# Patient Record
Sex: FEMALE | Race: BLACK OR AFRICAN AMERICAN | Employment: UNEMPLOYED | URBAN - METROPOLITAN AREA
[De-identification: names, ages, dates, MRNs, and addresses within clinical notes are randomized per-mention and may not be internally consistent; named-entity substitution may affect disease eponyms.]

---

## 2017-01-25 ENCOUNTER — OFFICE VISIT (OUTPATIENT)
Dept: FAMILY MEDICINE CLINIC | Age: 57
End: 2017-01-25

## 2017-01-25 ENCOUNTER — DOCUMENTATION ONLY (OUTPATIENT)
Dept: FAMILY MEDICINE CLINIC | Age: 57
End: 2017-01-25

## 2017-01-25 ENCOUNTER — HOSPITAL ENCOUNTER (OUTPATIENT)
Dept: LAB | Age: 57
Discharge: HOME | End: 2017-01-25
Payer: OTHER GOVERNMENT

## 2017-01-25 VITALS
OXYGEN SATURATION: 96 % | WEIGHT: 173 LBS | BODY MASS INDEX: 31.83 KG/M2 | SYSTOLIC BLOOD PRESSURE: 147 MMHG | HEART RATE: 80 BPM | RESPIRATION RATE: 16 BRPM | HEIGHT: 62 IN | DIASTOLIC BLOOD PRESSURE: 86 MMHG | TEMPERATURE: 97.6 F

## 2017-01-25 DIAGNOSIS — E11.9 TYPE 2 DIABETES MELLITUS WITHOUT COMPLICATION, WITHOUT LONG-TERM CURRENT USE OF INSULIN (HCC): Primary | ICD-10-CM

## 2017-01-25 DIAGNOSIS — E11.69 CONTROLLED TYPE 2 DIABETES MELLITUS WITH OTHER SPECIFIED COMPLICATION, WITH LONG-TERM CURRENT USE OF INSULIN (HCC): ICD-10-CM

## 2017-01-25 DIAGNOSIS — K21.9 GASTRIC REFLUX: ICD-10-CM

## 2017-01-25 DIAGNOSIS — Z79.4 CONTROLLED TYPE 2 DIABETES MELLITUS WITH OTHER SPECIFIED COMPLICATION, WITH LONG-TERM CURRENT USE OF INSULIN (HCC): ICD-10-CM

## 2017-01-25 DIAGNOSIS — I10 ESSENTIAL HYPERTENSION: ICD-10-CM

## 2017-01-25 DIAGNOSIS — E03.9 HYPOTHYROIDISM, ACQUIRED: ICD-10-CM

## 2017-01-25 DIAGNOSIS — Z13.5 SCREENING FOR EYE CONDITION: ICD-10-CM

## 2017-01-25 LAB
ALBUMIN SERPL BCP-MCNC: 3.4 G/DL (ref 3.4–5)
ALBUMIN/GLOB SERPL: 0.7 {RATIO} (ref 0.8–1.7)
ALP SERPL-CCNC: 201 U/L (ref 45–117)
ALT SERPL-CCNC: 69 U/L (ref 13–56)
ANION GAP BLD CALC-SCNC: 10 MMOL/L (ref 3–18)
AST SERPL W P-5'-P-CCNC: 43 U/L (ref 15–37)
BILIRUB SERPL-MCNC: 0.7 MG/DL (ref 0.2–1)
BUN SERPL-MCNC: 18 MG/DL (ref 7–18)
BUN/CREAT SERPL: 17 (ref 12–20)
CALCIUM SERPL-MCNC: 9.1 MG/DL (ref 8.5–10.1)
CHLORIDE SERPL-SCNC: 92 MMOL/L (ref 100–108)
CHOLEST SERPL-MCNC: 207 MG/DL
CO2 SERPL-SCNC: 30 MMOL/L (ref 21–32)
CREAT SERPL-MCNC: 1.04 MG/DL (ref 0.6–1.3)
CREAT UR-MCNC: 57.27 MG/DL (ref 30–125)
EST. AVERAGE GLUCOSE BLD GHB EST-MCNC: 298 MG/DL
GLOBULIN SER CALC-MCNC: 4.9 G/DL (ref 2–4)
GLUCOSE SERPL-MCNC: 544 MG/DL (ref 74–99)
HBA1C MFR BLD: 12 % (ref 4.2–5.6)
HDLC SERPL-MCNC: 61 MG/DL (ref 40–60)
HDLC SERPL: 3.4 {RATIO} (ref 0–5)
LDLC SERPL CALC-MCNC: 104.8 MG/DL (ref 0–100)
LIPID PROFILE,FLP: ABNORMAL
MICROALBUMIN UR-MCNC: 11.1 MG/DL (ref 0–3)
MICROALBUMIN/CREAT UR-RTO: 194 MG/G (ref 0–30)
POTASSIUM SERPL-SCNC: 4.1 MMOL/L (ref 3.5–5.5)
PROT SERPL-MCNC: 8.3 G/DL (ref 6.4–8.2)
SODIUM SERPL-SCNC: 132 MMOL/L (ref 136–145)
TRIGL SERPL-MCNC: 206 MG/DL (ref ?–150)
TSH SERPL DL<=0.05 MIU/L-ACNC: 3.55 UIU/ML (ref 0.36–3.74)
VLDLC SERPL CALC-MCNC: 41.2 MG/DL

## 2017-01-25 PROCEDURE — 36415 COLL VENOUS BLD VENIPUNCTURE: CPT | Performed by: FAMILY MEDICINE

## 2017-01-25 PROCEDURE — 84443 ASSAY THYROID STIM HORMONE: CPT | Performed by: FAMILY MEDICINE

## 2017-01-25 PROCEDURE — 82043 UR ALBUMIN QUANTITATIVE: CPT | Performed by: FAMILY MEDICINE

## 2017-01-25 PROCEDURE — 80061 LIPID PANEL: CPT | Performed by: FAMILY MEDICINE

## 2017-01-25 PROCEDURE — 83036 HEMOGLOBIN GLYCOSYLATED A1C: CPT | Performed by: FAMILY MEDICINE

## 2017-01-25 PROCEDURE — 80053 COMPREHEN METABOLIC PANEL: CPT | Performed by: FAMILY MEDICINE

## 2017-01-25 RX ORDER — INSULIN ASPART 100 [IU]/ML
INJECTION, SOLUTION INTRAVENOUS; SUBCUTANEOUS
Qty: 10 PEN | Refills: 11 | Status: SHIPPED | OUTPATIENT
Start: 2017-01-25 | End: 2017-04-25 | Stop reason: SDUPTHER

## 2017-01-25 RX ORDER — INSULIN DEGLUDEC 100 U/ML
25 INJECTION, SOLUTION SUBCUTANEOUS DAILY
Qty: 2 BOX | Refills: 11 | Status: SHIPPED | OUTPATIENT
Start: 2017-01-25 | End: 2017-04-25 | Stop reason: SDUPTHER

## 2017-01-25 RX ORDER — RABEPRAZOLE SODIUM 20 MG/1
20 TABLET, DELAYED RELEASE ORAL DAILY
Qty: 30 TAB | Refills: 1 | Status: SHIPPED | OUTPATIENT
Start: 2017-01-25 | End: 2017-03-27 | Stop reason: SDUPTHER

## 2017-01-25 NOTE — PROGRESS NOTES
DOCUMENTATION ONLY: CRITICAL LAB RESULTS: Keri with [facility] contacted the office and stated that the patients glucose was 544. Per Dr. Yap Grew request, contacted the patient and advised her to take 20 units of Novalog now and resume the original medication regimen tomorrow. Patient verbalized understanding and tolerated well.

## 2017-01-25 NOTE — MR AVS SNAPSHOT
Visit Information Date & Time Provider Department Dept. Phone Encounter #  
 1/25/2017  8:20 AM Lizzy Rush[address]  [id number] Follow-up Instructions Return in about 3 months (around 4/25/2017). Upcoming Health Maintenance Date Due  
 EYE EXAM RETINAL OR DILATED Q1 8/24/1970 DTaP/Tdap/Td series (1 - Tdap) 8/24/1981 PAP AKA CERVICAL CYTOLOGY 8/24/1981 FOBT Q 1 YEAR AGE 50-75 8/24/2010 MICROALBUMIN Q1 7/24/2016 LIPID PANEL Q1 7/24/2016 HEMOGLOBIN A1C Q6M 11/19/2016 FOOT EXAM Q1 10/25/2017 BREAST CANCER SCRN MAMMOGRAM 11/16/2018 Allergies as of 1/25/2017  Review Complete On: 1/25/2017 By: Ciera Harrell LPN Severity Noted Reaction Type Reactions Contrast Agent [Iodine]  06/24/2014    Rash, Sneezing Current Immunizations  Never Reviewed Name Date Influenza Vaccine 9/1/2016 Influenza Vaccine (Madin Olin Canine Kidney) PF 2/5/2015  3:56 PM  
 Influenza Vaccine (Quad) PF 11/30/2015 Pneumococcal Polysaccharide (PPSV-23) 10/25/2016 Not reviewed this visit You Were Diagnosed With   
  
 Codes Comments Type 2 diabetes mellitus without complication, without long-term current use of insulin ([facility])    -  Primary ICD-10-CM: E11.9 ICD-9-CM: 250.00 Screening for eye condition     ICD-10-CM: Z13.5 ICD-9-CM: V80.2 Gastric reflux     ICD-10-CM: K21.9 ICD-9-CM: 530.81 Vitals BP Pulse Temp Resp Height(growth percentile) Weight(growth percentile) 147/86 (BP 1 Location: Right arm, BP Patient Position: Sitting) 80 97.6 °F (36.4 °C) (Oral) 16 5' 2\" (1.575 m) 173 lb (78.5 kg) SpO2 BMI OB Status Smoking Status 96% 31.64 kg/m2 Hysterectomy Never Smoker BMI and BSA Data Body Mass Index Body Surface Area  
 31.64 kg/m 2 1.85 m 2 Preferred Pharmacy Pharmacy Name Phone [address] [facility] [address] [phone] Your Updated Medication List  
  
   
This list is accurate as of: 1/25/17  8:56 AM.  Always use your most recent med list. amLODIPine 10 mg tablet Commonly known as:  Linnea Darting Take 1 Tab by mouth daily. aspirin delayed-release 81 mg tablet Take  by mouth daily. atorvastatin 40 mg tablet Commonly known as:  LIPITOR Take 1 Tab by mouth daily. buPROPion 100 mg tablet Commonly known as:  [facility] Take 1 Tab by mouth daily. citalopram 40 mg tablet Commonly known as:  Artemio Gola Take 1 Tab by mouth daily. clopidogrel 75 mg Tab Commonly known as:  PLAVIX Take 1 Tab by mouth daily. hydrALAZINE 50 mg tablet Commonly known as:  APRESOLINE Take 0.5 Tabs by mouth four (4) times daily. hydroCHLOROthiazide 25 mg tablet Commonly known as:  HYDRODIURIL Take 1 Tab by mouth daily. ibuprofen 800 mg tablet Commonly known as:  MOTRIN Take 1 Tab by mouth every eight (8) hours as needed for Pain. insulin aspart 100 unit/mL Inpn Commonly known as:  Dalphine Milly Take 8 units with each meal.  
  
 insulin degludec 100 unit/mL (3 mL) Inpn Commonly known as:  TRESIBA FLEXTOUCH U-100  
25 Units by SubCUTAneous route daily. Insulin Needles (Disposable) 32 gauge x 5/32\" Ndle Commonly known as:  Mona Pen Needle Use one needle to give insulin 4 times a day. levothyroxine 125 mcg tablet Commonly known as:  SYNTHROID Take 1 Tab by mouth Daily (before breakfast). losartan 100 mg tablet Commonly known as:  COZAAR Take 1 Tab by mouth daily. metFORMIN 850 mg tablet Commonly known as:  GLUCOPHAGE Take 1 Tab by mouth two (2) times daily (with meals). metoprolol succinate 200 mg XL tablet Commonly known as:  TOPROL-XL Take 1 Tab by mouth daily. RABEprazole 20 mg tablet Commonly known as:  ACIPHEX Take 1 Tab by mouth daily. Prescriptions Sent to Pharmacy Refills insulin degludec (TRESIBA FLEXTOUCH U-100) 100 unit/mL (3 mL) inpn 11 Si Units by SubCUTAneous route daily. Class: Normal  
 Pharmacy: [place] [address] Ph #: [phone] Route: SubCUTAneous  
 insulin aspart (NOVOLOG) 100 unit/mL inpn 11 Sig: Take 8 units with each meal.  
 Class: Normal  
 Pharmacy: [address] [place] [address] Ph #: [phone] RABEprazole (ACIPHEX) 20 mg tablet 1 Sig: Take 1 Tab by mouth daily. Class: Normal  
 Pharmacy: [place] [address] Ph #: [phone] Route: Oral  
  
We Performed the Following REFERRAL TO OPHTHALMOLOGY [REF57 Custom] Follow-up Instructions Return in about 3 months (around 2017). Referral Information Referral ID Referred By Referred To  
  
 [id number] Jose BOLAÑOS Not Available Visits Status Start Date End Date 1 New Request 17 If your referral has a status of pending review or denied, additional information will be sent to support the outcome of this decision. Introducing \[facility] [place] [facility]\"" & HEALTH SERVICES! Gurwinder Young introduces [employer] patient portal. Now you can access parts of your medical record, email your doctor's office, and request medication refills online. 1. In your internet browser, go to [email or web address] 2. Click on the First Time User? Click Here link in the Sign In box. You will see the New Member Sign Up page. 3. Enter your [employer] Access Code exactly as it appears below. You will not need to use this code after youve completed the sign-up process. If you do not sign up before the expiration date, you must request a new code. · [employer] Access Code: [email or web address] Expires: 2017  8:15 AM 
 
4.  Enter the last four digits of your Social Security Number (xxxx) and Date of Birth (mm/dd/yyyy) as indicated and click Submit. You will be taken to the next sign-up page. 5. Create a [employer] ID. This will be your [employer] login ID and cannot be changed, so think of one that is secure and easy to remember. 6. Create a [employer] password. You can change your password at any time. 7. Enter your Password Reset Question and Answer. This can be used at a later time if you forget your password. 8. Enter your e-mail address. You will receive e-mail notification when new information is available in [address] 9. Click Sign Up. You can now view and download portions of your medical record. 10. Click the Download Summary menu link to download a portable copy of your medical information. If you have questions, please visit the Frequently Asked Questions section of the [employer] website. Remember, [employer] is NOT to be used for urgent needs. For medical emergencies, dial 911. Now available from your iPhone and Android! Please provide this summary of care documentation to your next provider. Your primary care clinician is listed as Caatrina Pichardo. If you have any questions after today's visit, please call [phone].

## 2017-01-25 NOTE — PROGRESS NOTES
1. Have you been to the ER, urgent care clinic since your last visit? Hospitalized since your last visit? No    2. Have you seen or consulted any other health care providers outside of the [address] [place] [address] since your last visit? Include any pap smears or colon screening.  No

## 2017-01-25 NOTE — PROGRESS NOTES
Subjective:     HPI:  Axel Santana is a 64 y.o. female who presents to the office for follow up on diabetes and hypertension, c/o abdominal pain. Diabetes Mellitus:  female has diabetes mellitus, and  hypertension, hyperlipidemia and coronary artery disease. Diabetic ROS - medication compliance: compliant most all of the time - she has been out of insulin for two weeks, Patient's insurance is no longer covering Humalog and Toujeo. Patient is currently taking Toujeo 25 units daily and Humalog 8 units TID with meals. diabetic diet compliance: compliant most of the time,   home glucose monitoring: is not performed, patient reports taking blood sugar readings makes her anxious so she does not take it at home. further diabetic ROS: no polyuria or polydipsia, no chest pain, dyspnea or TIA's, no numbness, tingling or pain in extremities, no unusual visual symptoms, no medication side effects noted. Lab review: orders written for new lab studies as appropriate; see orders. Lab Results   Component Value Date/Time    Hemoglobin A1c 11.4 03/17/2016 01:10 PM    Hemoglobin A1c (POC) 10.8 05/19/2016 12:56 PM     Hypertension  The patient presents for follow up of hypertension. Pt's BP is 147/86 in the office today. Cardiovascular ROS: taking medications as instructed, no medication side effects noted, no TIA's, no chest pain on exertion, no dyspnea on exertion, no swelling of ankles. Abdominal Pain: Patient reports left upper quadrant pain. She states the pain is usually dull but sometimes becomes sharp. Patient rates pain at 6/10. Pain is increased after eating. She reports pain increases after eating without difference with food eaten. Pain is improved with lying down. She denies radiation of pain. Patient denies burning in stomach and esophagus. Patient reports she normally has a bowel movement every other day. Patient denies diarrhea, constipation, blood in stool, and dark stool.  She reports being diagnosed with GERD in the past.    Of note, patient reports she was dx with sleep apnea and prescribed a CPAP machine. She reports feeling more rested with decreased daytime somnolence while using CPAP during sleep. Patient is not fasting at office visit today. Current Outpatient Prescriptions   Medication Sig Dispense Refill    insulin degludec (TRESIBA FLEXTOUCH U-100) 100 unit/mL (3 mL) inpn 25 Units by SubCUTAneous route daily. 2 Box 11    insulin aspart (NOVOLOG) 100 unit/mL inpn Take 8 units with each meal. 10 Pen 11    RABEprazole (ACIPHEX) 20 mg tablet Take 1 Tab by mouth daily. 30 Tab 1    amLODIPine (NORVASC) 10 mg tablet Take 1 Tab by mouth daily. 90 Tab 3    losartan (COZAAR) 100 mg tablet Take 1 Tab by mouth daily. 90 Tab 3    metoprolol succinate (TOPROL-XL) 200 mg XL tablet Take 1 Tab by mouth daily. 90 Tab 3    hydrALAZINE (APRESOLINE) 50 mg tablet Take 0.5 Tabs by mouth four (4) times daily. 180 Tab 3    hydroCHLOROthiazide (HYDRODIURIL) 25 mg tablet Take 1 Tab by mouth daily. 90 Tab 3    atorvastatin (LIPITOR) 40 mg tablet Take 1 Tab by mouth daily. 90 Tab 3    metFORMIN (GLUCOPHAGE) 850 mg tablet Take 1 Tab by mouth two (2) times daily (with meals). 180 Tab 3    levothyroxine (SYNTHROID) 125 mcg tablet Take 1 Tab by mouth Daily (before breakfast). 90 Tab 3    clopidogrel (PLAVIX) 75 mg tablet Take 1 Tab by mouth daily. 90 Tab 3    buPROPion (WELLBUTRIN) 100 mg tablet Take 1 Tab by mouth daily. 90 Tab 3    citalopram (CELEXA) 40 mg tablet Take 1 Tab by mouth daily. 90 Tab 3    Insulin Needles, Disposable, (DORA PEN NEEDLE) 32 gauge x 5/32\" ndle Use one needle to give insulin 4 times a day. 400 Pen Needle 15    ibuprofen (MOTRIN) 800 mg tablet Take 1 Tab by mouth every eight (8) hours as needed for Pain. 90 Tab 0    aspirin delayed-release 81 mg tablet Take  by mouth daily.           Allergies   Allergen Reactions    Contrast Agent [Iodine] Rash and Sneezing       Past Medical History   Diagnosis Date    Anxiety     CAD (coronary artery disease)      S/P Coronary stents ( LAD and Lcx)    Depression     Diabetes ([facility] [address]     Hepatitis C     Hypercholesterolemia     Hypertension         Past Surgical History   Procedure Laterality Date    Hx coronary stent placement  2013     4 stents    Hx hysterectomy  2005     heavy periods    Hx  section  1982     pre-eclampsia    Hx heent  2009     thyroidectomy due to nodules.  Hx cholecystectomy  2004    Hx thyroidectomy      Hx breast biopsy       1971  left breast lump removed excisional per patient       Family History   Problem Relation Age of Onset    Diabetes Mother     Hypertension Mother     Cancer Mother     Heart Disease Mother     Heart Attack Mother     Diabetes Father     Hypertension Father     Cancer Father        Social History     Social History    Marital status:      Spouse name: N/A    Number of children: N/A    Years of education: N/A     Occupational History    Not on file. Social History Main Topics    Smoking status: Never Smoker    Smokeless tobacco: Never Used    Alcohol use No    Drug use: No    Sexual activity: Not Currently     Other Topics Concern    Not on file     Social History Narrative       REVIEW OF SYSTEM:  Review of Systems   Constitutional: Negative for chills and fever. Eyes: Negative for blurred vision. Respiratory: Negative for shortness of breath. Cardiovascular: Negative for chest pain, palpitations and leg swelling. Gastrointestinal: Positive for abdominal pain. Negative for blood in stool, constipation, diarrhea, heartburn, melena, nausea and vomiting. Musculoskeletal: Negative for joint pain. Neurological: Negative for headaches.        Objective:     Visit Vitals    /86 (BP 1 Location: Right arm, BP Patient Position: Sitting)    Pulse 80    Temp 97.6 °F (36.4 °C) (Oral)    Resp 16    Ht 5' 2\" (1.575 m)    Wt 173 lb (78.5 kg)    SpO2 96%    BMI 31.64 kg/m2       PHYSICAL EXAM:  Physical Exam   Constitutional: She is oriented to person, place, and time and well-developed, well-nourished, and in no distress. HENT:   Right Ear: Tympanic membrane, external ear and ear canal normal.   Left Ear: Tympanic membrane, external ear and ear canal normal.   Nose: Nose normal.   Mouth/Throat: Oropharynx is clear and moist.   Eyes: Pupils are equal, round, and reactive to light. Neck: Normal range of motion. Neck supple. No thyromegaly present. Cardiovascular: Normal rate, regular rhythm, normal heart sounds and intact distal pulses. No murmur heard. Pulmonary/Chest: Effort normal and breath sounds normal. She has no wheezes. Abdominal: Soft. Bowel sounds are normal. There is no tenderness. Neurological: She is alert and oriented to person, place, and time. Skin: Skin is warm and dry. Vitals reviewed. Assessment/Plan:       ICD-10-CM ICD-9-CM    1. Type 2 diabetes mellitus without complication, without long-term current use of insulin ([facility]) E11.9 250.00 insulin degludec (TRESIBA FLEXTOUCH U-100) 100 unit/mL (3 mL) inpn      insulin aspart (NOVOLOG) 100 unit/mL inpn   2. Screening for eye condition Z13.5 V80.2 REFERRAL TO OPHTHALMOLOGY   3. Gastric reflux K21.9 530.81 RABEprazole (ACIPHEX) 20 mg tablet      Patient will have non-fasting labs drawn in office today. Patient's Humalog and Toujeo changed to Novolog and [place] because of insurance formulary changes. Patient given opportunity to ask questions. Questions answered. Patient understands plan of care. Follow-up Disposition:  Return in about 3 months (around 4/25/2017).         Written by Terese Perrin, as dictated by Anny Fang DO.

## 2017-03-27 DIAGNOSIS — K21.9 GASTRIC REFLUX: ICD-10-CM

## 2017-03-31 RX ORDER — RABEPRAZOLE SODIUM 20 MG/1
TABLET, DELAYED RELEASE ORAL
Qty: 30 TAB | Refills: 3 | Status: SHIPPED | OUTPATIENT
Start: 2017-03-31 | End: 2017-05-21 | Stop reason: SDUPTHER

## 2017-04-04 ENCOUNTER — APPOINTMENT (OUTPATIENT)
Dept: MRI IMAGING | Age: 57
End: 2017-04-04
Attending: HOSPITALIST
Payer: OTHER GOVERNMENT

## 2017-04-04 ENCOUNTER — APPOINTMENT (OUTPATIENT)
Dept: CT IMAGING | Age: 57
End: 2017-04-04
Attending: EMERGENCY MEDICINE
Payer: OTHER GOVERNMENT

## 2017-04-04 ENCOUNTER — APPOINTMENT (OUTPATIENT)
Dept: GENERAL RADIOLOGY | Age: 57
End: 2017-04-04
Attending: EMERGENCY MEDICINE
Payer: OTHER GOVERNMENT

## 2017-04-04 ENCOUNTER — HOSPITAL ENCOUNTER (OUTPATIENT)
Age: 57
Setting detail: OBSERVATION
Discharge: HOME | End: 2017-04-06
Attending: EMERGENCY MEDICINE | Admitting: HOSPITALIST
Payer: OTHER GOVERNMENT

## 2017-04-04 DIAGNOSIS — I10 MALIGNANT HYPERTENSION: ICD-10-CM

## 2017-04-04 DIAGNOSIS — R73.9 HYPERGLYCEMIA: ICD-10-CM

## 2017-04-04 DIAGNOSIS — G45.8 OTHER SPECIFIED TRANSIENT CEREBRAL ISCHEMIAS: Primary | ICD-10-CM

## 2017-04-04 LAB
ALBUMIN SERPL BCP-MCNC: 3.6 G/DL (ref 3.4–5)
ALBUMIN/GLOB SERPL: 0.6 {RATIO} (ref 0.8–1.7)
ALP SERPL-CCNC: 182 U/L (ref 45–117)
ALT SERPL-CCNC: 46 U/L (ref 13–56)
AMPHET UR QL SCN: NEGATIVE
ANION GAP BLD CALC-SCNC: 11 MMOL/L (ref 3–18)
ANION GAP BLD CALC-SCNC: 18 MMOL/L (ref 10–20)
APPEARANCE UR: CLEAR
AST SERPL W P-5'-P-CCNC: 33 U/L (ref 15–37)
BACTERIA URNS QL MICRO: ABNORMAL /HPF
BARBITURATES UR QL SCN: NEGATIVE
BENZODIAZ UR QL: NEGATIVE
BILIRUB SERPL-MCNC: 0.6 MG/DL (ref 0.2–1)
BILIRUB UR QL: NEGATIVE
BUN BLD-MCNC: 20 MG/DL (ref 7–18)
BUN SERPL-MCNC: 19 MG/DL (ref 7–18)
BUN/CREAT SERPL: 17 (ref 12–20)
CA-I BLD-MCNC: 1.12 MMOL/L (ref 1.12–1.32)
CALCIUM SERPL-MCNC: 9.2 MG/DL (ref 8.5–10.1)
CANNABINOIDS UR QL SCN: NEGATIVE
CHLORIDE BLD-SCNC: 94 MMOL/L (ref 100–108)
CHLORIDE SERPL-SCNC: 92 MMOL/L (ref 100–108)
CK MB CFR SERPL CALC: NORMAL % (ref 0–4)
CK MB SERPL-MCNC: <1 NG/ML (ref 5–25)
CK SERPL-CCNC: 88 U/L (ref 26–192)
CO2 BLD-SCNC: 28 MMOL/L (ref 19–24)
CO2 SERPL-SCNC: 27 MMOL/L (ref 21–32)
COCAINE UR QL SCN: NEGATIVE
COLOR UR: YELLOW
CREAT SERPL-MCNC: 1.11 MG/DL (ref 0.6–1.3)
CREAT UR-MCNC: 0.7 MG/DL (ref 0.6–1.3)
EPITH CASTS URNS QL MICRO: ABNORMAL /LPF (ref 0–5)
ERYTHROCYTE [DISTWIDTH] IN BLOOD BY AUTOMATED COUNT: 11.1 % (ref 11.6–14.5)
EST. AVERAGE GLUCOSE BLD GHB EST-MCNC: 232 MG/DL
FIBRINOGEN PPP-MCNC: 498 MG/DL (ref 210–451)
GLOBULIN SER CALC-MCNC: 5.7 G/DL (ref 2–4)
GLUCOSE BLD STRIP.AUTO-MCNC: 351 MG/DL (ref 70–110)
GLUCOSE BLD STRIP.AUTO-MCNC: 456 MG/DL (ref 70–110)
GLUCOSE BLD STRIP.AUTO-MCNC: 515 MG/DL (ref 74–106)
GLUCOSE SERPL-MCNC: 550 MG/DL (ref 74–99)
GLUCOSE UR STRIP.AUTO-MCNC: >1000 MG/DL
HBA1C MFR BLD: 9.7 % (ref 4.2–5.6)
HCT VFR BLD AUTO: 45.3 % (ref 35–45)
HCT VFR BLD CALC: 39 % (ref 36–49)
HDSCOM,HDSCOM: NORMAL
HGB BLD-MCNC: 13.3 G/DL (ref 12–16)
HGB BLD-MCNC: 16.1 G/DL (ref 12–16)
HGB UR QL STRIP: ABNORMAL
INR PPP: 0.9 (ref 0.8–1.2)
KETONES UR QL STRIP.AUTO: NEGATIVE MG/DL
LEUKOCYTE ESTERASE UR QL STRIP.AUTO: NEGATIVE
MCH RBC QN AUTO: 30.8 PG (ref 24–34)
MCHC RBC AUTO-ENTMCNC: 35.5 G/DL (ref 31–37)
MCV RBC AUTO: 86.6 FL (ref 74–97)
METHADONE UR QL: NEGATIVE
NITRITE UR QL STRIP.AUTO: NEGATIVE
OPIATES UR QL: NEGATIVE
PCP UR QL: NEGATIVE
PH UR STRIP: 6.5 [PH] (ref 5–8)
PLATELET # BLD AUTO: 207 K/UL (ref 135–420)
PMV BLD AUTO: 11.4 FL (ref 9.2–11.8)
POTASSIUM BLD-SCNC: 3.5 MMOL/L (ref 3.5–5.5)
POTASSIUM SERPL-SCNC: 4.1 MMOL/L (ref 3.5–5.5)
PROT SERPL-MCNC: 9.3 G/DL (ref 6.4–8.2)
PROT UR STRIP-MCNC: 100 MG/DL
PROTHROMBIN TIME: 11.9 SEC (ref 11.5–15.2)
RBC # BLD AUTO: 5.23 M/UL (ref 4.2–5.3)
RBC #/AREA URNS HPF: 0 /HPF (ref 0–5)
SODIUM BLD-SCNC: 135 MMOL/L (ref 136–145)
SODIUM SERPL-SCNC: 130 MMOL/L (ref 136–145)
SP GR UR REFRACTOMETRY: >1.03 (ref 1–1.03)
THROMBIN TIME: 18.9 SECS (ref 13.8–18.2)
TROPONIN I SERPL-MCNC: 0.05 NG/ML (ref 0–0.04)
TROPONIN I SERPL-MCNC: <0.02 NG/ML (ref 0–0.04)
TSH SERPL DL<=0.05 MIU/L-ACNC: 5.15 UIU/ML (ref 0.36–3.74)
UROBILINOGEN UR QL STRIP.AUTO: 1 EU/DL (ref 0.2–1)
WBC # BLD AUTO: 6.2 K/UL (ref 4.6–13.2)
WBC URNS QL MICRO: ABNORMAL /HPF (ref 0–4)
YEAST URNS QL MICRO: ABNORMAL

## 2017-04-04 PROCEDURE — 93005 ELECTROCARDIOGRAM TRACING: CPT

## 2017-04-04 PROCEDURE — 70450 CT HEAD/BRAIN W/O DYE: CPT

## 2017-04-04 PROCEDURE — 83036 HEMOGLOBIN GLYCOSYLATED A1C: CPT | Performed by: EMERGENCY MEDICINE

## 2017-04-04 PROCEDURE — 74011250637 HC RX REV CODE- 250/637: Performed by: EMERGENCY MEDICINE

## 2017-04-04 PROCEDURE — 99285 EMERGENCY DEPT VISIT HI MDM: CPT

## 2017-04-04 PROCEDURE — 85670 THROMBIN TIME PLASMA: CPT | Performed by: EMERGENCY MEDICINE

## 2017-04-04 PROCEDURE — 96376 TX/PRO/DX INJ SAME DRUG ADON: CPT

## 2017-04-04 PROCEDURE — 96374 THER/PROPH/DIAG INJ IV PUSH: CPT

## 2017-04-04 PROCEDURE — 65660000000 HC RM CCU STEPDOWN

## 2017-04-04 PROCEDURE — 96361 HYDRATE IV INFUSION ADD-ON: CPT

## 2017-04-04 PROCEDURE — 74011636637 HC RX REV CODE- 636/637: Performed by: HOSPITALIST

## 2017-04-04 PROCEDURE — 85610 PROTHROMBIN TIME: CPT | Performed by: EMERGENCY MEDICINE

## 2017-04-04 PROCEDURE — A9585 GADOBUTROL INJECTION: HCPCS | Performed by: HOSPITALIST

## 2017-04-04 PROCEDURE — 70544 MR ANGIOGRAPHY HEAD W/O DYE: CPT

## 2017-04-04 PROCEDURE — 80307 DRUG TEST PRSMV CHEM ANLYZR: CPT | Performed by: EMERGENCY MEDICINE

## 2017-04-04 PROCEDURE — 96375 TX/PRO/DX INJ NEW DRUG ADDON: CPT

## 2017-04-04 PROCEDURE — 70549 MR ANGIOGRAPH NECK W/O&W/DYE: CPT

## 2017-04-04 PROCEDURE — 85027 COMPLETE CBC AUTOMATED: CPT | Performed by: EMERGENCY MEDICINE

## 2017-04-04 PROCEDURE — 85576 BLOOD PLATELET AGGREGATION: CPT | Performed by: EMERGENCY MEDICINE

## 2017-04-04 PROCEDURE — 36415 COLL VENOUS BLD VENIPUNCTURE: CPT | Performed by: HOSPITALIST

## 2017-04-04 PROCEDURE — 74011250636 HC RX REV CODE- 250/636: Performed by: EMERGENCY MEDICINE

## 2017-04-04 PROCEDURE — 85384 FIBRINOGEN ACTIVITY: CPT | Performed by: EMERGENCY MEDICINE

## 2017-04-04 PROCEDURE — 80053 COMPREHEN METABOLIC PANEL: CPT | Performed by: EMERGENCY MEDICINE

## 2017-04-04 PROCEDURE — 84481 FREE ASSAY (FT-3): CPT | Performed by: HOSPITALIST

## 2017-04-04 PROCEDURE — 84443 ASSAY THYROID STIM HORMONE: CPT | Performed by: HOSPITALIST

## 2017-04-04 PROCEDURE — 96372 THER/PROPH/DIAG INJ SC/IM: CPT

## 2017-04-04 PROCEDURE — 99218 HC RM OBSERVATION: CPT

## 2017-04-04 PROCEDURE — 74011250636 HC RX REV CODE- 250/636: Performed by: HOSPITALIST

## 2017-04-04 PROCEDURE — 70553 MRI BRAIN STEM W/O & W/DYE: CPT

## 2017-04-04 PROCEDURE — 86900 BLOOD TYPING SEROLOGIC ABO: CPT | Performed by: EMERGENCY MEDICINE

## 2017-04-04 PROCEDURE — 80047 BASIC METABLC PNL IONIZED CA: CPT

## 2017-04-04 PROCEDURE — 71010 XR CHEST PORT: CPT

## 2017-04-04 PROCEDURE — 74011250637 HC RX REV CODE- 250/637: Performed by: HOSPITALIST

## 2017-04-04 PROCEDURE — 82550 ASSAY OF CK (CPK): CPT | Performed by: EMERGENCY MEDICINE

## 2017-04-04 PROCEDURE — 82962 GLUCOSE BLOOD TEST: CPT

## 2017-04-04 PROCEDURE — 74011000250 HC RX REV CODE- 250: Performed by: EMERGENCY MEDICINE

## 2017-04-04 PROCEDURE — 84439 ASSAY OF FREE THYROXINE: CPT | Performed by: HOSPITALIST

## 2017-04-04 PROCEDURE — 81001 URINALYSIS AUTO W/SCOPE: CPT | Performed by: EMERGENCY MEDICINE

## 2017-04-04 PROCEDURE — 86141 C-REACTIVE PROTEIN HS: CPT | Performed by: HOSPITALIST

## 2017-04-04 PROCEDURE — 77030020263 HC SOL INJ SOD CL0.9% LFCR 1000ML

## 2017-04-04 RX ORDER — SODIUM CHLORIDE 9 MG/ML
150 INJECTION, SOLUTION INTRAVENOUS CONTINUOUS
Status: DISPENSED | OUTPATIENT
Start: 2017-04-04 | End: 2017-04-05

## 2017-04-04 RX ORDER — CLOPIDOGREL BISULFATE 75 MG/1
75 TABLET ORAL DAILY
Status: DISCONTINUED | OUTPATIENT
Start: 2017-04-05 | End: 2017-04-06 | Stop reason: HOSPADM

## 2017-04-04 RX ORDER — LORAZEPAM 2 MG/ML
1 INJECTION INTRAMUSCULAR
Status: COMPLETED | OUTPATIENT
Start: 2017-04-04 | End: 2017-04-04

## 2017-04-04 RX ORDER — LOSARTAN POTASSIUM 50 MG/1
100 TABLET ORAL DAILY
Status: DISCONTINUED | OUTPATIENT
Start: 2017-04-05 | End: 2017-04-06 | Stop reason: HOSPADM

## 2017-04-04 RX ORDER — DEXTROSE 50 % IN WATER (D50W) INTRAVENOUS SYRINGE
25-50 AS NEEDED
Status: DISCONTINUED | OUTPATIENT
Start: 2017-04-04 | End: 2017-04-06 | Stop reason: HOSPADM

## 2017-04-04 RX ORDER — MAGNESIUM SULFATE 100 %
4 CRYSTALS MISCELLANEOUS AS NEEDED
Status: DISCONTINUED | OUTPATIENT
Start: 2017-04-04 | End: 2017-04-06 | Stop reason: HOSPADM

## 2017-04-04 RX ORDER — ENOXAPARIN SODIUM 100 MG/ML
40 INJECTION SUBCUTANEOUS EVERY 24 HOURS
Status: DISCONTINUED | OUTPATIENT
Start: 2017-04-04 | End: 2017-04-06 | Stop reason: HOSPADM

## 2017-04-04 RX ORDER — ACETAMINOPHEN 325 MG/1
650 TABLET ORAL
Status: DISCONTINUED | OUTPATIENT
Start: 2017-04-04 | End: 2017-04-06 | Stop reason: HOSPADM

## 2017-04-04 RX ORDER — AMLODIPINE BESYLATE 10 MG/1
10 TABLET ORAL DAILY
Status: DISCONTINUED | OUTPATIENT
Start: 2017-04-05 | End: 2017-04-06 | Stop reason: HOSPADM

## 2017-04-04 RX ORDER — INSULIN LISPRO 100 [IU]/ML
INJECTION, SOLUTION INTRAVENOUS; SUBCUTANEOUS
Status: DISCONTINUED | OUTPATIENT
Start: 2017-04-04 | End: 2017-04-06 | Stop reason: HOSPADM

## 2017-04-04 RX ORDER — HYDROCHLOROTHIAZIDE 25 MG/1
25 TABLET ORAL DAILY
Status: DISCONTINUED | OUTPATIENT
Start: 2017-04-05 | End: 2017-04-06 | Stop reason: HOSPADM

## 2017-04-04 RX ORDER — ATORVASTATIN CALCIUM 40 MG/1
40 TABLET, FILM COATED ORAL DAILY
Status: DISCONTINUED | OUTPATIENT
Start: 2017-04-05 | End: 2017-04-06 | Stop reason: HOSPADM

## 2017-04-04 RX ORDER — BUPROPION HYDROCHLORIDE 100 MG/1
100 TABLET ORAL DAILY
Status: DISCONTINUED | OUTPATIENT
Start: 2017-04-05 | End: 2017-04-06 | Stop reason: HOSPADM

## 2017-04-04 RX ORDER — GUAIFENESIN 100 MG/5ML
81 LIQUID (ML) ORAL
Status: COMPLETED | OUTPATIENT
Start: 2017-04-04 | End: 2017-04-04

## 2017-04-04 RX ORDER — INSULIN LISPRO 100 [IU]/ML
5 INJECTION, SOLUTION INTRAVENOUS; SUBCUTANEOUS
Status: DISCONTINUED | OUTPATIENT
Start: 2017-04-04 | End: 2017-04-04

## 2017-04-04 RX ORDER — ONDANSETRON 2 MG/ML
1 INJECTION INTRAMUSCULAR; INTRAVENOUS
Status: DISCONTINUED | OUTPATIENT
Start: 2017-04-04 | End: 2017-04-06 | Stop reason: HOSPADM

## 2017-04-04 RX ORDER — LORAZEPAM 2 MG/ML
1 INJECTION INTRAMUSCULAR ONCE
Status: COMPLETED | OUTPATIENT
Start: 2017-04-04 | End: 2017-04-04

## 2017-04-04 RX ORDER — HYDRALAZINE HYDROCHLORIDE 25 MG/1
25 TABLET, FILM COATED ORAL 4 TIMES DAILY
Status: DISCONTINUED | OUTPATIENT
Start: 2017-04-04 | End: 2017-04-06 | Stop reason: HOSPADM

## 2017-04-04 RX ORDER — LABETALOL HYDROCHLORIDE 5 MG/ML
10 INJECTION, SOLUTION INTRAVENOUS
Status: COMPLETED | OUTPATIENT
Start: 2017-04-04 | End: 2017-04-04

## 2017-04-04 RX ORDER — ASPIRIN 81 MG/1
81 TABLET ORAL DAILY
Status: DISCONTINUED | OUTPATIENT
Start: 2017-04-05 | End: 2017-04-05

## 2017-04-04 RX ORDER — ALBUTEROL SULFATE 0.83 MG/ML
2.5 SOLUTION RESPIRATORY (INHALATION)
Status: DISCONTINUED | OUTPATIENT
Start: 2017-04-04 | End: 2017-04-06 | Stop reason: HOSPADM

## 2017-04-04 RX ORDER — ACETAMINOPHEN 650 MG/1
650 SUPPOSITORY RECTAL
Status: DISCONTINUED | OUTPATIENT
Start: 2017-04-04 | End: 2017-04-06 | Stop reason: HOSPADM

## 2017-04-04 RX ORDER — AMOXICILLIN 250 MG
2 CAPSULE ORAL
Status: DISCONTINUED | OUTPATIENT
Start: 2017-04-04 | End: 2017-04-06 | Stop reason: HOSPADM

## 2017-04-04 RX ORDER — CITALOPRAM 20 MG/1
40 TABLET, FILM COATED ORAL DAILY
Status: DISCONTINUED | OUTPATIENT
Start: 2017-04-05 | End: 2017-04-06 | Stop reason: HOSPADM

## 2017-04-04 RX ORDER — METOPROLOL SUCCINATE 100 MG/1
200 TABLET, EXTENDED RELEASE ORAL DAILY
Status: DISCONTINUED | OUTPATIENT
Start: 2017-04-05 | End: 2017-04-06 | Stop reason: HOSPADM

## 2017-04-04 RX ADMIN — LORAZEPAM 1 MG: 2 INJECTION, SOLUTION INTRAMUSCULAR; INTRAVENOUS at 15:33

## 2017-04-04 RX ADMIN — DOCUSATE SODIUM AND SENNOSIDES 2 TABLET: 8.6; 5 TABLET, FILM COATED ORAL at 22:35

## 2017-04-04 RX ADMIN — GADOBUTROL 15 ML: 604.72 INJECTION INTRAVENOUS at 21:07

## 2017-04-04 RX ADMIN — LABETALOL HYDROCHLORIDE 10 MG: 5 INJECTION, SOLUTION INTRAVENOUS at 15:33

## 2017-04-04 RX ADMIN — HYDRALAZINE HYDROCHLORIDE 25 MG: 25 TABLET, FILM COATED ORAL at 19:39

## 2017-04-04 RX ADMIN — SODIUM CHLORIDE 150 ML/HR: 900 INJECTION, SOLUTION INTRAVENOUS at 19:00

## 2017-04-04 RX ADMIN — SODIUM CHLORIDE 1000 ML: 900 INJECTION, SOLUTION INTRAVENOUS at 15:48

## 2017-04-04 RX ADMIN — INSULIN DETEMIR 15 UNITS: 100 INJECTION, SOLUTION SUBCUTANEOUS at 22:41

## 2017-04-04 RX ADMIN — LORAZEPAM 1 MG: 2 INJECTION, SOLUTION INTRAMUSCULAR; INTRAVENOUS at 19:39

## 2017-04-04 RX ADMIN — ENOXAPARIN SODIUM 40 MG: 40 INJECTION SUBCUTANEOUS at 22:39

## 2017-04-04 RX ADMIN — LORAZEPAM 1 MG: 2 INJECTION, SOLUTION INTRAMUSCULAR; INTRAVENOUS at 16:52

## 2017-04-04 RX ADMIN — INSULIN LISPRO 15 UNITS: 100 INJECTION, SOLUTION INTRAVENOUS; SUBCUTANEOUS at 22:41

## 2017-04-04 RX ADMIN — HYDRALAZINE HYDROCHLORIDE 25 MG: 25 TABLET, FILM COATED ORAL at 22:34

## 2017-04-04 RX ADMIN — ASPIRIN 81 MG CHEWABLE TABLET 81 MG: 81 TABLET CHEWABLE at 15:33

## 2017-04-04 NOTE — ROUTINE PROCESS
TRANSFER - OUT REPORT:    Verbal report given to Donna Chao RN(name) on [employer]  being transferred to [facility](unit) for routine progression of care       Report consisted of patients Situation, Background, Assessment and   Recommendations(SBAR). Information from the following report(s) SBAR, ED Summary and Procedure Summary was reviewed with the receiving nurse. Lines:   Peripheral IV 04/04/17 Right Antecubital (Active)        Opportunity for questions and clarification was provided. Patient transported with:   Monitor  Registered Nurse   GLENNY performed with nurse.

## 2017-04-04 NOTE — ED TRIAGE NOTES
Per EMS, pt was at [facility] when she started to experience L sided weakness and L facial droop with slurred speech. Symptoms resolved by the time EMS arrived. Pt c/o slight headache and L side chest pain.

## 2017-04-04 NOTE — ROUTINE PROCESS
Bedside and Verbal shift change report given to Josefina Rush RN (oncoming nurse) by Pritesh Pizarro RN, BSN (offgoing nurse). Report given with SBAR, Kardex, Intake/Output, MAR and Recent Results.

## 2017-04-04 NOTE — PROGRESS NOTES
Patient received from ED via stretcher accompanied by Kelsey Obrien RN. Patient A&Ox4, denies pain and discomfort. Dual skin assessment performed with this nurse and Emmanuelle Gilbert; Skin CDI. Has neuro checks in place. No distress noted. Frequently use items within reach. Bed locked in low position and call bell w/in reach. 1845- Patient with Insulin gtt to [facility]. Dr. Diomedes Diaz was called order was benito'bekah.     Lyly Felder- Patient to have MRI said that she is claustrophobic. Dr. Dmitry Ramirez was called order received for Ativan 1 mg IV x1 dose for MRI (RBV). Also made aware blood glucose elevated.

## 2017-04-04 NOTE — H&P
Internal Medicine History and Physical          Subjective     HPI: Maryana Watts is a 64 y.o. female with a PMHx of HTN, DM-II, HLD, HepC, hypothyroidism, CAD s/p Stent x 4 and anxiety disorder who presented to the ED with the acute onset of left-sided neurological deficits. The patient states she was in the [employer] bathroom when she developed left arm and leg complete numbness and tingling. She states she immediately asked someone to call EMS. She states she then had trouble speaking as well. She denied any other symptoms at that time. She said prior to this, she was at her normal state of health and even went to work this morning without difficulty. By the time she arrived at the ED, she was no longer having the symptoms. She was sent for a CT of the head which was negative. She was evaluated by tele-neuro as well and was deemed not a candidate for tPA. We were consulted for admission to the hospital for further workup of her presenting symptoms. PMHx:  HTN  DM-II  HLD  HepC  Anxiety Disorder  Hypothyroidism  CAD s/p stent x 4    PSurgHx:    Thyroidectomy  Hysterectomy  Cholecystectomy  Stent x 4  L breast lumpectomy x 2  R breast lumpectomy x 1    SocialHx:  Tobacco: Denies  EtOH: Denies  Drugs: Denies  Lives at home alone    FamilyHx:  F - Stomach ca, MI, Dm-II, HTN, HLD  M - MI, Colon ca, TIA, Dm-II, HTN, HLD    Prior to Admission Medications   Prescriptions Last Dose Informant Patient Reported? Taking? Insulin Needles, Disposable, (DORA PEN NEEDLE) 32 gauge x \" ndle   No No   Sig: Use one needle to give insulin 4 times a day. RABEprazole (ACIPHEX) 20 mg tablet   No No   Sig: take 1 tablet by mouth once daily   amLODIPine (NORVASC) 10 mg tablet   No No   Sig: Take 1 Tab by mouth daily. aspirin delayed-release 81 mg tablet   Yes No   Sig: Take  by mouth daily. atorvastatin (LIPITOR) 40 mg tablet   No No   Sig: Take 1 Tab by mouth daily.    buPROPion (WELLBUTRIN) 100 mg tablet   No No   Sig: Take 1 Tab by mouth daily. citalopram (CELEXA) 40 mg tablet   No No   Sig: Take 1 Tab by mouth daily. clopidogrel (PLAVIX) 75 mg tablet   No No   Sig: Take 1 Tab by mouth daily. hydrALAZINE (APRESOLINE) 50 mg tablet   No No   Sig: Take 0.5 Tabs by mouth four (4) times daily. hydroCHLOROthiazide (HYDRODIURIL) 25 mg tablet   No No   Sig: Take 1 Tab by mouth daily. ibuprofen (MOTRIN) 800 mg tablet   No No   Sig: Take 1 Tab by mouth every eight (8) hours as needed for Pain. insulin aspart (NOVOLOG) 100 unit/mL inpn   No No   Sig: Take 8 units with each meal.   insulin degludec (TRESIBA FLEXTOUCH U-100) 100 unit/mL (3 mL) inpn   No No   Si Units by SubCUTAneous route daily. levothyroxine (SYNTHROID) 125 mcg tablet   No No   Sig: Take 1 Tab by mouth Daily (before breakfast). losartan (COZAAR) 100 mg tablet   No No   Sig: Take 1 Tab by mouth daily. metFORMIN (GLUCOPHAGE) 850 mg tablet   No No   Sig: Take 1 Tab by mouth two (2) times daily (with meals). metoprolol succinate (TOPROL-XL) 200 mg XL tablet   No No   Sig: Take 1 Tab by mouth daily. Facility-Administered Medications: None       Review of Systems:  Constitutional:  Denies fever, chills or weight loss  Eyes: Denies vision loss or changes, denies pain  Ears, Nose, Mouth, Throat: Denies hearing loss, denies sore throat  Cardiovascular:  Denies chest pain or diaphoresis  Respiratory:  Denies coughing, wheezing, or shortness of breath. Gastrointestinal:  Denies nausea or vomitting. Denies diarrhea or constipation  Genitourinary:  Denies hematuria or dysuria  Musculoskeletal: Denies back pain or muscle/joint pain  Skin:  Denies rashes or moles  Neuro:  Denies seizures,syncope. Admits to left sided numbness/tingling/weakness.  Admits to speech difficulty      Objective      Visit Vitals    /75    Pulse 100    Resp 23    Ht 5' 2\" (1.575 m)    Wt 78.9 kg (174 lb)    SpO2 95%    BMI 31.83 kg/m2       Physical Exam:  General Appearance: NAD, conversant  HENT: normocephalic/atraumatic, moist mucus membranes  Lungs: CTA with normal respiratory effort  Cardiovascular: Tachycardic w/ RR, no m/r/g  Abdomen: soft, non-tender, normal bowel sounds  Extremities: no cyanosis, no peripheral edema  Neuro: moves all extremities, no focal deficits  Psych: appropriate affect, alert and oriented to person, place and time    Laboratory Studies:  CMP:   Lab Results   Component Value Date/Time     (L) 04/04/2017 03:30 PM    K 4.1 04/04/2017 03:30 PM    CL 92 (L) 04/04/2017 03:30 PM    CO2 27 04/04/2017 03:30 PM    AGAP 11 04/04/2017 03:30 PM     (HH) 04/04/2017 03:30 PM    BUN 19 (H) 04/04/2017 03:30 PM    CREA 1.11 04/04/2017 03:30 PM    GFRAA >60 04/04/2017 03:30 PM    GFRNA 51 (L) 04/04/2017 03:30 PM    CA 9.2 04/04/2017 03:30 PM    ALB 3.6 04/04/2017 03:30 PM    TP 9.3 (H) 04/04/2017 03:30 PM    GLOB 5.7 (H) 04/04/2017 03:30 PM    AGRAT 0.6 (L) 04/04/2017 03:30 PM    SGOT 33 04/04/2017 03:30 PM    ALT 46 04/04/2017 03:30 PM     CBC:   Lab Results   Component Value Date/Time    WBC 6.2 04/04/2017 03:30 PM    HGB 16.1 (H) 04/04/2017 03:30 PM    HCT 45.3 (H) 04/04/2017 03:30 PM     04/04/2017 03:30 PM     All Cardiac Markers in the last 24 hours:   Lab Results   Component Value Date/Time    CPK 88 04/04/2017 03:30 PM    CKMB <1.0 04/04/2017 03:30 PM    CKND1 Cannot be calulated 04/04/2017 03:30 PM    TROIQ <0.02 04/04/2017 03:30 PM       Imaging Reviewed:  Ct Head Wo Cont    Result Date: 4/4/2017  EXAM:  CT of the brain without intravenous contrast. COMPARISON: PROVIDED REASON FOR EXAM: \"Suspected Stroke\". Slurred speech. Left-sided weakness. DOSE REDUCTION:  One or more dose reduction techniques were used on this CT: automated exposure control, adjustment of the mAs and/or kVp according to patient's size, and iterative reconstruction techniques.  The specific techniques utilized on this CT exam have been documented in the patient's electronic medical record. _______________ FINDINGS:      IMAGE QUALITY:  There is no significant motion degradation of the images. BRAIN AND EXTRA-AXIAL SPACE:  2 cm in diameter patch of mildly decreased attenuation in the right anterior corona radiata likely reflects age indeterminant ischemic change. This occurs in concert with an otherwise mild burden of patchy white matter hypodensity that also likely represents chronic microvascular ischemic change. There is a mild burden of calcific intracranial atherosclerosis. Mild diffuse volume loss. There is no evidence of mass, hemorrhage, extra-axial fluid collection, or territorial subacute cortical infarct, and no hydrocephalus. MISCELLANEOUS:  Subcentimeter low-attenuation lesion in the high right frontal bone on axial image 21 is statistically most likely benign. The mastoid air cells and imaged superior portions of the paranasal sinuses are clear. _______________     IMPRESSION: Low-grade chronic changes, however, no acute findings. Note: Dr. Miguel Angel Coelho discussed the stroke code results with Dr. Lisa Jacobo at 3:12 PM on 4/4/2017. _______________     [facility] Chest Port    Result Date: 4/4/2017  Chest, single view Indication: Acute onset left-sided weakness and left facial droop with slurred speech Comparison: 2/3/2015 Findings:  Portable upright AP view of the chest was obtained. The cardiomediastinal silhouette is within normal limits. Tortuous and atherosclerotic thoracic aorta. No central pulmonary vascular congestion. Lung parenchyma is well aerated, without focal consolidation. No pleural effusion nor pneumothorax. No acute osseous abnormality. Impression: No radiographic evidence of an acute abnormality. EKG:   Sinus tachycardia   Possible Left atrial enlargement   Left axis deviation   Nonspecific ST and T wave abnormality   Abnormal ECG   When compared with ECG of 04-FEB-2015 04:09,   Vent.  rate has increased BY  39 BPM Nonspecific T wave abnormality, improved in Lateral leads    Assessment/Plan     #L-sided Numbness/Tingling 2/2 Possible TIA   #Acute Metabolic Encephalopathy  #Hyperosmoloar Hyponatremia 2/2 Hyperglycemia  #DM-II, uncontrolled  #HTN, non-compliant  #HLD  #CAD s/p Stent x 4  #DVT PPx    - Admit to tele. Neuro checks q4h. MRA head/neck. Echo. Trend enzymes. Neuro consult. Risk reduction w/ ASA, statin, BP control  - SSI and accuchecks. Cont aggressive IVFs for now. No insulin gtt needed as pt w/o anion gap  - Restart home meds, pt is noncompliant  - Cont statin  - Cont plavix, ASA  - SQH    I have personally reviewed all pertinent labs, films and EKGs that have officially resulted. I reviewed available electronic documentation outlining the initial presentation as well as the emergency room physician's encounter.     Ammie Brunner, DO  Internal Medicine, Hospitalist  Pager: [phone] [facility]

## 2017-04-04 NOTE — IP AVS SNAPSHOT
Current Discharge Medication List  
  
CONTINUE these medications which have NOT CHANGED Dose & Instructions Dispensing Information Comments Morning Noon Evening Bedtime  
 amLODIPine 10 mg tablet Commonly known as:  Cheyanne Half Your last dose was: Your next dose is:    
   
   
 Dose:  10 mg Take 1 Tab by mouth daily. Quantity:  90 Tab Refills:  3  
     
   
   
   
  
 aspirin delayed-release 81 mg tablet Your last dose was: Your next dose is: Take  by mouth daily. Refills:  0  
     
   
   
   
  
 atorvastatin 40 mg tablet Commonly known as:  LIPITOR Your last dose was: Your next dose is:    
   
   
 Dose:  40 mg Take 1 Tab by mouth daily. Quantity:  90 Tab Refills:  3  
     
   
   
   
  
 buPROPion 100 mg tablet Commonly known as:  [facility] Your last dose was: Your next dose is:    
   
   
 Dose:  100 mg Take 1 Tab by mouth daily. Quantity:  90 Tab Refills:  3  
     
   
   
   
  
 citalopram 40 mg tablet Commonly known as:  Artemio Breeding Your last dose was: Your next dose is:    
   
   
 Dose:  40 mg Take 1 Tab by mouth daily. Quantity:  90 Tab Refills:  3  
     
   
   
   
  
 clopidogrel 75 mg Tab Commonly known as:  PLAVIX Your last dose was: Your next dose is:    
   
   
 Dose:  75 mg Take 1 Tab by mouth daily. Quantity:  90 Tab Refills:  3  
     
   
   
   
  
 hydrALAZINE 50 mg tablet Commonly known as:  APRESOLINE Your last dose was: Your next dose is:    
   
   
 Dose:  25 mg Take 0.5 Tabs by mouth four (4) times daily. Quantity:  180 Tab Refills:  3  
     
   
   
   
  
 hydroCHLOROthiazide 25 mg tablet Commonly known as:  HYDRODIURIL Your last dose was: Your next dose is:    
   
   
 Dose:  25 mg Take 1 Tab by mouth daily. Quantity:  90 Tab Refills:  3 insulin aspart 100 unit/mL Inpn Commonly known as:  Jennifer Finney Your last dose was: Your next dose is: Take 8 units with each meal.  
 Quantity:  10 Pen Refills:  11  
     
   
   
   
  
 insulin degludec 100 unit/mL (3 mL) Inpn Commonly known as:  TRESIBA FLEXTOUCH U-100 Your last dose was: Your next dose is:    
   
   
 Dose:  25 Units 25 Units by SubCUTAneous route daily. Quantity:  2 Box Refills:  11 Insulin Needles (Disposable) 32 gauge x 5/32\" Ndle Commonly known as:  Mona Pen Needle Your last dose was: Your next dose is:    
   
   
 Use one needle to give insulin 4 times a day. Quantity:  400 Pen Needle Refills:  15  
     
   
   
   
  
 levothyroxine 125 mcg tablet Commonly known as:  SYNTHROID Your last dose was: Your next dose is:    
   
   
 Dose:  125 mcg Take 1 Tab by mouth Daily (before breakfast). Quantity:  90 Tab Refills:  3  
     
   
   
   
  
 losartan 100 mg tablet Commonly known as:  COZAAR Your last dose was: Your next dose is:    
   
   
 Dose:  100 mg Take 1 Tab by mouth daily. Quantity:  90 Tab Refills:  3  
     
   
   
   
  
 metFORMIN 850 mg tablet Commonly known as:  GLUCOPHAGE Your last dose was: Your next dose is:    
   
   
 Dose:  850 mg Take 1 Tab by mouth two (2) times daily (with meals). Quantity:  180 Tab Refills:  3  
     
   
   
   
  
 metoprolol succinate 200 mg XL tablet Commonly known as:  TOPROL-XL Your last dose was: Your next dose is:    
   
   
 Dose:  200 mg Take 1 Tab by mouth daily. Quantity:  90 Tab Refills:  3 RABEprazole 20 mg tablet Commonly known as:  ACIPHEX Your last dose was: Your next dose is:    
   
   
 take 1 tablet by mouth once daily Quantity:  30 Tab Refills:  3 STOP taking these medications   
 ibuprofen 800 mg tablet Commonly known as:  MOTRIN

## 2017-04-04 NOTE — IP AVS SNAPSHOT
Jas Sanchez 
 
 
 [address] [place] [address] 
[phone] Patient: Margie Callejas MRN: [id number] You are allergic to the following Allergen Reactions Contrast Agent (Iodine) Rash Sneezing Recent Documentation Height Weight Breastfeeding? BMI OB Status Smoking Status 1.575 m 78.9 kg No 31.83 kg/m2 Hysterectomy Never Smoker Emergency Contacts Name Discharge Info Relation Home Work Mobile Sommer Chase DISCHARGE CAREGIVER [3] Daughter [21] [phone] About your hospitalization You were admitted on:  April 4, 2017 You last received care in the:  [address] [facility] You were discharged on:  April 6, 2017 Unit phone number:  [phone] Why you were hospitalized Your primary diagnosis was:  Seizure Disorder, Focal Motor (Hcc) Your diagnoses also included:  Uncontrolled Type 2 Diabetes Mellitus With Diabetic Polyneuropathy (Hcc), Noncompliance With Medication Regimen Providers Seen During Your Hospitalizations Provider Role Specialty Primary office phone Julián Lowe MD Attending Provider Emergency Medicine [phone] Griselda Bush DO Attending Provider Internal Medicine [phone] Tiffany Saunders MD Attending Provider Internal Medicine [phone] Your Primary Care Physician (PCP) Primary Care Physician Office Phone Office Fax Anju Espinoza [phone] Follow-up Information Follow up With Details Comments Contact Info [address] [place] [address] In 1 week  [address] [facility] [address] [address] [address] [address] [phone] Your Appointments Tuesday April 25, 2017  8:20 AM EDT Follow Up with [address] [place] [address]) [address] [facility] [address] [facility] [id number] [phone] Current Discharge Medication List  
  
CONTINUE these medications which have NOT CHANGED Dose & Instructions Dispensing Information Comments Morning Noon Evening Bedtime  
 amLODIPine 10 mg tablet Commonly known as:  Valdo Trotter Your last dose was: Your next dose is:    
   
   
 Dose:  10 mg Take 1 Tab by mouth daily. Quantity:  90 Tab Refills:  3  
     
   
   
   
  
 aspirin delayed-release 81 mg tablet Your last dose was: Your next dose is: Take  by mouth daily. Refills:  0  
     
   
   
   
  
 atorvastatin 40 mg tablet Commonly known as:  LIPITOR Your last dose was: Your next dose is:    
   
   
 Dose:  40 mg Take 1 Tab by mouth daily. Quantity:  90 Tab Refills:  3  
     
   
   
   
  
 buPROPion 100 mg tablet Commonly known as:  [facility] Your last dose was: Your next dose is:    
   
   
 Dose:  100 mg Take 1 Tab by mouth daily. Quantity:  90 Tab Refills:  3  
     
   
   
   
  
 citalopram 40 mg tablet Commonly known as:  Yosi Arenas Your last dose was: Your next dose is:    
   
   
 Dose:  40 mg Take 1 Tab by mouth daily. Quantity:  90 Tab Refills:  3  
     
   
   
   
  
 clopidogrel 75 mg Tab Commonly known as:  PLAVIX Your last dose was: Your next dose is:    
   
   
 Dose:  75 mg Take 1 Tab by mouth daily. Quantity:  90 Tab Refills:  3  
     
   
   
   
  
 hydrALAZINE 50 mg tablet Commonly known as:  APRESOLINE Your last dose was: Your next dose is:    
   
   
 Dose:  25 mg Take 0.5 Tabs by mouth four (4) times daily. Quantity:  180 Tab Refills:  3  
     
   
   
   
  
 hydroCHLOROthiazide 25 mg tablet Commonly known as:  HYDRODIURIL Your last dose was: Your next dose is:    
   
   
 Dose:  25 mg Take 1 Tab by mouth daily. Quantity:  90 Tab Refills:  3 insulin aspart 100 unit/mL Inpn Commonly known as:  Kimani Erickson Your last dose was: Your next dose is: Take 8 units with each meal.  
 Quantity:  10 Pen Refills:  11  
     
   
   
   
  
 insulin degludec 100 unit/mL (3 mL) Inpn Commonly known as:  TRESIBA FLEXTOUCH U-100 Your last dose was: Your next dose is:    
   
   
 Dose:  25 Units 25 Units by SubCUTAneous route daily. Quantity:  2 Box Refills:  11 Insulin Needles (Disposable) 32 gauge x 5/32\" Ndle Commonly known as:  Mona Pen Needle Your last dose was: Your next dose is:    
   
   
 Use one needle to give insulin 4 times a day. Quantity:  400 Pen Needle Refills:  15  
     
   
   
   
  
 levothyroxine 125 mcg tablet Commonly known as:  SYNTHROID Your last dose was: Your next dose is:    
   
   
 Dose:  125 mcg Take 1 Tab by mouth Daily (before breakfast). Quantity:  90 Tab Refills:  3  
     
   
   
   
  
 losartan 100 mg tablet Commonly known as:  COZAAR Your last dose was: Your next dose is:    
   
   
 Dose:  100 mg Take 1 Tab by mouth daily. Quantity:  90 Tab Refills:  3  
     
   
   
   
  
 metFORMIN 850 mg tablet Commonly known as:  GLUCOPHAGE Your last dose was: Your next dose is:    
   
   
 Dose:  850 mg Take 1 Tab by mouth two (2) times daily (with meals). Quantity:  180 Tab Refills:  3  
     
   
   
   
  
 metoprolol succinate 200 mg XL tablet Commonly known as:  TOPROL-XL Your last dose was: Your next dose is:    
   
   
 Dose:  200 mg Take 1 Tab by mouth daily. Quantity:  90 Tab Refills:  3 RABEprazole 20 mg tablet Commonly known as:  ACIPHEX Your last dose was: Your next dose is:    
   
   
 take 1 tablet by mouth once daily Quantity:  30 Tab Refills:  3 STOP taking these medications   
 ibuprofen 800 mg tablet Commonly known as:  MOTRIN Discharge Instructions DISCHARGE SUMMARY from Nurse The following personal items are in your possession at time of discharge: 
 
Dental Appliances: None Visual Aid: None Home Medications: None Jewelry: None Clothing: Footwear, Pants, Shirt, Socks, Undergarments Other Valuables: Cell Phone Personal Items Sent to Safe: no PATIENT INSTRUCTIONS: 
 
 
F-face looks uneven A-arms unable to move or move unevenly S-speech slurred or non-existent T-time-call 911 as soon as signs and symptoms begin-DO NOT go Back to bed or wait to see if you get better-TIME IS BRAIN. Warning Signs of HEART ATTACK Call 911 if you have these symptoms: 
? Chest discomfort. Most heart attacks involve discomfort in the center of the chest that lasts more than a few minutes, or that goes away and comes back. It can feel like uncomfortable pressure, squeezing, fullness, or pain. ? Discomfort in other areas of the upper body. Symptoms can include pain or discomfort in one or both arms, the back, neck, jaw, or stomach. ? Shortness of breath with or without chest discomfort. ? Other signs may include breaking out in a cold sweat, nausea, or lightheadedness. Don't wait more than five minutes to call [address]! Fast action can save your life. Calling 911 is almost always the fastest way to get lifesaving treatment.  Emergency Medical Services staff can begin treatment when they arrive  up to an hour sooner than if someone gets to the hospital by car. The discharge information has been reviewed with the patient. The patient verbalized understanding. Discharge medications reviewed with the patient and appropriate educational materials and side effects teaching were provided. Learning About Diabetes Food Guidelines Your Care Instructions Meal planning is important to manage diabetes. It helps keep your blood sugar at a target level (which you set with your doctor). You don't have to eat special foods. You can eat what your family eats, including sweets once in a while. But you do have to pay attention to how often you eat and how much you eat of certain foods. You may want to work with a dietitian or a certified diabetes educator (CDE) to help you plan meals and snacks. A dietitian or CDE can also help you lose weight if that is one of your goals. What should you know about eating carbs? Managing the amount of carbohydrate (carbs) you eat is an important part of healthy meals when you have diabetes. Carbohydrate is found in many foods. · Learn which foods have carbs. And learn the amounts of carbs in different foods. ¨ Bread, cereal, pasta, and rice have about 15 grams of carbs in a serving. A serving is 1 slice of bread (1 ounce), ½ cup of cooked cereal, or 1/3 cup of cooked pasta or rice. ¨ Fruits have 15 grams of carbs in a serving. A serving is 1 small fresh fruit, such as an apple or orange; ½ of a banana; ½ cup of cooked or canned fruit; ½ cup of fruit juice; 1 cup of melon or raspberries; or 2 tablespoons of dried fruit. ¨ Milk and no-sugar-added yogurt have 15 grams of carbs in a serving. A serving is 1 cup of milk or 2/3 cup of no-sugar-added yogurt. ¨ Starchy vegetables have 15 grams of carbs in a serving.  A serving is ½ cup of mashed potatoes or sweet potato; 1 cup winter squash; ½ of a small baked potato; ½ cup of cooked beans; or ½ cup cooked corn or green peas. · Learn how much carbs to eat each day and at each meal. A dietitian or CDE can teach you how to keep track of the amount of carbs you eat. This is called carbohydrate counting. · If you are not sure how to count carbohydrate grams, use the Plate Method to plan meals. It is a good, quick way to make sure that you have a balanced meal. It also helps you spread carbs throughout the day. ¨ Divide your plate by types of foods. Put non-starchy vegetables on half the plate, meat or other protein food on one-quarter of the plate, and a grain or starchy vegetable in the final quarter of the plate. To this you can add a small piece of fruit and 1 cup of milk or yogurt, depending on how many carbs you are supposed to eat at a meal. 
· Try to eat about the same amount of carbs at each meal. Do not \"save up\" your daily allowance of carbs to eat at one meal. 
· Proteins have very little or no carbs per serving. Examples of proteins are beef, chicken, turkey, fish, eggs, tofu, cheese, cottage cheese, and peanut butter. A serving size of meat is 3 ounces, which is about the size of a deck of cards. Examples of meat substitute serving sizes (equal to 1 ounce of meat) are 1/4 cup of cottage cheese, 1 egg, 1 tablespoon of peanut butter, and ½ cup of tofu. How can you eat out and still eat healthy? · Learn to estimate the serving sizes of foods that have carbohydrate. If you measure food at home, it will be easier to estimate the amount in a serving of restaurant food. · If the meal you order has too much carbohydrate (such as potatoes, corn, or baked beans), ask to have a low-carbohydrate food instead. Ask for a salad or green vegetables. · If you use insulin, check your blood sugar before and after eating out to help you plan how much to eat in the future.  
· If you eat more carbohydrate at a meal than you had planned, take a walk or do other exercise. This will help lower your blood sugar. What else should you know? · Limit saturated fat, such as the fat from meat and dairy products. This is a healthy choice because people who have diabetes are at higher risk of heart disease. So choose lean cuts of meat and nonfat or low-fat dairy products. Use olive or canola oil instead of butter or shortening when cooking. · Don't skip meals. Your blood sugar may drop too low if you skip meals and take insulin or certain medicines for diabetes. · Check with your doctor before you drink alcohol. Alcohol can cause your blood sugar to drop too low. Alcohol can also cause a bad reaction if you take certain diabetes medicines. Follow-up care is a key part of your treatment and safety. Be sure to make and go to all appointments, and call your doctor if you are having problems. It's also a good idea to know your test results and keep a list of the medicines you take. Where can you learn more? Go to http://staci-zeinab.info/. Enter Z219 in the search box to learn more about \"Learning About Diabetes Food Guidelines. \" Current as of: May 23, 2016 Content Version: 11.2 © 8651-3515 [employer]. Care instructions adapted under license by [employer] (which disclaims liability or warranty for this information). If you have questions about a medical condition or this instruction, always ask your healthcare professional. [place] [address] any warranty or liability for your use of this information. Diabetes and Preventing Falls: Care Instructions Your Care Instructions If you are an older adult who has diabetes, you may have a higher risk of falling. Complications of diabetessuch as nerve damage, foot problems, and reduced visionmay increase your risk of a fall. Some of your medicines also may add to your risk. By making your home safer, you can lower your risk of falling.  Doing things to prevent diabetes complications may also help to lower your risk. You can make your home safer with a few simple measures. Follow-up care is a key part of your treatment and safety. Be sure to make and go to all appointments, and call your doctor if you are having problems. It's also a good idea to know your test results and keep a list of the medicines you take. How can you care for yourself at home? Taking care of yourself · Keep your blood sugar at a target level (which you set with your doctor). · Exercise regularly to improve your strength, muscle tone, and balance. Walk if you can. Swimming may be a good choice if you cannot walk easily. · Have your vision checked as often as your doctor recommends. It is usually once a year or more often if you have eye problems. · Know the side effects of the medicines you take. Ask your doctor or pharmacist whether the medicines you take can affect your balance. Sleeping pills or sedatives can affect your balance. · Limit the amount of alcohol you drink. Alcohol can impair your balance and other senses. · Have your doctor check your feet during each visit. If you have a foot problem, see your doctor. Preventing falls at home · Remove raised doorway thresholds, throw rugs, and clutter. Repair loose carpet or raised areas in the floor. · Move furniture and electrical cords to keep them out of walking paths. · Use nonskid floor wax, and wipe up spills right away, especially on ceramic tile floors. · If you use a walker or cane, put rubber tips on it. If you use crutches, clean the bottoms of them regularly with an abrasive pad, such as steel wool. · Keep your house well lit, especially Eleonore Guppy, and outside walkways. Use night-lights in areas such as hallways and bathrooms.  Add extra light switches or use remote switches (such as switches that go on or off when you clap your hands) to make it easier to turn lights on if you have to get up during the night. · Install sturdy handrails on stairways. Put grab bars near your shower, bathtub, and toilet. · Store household items on low shelves so that you do not have to climb or reach high. Or use a reaching device that you can get at a medical supply store. If you have to climb for something, use a step stool with handrails, or ask someone to get it for you. · Keep a cordless phone and a flashlight with new batteries by your bed. If possible, put a phone in each of the main rooms of your house, or carry a cell phone in case you fall and cannot reach a phone. Or you can wear a device around your neck or wrist. You push a button that sends a signal for help. · Wear low-heeled shoes that fit well and give your feet good support. Use footwear with nonskid soles. Check the heels and soles of your shoes for wear. Repair or replace worn heels or soles. · Do not wear socks without shoes on wood floors. · Walk on the grass when the sidewalks are slippery. If you live in an area that gets snow and ice in the winter, sprinkle salt on slippery steps and sidewalks. Where can you learn more? Go to http://staci-zeinab.info/. Enter T187 in the search box to learn more about \"Diabetes and Preventing Falls: Care Instructions. \" Current as of: August 4, 2016 Content Version: 11.2 © 2621-9113 [employer]. Care instructions adapted under license by [employer] (which disclaims liability or warranty for this information). If you have questions about a medical condition or this instruction, always ask your healthcare professional. [address] any warranty or liability for your use of this information. Counting Carbohydrate When You Take Insulin: Care Instructions Your Care Instructions You don't have to eat special foods when you take insulin.  You just have to be careful to eat healthy foods. And you have to spread throughout the day the carbohydrate you eat. Carbohydrate raises blood sugar higher and more quickly than any other nutrient. It is found in desserts, breads and cereals, and fruit. It's also found in starchy vegetables such as potatoes and corn, grains such as rice and pasta, and milk and yogurt. The more carbohydrate, or carbs, you eat at one time, the higher your blood sugar will rise. Spreading carbs throughout the day helps keep your blood sugar levels within your target range. Counting carbs is one of the best ways to keep your blood sugar under control when you use insulin. It helps you match the right amount of insulin to the number of grams of carbohydrate in a meal. You need to test your blood sugar several times a day to learn how carbs affect you. Then you can change your diet and insulin dose as needed. A registered dietitian or certified diabetes educator can help you plan meals and snacks. Follow-up care is a key part of your treatment and safety. Be sure to make and go to all appointments, and call your doctor if you are having problems. It's also a good idea to know your test results and keep a list of the medicines you take. How can you care for yourself at home? Know your daily amount of carbohydrate Your daily amount depends on several things, including your weight, how active you are, which diabetes medicines you take, and what your goals are for your blood sugar levels. A registered dietitian or certified diabetes educator can help you plan how much carbohydrate to include in each meal and snack. For most adults, a guideline for the daily amount of carbohydrate is: · 45 to 60 grams at each meal. That's about the same as 3 to 4 carbohydrate servings. · 15 to 20 grams at each snack. That's about the same as 1 carbohydrate serving. Count carbs If you take insulin, you need to know how many grams of carbohydrate are in a meal. This lets you know how much rapid-acting insulin to take before you eat. If you use an insulin pump, you get a constant rate of insulin during the day. So the pump must be programmed at meals to give you extra insulin to cover the rise in blood sugar after meals. When you know how much carbohydrate you will eat, you can take the right amount of insulin. Or, if you always use the same amount of insulin, you need to make sure that you eat the same amount of carbohydrate at meals. · Learn your own insulin-to-carbohydrate ratio. You and your diabetes health professional will figure out the ratio. You can do this by testing your blood sugar after meals. For example, you may need a certain amount of insulin for every 15 grams of carbohydrate. · Add up the carbohydrate grams in a meal. Then you can figure out how many units of insulin to take based on your insulin-to-carbohydrate ratio. · Look at labels on packaged foods. This can tell you how much carbohydrate is in a serving. You can also use guides from the American Diabetes Association. · Be aware of portions, or serving sizes. If a package has two servings and you eat the whole package, you need to double the number of grams of carbohydrate listed for one serving. · Protein, fat, and fiber do not raise blood sugar as much as carbs do. If you eat a lot of these nutrients in a meal, your blood sugar will rise more slowly than it would otherwise. · Exercise lowers blood sugar. You can use less insulin than you would if you were not doing exercise. Keep in mind that timing matters. If you exercise within 1 hour after a meal, your body may need less insulin for that meal than it would if you exercised 3 hours after the meal. Test your blood sugar to find out how exercise affects your need for insulin. Eat from all food groups · Eat at least three meals a day. · Plan meals to include food from all the food groups. ¨ Grains: 1 slice of bread (1 ounce), ½ cup of cooked cereal, and 1/3 cup of cooked pasta or rice. These have about 15 grams of carbohydrate in a serving. Choose whole grains. These include whole wheat bread or crackers, oatmeal, and brown rice. Have them more often than refined grains. ¨ Fruit: 1 small fresh fruit, such as an apple or orange; ½ of a banana; ½ cup of chopped, cooked, or canned fruit; ½ cup of fruit juice; 1 cup of melon or raspberries; and 2 tablespoons of dried fruit. These have about 15 grams of carbohydrate in a serving. ¨ Dairy: 1 cup of nonfat or low-fat milk and 2/3 cup of plain yogurt. These have about 15 grams of carbohydrate in a serving. ¨ Protein foods: Beef, chicken, turkey, fish, eggs, tofu, cheese, cottage cheese, and peanut butter. A serving size of meat is 3 ounces. This is about the size of a deck of cards. Examples of meat substitute serving sizes (equal to 1 ounce of meat) are 1/4 cup of cottage cheese, 1 egg, 1 tablespoon of peanut butter, and ½ cup of tofu. These have very little or no carbohydrate per serving. ¨ Vegetables: Starchy vegetables such as ½ cup of cooked beans, peas, potatoes, or corn have about 15 grams of carbohydrate. Nonstarchy vegetables have very little carbohydrate. These include 1 cup of raw leafy vegetables (such as spinach), ½ cup of other vegetables (cooked or chopped), and 3/4 cup of vegetable juice. · Talk to your dietitian or diabetes educator about ways to add limited amounts of sweets into your meal plan. · If you drink alcohol: ¨ Limit it to no more than 1 drink a day for women and 2 drinks a day for men. (One drink is 12 fl oz of beer, 5 fl oz of wine, or 1.5 fl oz liquor.) ¨ Make sure to count drink mixers that have sugar in your total carbohydrate count. These include cola, tonic water, wilman mix, and fruit juice. ¨ Eat a carbohydrate food along with your alcoholic drink. ¨ Check your blood sugar more often. This is because alcohol can lower your blood sugar too much. This may happen even hours later while you sleep. You may want to eat and adjust your insulin dose when you drink alcohol to prevent severe low blood sugar. ¨ Talk to your doctor. Alcohol may not be recommended when you are taking certain diabetes medicines. Where can you learn more? Go to http://staci-zeinab.info/. Enter R805 in the search box to learn more about \"Counting Carbohydrate When You Take Insulin: Care Instructions. \" Current as of: July 5, 2016 Content Version: 11.2 © 6141-0502 [employer]. Care instructions adapted under license by [employer] (which disclaims liability or warranty for this information). If you have questions about a medical condition or this instruction, always ask your healthcare professional. [address] any warranty or liability for your use of this information. Diabetes Foot Health: Care Instructions Your Care Instructions When you have diabetes, your feet need extra care and attention. Diabetes can damage the nerve endings and blood vessels in your feet, making you less likely to notice when your feet are injured. Diabetes also limits your body's ability to fight infection and get blood to areas that need it. If you get a minor foot injury, it could become an ulcer or a serious infection. With good foot care, you can prevent most of these problems. Caring for your feet can be quick and easy. Most of the care can be done when you are bathing or getting ready for bed. Follow-up care is a key part of your treatment and safety. Be sure to make and go to all appointments, and call your doctor if you are having problems. Its also a good idea to know your test results and keep a list of the medicines you take. How can you care for yourself at home? · Keep your blood sugar close to normal by watching what and how much you eat, monitoring blood sugar, taking medicines if prescribed, and getting regular exercise. · Do not smoke. Smoking affects blood flow and can make foot problems worse. If you need help quitting, talk to your doctor about stop-smoking programs and medicines. These can increase your chances of quitting for good. · Eat a diet that is low in fats. High fat intake can cause fat to build up in your blood vessels and decrease blood flow. · Inspect your feet daily for blisters, cuts, cracks, or sores. If you cannot see well, use a mirror or have someone help you. · Take care of your feet: 
[facility] [place] [facility] your feet every day. Use warm (not hot) water. Check the water temperature with your wrists or other part of your body, not your feet. ¨ Dry your feet well. Pat them dry. Do not rub the skin on your feet too hard. Dry well between your toes. If the skin on your feet stays moist, bacteria or a fungus can grow, which can lead to infection. ¨ Keep your skin soft. Use moisturizing skin cream to keep the skin on your feet soft and prevent calluses and cracks. But do not put the cream between your toes, and stop using any cream that causes a rash. ¨ Clean underneath your toenails carefully. Do not use a sharp object to clean underneath your toenails. Use the blunt end of a nail file or other rounded tool. ¨ Trim and file your toenails straight across to prevent ingrown toenails. Use a nail clipper, not scissors. Use an emery board to smooth the edges. · Change socks daily. Socks without seams are best, because seams often rub the feet. You can find socks for people with diabetes from specialty catalogs. · Look inside your shoes every day for things like gravel or torn linings, which could cause blisters or sores. · Buy shoes that fit well: 
¨ Look for shoes that have plenty of space around the toes. This helps prevent bunions and blisters. ¨ Try on shoes while wearing the kind of socks you will usually wear with the shoes. ¨ Avoid plastic shoes. They may rub your feet and cause blisters. Good shoes should be made of materials that are flexible and breathable, such as leather or cloth. ¨ Break in new shoes slowly by wearing them for no more than an hour a day for several days. Take extra time to check your feet for red areas, blisters, or other problems after you wear new shoes. · Do not go barefoot. Do not wear sandals, and do not wear shoes with very thin soles. Thin soles are easy to puncture. They also do not protect your feet from hot pavement or cold weather. · Have your doctor check your feet during each visit. If you have a foot problem, see your doctor. Do not try to treat an early foot problem at home. Home remedies or treatments that you can buy without a prescription (such as corn removers) can be harmful. · Always get early treatment for foot problems. A minor irritation can lead to a major problem if not properly cared for early. When should you call for help? Call your doctor now or seek immediate medical care if: 
· You have a foot sore, an ulcer or break in the skin that is not healing after 4 days, bleeding corns or calluses, or an ingrown toenail. · You have blue or black areas, which can mean bruising or blood flow problems. · You have peeling skin or tiny blisters between your toes or cracking or oozing of the skin. · You have a fever for more than 24 hours and a foot sore. · You have new numbness or tingling in your feet that does not go away after you move your feet or change positions. · You have unexplained or unusual swelling of the foot or ankle. Watch closely for changes in your health, and be sure to contact your doctor if: 
· You cannot do proper foot care. Where can you learn more? Go to http://staci-zeinab.info/.  
Enter A795 in the search box to learn more about \"Diabetes Foot Health: Care Instructions. \" Current as of: May 23, 2016 Content Version: 11.2 © 6440-3697 [employer]. Care instructions adapted under license by [employer] (which disclaims liability or warranty for this information). If you have questions about a medical condition or this instruction, always ask your healthcare professional. [address] any warranty or liability for your use of this information. Nutrition Tips for Diabetes: After Your Visit Your Care Instructions A healthy diet is important to manage diabetes. It helps you lose weight (if you need to) and keep it off. It gives you the nutrition and energy your body needs and helps prevent heart disease. But a diet for diabetes does not mean that you have to eat special foods. You can eat what your family eats, including occasional sweets and other favorites. But you do have to pay attention to how often you eat and how much you eat of certain foods. The right plan for you will give you meals that help you keep your blood sugar at healthy levels. Try to eat a variety of foods and to spread carbohydrate throughout the day. Carbohydrate raises blood sugar higher and more quickly than any other nutrient does. Carbohydrate is found in sugar, breads and cereals, fruit, starchy vegetables such as potatoes and corn, and milk and yogurt. You may want to work with a dietitian or diabetes educator to help you plan meals and snacks. A dietitian or diabetes educator also can help you lose weight if that is one of your goals. The following tips can help you enjoy your meals and stay healthy. Follow-up care is a key part of your treatment and safety. Be sure to make and go to all appointments, and call your doctor if you are having problems. Its also a good idea to know your test results and keep a list of the medicines you take. How can you care for yourself at home? · Learn which foods have carbohydrate and how much carbohydrate to eat. A dietitian or diabetes educator can help you learn to keep track of how much carbohydrate you eat. · Spread carbohydrate throughout the day. Eat some carbohydrate at all meals, but do not eat too much at any one time. · Plan meals to include food from all the food groups. These are the food groups and some example portion sizes: ¨ Grains: 1 slice of bread (1 ounce), ½ cup of cooked cereal, and 1/3 cup of cooked pasta or rice. These have about 15 grams of carbohydrate in a serving. Choose whole grains such as whole wheat bread or crackers, oatmeal, and brown rice more often than refined grains. ¨ Fruit: 1 small fresh fruit, such as an apple or orange; ½ of a banana; ½ cup of chopped, cooked, or canned fruit; ½ cup of fruit juice; 1 cup of melon or raspberries; and 2 tablespoons of dried fruit. These have about 15 grams of carbohydrate in a serving. ¨ Dairy: 1 cup of nonfat or low-fat milk and 2/3 cup of plain yogurt. These have about 15 grams of carbohydrate in a serving. ¨ Protein foods: Beef, chicken, turkey, fish, eggs, tofu, cheese, cottage cheese, and peanut butter. A serving size of meat is 3 ounces, which is about the size of a deck of cards. Examples of meat substitute serving sizes (equal to 1 ounce of meat) are 1/4 cup of cottage cheese, 1 egg, 1 tablespoon of peanut butter, and ½ cup of tofu. These have very little or no carbohydrate per serving. ¨ Vegetables: Starchy vegetables such as ½ cup of cooked dried beans, peas, potatoes, or corn have about 15 grams of carbohydrate. Nonstarchy vegetables have very little carbohydrate, such as 1 cup of raw leafy vegetables (such as spinach), ½ cup of other vegetables (cooked or chopped), and 3/4 cup of vegetable juice. · Use the plate format to plan meals.  It is a good, quick way to make sure that you have a balanced meal. It also helps you spread carbohydrate throughout the day. You divide your plate by types of foods. Put vegetables on half the plate, meat or meat substitutes on one-quarter of the plate, and a grain or starchy vegetable (such as brown rice or a potato) in the final quarter of the plate. To this you can add a small piece of fruit and 1 cup of milk or yogurt, depending on how much carbohydrate you are supposed to eat at a meal. 
· Talk to your dietitian or diabetes educator about ways to add limited amounts of sweets into your meal plan. You can eat these foods now and then, as long as you include the amount of carbohydrate they have in your daily carbohydrate allowance. · If you drink alcohol, limit it to no more than 1 drink a day for women and 2 drinks a day for men. If you are pregnant, no amount of alcohol is known to be safe. · Protein, fat, and fiber do not raise blood sugar as much as carbohydrate does. If you eat a lot of these nutrients in a meal, your blood sugar will rise more slowly than it would otherwise. · Limit saturated fats, such as those from meat and dairy products. Try to replace it with monounsaturated fat, such as olive oil. This is a healthier choice because people who have diabetes are at higher-than-average risk of heart disease. But use a modest amount of olive oil. A tablespoon of olive oil has 14 grams of fat and 120 calories. · Exercise lowers blood sugar. If you take insulin by shots or pump, you can use less than you would if you were not exercising. Keep in mind that timing matters. If you exercise within 1 hour after a meal, your body may need less insulin for that meal than it would if you exercised 3 hours after the meal. Test your blood sugar to find out how exercise affects your need for insulin. · Exercise on most days of the week. Aim for at least 30 minutes. Exercise helps you stay at a healthy weight and helps your body use insulin. Walking is an easy way to get exercise.  Gradually increase the amount you walk every day. You also may want to swim, bike, or do other activities. When you eat out · Learn to estimate the serving sizes of foods that have carbohydrate. If you measure food at home, it will be easier to estimate the amount in a serving of restaurant food. · If the meal you order has too much carbohydrate (such as potatoes, corn, or baked beans), ask to have a low-carbohydrate food instead. Ask for a salad or green vegetables. · If you use insulin, check your blood sugar before and after eating out to help you plan how much to eat in the future. · If you eat more carbohydrate at a meal than you had planned, take a walk or do other exercise. This will help lower your blood sugar. Where can you learn more? Go to JRKICKZ.be Enter Y940 in the search box to learn more about \"Nutrition Tips for Diabetes: After Your Visit. \"  
© 0295-1264 [employer], [place]. Care instructions adapted under license by [employer] (which disclaims liability or warranty for this information). This care instruction is for use with your licensed healthcare professional. If you have questions about a medical condition or this instruction, always ask your healthcare professional. [place] [address] any warranty or liability for your use of this information. Content Version: 74.3.244327; Current as of: June 4, 2014 Diabetic Neuropathy: Care Instructions Your Care Instructions When you have diabetes, your blood sugar level may get too high. Over time, high blood sugar levels can damage nerves. This is called diabetic neuropathy. Nerve damage can cause pain, burning, tingling, and numbness and may leave you feeling weak. The feet are often affected. When you have nerve damage in your feet, you cannot feel your feet and toes as well as normal and may not notice cuts or sores.  Even a small injury can lead to a serious infection. It is very important that you follow your doctor's advice on foot care. Sometimes diabetes damages nerves that help the body function. If this happens, your blood pressure, sweating, digestion, and urination might be affected. Your doctor may give you a target blood sugar level that is higher or lower than you are used to. Try to keep your blood sugar very close to this target level to prevent more damage. Follow-up care is a key part of your treatment and safety. Be sure to make and go to all appointments, and call your doctor if you are having problems. Its also a good idea to know your test results and keep a list of the medicines you take. How can you care for yourself at home? · Take your medicines exactly as prescribed. Call your doctor if you think you are having a problem with your medicine. It is very important that you take your insulin or diabetes pills as your doctor tells you. · Try to keep blood sugar at your target level. ¨ Eat a variety of healthy foods, with carbohydrate spread out in your meals. A dietitian can help you plan meals. ¨ Try to get at least 30 minutes of exercise on most days. ¨ Check your blood sugar as many times each day as your doctor recommends. · Take and record your blood pressure at home if your doctor tells you to. Learn the importance of the two measures of blood pressure (such as 130 over 80, or 130/80). To take your blood pressure at home: ¨ Ask your doctor to check your blood pressure monitor to be sure it is accurate and the cuff fits you. Also ask your doctor to watch you to make sure that you are using it right. ¨ Do not use medicine known to raise blood pressure (such as some nasal decongestant sprays) before taking your blood pressure. ¨ Avoid taking your blood pressure if you have just exercised or are nervous or upset. Rest at least 15 minutes before you take a reading. · Take pain medicines exactly as directed. ¨ If the doctor gave you a prescription medicine for pain, take it as prescribed. ¨ If you are not taking a prescription pain medicine, ask your doctor if you can take an over-the-counter medicine. · Do not smoke. Smoking can increase your chance for a heart attack or stroke. If you need help quitting, talk to your doctor about stop-smoking programs and medicines. These can increase your chances of quitting for good. · Limit alcohol to 2 drinks a day for men and 1 drink a day for women. Too much alcohol can cause health problems. · Eat small meals often, rather than 2 or 3 large meals a day. To care for your feet · Prevent injury by wearing shoes at all times, even when you are indoors. · Do foot care as part of your daily routine. Wash your feet and then rub lotion on your feet, but not between your toes. Use a handheld mirror or magnifying mirror to inspect your feet for blisters, cuts, cracks, or sores. · Have your toenails trimmed and filed straight across. · Wear shoes and socks that fit well. Soft shoes that have good support and that fit well (such as tennis shoes) are best for your feet. · Check your shoes for any loose objects or rough edges before you put them on. · Ask your doctor to check your feet during each visit. Your doctor may notice a foot problem you have missed. · Get early treatment for any foot problem, even a minor one. When should you call for help? Call your doctor now or seek immediate medical care if: 
· You have a sore or any type of injured skin on your toes or feet. · Your feet have blue or black areas, which can mean bruising or blood flow problems. · You have peeling skin or tiny blisters between your toes, or cracking or oozing of the skin. · You have new numbness or tingling in your feet that does not go away after you move your feet or change positions. Watch closely for changes in your health, and be sure to contact your doctor if: · You want help with foot care or cutting your toenails. · You have frequent problems with high or low blood sugar levels. Your medicines or your insulin may need to be changed. Where can you learn more? Go to http://staci-zeinab.info/. Enter Q765 in the search box to learn more about \"Diabetic Neuropathy: Care Instructions. \" Current as of: May 23, 2016 Content Version: 11.2 © 4822-2553 [employer]. Care instructions adapted under license by [employer] (which disclaims liability or warranty for this information). If you have questions about a medical condition or this instruction, always ask your healthcare professional. [place] [address] any warranty or liability for your use of this information. Epilepsy: Care Instructions Your Care Instructions Epilepsy is a common condition that causes repeated seizures. The seizures are caused by bursts of electrical activity in the brain that aren't normal. Seizures may cause problems with muscle control, movement, speech, vision, or awareness. They can be scary. Epilepsy affects each person differently. Some people have only a few seizures. Others get them more often. If you know what triggers a seizure, you may be able to avoid having one. You can take medicines to control and reduce seizures. You and your doctor will need to find the right combination, schedule, and dose of medicine. This may take time and careful changes. Seizures may get worse and happen more often over time. Follow-up care is a key part of your treatment and safety. Be sure to make and go to all appointments, and call your doctor if you are having problems. It's also a good idea to know your test results and keep a list of the medicines you take. How can you care for yourself at home? · Be safe with medicines. Take your medicines exactly as prescribed.  Call your doctor if you think you are having a problem with your medicine. · Make a treatment plan with your doctor. Be sure to follow your plan. · Try to identify and avoid things that may make you more likely to have a seizure. These may include: ¨ Not getting enough sleep. ¨ Using drugs or alcohol. ¨ Being emotionally stressed. ¨ Skipping meals. · Keep a record of any seizures you have. Note the date, time of day, and any details about the seizure that you can remember. Your doctor can use this information to plan or adjust your medicine or other treatment. · Be sure that any doctor treating you for another condition knows that you have epilepsy. Each doctor should know what medicines you are taking, if any. · Wear a medical ID bracelet. You can buy this at most [employer]. If you have a seizure that leaves you unconscious or unable to speak for yourself, this bracelet will let those who are treating you know that you have epilepsy. · Talk to your doctor about whether it is safe for you to do certain activities, such as drive or swim. When should you call for help? Call 911 anytime you think you may need emergency care. For example, call if: · A seizure does not stop as it normally does. · You have new symptoms such as: 
¨ Numbness, tingling, or weakness on one side of your body or face. ¨ Vision changes. ¨ Trouble speaking or thinking clearly. Call your doctor now or seek immediate medical care if: 
· You have a fever. · You have a severe headache. Watch closely for changes in your health, and be sure to contact your doctor if: · The normal pattern or features of your seizures change. Where can you learn more? Go to http://staci-zeinab.info/. Luciana Mention in the search box to learn more about \"Epilepsy: Care Instructions. \" Current as of: October 14, 2016 Content Version: 11.2 © 5567-3969 [employer].  Care instructions adapted under license by [address] (which disclaims liability or warranty for this information). If you have questions about a medical condition or this instruction, always ask your healthcare professional. [address] any warranty or liability for your use of this information. High Blood Pressure: Care Instructions Your Care Instructions If your blood pressure is usually above 140/90, you have high blood pressure, or hypertension. That means the top number is 140 or higher or the bottom number is 90 or higher, or both. Despite what a lot of people think, high blood pressure usually doesn't cause headaches or make you feel dizzy or lightheaded. It usually has no symptoms. But it does increase your risk for heart attack, stroke, and kidney or eye damage. The higher your blood pressure, the more your risk increases. Your doctor will give you a goal for your blood pressure. Your goal will be based on your health and your age. An example of a goal is to keep your blood pressure below 140/90. Lifestyle changes, such as eating healthy and being active, are always important to help lower blood pressure. You might also take medicine to reach your blood pressure goal. 
Follow-up care is a key part of your treatment and safety. Be sure to make and go to all appointments, and call your doctor if you are having problems. It's also a good idea to know your test results and keep a list of the medicines you take. How can you care for yourself at home? Medical treatment · If you stop taking your medicine, your blood pressure will go back up. You may take one or more types of medicine to lower your blood pressure. Be safe with medicines. Take your medicine exactly as prescribed. Call your doctor if you think you are having a problem with your medicine. · Talk to your doctor before you start taking aspirin every day.  Aspirin can help certain people lower their risk of a heart attack or stroke. But taking aspirin isn't right for everyone, because it can cause serious bleeding. · See your doctor regularly. You may need to see the doctor more often at first or until your blood pressure comes down. · If you are taking blood pressure medicine, talk to your doctor before you take decongestants or anti-inflammatory medicine, such as ibuprofen. Some of these medicines can raise blood pressure. · Learn how to check your blood pressure at home. Lifestyle changes · Stay at a healthy weight. This is especially important if you put on weight around the waist. Losing even 10 pounds can help you lower your blood pressure. · If your doctor recommends it, get more exercise. Walking is a good choice. Bit by bit, increase the amount you walk every day. Try for at least 30 minutes on most days of the week. You also may want to swim, bike, or do other activities. · Avoid or limit alcohol. Talk to your doctor about whether you can drink any alcohol. · Try to limit how much sodium you eat to less than 2,300 milligrams (mg) a day. Your doctor may ask you to try to eat less than 1,500 mg a day. · Eat plenty of fruits (such as bananas and oranges), vegetables, legumes, whole grains, and low-fat dairy products. · Lower the amount of saturated fat in your diet. Saturated fat is found in animal products such as milk, cheese, and meat. Limiting these foods may help you lose weight and also lower your risk for heart disease. · Do not smoke. Smoking increases your risk for heart attack and stroke. If you need help quitting, talk to your doctor about stop-smoking programs and medicines. These can increase your chances of quitting for good. When should you call for help? Call 911 anytime you think you may need emergency care.  This may mean having symptoms that suggest that your blood pressure is causing a serious heart or blood vessel problem. Your blood pressure may be over 180/110. For example, call 911 if: 
· You have symptoms of a heart attack. These may include: ¨ Chest pain or pressure, or a strange feeling in the chest. 
¨ Sweating. ¨ Shortness of breath. ¨ Nausea or vomiting. ¨ Pain, pressure, or a strange feeling in the back, neck, jaw, or upper belly or in one or both shoulders or arms. ¨ Lightheadedness or sudden weakness. ¨ A fast or irregular heartbeat. · You have symptoms of a stroke. These may include: 
¨ Sudden numbness, tingling, weakness, or loss of movement in your face, arm, or leg, especially on only one side of your body. ¨ Sudden vision changes. ¨ Sudden trouble speaking. ¨ Sudden confusion or trouble understanding simple statements. ¨ Sudden problems with walking or balance. ¨ A sudden, severe headache that is different from past headaches. · You have severe back or belly pain. Do not wait until your blood pressure comes down on its own. Get help right away. Call your doctor now or seek immediate care if: 
· Your blood pressure is much higher than normal (such as 180/110 or higher), but you don't have symptoms. · You think high blood pressure is causing symptoms, such as: ¨ Severe headache. ¨ Blurry vision. Watch closely for changes in your health, and be sure to contact your doctor if: 
· Your blood pressure measures 140/90 or higher at least 2 times. That means the top number is 140 or higher or the bottom number is 90 or higher, or both. · You think you may be having side effects from your blood pressure medicine. · Your blood pressure is usually normal, but it goes above normal at least 2 times. Where can you learn more? Go to http://staci-zeinab.info/. Enter H229 in the search box to learn more about \"High Blood Pressure: Care Instructions. \" Current as of: August 8, 2016 Content Version: 11.2 © 8678-5780 [employer]. Care instructions adapted under license by [employer] (which disclaims liability or warranty for this information). If you have questions about a medical condition or this instruction, always ask your healthcare professional. [address] any warranty or liability for your use of this information. Learning About Transient Ischemic Attack (TIA) What is a TIA? A transient ischemic attack (TIA) means that the blood flow to a part of the brain is blocked for a short time. A TIA feels like a stroke but usually lasts only 10 to 20 minutes. Unlike a stroke, a TIA does not cause lasting brain damage. A TIA is usually caused by a blood clot that blocks blood flow in the brain. A blood clot can form in another part of the body (often the heart) and travel through the bloodstream to the brain. When blood flow to part of the brain is blocked, the brain cells in that area are affected within seconds. This causes symptoms in parts of the body controlled by those brain cells. When the blood clot dissolves, blood flow returns, and the symptoms go away. Blood clots can be the result of hardening of the arteries (atherosclerosis) or a heart attack. Sometimes a TIA is caused by a sharp drop in blood pressure that reduces blood flow to the brain. This is called a \"low-flow\" TIA. It is not as common as a TIA caused by a blood clot. What happens after a TIA? TIA is a serious warning sign of a possible stroke in the future. If you have other medical conditions such as coronary artery disease or atherosclerosis, you may also have an increased risk for a heart attack. Talk to your doctor about your risk. Understanding your risk will help you and your doctor plan your treatment options. You can do a lot to lower your chance of having another TIA or a stroke. Medicines can help, and you may also need to make lifestyle changes. What are the symptoms? Symptoms of a TIA are the same as symptoms of a stroke. But symptoms of a TIA don't last very long. Most of the time, they go away in 10 to 20 minutes. They may include: 
· Sudden numbness, tingling, weakness, or loss of movement in your face, arm, or leg, especially on only one side of your body. · Sudden vision changes. · Sudden trouble speaking. · Sudden confusion or trouble understanding simple statements. · Sudden problems with walking or balance. If you have any of these symptoms, call 911 or other emergency services right away. Ask your family, friends, and coworkers to learn the signs of a TIA. They may notice these signs before you do. Make sure they know to call 911 if these signs appear. How is a TIA treated? If you've had a TIA, you may need more testing and treatment after you get checked by your doctor. If you have a high risk of stroke, you may have to stay in the hospital for treatment. Your treatment for a TIA may include taking medicines to prevent blood clots or a stroke, or having surgery to reopen narrow arteries. How can you prevent another TIA? · Work with your doctor to treat any health problems you have. High blood pressure, high cholesterol, atrial fibrillation, and diabetes all raise your chances of having a stroke. · Be safe with medicines. Take your medicine exactly as prescribed. Call your doctor if you think you are having a problem with your medicine. · Have a healthy lifestyle. ¨ Do not smoke or allow others to smoke around you. If you need help quitting, talk to your doctor about stop-smoking programs and medicines. These can increase your chances of quitting for good. Smoking makes a stroke more likely. ¨ Limit alcohol to 2 drinks a day for men and 1 drink a day for women. ¨ Lose weight if you need to. A healthy weight will help you keep your heart and body healthy. ¨ Be active. Ask your doctor what type and level of activity are safe for you. ¨ Eat heart-healthy foods, like fruits, vegetables, and high-fiber foods. Follow-up care is a key part of your treatment and safety. Be sure to make and go to all appointments, and call your doctor if you are having problems. It's also a good idea to know your test results and keep a list of the medicines you take. Where can you learn more? Go to http://staci-zeinab.info/. Enter D568 in the search box to learn more about \"Learning About Transient Ischemic Attack (TIA). \" 
Current as of: July 27, 2016 Content Version: 11.2 © 3956-9779 [employer]. Care instructions adapted under license by [employer] (which disclaims liability or warranty for this information). If you have questions about a medical condition or this instruction, always ask your healthcare professional. [address] any warranty or liability for your use of this information. Patient armband removed and shredded. Discharge Orders None [employer] Announcement We are excited to announce that we are making your provider's discharge notes available to you in [employer]. You will see these notes when they are completed and signed by the physician that discharged you from your recent hospital stay. If you have any questions or concerns about any information you see in [employer], please call the Health Information Department where you were seen or reach out to your Primary Care Provider for more information about your plan of care. Introducing [facility]! Dear Promise Eubanks: 
Thank you for requesting a [employer] account. Our records indicate that you already have an active [employer] account. You can access your account anytime at [email or web address]://[employer]. [employer] Did you know that you can access your hospital and ER discharge instructions at any time in [employer]? You can also review all of your test results from your hospital stay or ER visit. Additional Information If you have questions, please visit the Frequently Asked Questions section of the MyChart website at [email or web address] Remember, MyChart is NOT to be used for urgent needs. For medical emergencies, dial 911. Now available from your iPhone and Android! General Information Please provide this summary of care documentation to your next provider. Patient Signature:  ____________________________________________________________ Date:  ____________________________________________________________  
  
Vidhya Stratford Provider Signature:  ____________________________________________________________ Date:  ____________________________________________________________

## 2017-04-04 NOTE — ED PROVIDER NOTES
HPI Comments: 3:00 PM Carlitos Alonso is a 64 y.o. female with a hx of HTN, DM, CAD, Hepatitis C, anxiety, and hypercholesterolemia who presents to the ED via EMS c/o L sided weakness. Code S initiated upon arrival. Pt was working at [employer] when she started experiencing the weakness at 1:45 PM today. She also c/o L arm numbness, slurred speech and anxiousness. Now, the patient states that she no longer experiencing the symptoms and she feels normal. She reports being non-compliant with her Plavix X 1 week and her Asprin X 2 weeks. Patient denies tobacco usage, alcohol consumption, or recreational drug use. No other associated symptoms or modifying factors at this time. The history is provided by the patient. Past Medical History:   Diagnosis Date    Anxiety     CAD (coronary artery disease)     S/P Coronary stents ( LAD and Lcx)    Depression     Diabetes ([facility] [address]     Hepatitis C     Hypercholesterolemia     Hypertension        Past Surgical History:   Procedure Laterality Date    HX BREAST BIOPSY      1971 2010 left breast lump removed excisional per patient     [address]    pre-eclampsia    HX CHOLECYSTECTOMY  2004    HX CORONARY STENT PLACEMENT  Nov 2013    4 stents    HX HEENT  2009    thyroidectomy due to nodules.  HX HYSTERECTOMY  2005    heavy periods    HX THYROIDECTOMY           Family History:   Problem Relation Age of Onset    Diabetes Mother     Hypertension Mother     Cancer Mother     Heart Disease Mother     Heart Attack Mother     Diabetes Father     Hypertension Father     Cancer Father        Social History     Social History    Marital status:      Spouse name: N/A    Number of children: N/A    Years of education: N/A     Occupational History    Not on file.      Social History Main Topics    Smoking status: Never Smoker    Smokeless tobacco: Never Used    Alcohol use No    Drug use: No    Sexual activity: Not Currently     Other Topics Concern    Not on file     Social History Narrative         ALLERGIES: Contrast agent [iodine]    Review of Systems   Constitutional: Negative for chills and fever. HENT: Negative for congestion and rhinorrhea. Respiratory: Negative for cough and shortness of breath. Cardiovascular: Negative for chest pain and leg swelling. Gastrointestinal: Negative for abdominal pain and nausea. Genitourinary: Negative for dysuria and hematuria. Musculoskeletal: Negative for arthralgias and myalgias. Skin: Negative for rash and wound. Neurological: Positive for speech difficulty (slurred), weakness (L sided) and numbness (L arm). Negative for light-headedness and headaches. Psychiatric/Behavioral: Negative for confusion and hallucinations. All other systems reviewed and are negative. Vitals:    04/04/17 1615 04/04/17 1630 04/04/17 1641 04/04/17 1645   BP: (!) 193/107 (!) 217/123 181/87 (!) 194/101   Pulse: (!) 104 (!) 104 (!) 102 (!) 102   Resp: 22 17 20 21   SpO2: 97% 96% 97% 97%   Weight:       Height:                Physical Exam   Constitutional: She is oriented to person, place, and time. She appears well-developed and well-nourished. No distress. HENT:   Head: Normocephalic and atraumatic. Mouth/Throat: Oropharynx is clear and moist.   Eyes: Conjunctivae and EOM are normal. Pupils are equal, round, and reactive to light. No scleral icterus. Neck: Normal range of motion. Neck supple. Cardiovascular: Regular rhythm and normal heart sounds. Tachycardia present. No murmur heard. Pulmonary/Chest: Effort normal and breath sounds normal. No respiratory distress. Abdominal: Soft. Bowel sounds are normal. She exhibits no distension. There is no tenderness. Musculoskeletal: She exhibits no edema. Lymphadenopathy:     She has no cervical adenopathy. Neurological: She is alert and oriented to person, place, and time. She has normal strength.  Coordination normal.   Motor strength 5/5 in upper and lower extremities   Speech normal  Sensations intact   Skin: Skin is warm and dry. No rash noted. Psychiatric: She has a normal mood and affect. Her behavior is normal.   Nursing note and vitals reviewed. MDM  Number of Diagnoses or Management Options  Hyperglycemia:   Malignant hypertension:   Other specified transient cerebral ischemias:   Diagnosis management comments: Code  s called for slurred speech and L sided weakness/numbness last time normal approx 1:30pm pt  Still c/o mild arm weakness on arrival however strength on exam intact. On return from CT pt states back to normal     Initial CT head neg seen by [facility] pt not TPA candidate due to resolution of all symtpoms, asa given in ED     Ekg: sinus rhythm    cxr neg in ED     Noted hyperglycemic and tachycardic hypertensive in ED fluid bolus given insulin gtts started labetalol given for Bp control with HR an Bp improvement    MRI/MRA ordered pt admitted to hospitalist service       Amount and/or Complexity of Data Reviewed  Clinical lab tests: ordered and reviewed  Tests in the radiology section of CPT®: ordered and reviewed  Independent visualization of images, tracings, or specimens: yes    Risk of Complications, Morbidity, and/or Mortality  Presenting problems: high  Diagnostic procedures: high  Management options: high    Critical Care  Total time providing critical care: 30-74 minutes    Patient Progress  Patient progress: improved    ED Course       Bedside US  Date/Time: 4/4/2017 3:38 PM  Consent: Verbal consent obtained. Consent given by: patient  Procedure Type: Ultrasound guided procedure (IV)  Indication: extremity weakness. Confirmation Study: A confirmatory study was obtained while the patient was in the Emergency Department. Comments: 20 gauge in R arm. Successful placement. Tolerated without any complications.            Vitals:  Patient Vitals for the past 12 hrs:   Pulse Resp BP SpO2   04/04/17 1645 (!) 102 21 (!) 194/101 97 %   04/04/17 1641 (!) 102 20 181/87 97 %   04/04/17 1630 (!) 104 17 (!) 217/123 96 %   04/04/17 1615 (!) 104 22 (!) 193/107 97 %   04/04/17 1600 (!) 104 16 (!) 165/110 96 %   04/04/17 1545 (!) 107 23 (!) 164/102 95 %   04/04/17 1511 (!) 113 15 - 95 %   04/04/17 1508 - - (!) 210/102 -       Medications ordered:   Medications   insulin regular (NOVOLIN R, HUMULIN R) 100 Units in 0.9% sodium chloride 100 mL infusion (not administered)   glucose chewable tablet 16 g (not administered)   glucagon (GLUCAGEN) injection 1 mg (not administered)   dextrose (D50W) injection syrg 12.5-25 g (not administered)   aspirin chewable tablet 81 mg (81 mg Oral Given 4/4/17 1533)   labetalol (NORMODYNE;TRANDATE) injection 10 mg (10 mg IntraVENous Given 4/4/17 1533)   sodium chloride 0.9 % bolus infusion 1,000 mL (1,000 mL IntraVENous New Bag 4/4/17 1548)   LORazepam (ATIVAN) injection 1 mg (1 mg IntraVENous Given 4/4/17 1533)   LORazepam (ATIVAN) injection 1 mg (1 mg IntraVENous Given 4/4/17 1652)         Lab findings:  Recent Results (from the past 12 hour(s))   EKG, 12 LEAD, INITIAL    Collection Time: 04/04/17  3:18 PM   Result Value Ref Range    Ventricular Rate 119 BPM    Atrial Rate 119 BPM    P-R Interval 150 ms    QRS Duration 100 ms    Q-T Interval 352 ms    QTC Calculation (Bezet) 495 ms    Calculated P Axis 52 degrees    Calculated R Axis -35 degrees    Calculated T Axis 74 degrees    Diagnosis       Sinus tachycardia  Possible Left atrial enlargement  Left axis deviation  Nonspecific ST and T wave abnormality  Abnormal ECG  When compared with ECG of 04-FEB-2015 04:09,  Vent.  rate has increased BY  39 BPM  Nonspecific T wave abnormality, improved in Lateral leads     CBC W/O DIFF    Collection Time: 04/04/17  3:30 PM   Result Value Ref Range    WBC 6.2 4.6 - 13.2 K/uL    RBC 5.23 4.20 - 5.30 M/uL    HGB 16.1 (H) 12.0 - 16.0 g/dL    HCT 45.3 (H) 35.0 - 45.0 %    MCV 86.6 74.0 - 97.0 FL    MCH 30.8 24.0 - 34.0 PG MCHC 35.5 31.0 - 37.0 g/dL    RDW 11.1 (L) 11.6 - 14.5 %    PLATELET 633 198 - 325 K/uL    MPV 11.4 9.2 - 76.7 FL   METABOLIC PANEL, COMPREHENSIVE    Collection Time: 04/04/17  3:30 PM   Result Value Ref Range    Sodium 130 (L) 136 - 145 mmol/L    Potassium 4.1 3.5 - 5.5 mmol/L    Chloride 92 (L) 100 - 108 mmol/L    CO2 27 21 - 32 mmol/L    Anion gap 11 3.0 - 18 mmol/L    Glucose 550 (HH) 74 - 99 mg/dL    BUN 19 (H) 7.0 - 18 MG/DL    Creatinine 1.11 0.6 - 1.3 MG/DL    BUN/Creatinine ratio 17 12 - 20      GFR est AA >60 >60 ml/min/1.73m2    GFR est non-AA 51 (L) >60 ml/min/1.73m2    Calcium 9.2 8.5 - 10.1 MG/DL    Bilirubin, total 0.6 0.2 - 1.0 MG/DL    ALT (SGPT) 46 13 - 56 U/L    AST (SGOT) 33 15 - 37 U/L    Alk.  phosphatase 182 (H) 45 - 117 U/L    Protein, total 9.3 (H) 6.4 - 8.2 g/dL    Albumin 3.6 3.4 - 5.0 g/dL    Globulin 5.7 (H) 2.0 - 4.0 g/dL    A-G Ratio 0.6 (L) 0.8 - 1.7     PROTHROMBIN TIME + INR    Collection Time: 04/04/17  3:30 PM   Result Value Ref Range    Prothrombin time 11.9 11.5 - 15.2 sec    INR 0.9 0.8 - 1.2     THROMBIN TIME    Collection Time: 04/04/17  3:30 PM   Result Value Ref Range    Thrombin time 18.9 (H) 13.8 - 18.2 SECS   CARDIAC PANEL,(CK, CKMB & TROPONIN)    Collection Time: 04/04/17  3:30 PM   Result Value Ref Range    CK 88 26 - 192 U/L    CK - MB <1.0 <3.6 ng/ml    CK-MB Index Cannot be calulated 0.0 - 4.0 %    Troponin-I, Qt. <0.02 0.0 - 0.045 NG/ML   FIBRINOGEN    Collection Time: 04/04/17  3:30 PM   Result Value Ref Range    Fibrinogen 498 (H) 210 - 451 mg/dL   HEMOGLOBIN A1C WITH EAG    Collection Time: 04/04/17  3:30 PM   Result Value Ref Range    Hemoglobin A1c 9.7 (H) 4.2 - 5.6 %    Est. average glucose 232 mg/dL   POC CHEM8    Collection Time: 04/04/17  3:49 PM   Result Value Ref Range    CO2 (POC) 28 (H) 19 - 24 MMOL/L    Glucose (POC) 515 (HH) 74 - 106 MG/DL    BUN (POC) 20 (H) 7 - 18 MG/DL    Creatinine (POC) 0.7 0.6 - 1.3 MG/DL    GFR-AA (POC) >60 >60 ml/min/1.73m2 GFR, non-AA (POC) >60 >60 ml/min/1.73m2    Sodium (POC) 135 (L) 136 - 145 MMOL/L    Potassium (POC) 3.5 3.5 - 5.5 MMOL/L    Calcium, ionized (POC) 1.12 1.12 - 1.32 MMOL/L    Chloride (POC) 94 (L) 100 - 108 MMOL/L    Anion gap (POC) 18 10 - 20      Hematocrit (POC) 39 36 - 49 %    Hemoglobin (POC) 13.3 12 - 16 G/DL     X-Ray, CT or other radiology findings or impressions:  XR CHEST PORT      IMPRESSION:  No acute process. Per William Arzola MD 3:15 PM    Impression:  No radiographic evidence of an acute abnormality. Per Radiology. CT HEAD WO CONT     IMPRESSION:     Low-grade chronic changes, however, no acute findings. Note: Dr. Shannan White discussed the stroke code results with Dr. Sarah Hill at 3:12 PM  on 4/4/2017. Progress notes, Consult notes or additional Procedure notes:   3:42 PM Consult: Discussed care with Dr. Christina Osman [facility]). Standard discussion; including history of patients chief complaint, available diagnostic results, and treatment course. Agrees with Asprin, MRI/MRA, and admission. 4:30 PM Consult: Discussed care with Dr. Edmond Choi (HOSPITALIST). Standard discussion; including history of patients chief complaint, available diagnostic results, and treatment course. Agrees to admit. Reevaluation of patient:   3:42 PM Patient re-evaluated. Patient HR has improved. Will start glucose stabilizer. 3:46 PM Pt reevaluated at this time. Discussed results and findings, as well as, plan for admission. Pt verbalizes understanding and agreement with plan. Disposition:  Diagnosis:   1. Other specified transient cerebral ischemias    2. Hyperglycemia    3. Malignant hypertension        Disposition: Admit    Follow-up Information     None            Patient's Medications   Start Taking    No medications on file   Continue Taking    AMLODIPINE (NORVASC) 10 MG TABLET    Take 1 Tab by mouth daily. ASPIRIN DELAYED-RELEASE 81 MG TABLET    Take  by mouth daily.     ATORVASTATIN (LIPITOR) 40 MG TABLET    Take 1 Tab by mouth daily. BUPROPION (WELLBUTRIN) 100 MG TABLET    Take 1 Tab by mouth daily. CITALOPRAM (CELEXA) 40 MG TABLET    Take 1 Tab by mouth daily. CLOPIDOGREL (PLAVIX) 75 MG TABLET    Take 1 Tab by mouth daily. HYDRALAZINE (APRESOLINE) 50 MG TABLET    Take 0.5 Tabs by mouth four (4) times daily. HYDROCHLOROTHIAZIDE (HYDRODIURIL) 25 MG TABLET    Take 1 Tab by mouth daily. IBUPROFEN (MOTRIN) 800 MG TABLET    Take 1 Tab by mouth every eight (8) hours as needed for Pain. INSULIN ASPART (NOVOLOG) 100 UNIT/ML INPN    Take 8 units with each meal.    INSULIN DEGLUDEC (TRESIBA FLEXTOUCH U-100) 100 UNIT/ML (3 ML) INPN    25 Units by SubCUTAneous route daily. INSULIN NEEDLES, DISPOSABLE, (DORA PEN NEEDLE) 32 GAUGE X 5/32\" NDLE    Use one needle to give insulin 4 times a day. LEVOTHYROXINE (SYNTHROID) 125 MCG TABLET    Take 1 Tab by mouth Daily (before breakfast). LOSARTAN (COZAAR) 100 MG TABLET    Take 1 Tab by mouth daily. METFORMIN (GLUCOPHAGE) 850 MG TABLET    Take 1 Tab by mouth two (2) times daily (with meals). METOPROLOL SUCCINATE (TOPROL-XL) 200 MG XL TABLET    Take 1 Tab by mouth daily. RABEPRAZOLE (ACIPHEX) 20 MG TABLET    take 1 tablet by mouth once daily   These Medications have changed    No medications on file   Stop Taking    No medications on file     SCRIBE ATTESTATION STATEMENT  Documented by: Pito Denise for, and in the presence of, Nimisha Perez MD 3:09 PM    Signed by: Enrrique Evans, 04/04/17 3:09 PM    PROVIDER ATTESTATION STATEMENT  I personally performed the services described in the documentation, reviewed the documentation, as recorded by the scribe in my presence, and it accurately and completely records my words and actions.   Nimisha Perez MD

## 2017-04-05 PROBLEM — G40.109 SEIZURE DISORDER, FOCAL MOTOR (HCC): Status: ACTIVE | Noted: 2017-04-05

## 2017-04-05 PROBLEM — Z91.14 NONCOMPLIANCE WITH MEDICATION REGIMEN: Chronic | Status: ACTIVE | Noted: 2017-04-05

## 2017-04-05 LAB
ABO + RH BLD: NORMAL
ASPIRIN TEST, ASPIRN: 518 ARU
ATRIAL RATE: 119 BPM
BLOOD GROUP ANTIBODIES SERPL: NORMAL
CALCULATED P AXIS, ECG09: 52 DEGREES
CALCULATED R AXIS, ECG10: -35 DEGREES
CALCULATED T AXIS, ECG11: 74 DEGREES
DIAGNOSIS, 93000: NORMAL
GLUCOSE BLD STRIP.AUTO-MCNC: 187 MG/DL (ref 70–110)
GLUCOSE BLD STRIP.AUTO-MCNC: 239 MG/DL (ref 70–110)
GLUCOSE BLD STRIP.AUTO-MCNC: 252 MG/DL (ref 70–110)
GLUCOSE BLD STRIP.AUTO-MCNC: 275 MG/DL (ref 70–110)
GLUCOSE BLD STRIP.AUTO-MCNC: 97 MG/DL (ref 70–110)
P-R INTERVAL, ECG05: 150 MS
P2Y12 PLT RESPONSE,PPPR: 335 PRU (ref 194–418)
Q-T INTERVAL, ECG07: 352 MS
QRS DURATION, ECG06: 100 MS
QTC CALCULATION (BEZET), ECG08: 495 MS
SPECIMEN EXP DATE BLD: NORMAL
T3FREE SERPL-MCNC: 2.6 PG/ML (ref 2.3–4.2)
T4 FREE SERPL-MCNC: 1 NG/DL (ref 0.7–1.5)
TROPONIN I SERPL-MCNC: 0.03 NG/ML (ref 0–0.04)
VENTRICULAR RATE, ECG03: 119 BPM

## 2017-04-05 PROCEDURE — 74011000250 HC RX REV CODE- 250: Performed by: HOSPITALIST

## 2017-04-05 PROCEDURE — 74011250637 HC RX REV CODE- 250/637: Performed by: HOSPITALIST

## 2017-04-05 PROCEDURE — 97162 PT EVAL MOD COMPLEX 30 MIN: CPT

## 2017-04-05 PROCEDURE — 99218 HC RM OBSERVATION: CPT

## 2017-04-05 PROCEDURE — 74011636637 HC RX REV CODE- 636/637: Performed by: HOSPITALIST

## 2017-04-05 PROCEDURE — 74011250636 HC RX REV CODE- 250/636: Performed by: HOSPITALIST

## 2017-04-05 PROCEDURE — G8998 SWALLOW D/C STATUS: HCPCS

## 2017-04-05 PROCEDURE — 96372 THER/PROPH/DIAG INJ SC/IM: CPT

## 2017-04-05 PROCEDURE — G8996 SWALLOW CURRENT STATUS: HCPCS

## 2017-04-05 PROCEDURE — 85576 BLOOD PLATELET AGGREGATION: CPT | Performed by: HOSPITALIST

## 2017-04-05 PROCEDURE — 96375 TX/PRO/DX INJ NEW DRUG ADDON: CPT

## 2017-04-05 PROCEDURE — 82962 GLUCOSE BLOOD TEST: CPT

## 2017-04-05 PROCEDURE — 74011000258 HC RX REV CODE- 258: Performed by: HOSPITALIST

## 2017-04-05 PROCEDURE — 84484 ASSAY OF TROPONIN QUANT: CPT | Performed by: HOSPITALIST

## 2017-04-05 PROCEDURE — G8997 SWALLOW GOAL STATUS: HCPCS

## 2017-04-05 PROCEDURE — 92610 EVALUATE SWALLOWING FUNCTION: CPT

## 2017-04-05 PROCEDURE — 96361 HYDRATE IV INFUSION ADD-ON: CPT

## 2017-04-05 PROCEDURE — 36415 COLL VENOUS BLD VENIPUNCTURE: CPT | Performed by: HOSPITALIST

## 2017-04-05 PROCEDURE — 97165 OT EVAL LOW COMPLEX 30 MIN: CPT

## 2017-04-05 PROCEDURE — 77030020263 HC SOL INJ SOD CL0.9% LFCR 1000ML

## 2017-04-05 PROCEDURE — 95819 EEG AWAKE AND ASLEEP: CPT

## 2017-04-05 PROCEDURE — 95816 EEG AWAKE AND DROWSY: CPT | Performed by: PSYCHIATRY & NEUROLOGY

## 2017-04-05 PROCEDURE — 97535 SELF CARE MNGMENT TRAINING: CPT

## 2017-04-05 RX ORDER — ASPIRIN 325 MG
325 TABLET ORAL DAILY
Status: DISCONTINUED | OUTPATIENT
Start: 2017-04-06 | End: 2017-04-06 | Stop reason: HOSPADM

## 2017-04-05 RX ORDER — INSULIN LISPRO 100 [IU]/ML
5 INJECTION, SOLUTION INTRAVENOUS; SUBCUTANEOUS
Status: DISCONTINUED | OUTPATIENT
Start: 2017-04-05 | End: 2017-04-06 | Stop reason: HOSPADM

## 2017-04-05 RX ADMIN — INSULIN LISPRO 6 UNITS: 100 INJECTION, SOLUTION INTRAVENOUS; SUBCUTANEOUS at 16:30

## 2017-04-05 RX ADMIN — ASPIRIN 81 MG: 81 TABLET, COATED ORAL at 09:28

## 2017-04-05 RX ADMIN — LOSARTAN POTASSIUM 100 MG: 50 TABLET ORAL at 09:28

## 2017-04-05 RX ADMIN — INSULIN DETEMIR 20 UNITS: 100 INJECTION, SOLUTION SUBCUTANEOUS at 15:25

## 2017-04-05 RX ADMIN — HYDRALAZINE HYDROCHLORIDE 25 MG: 25 TABLET, FILM COATED ORAL at 09:28

## 2017-04-05 RX ADMIN — CLOPIDOGREL BISULFATE 75 MG: 75 TABLET, FILM COATED ORAL at 09:29

## 2017-04-05 RX ADMIN — HYDRALAZINE HYDROCHLORIDE 25 MG: 25 TABLET, FILM COATED ORAL at 23:26

## 2017-04-05 RX ADMIN — DOCUSATE SODIUM AND SENNOSIDES 2 TABLET: 8.6; 5 TABLET, FILM COATED ORAL at 23:26

## 2017-04-05 RX ADMIN — LEVOTHYROXINE SODIUM 125 MCG: 100 TABLET ORAL at 09:28

## 2017-04-05 RX ADMIN — HYDRALAZINE HYDROCHLORIDE 25 MG: 25 TABLET, FILM COATED ORAL at 18:31

## 2017-04-05 RX ADMIN — CITALOPRAM HYDROBROMIDE 40 MG: 20 TABLET ORAL at 09:28

## 2017-04-05 RX ADMIN — SODIUM CHLORIDE 150 ML/HR: 900 INJECTION, SOLUTION INTRAVENOUS at 06:25

## 2017-04-05 RX ADMIN — INSULIN LISPRO 5 UNITS: 100 INJECTION, SOLUTION INTRAVENOUS; SUBCUTANEOUS at 16:30

## 2017-04-05 RX ADMIN — INSULIN DETEMIR 20 UNITS: 100 INJECTION, SOLUTION SUBCUTANEOUS at 23:26

## 2017-04-05 RX ADMIN — ATORVASTATIN CALCIUM 40 MG: 40 TABLET, FILM COATED ORAL at 09:28

## 2017-04-05 RX ADMIN — INSULIN LISPRO 9 UNITS: 100 INJECTION, SOLUTION INTRAVENOUS; SUBCUTANEOUS at 09:29

## 2017-04-05 RX ADMIN — INSULIN LISPRO 3 UNITS: 100 INJECTION, SOLUTION INTRAVENOUS; SUBCUTANEOUS at 13:55

## 2017-04-05 RX ADMIN — HYDRALAZINE HYDROCHLORIDE 25 MG: 25 TABLET, FILM COATED ORAL at 13:55

## 2017-04-05 RX ADMIN — FOLIC ACID: 5 INJECTION, SOLUTION INTRAMUSCULAR; INTRAVENOUS; SUBCUTANEOUS at 15:25

## 2017-04-05 RX ADMIN — ENOXAPARIN SODIUM 40 MG: 40 INJECTION SUBCUTANEOUS at 18:31

## 2017-04-05 RX ADMIN — HYDROCHLOROTHIAZIDE 25 MG: 25 TABLET ORAL at 09:28

## 2017-04-05 RX ADMIN — BUPROPION HYDROCHLORIDE 100 MG: 100 TABLET, FILM COATED ORAL at 09:29

## 2017-04-05 RX ADMIN — METOPROLOL SUCCINATE 200 MG: 100 TABLET, EXTENDED RELEASE ORAL at 09:28

## 2017-04-05 RX ADMIN — INSULIN LISPRO 5 UNITS: 100 INJECTION, SOLUTION INTRAVENOUS; SUBCUTANEOUS at 13:55

## 2017-04-05 RX ADMIN — SODIUM CHLORIDE 150 ML/HR: 900 INJECTION, SOLUTION INTRAVENOUS at 13:31

## 2017-04-05 RX ADMIN — AMLODIPINE BESYLATE 10 MG: 10 TABLET ORAL at 09:29

## 2017-04-05 NOTE — DIABETES MGMT
Diabetes Patient/Family Education Record    Factors That  May Influence Patients Ability  to Learn or  Comply With  Recommendations:    []   Language barrier    []   Cultural needs   [x]   Motivation    []   Cognitive limitation    []   Physical   []   Education    []   Physiological factors   []   Hearing/vision/speaking impairment   []   Yazdanism beliefs    []   Financial factors   []  Other:   []  No factors identified at this time. Person Instructed:   [x]   Patient   []   Family   []  Other     Preference for Learning:   [x]   Verbal   [x]   Written   []  Demonstration     Level of Comprehension & Competence:    [x]  Good                                      [] Fair                                     []  Poor                             []  Needs Reinforcement   [x]  Teachback completed    Education Component:   []  Medication management, including how to administer insulin (if appropriate) and potential medication interactions Taking Tresiba 25 units every night. Novolog 8 units 3 times daily with meals. Metformin 850 mg twice daily. Reviewed correct pen technique with patient. [x]  Nutritional management including the role of carbohydrate intake Discussed healthier beverage choices. \"I have no willpower. I love soda. \" Patient was given the \"blue packet\" on her breakfast tray this morning, but prefers the \"pink packet\". Sweet N Low equivalent brought to patient.   []  Exercise   [x]  Signs, symptoms, and treatment of hyperglycemia and hypoglycemia \"I have to issues with going low. \"   [] Treatment of hyperglycemia and hypoglycemia   [x]  Importance of blood glucose monitoring and how to obtain a blood glucose meter \"I have 3 meters at home, and plenty of strips\"  States she is not good about checking her blood sugar. Reinforced importance of bg monitoring. Encouraged pt to check the dates on her strips, because they do .    []  Instruction on use of blood glucose meter   [x]  Discuss the importance of HbA1C monitoring and provide patient with  Results 9.7% equivalent to average blood glucose of 232 mg/dl. Patient states this was 12% 3 months ago at her drs office.    []  Sick day guidelines   []  Proper use and disposal of lancets, needles, syringes or insulin pens (if appropriate)   [x]  Potential long-term complications (retinopathy, kidney disease, neuropathy, heart disease, stroke, vascular disease, foot care)   [] Provide emergency contact number and contact number for more information    [x]  Goal:  Patient/family will demonstrate understanding of Diabetes Self Management Skills by: (date) __4/10/17____  Plan for post-discharge education or self-management support:    [x] Outpatient class schedule provided            [] Patient Declined    [] Scheduled for outpatient classes (date) _______

## 2017-04-05 NOTE — PROGRESS NOTES
Patient remains unable to communicate after multiple attempts to visit with her today. She contiues to be resting peacefully in her room.  offered prayer and left Spiritual Care brochure. Chaplains will continue to follow and will provide pastoral care on an as needed/requested basis.     India Lamas   Spiritual Care   [phone]

## 2017-04-05 NOTE — PROGRESS NOTES
Assumed pt care. Received pt resting in bed, alert and oriented x 4. Denies any pain at this time. No s/s of distress. Family at bedside. Pt is asking if she can have food, informed her that I have to ask the doctor if she can have a diet order, pt verbalized understanding. Call bell within reach. 2000> patient transported for MRI and MRA.     2155> patient back in room from MRI.     4-5-17 0811> Bedside and Verbal shift change report given to Trace Constantino (oncoming nurse) by Venu Perla   (offgoing nurse). Report included the following information SBAR, Kardex, Intake/Output and MAR.

## 2017-04-05 NOTE — PROGRESS NOTES
Patient and/or next of kin has been given the Outpatient Observation Information and Notification letter and all questions answered.

## 2017-04-05 NOTE — PROGRESS NOTES
Received bedside verbal report from Denice  43.  Pt resting, no sign of distress. Call light within reach. 1200- therapy notes pt reporting h/a on left side of head as well as \"funny feeling\". Pt assessed by this nurse. Alert and oriented x4. Denies headache or dizziness at this time. NIH repeated with score of 0.     1920 Bedside and Verbal shift change report given to Shaina BELLAMY (oncoming nurse) by Kailash Robles (offgoing nurse). Report included the following information SBAR, MAR and Recent Results.

## 2017-04-05 NOTE — PROGRESS NOTES
Progress Note      Patient: Saumya Yancey               Sex: female          DOA: 4/4/2017       YOB: 1960      Age:  64 y.o.        LOS:  LOS: 0 days               Subjective: The pt has been a diabetic for 30 years . She is noncompliant with her insulin the pt now has been admitted with a probable tia with left sided weakness and numbness . The pt's hemoglobin a1c was 12  on 1/25/17  and is now 9.7  on 4/4/17. on admission the pt's glucose was 550 . will ask neurology to see the pt  .pt will have a 2 d echo  . the  Mri of the head showed no evidence of any acute cva . There is a  Chronic cva in the right lentiform nucleus mra of the  head was negative MRI examination of the abdomen of the neck   Shows right ICA  Stenosis was 33 and 59 % as discussed in the report. The pt says that she has 5 cardiac stents in place . The problem seems to be that the pt is reluctant to take her medictions especially her insulin . She has depression . She has sleep apnea and has had a recent sleep study. She is on wellbutrin and celexa. .hopefully her depression will respond to the use of her cpap      Objective:      Visit Vitals    /83 (BP 1 Location: Right arm, BP Patient Position: At rest)    Pulse 94    Temp 98.1 °F (36.7 °C)    Resp 16    Ht 5' 2\" (1.575 m)    Wt 78.9 kg (174 lb)    SpO2 98%    Breastfeeding No    BMI 31.83 kg/m2       Physical Exam:  Pt is awake and is alert   Heart reg rate and rhythm   Lungs fair breath sounds heard   Abdomen soft and nontender   Neuro . No weakness of the outstretched arms the patient can stand on her tiptoes .  Denies any numbness         Lab/Data Reviewed:  CMP:   Lab Results   Component Value Date/Time     (L) 04/04/2017 03:30 PM    K 4.1 04/04/2017 03:30 PM    CL 92 (L) 04/04/2017 03:30 PM    CO2 27 04/04/2017 03:30 PM    AGAP 11 04/04/2017 03:30 PM     (HH) 04/04/2017 03:30 PM    BUN 19 (H) 04/04/2017 03:30 PM    CREA 1.11 04/04/2017 03:30 PM    GFRAA >60 04/04/2017 03:30 PM    GFRNA 51 (L) 04/04/2017 03:30 PM    CA 9.2 04/04/2017 03:30 PM    ALB 3.6 04/04/2017 03:30 PM    TP 9.3 (H) 04/04/2017 03:30 PM    GLOB 5.7 (H) 04/04/2017 03:30 PM    AGRAT 0.6 (L) 04/04/2017 03:30 PM    SGOT 33 04/04/2017 03:30 PM    ALT 46 04/04/2017 03:30 PM     CBC:   Lab Results   Component Value Date/Time    WBC 6.2 04/04/2017 03:30 PM    HGB 16.1 (H) 04/04/2017 03:30 PM    HCT 45.3 (H) 04/04/2017 03:30 PM     04/04/2017 03:30 PM           Assessment/Plan     Active Problems:  Probable tia with left sided weakness and numbness which now seems to have resolved . Diabetes mellitus - poorly controlled . CAD - pt is s/p 5 cardiac stents . Depression  Sleep apnea   Plan:   Will need to be on aspirin and probable plavix but carlin discuss with neurology

## 2017-04-05 NOTE — PROGRESS NOTES
Problem: Mobility Impaired (Adult and Pediatric)  Goal: *Acute Goals and Plan of Care (Insert Text)  Physical Therapy Goals  Initiated 4/5/2017 and to be accomplished within 7 day(s)  1. Patient will move from supine to sit and sit to supine , scoot up and down and roll side to side in bed with modified independence. 2. Patient will transfer from bed to chair and chair to bed with modified independence using the least restrictive device. 3. Patient will perform sit to stand with modified independence. 4. Patient will ambulate with modified independence for 300 feet with the least restrictive device. 5. Patient will ascend/descend 3 stairs with handrail(s) with modified independence. Outcome: Progressing Towards Goal  PHYSICAL THERAPY EVALUATION     Patient: Char Poole (60 y.o. female)  Date: 4/5/2017  Primary Diagnosis: TIA        Precautions:   Fall      PROBLEM LIST:  Patient presents with the following problems:   Bed Mobility, Transfers, Gait, Strength, Balance and Stairs  ASSESSMENT :   Patient requires between minimal assistance/contact guard assist and supervision/set-up for bed mobility, transfers and ambulation. Patient left in chair with OT present to complete her evaluation. Patient reports problems with left hip with gait and losing balance to the left. Endurance decreased per patient. Education on plan of care and  Exercises to do on own in bed or chair and to call for help with going to bathroom. Patient will benefit from skilled intervention to address the above impairments.   Patients rehabilitation potential is considered to be Good  Factors which may influence rehabilitation potential include:   [ ]         None noted  [ ]         Mental ability/status  [X]         Medical condition  [ ]         Home/family situation and support systems  [ ]         Safety awareness  [ ]         Pain tolerance/management  [ ]         Other:        PLAN :  Recommendations and Planned Interventions:  [X]           Bed Mobility Training             [X]    Neuromuscular Re-Education  [X]           Transfer Training                   [ ]    Orthotic/Prosthetic Training  [X]           Gait Training                          [ ]    Modalities  [X]           Therapeutic Exercises          [ ]    Edema Management/Control  [X]           Therapeutic Activities            [X]    Patient and Family Training/Education  [ ]           Other (comment):     Frequency/Duration: Patient will be followed by physical therapy 1-2 times per day/4-7 days per week to address goals. Discharge Recommendations: Outpatient and To Be Determined  Further Equipment Recommendations for Discharge: straight cane ? SUBJECTIVE:   Patient stated .      OBJECTIVE DATA SUMMARY:       Past Medical History:   Diagnosis Date    Anxiety      CAD (coronary artery disease)       S/P Coronary stents ( LAD and Lcx)    Depression      Diabetes ([facility] [address]      Hepatitis C      Hypercholesterolemia      Hypertension       Past Surgical History:   Procedure Laterality Date    HX BREAST BIOPSY         1971 2010 left breast lump removed excisional per patient     [facility]     pre-eclampsia    HX CHOLECYSTECTOMY   2004    HX CORONARY STENT PLACEMENT   Nov 2013     4 stents    HX HEENT   2009     thyroidectomy due to nodules.  HX HYSTERECTOMY   2005     heavy periods    HX THYROIDECTOMY         Barriers to Learning/Limitations: None  Compensate with: visual, verbal, tactile, kinesthetic cues/model     G CODES:Mobility  Current  CJ= 20-39%   Goal  CI= 1-19%. The severity rating is based on the Other [employer] Balance Scale4/5   [facility] Standing Balance Scale4/5  0: Pt performs 25% or less of standing activity (Max assist) CN, 100% impaired. 1: Pt supports self with upper extremities but requires therapist assistance.  Pt performs 25-50% of effort (Mod assist) CM, 80% to <100% impaired. 1+: Pt supports self with upper extremities but requires therapist assistance. Pt performs >50% effort. (Min assist). CL, 60% to <80% impaired. 2: Pt supports self independently with both upper extremities (walker, crutches, parallel bars). CL, 60% to <80% impaired. 2+: Pt support self independently with 1 upper extremity (cane, crutch, 1 parallel bar). CK, 40% to <60% impaired. 3: Pt stands without upper extremity support for up to 30 seconds. CK, 40% to <60% impaired. 3+: Pt stands without upper extremity support for 30 seconds or greater. CJ, 20% to <40% impaired. 4: Pt independently moves and returns center of gravity 1-2 inches in one plane. CJ, 20% to <40% impaired. 4+: Pt independently moves and returns center of gravity 1-2 inches in multiple planes. CI, 1% to <20% impaired. 5: Pt independently moves and returns center of gravity in all planes greater than 2 inches. CH, 0% impaired. Eval Complexity: History: MEDIUM  Complexity : 1-2 comorbidities / personal factors will impact the outcome/ POC Exam:MEDIUM Complexity : 3 Standardized tests and measures addressing body structure, function, activity limitation and / or participation in recreation  Presentation: MEDIUM Complexity : Evolving with changing characteristics  Clinical Decision Making:Medium Complexity [facility] Standing Balance Scale4/5 Overall Complexity:MEDIUM     Prior Level of Function/Home Situation: I with adl's and ambulation without assistive device   Home Situation  Home Environment: Apartment  # Steps to Enter: 3  Rails to Enter: Yes  One/Two Story Residence: One story  Living Alone: Yes  Support Systems: Family member(s)  Patient Expects to be Discharged to[de-identified] Apartment  Current DME Used/Available at Home: None  Tub or Shower Type: Tub/Shower combination  Critical Behavior:  Neurologic State: Alert  Orientation Level: Oriented X4  Cognition: Follows commands  Safety/Judgement: Fall prevention; Awareness of environment  Psychosocial  Patient Behaviors: Calm; Cooperative  Strength:    Strength:  (4/5 with left hip 3+/5) both legs   Tone & Sensation:   Tone: Normal  Sensation: Intact  Range Of Motion:  AROM: Within functional limits  PROM: Within functional limits  Functional Mobility:  Bed Mobility:  Rolling: Stand-by asssistance;Supervision; Additional time  Supine to Sit: Supervision;Stand-by asssistance; Additional time  Transfers:  Sit to Stand: Stand-by asssistance;Contact guard assistance  Stand to Sit: Stand-by asssistance;Contact guard assistance  Balance:   Sitting: Intact  Sitting - Static: Good (unsupported)  Sitting - Dynamic: Good (unsupported)  Standing: Impaired  Standing - Static: Good  Standing - Dynamic : Fair  Ambulation/Gait Training:  Distance (ft): 40 Feet (ft)  Assistive Device:  (none)  Ambulation - Level of Assistance: Contact guard assistance;Stand-by asssistance; Additional time;Assist x1  Gait Description (WDL): Exceptions to WDL  Gait Abnormalities: Decreased step clearance; Step to gait; Path deviations  Base of Support: Center of gravity altered  Speed/Mayra: Delayed; Slow  Step Length: Left shortened;Right shortened  Interventions: Safety awareness training  Therapeutic Exercises: Ankle pumps, laq's hip flex, to do in bed  Pain:  Pre treatment pain level:0  Post treatment pain level:0  Pain Scale 1: Visual  Pain Intensity 1: 0  Activity Tolerance:    fair   Please refer to the flowsheet for vital signs taken during this treatment. After treatment:   [X]         Patient left in no apparent distress sitting up in chair  [ ]         Patient left in no apparent distress in bed  [X]         Call bell left within reach  [ ]         Nursing notified  [ ]         Caregiver present  [ ]         Bed alarm activated      COMMUNICATION/EDUCATION:   [X]         Fall prevention education was provided and the patient/caregiver indicated understanding.   [X]         Patient/family have participated as able in goal setting and plan of care. [X]         Patient/family agree to work toward stated goals and plan of care. [ ]         Patient understands intent and goals of therapy, but is neutral about his/her participation. [ ]         Patient is unable to participate in goal setting and plan of care. Patient educated on the role of physical therapy during the acute stay  and the importance of mobility. VU.  Need reinforcement      Thank you for this referral.  Itz Reyes, PT   Time Calculation: 8 mins

## 2017-04-05 NOTE — INTERDISCIPLINARY ROUNDS
IDR Summary      Patient: Dixie Chandler MRN: [id number]    Age: 64 y.o.  : 1960     Admit Diagnosis: TIA      Problems pertinent to hospital stay: left sided weakness    Consults:P.T, O.T., Speech and Case Management     Testing due for patient today?  YES    Nutrition plan:Yes     Mobility needs: Yes      Lines/Tubes:   IV: YES   Needed: YES  Chavez: NO  Needed:NO  Central Line: NO Needed: NO      VTE Prophylaxis: Chemical           Care Management:  Discharge plan: Home with Dong Davey   PCP: DO Avani Knutson Coach: NO  Financial concerns:No   Interventions:       LOS: 0 days     Expected days until discharge: 1-2 days        Signed:     Stalin Davison, JESUS MANUEL-BC  [address]  Hospitalist Division  Pager:  [phone]  Office:  [phone]

## 2017-04-05 NOTE — CONSULTS
Neurology Consult Note  Dictation, #  Admit Date: 4/4/2017  Length of Stay: 0  Primary Care: Zeyad Multani DO   Principle Problem: TIA    Patient interviewed and examined independently. Patient describes first feeling strange in the pit of her stomach. She went to bathroom and urinated without difficulty. After leaving the stall her left hand started twisting and she went to door to request help. Someone helped her to sit and then both legs started shaking, then whole body, and she was lowered to floor. She says she was awake the whole time with shaking lasting about 1 minute. Afterwards she had trouble coming up with words and was sleepy. There was numbness and weakness of the left hand and forearm that lasted about an hour. She has had some intermittent left frontal headache rated 1/10. Note significantly elevated BP on presentation to ED. History of noncompliance with medications. No history of similar episodes in past. Denies history of seizures. Exam as per Ms. Kaur Malhotra except as noted: has decreased pinprick toes of both feet improving at ankle. MRI personally reviewed and no acute ischemia. Has mild small vessel ischemic changes. Imp: 1. Seizure. Events more consistent with seizure than TIA. Question secondary to hypertensive urgency. No clear cause for seizure seen on MRI. 2. Diabetic polyneuropathy  3. Diabetes  4. Noncompliance with medical therapy    Plan: Will request EEG. Patient was advised of DMV regulations and told no driving for 6 months. No antiepileptic medication unless recurrence. She will follow with Dr. Nohemi Floyd, neurology, who already follows her for sleep apnea. Assessment:   Normal strength and sensation. Speech clear and articulate. CN II-XII intact. Progression of symptoms to not sound like typical stroke/TIA symptoms. Plan:   1.  TIA- MRI negative for any acute findings but does have small vessel changes and small chronic infarct in right lentiform nucleus/corona radiata. TTE is ordered and pending. Telemetry shows SR/ST. She has been non-compliant with her medications at home. Discussed taking them daily for secondary stroke prevention. If TTE negative, would be able to DC home. 2. Hypertensive Urgency-  Initial BP on arrival 210/102 with a max of 217/123 while in ED. She continues to be hypertensive. BP management per medical team.  Would gradually lower and have her follow-up with PCP closely. History: Pardeep Gomez is a pleasant right handed  female with PMH of HTN, DM, CAD with stents x 4, hepatitis C, anxiety, and hypercholesteremia that presented to the ED yesterday. She was at [employer] with her friend around 1430 when left hand and forearm were kavitha followed by bilateral facial \"twisting\". Speech at that time was slurred. This was followed by all over body shaking and left hand numbness. The shaking last a few minutes and she did not lose consciousness. By the time EMS brought her to the hospital symptoms were resolved. Code S was activated and stroke work-up was performed. She has not been compliant with her medications at home. She states that she she may take them every other day or when she feels like it. Results reviewed: MRI does not show acute infarct but small vessel changes and probable small chronic right lentiform nucleus/corona radiata infarct. CT Results (most recent):    Results from Hospital Encounter encounter on 04/04/17   CT HEAD WO CONT   Narrative EXAM:  CT of the brain without intravenous contrast.    COMPARISON:     PROVIDED REASON FOR EXAM: \"Suspected Stroke\". Slurred speech. Left-sided  weakness. DOSE REDUCTION:  One or more dose reduction techniques were used on this CT:  automated exposure control, adjustment of the mAs and/or kVp according to  patient's size, and iterative reconstruction techniques.  The specific techniques  utilized on this CT exam have been documented in the patient's electronic  medical record.    _______________    FINDINGS:         IMAGE QUALITY:  There is no significant motion degradation of the images. BRAIN AND EXTRA-AXIAL SPACE:  2 cm in diameter patch of mildly decreased  attenuation in the right anterior corona radiata likely reflects age  indeterminant ischemic change. This occurs in concert with an otherwise mild  burden of patchy white matter hypodensity that also likely represents chronic  microvascular ischemic change. There is a mild burden of calcific intracranial  atherosclerosis. Mild diffuse volume loss. There is no evidence of mass, hemorrhage, extra-axial fluid collection, or  territorial subacute cortical infarct, and no hydrocephalus. MISCELLANEOUS:  Subcentimeter low-attenuation lesion in the high right  frontal bone on axial image 21 is statistically most likely benign. The mastoid  air cells and imaged superior portions of the paranasal sinuses are clear.     _______________         Impression IMPRESSION:    Low-grade chronic changes, however, no acute findings. Note: Dr. Lala Katz discussed the stroke code results with Dr. Roberto York at 3:12 PM  on 4/4/2017.     _______________                    MRI Results (most recent):    Results from Hospital Encounter encounter on 04/04/17   MRA NECK W WO CONT   Narrative MRA neck with and without contrast.    History: Left-sided weakness, left facial droop with slurred speech, headache    Technique:  MR angiography of the neck was performed utilizing contrast enhanced  coronal acquisitions with multiplanar reconstructions. Additional axial 2-D  time-of-flight non-contrast imaging was also acquired. Findings: The left carotid artery bifurcation is mildly irregular. Mild kinking is seen  along the mid left common carotid artery with only slight narrowing. Estimated  minimal luminal diameter of proximal ICA is 4.2 mm.   Estimated diameter of  normal distal cervical segment ICA is 5.4 mm. Estimated stenosis of left ICA  based upon NASCET criteria is 23%. The right carotid artery bifurcation is mildly irregular. Estimated minimal  luminal diameter of proximal ICA is 3.7 mm. There is additional kinking  identified along the proximal to mid cervical segment narrowing diameter to  estimated 2.3 mm. Estimated diameter of normal distal cervical segment ICA is  5.5 mm. Estimated stenosis of right ICA based upon NASCET criteria is 59% along  the kinking and 33% along the proximal carotid bulb. The origins of the great vessels are unremarkable. Mild narrowing is present  along the proximal left subclavian artery proximal to the vertebral artery  origin. The vertebral arteries are relatively equal in size. No focal vertebral  artery stenosis is seen. Bilateral antegrade flow seen on time-of-flight images. Impression IMPRESSION:    1. Mild proximal ICA irregularity with additional proximal and mid right ICA  cervical segment kinking. Estimated proximal right ICA stenosis 33% with 59%  along the cervical segment kinking, based on NASCET criteria. Estimated proximal  left ICA stenosis 23%. 2.  No focal vertebral artery stenosis is seen. Preliminary report of this study was provided by another radiologist, Dr. Lisa Harrington 4/4/2017 at 11:03 PM.      MRA Brain  Findings:      Mild motion artifact is present.     There is no evidence of aneurysm. There is no focal high-grade stenosis within  the intracranial arteries. No focal carotid siphon stenosis is seen. The  carotid terminus is unremarkable. Anterior cerebral arteries are within normal  limits. The middle cerebral artery bifurcations are within normal limits given  motion artifact. Distal MCA branch flow is seen bilaterally. The vertebral  arteries are relatively equal in size. The basilar artery is unremarkable. Posterior cerebral arteries are unremarkable.  A very small posterior  communicating artery is present bilaterally.     IMPRESSION  IMPRESSION:     Mildly motion degraded study.     1. No high-grade intracranial stenosis is seen.     2. No intracranial aneurysm is seen. MRI BRain  Findings:      The ventricles and sulci are normal in their size and configuration. A mild  amount of T2/FLAIR hyperintense white matter lesions are seen within the  periventricular and subcortical white matter , which are nonspecific, but likely  represent chronic small vessel changes. Probable chronic small infarct  involving anterosuperior right lentiform nucleus/corona radiata. There is no  evidence of mass effect, hemorrhage, midline shift, or herniation. There is no  abnormal restricted diffusion to suggest acute infarct. No abnormal contrast  enhancement is present. The major intracranial vascular flow voids are grossly  normal.      Mild mucoperiosteal thickening is scattered in the paranasal sinuses. No  air-fluid levels are present. Orbits and mastoid air cells are unremarkable.     IMPRESSION  IMPRESSION:     1. No acute infarct, hemorrhage, mass effect, or herniation.     2. Mild nonspecific white matter disease likely representing chronic small  vessel changes.      3. 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       Latest Hemoglobin A1C:  Lab Results   Component Value Date/Time    Hemoglobin A1c 9.7 04/04/2017 03:30 PM    Hemoglobin A1c (POC) 10.8 05/19/2016 12:56 PM       Latest Cardiology Procedure:  Results for orders placed or performed during the hospital encounter of 04/04/17   EKG, 12 LEAD, INITIAL   Result Value Ref Range    Ventricular Rate 119 BPM    Atrial Rate 119 BPM    P-R Interval 150 ms    QRS Duration 100 ms    Q-T Interval 352 ms    QTC Calculation (Bezet) 495 ms    Calculated P Axis 52 degrees    Calculated R Axis -35 degrees    Calculated T Axis 74 degrees    Diagnosis       Sinus tachycardia  Possible Left atrial enlargement  Left axis deviation  Nonspecific ST and T wave abnormality  Abnormal ECG  When compared with ECG of 04-FEB-2015 04:09,  Vent. rate has increased BY  39 BPM  Nonspecific T wave abnormality, improved in Lateral leads  Confirmed by Sujatha Sanchez ([id number]) on 4/5/2017 7:35:01 AM         Important Labs:  No results found for: FOL, RBCF  Lab Results   Component Value Date/Time    Cholesterol, total 207 01/25/2017 09:19 AM    HDL Cholesterol 61 01/25/2017 09:19 AM    LDL, calculated 104.8 01/25/2017 09:19 AM    VLDL, calculated 41.2 01/25/2017 09:19 AM    Triglyceride 206 01/25/2017 09:19 AM    CHOL/HDL Ratio 3.4 01/25/2017 09:19 AM     Lab Results   Component Value Date/Time    TSH 5.15 04/04/2017 10:50 PM    Triiodothyronine (T3), free 2.6 04/04/2017 10:50 PM    T4, Free 1.0 04/04/2017 10:50 PM     No results found for: DS35, PHEN, PHENO, PHENT, DILF, DS39, PHENY, PTN, VALF2, VALAC, VALP, VALPR, DS6, CRBAM, CRBAMP, CARB2, XCRBAM  Discussed with: patient     Allergies:    Allergies   Allergen Reactions    Contrast Agent [Iodine] Rash and Sneezing      Review of Systems:    Y  N   Constitutional: [] [x] recent weight change  [] [x] fever  [x] [] sleep difficulties   ENT/Mouth:  [] [x] hearing loss  [] [x] swallowing problems  [x] [] slurred speech   Cardiovascular:  [] [x] chest pain   [] [x] palpitations    Respiratory: [] [x] cough with swallow  [] [x] shortness of breath  [x] [] sleep apnea  [] [] intubated   Gastrointestinal: [] [x] abdominal pain  [] [x] nausea   Genitourinary: [] [x] frequent urination  [] [x] incontinence    Musculoskeletal:   [] [x] joint pain  [] [x] muscle pain   Integument:   [] [x] rash/itching   Neurological:  [] [x] dizziness/vertigo  [x] [] sedation  [] [x] confusion  [] [x] agitation/combativeness  [] [x] loss of consciousness  [x] [] numbness/tingling sensation  [] [x] tremors  [x] [] weakness in limbs  [] [x] difficulty with balance  [] [x] frequent or recurring headaches  [] [x] memory loss   [] [] comatose  [] [x] seizures Psychiatric:   [] [x] depression  [] [] hallucinations   Endocrine: [] [x] excessive thirst or urination   [] [x] heat or cold intolerance   Hematologic/Lymphatic: [] [x] bleeding tendency  [] [x] enlarged lymph nodes     PMH:   Past Medical History:   Diagnosis Date    Anxiety     CAD (coronary artery disease)     S/P Coronary stents ( LAD and Lcx)    Depression     Diabetes ([facility] [address]     Hepatitis C     Hypercholesterolemia     Hypertension         Problem List: Active Problems:    * No active hospital problems. *      FH:   Family History   Problem Relation Age of Onset    Diabetes Mother     Hypertension Mother     Cancer Mother     Heart Disease Mother     Heart Attack Mother    [place] Diabetes Father     Hypertension Father     Cancer Father         SH:   Social History     Social History    Marital status:      Spouse name: N/A    Number of children: N/A    Years of education: N/A     Social History Main Topics    Smoking status: Never Smoker    Smokeless tobacco: Never Used    Alcohol use No    Drug use: No    Sexual activity: Not Currently     Other Topics Concern    Not on file     Social History Narrative          Vital Signs:   Visit Vitals    /83 (BP 1 Location: Right arm, BP Patient Position: At rest)    Pulse 94    Temp 98.1 °F (36.7 °C)    Resp 16    Ht 5' 2\" (1.575 m)    Wt 78.9 kg (174 lb)    SpO2 98%    Breastfeeding No    BMI 31.83 kg/m2      Neurological examination:    Appearance: In no acute distress, well developed, well nourished, cooperative   Cardiovascular: Heart is regular rate and rhythm, peripheral edema is absent. No carotid bruits heard   Mental status examination: Alert and oriented X 3. Normal speech and language. Normal attention, memory and fund of knowledge.    Cranial Nerves:     I: smell Not tested   II: visual fields Full to confrontation   II: pupils Equal, round, reactive to light   III,VII: ptosis none   III,IV,VI: extraocular muscles  Full ROM   V: facial light touch sensation  normal   V,VII: corneal reflex     VII: facial muscle function  normal   VIII: hearing normal   IX: soft palate elevation  normal   IX,X: gag reflex    XI: sternocleidomastoid strength 5/5   XII: tongue strength  normal        Motor exam: Muscle tone, bulk and manual muscle testing: normal.    Sensory: Intact to primary modalities.  Coordination: Normal rapid alternating movements, finger to nose testing.  Reflexes: Symmetric and 2+ in bilateral biceps, triceps, brachioradialis, patellar, ankle reflexes. Plantar reflexes are flexor.            Medications:      [x] REVIEWED  Current Facility-Administered Medications   Medication    insulin lispro (HUMALOG) injection 5 Units    insulin detemir (LEVEMIR) injection 20 Units    glucose chewable tablet 16 g    glucagon (GLUCAGEN) injection 1 mg    dextrose (D50W) injection syrg 12.5-25 g    amLODIPine (NORVASC) tablet 10 mg    aspirin delayed-release tablet 81 mg    atorvastatin (LIPITOR) tablet 40 mg    buPROPion (WELLBUTRIN) tablet 100 mg    citalopram (CELEXA) tablet 40 mg    clopidogrel (PLAVIX) tablet 75 mg    hydrALAZINE (APRESOLINE) tablet 25 mg    hydroCHLOROthiazide (HYDRODIURIL) tablet 25 mg    levothyroxine (SYNTHROID) tablet 125 mcg    losartan (COZAAR) tablet 100 mg    metoprolol succinate (TOPROL-XL) tablet 200 mg    enoxaparin (LOVENOX) injection 40 mg    ondansetron (ZOFRAN) injection 1 mg    acetaminophen (TYLENOL) tablet 650 mg    acetaminophen (TYLENOL) suppository 650 mg    senna-docusate (PERICOLACE) 8.6-50 mg per tablet 2 Tab    albuterol (PROVENTIL VENTOLIN) nebulizer solution 2.5 mg    folic acid (FOLVITE) 1 mg, thiamine (B-1) 100 mg in 0.9% sodium chloride 50 mL ivpb    0.9% sodium chloride infusion    insulin lispro (HUMALOG) injection     Data:      [x] REVIEWED  Recent Results (from the past 24 hour(s))   EKG, 12 LEAD, INITIAL    Collection Time: 04/04/17 3:18 PM   Result Value Ref Range    Ventricular Rate 119 BPM    Atrial Rate 119 BPM    P-R Interval 150 ms    QRS Duration 100 ms    Q-T Interval 352 ms    QTC Calculation (Bezet) 495 ms    Calculated P Axis 52 degrees    Calculated R Axis -35 degrees    Calculated T Axis 74 degrees    Diagnosis       Sinus tachycardia  Possible Left atrial enlargement  Left axis deviation  Nonspecific ST and T wave abnormality  Abnormal ECG  When compared with ECG of 04-FEB-2015 04:09,  Vent. rate has increased BY  39 BPM  Nonspecific T wave abnormality, improved in Lateral leads  Confirmed by Latricia Ritter ([id number]) on 4/5/2017 7:35:01 AM     CBC W/O DIFF    Collection Time: 04/04/17  3:30 PM   Result Value Ref Range    WBC 6.2 4.6 - 13.2 K/uL    RBC 5.23 4.20 - 5.30 M/uL    HGB 16.1 (H) 12.0 - 16.0 g/dL    HCT 45.3 (H) 35.0 - 45.0 %    MCV 86.6 74.0 - 97.0 FL    MCH 30.8 24.0 - 34.0 PG    MCHC 35.5 31.0 - 37.0 g/dL    RDW 11.1 (L) 11.6 - 14.5 %    PLATELET 537 596 - 387 K/uL    MPV 11.4 9.2 - 36.4 FL   METABOLIC PANEL, COMPREHENSIVE    Collection Time: 04/04/17  3:30 PM   Result Value Ref Range    Sodium 130 (L) 136 - 145 mmol/L    Potassium 4.1 3.5 - 5.5 mmol/L    Chloride 92 (L) 100 - 108 mmol/L    CO2 27 21 - 32 mmol/L    Anion gap 11 3.0 - 18 mmol/L    Glucose 550 (HH) 74 - 99 mg/dL    BUN 19 (H) 7.0 - 18 MG/DL    Creatinine 1.11 0.6 - 1.3 MG/DL    BUN/Creatinine ratio 17 12 - 20      GFR est AA >60 >60 ml/min/1.73m2    GFR est non-AA 51 (L) >60 ml/min/1.73m2    Calcium 9.2 8.5 - 10.1 MG/DL    Bilirubin, total 0.6 0.2 - 1.0 MG/DL    ALT (SGPT) 46 13 - 56 U/L    AST (SGOT) 33 15 - 37 U/L    Alk.  phosphatase 182 (H) 45 - 117 U/L    Protein, total 9.3 (H) 6.4 - 8.2 g/dL    Albumin 3.6 3.4 - 5.0 g/dL    Globulin 5.7 (H) 2.0 - 4.0 g/dL    A-G Ratio 0.6 (L) 0.8 - 1.7     PROTHROMBIN TIME + INR    Collection Time: 04/04/17  3:30 PM   Result Value Ref Range    Prothrombin time 11.9 11.5 - 15.2 sec    INR 0.9 0.8 - 1.2     THROMBIN TIME Collection Time: 04/04/17  3:30 PM   Result Value Ref Range    Thrombin time 18.9 (H) 13.8 - 18.2 SECS   CARDIAC PANEL,(CK, CKMB & TROPONIN)    Collection Time: 04/04/17  3:30 PM   Result Value Ref Range    CK 88 26 - 192 U/L    CK - MB <1.0 <3.6 ng/ml    CK-MB Index Cannot be calulated 0.0 - 4.0 %    Troponin-I, Qt. <0.02 0.0 - 0.045 NG/ML   FIBRINOGEN    Collection Time: 04/04/17  3:30 PM   Result Value Ref Range    Fibrinogen 498 (H) 210 - 451 mg/dL   HEMOGLOBIN A1C WITH EAG    Collection Time: 04/04/17  3:30 PM   Result Value Ref Range    Hemoglobin A1c 9.7 (H) 4.2 - 5.6 %    Est. average glucose 232 mg/dL   POC CHEM8    Collection Time: 04/04/17  3:49 PM   Result Value Ref Range    CO2 (POC) 28 (H) 19 - 24 MMOL/L    Glucose (POC) 515 (HH) 74 - 106 MG/DL    BUN (POC) 20 (H) 7 - 18 MG/DL    Creatinine (POC) 0.7 0.6 - 1.3 MG/DL    GFR-AA (POC) >60 >60 ml/min/1.73m2    GFR, non-AA (POC) >60 >60 ml/min/1.73m2    Sodium (POC) 135 (L) 136 - 145 MMOL/L    Potassium (POC) 3.5 3.5 - 5.5 MMOL/L    Calcium, ionized (POC) 1.12 1.12 - 1.32 MMOL/L    Chloride (POC) 94 (L) 100 - 108 MMOL/L    Anion gap (POC) 18 10 - 20      Hematocrit (POC) 39 36 - 49 %    Hemoglobin (POC) 13.3 12 - 16 G/DL   URINALYSIS W/ RFLX MICROSCOPIC    Collection Time: 04/04/17  5:48 PM   Result Value Ref Range    Color YELLOW      Appearance CLEAR      Specific gravity >1.030 (H) 1.005 - 1.030    pH (UA) 6.5 5.0 - 8.0      Protein 100 (A) NEG mg/dL    Glucose >1000 (A) NEG mg/dL    Ketone NEGATIVE  NEG mg/dL    Bilirubin NEGATIVE  NEG      Blood TRACE (A) NEG      Urobilinogen 1.0 0.2 - 1.0 EU/dL    Nitrites NEGATIVE  NEG      Leukocyte Esterase NEGATIVE  NEG     DRUG SCREEN, URINE    Collection Time: 04/04/17  5:48 PM   Result Value Ref Range    BENZODIAZEPINE NEGATIVE  NEG      BARBITURATES NEGATIVE  NEG      THC (TH-CANNABINOL) NEGATIVE  NEG      OPIATES NEGATIVE  NEG      PCP(PHENCYCLIDINE) NEGATIVE  NEG      COCAINE NEGATIVE  NEG AMPHETAMINE NEGATIVE  NEG      METHADONE NEGATIVE       HDSCOM (NOTE)    URINE MICROSCOPIC ONLY    Collection Time: 04/04/17  5:48 PM   Result Value Ref Range    WBC 0 to 3 0 - 4 /hpf    RBC 0 0 - 5 /hpf    Epithelial cells FEW 0 - 5 /lpf    Bacteria FEW (A) NEG /hpf    Yeast FEW (A) NEG     GLUCOSE, POC    Collection Time: 04/04/17  6:48 PM   Result Value Ref Range    Glucose (POC) 456 (HH) 70 - 110 mg/dL   GLUCOSE, POC    Collection Time: 04/04/17  9:57 PM   Result Value Ref Range    Glucose (POC) 351 (H) 70 - 110 mg/dL   TYPE & SCREEN    Collection Time: 04/04/17 10:50 PM   Result Value Ref Range    Crossmatch Expiration 04/07/2017     ABO/Rh(D) A POSITIVE     Antibody screen NEG    TSH 3RD GENERATION    Collection Time: 04/04/17 10:50 PM   Result Value Ref Range    TSH 5.15 (H) 0.36 - 3.74 uIU/mL   T3, FREE    Collection Time: 04/04/17 10:50 PM   Result Value Ref Range    Triiodothyronine (T3), free 2.6 2.3 - 4.2 PG/ML   T4, FREE    Collection Time: 04/04/17 10:50 PM   Result Value Ref Range    T4, Free 1.0 0.7 - 1.5 NG/DL   TROPONIN I    Collection Time: 04/04/17 10:50 PM   Result Value Ref Range    Troponin-I, Qt. 0.05 (H) 0.0 - 0.045 NG/ML   TROPONIN I    Collection Time: 04/05/17  4:22 AM   Result Value Ref Range    Troponin-I, Qt. 0.03 0.0 - 0.045 NG/ML   ASPIRIN TEST    Collection Time: 04/05/17  4:22 AM   Result Value Ref Range    Aspirin test 518 ARU   PLATELET FUNCTION, VERIFY NOW P2Y12    Collection Time: 04/05/17  4:22 AM   Result Value Ref Range    P2Y12 Plt response 335 194 - 418 PRU   GLUCOSE, POC    Collection Time: 04/05/17  6:11 AM   Result Value Ref Range    Glucose (POC) 252 (H) 70 - 110 mg/dL   GLUCOSE, POC    Collection Time: 04/05/17  8:47 AM   Result Value Ref Range    Glucose (POC) 275 (H) 70 - 110 mg/dL   GLUCOSE, POC    Collection Time: 04/05/17 12:38 PM   Result Value Ref Range    Glucose (POC) 187 (H) 70 - 110 mg/dL

## 2017-04-05 NOTE — PROGRESS NOTES
[facility]   Discharge Planning/ Assessment    Reasons for Intervention: Spoke with pt ,lives alone. Independent  With adls and amb. pcp DR Multani. Pt  States she is independent  With adls and amb, states she has trouble  With med compliance, although her meds are  Where she can see them and knows she has to take them  But  Does not. Designates daughter for dcp. Plan home. obs letter given, copy to chart.     High Risk Criteria  [x] Yes  []No   Physician Referral  [] Yes  [x]No        Date    Nursing Referral  [] Yes  [x]No        Date    Patient/Family Request  [] Yes  [x]No        Date       Resources:    Medicare  [x] Yes  []No   Medicaid  [] Yes  []No   No Resources  [] Yes  []No   Private Insurance  [x] Yes  []No    Name/Phone Number    Other  [] Yes  []No        (i.e. Workman's Comp)         Prior Services:    Prior Services  [] Yes  [x]No   Home Health  [] Yes  []No   [address]  [] Yes  []No        Number of [address]  [] Yes  []No       Meals on Wheels  [] Yes  []No   Office on Aging  [] Yes  []No   Transportation Services  [] Yes  []No   Nursing Home  [] Yes  []No        Nursing Home Name    [facility]  [] Yes  []No        [address] Name    Other       Information Source:      Information obtained from  [x] Patient  [] Parent   [] [address]   [] Child  [] Spouse   [] Significant Other/Partner   [] Friend      [] EMS    [] Nursing Home Chart          [] Other:   Chart Review  [] Yes  []No     Family/Support System:    Patient lives with  [x] Alone    [] Spouse   [] Significant Other  [] Children  [] Caretaker   [] Parent  [] Sibling     [] Other       Other Support System:    Is the patient responsible for care of others  [] Yes  [x]No   Information of person caring for patient on  discharge    Managers financial affairs independently  [x] Yes  []No   If no, explain:      Status Prior to Admission:    Mental Status  [] Awake  [] Alert  [x] Oriented  [] Quiet/Calm [] Lethargic/Sedated   [] Disoriented  [] Restless/Anxious  [] Combative   Personal Care  [] Dependent  [x] Independent Personal Care  [] Requires Assistance   Meal Preparation Ability  [x] Independent   [] Standby Assistance   [] Minimal Assistance   [] Moderate Assistance  [] Maximum Assistance     [] Total Assistance   Chores  [x] Independent with Chores   [] N/A Nursing Home Resident   [] Requires Assistance   Bowel/Bladder  [x] Continent  [] Catheter  [] Incontinent  [] Ostomy Self-Care    [] Urine Diversion Self-Care  [] Maximum Assistance     [] Total Assistance   Number of Persons needed for assistance    DME at home  [] [place] derik, Orquidea Flick  [] Viola derik, Straight   [] Commode    [] Bathroom/Grab Bars  [] Hospital Bed  [] Nebulizer  [] Oxygen           [] Raised Toilet Seat  [] Shower Chair  [] Side Rails for Bed   [] Tub Transfer Bench   [] Felipa Hansen  [] Johnson Apgar, Laura      [] Other:   Vendor      Treatment Presently Receiving:    Current Treatments  [] Chemotherapy  [] Dialysis  [] Insulin  [] IVAB [] IVF   [] O2  [] PCA   [] PT   [] RT   [] Tube Feedings   [] Wound Care     Psychosocial Evaluation:    Verbalized Knowledge of Disease Process  [x] Patient  []Family   Coping with Disease Process  [] Patient  []Family   Requires Further Counseling Coping with Disease Process  [] Patient  []Family     Identified Projected Needs:    Home Health Aid  [] Yes  [x]No   Transportation  [] Yes  [x]No   Education  [] Yes  [x]No        Specific Education     Financial Counseling  [] Yes  [x]No   Inability to Care for Self/Will Require 24 hour care  [] Yes  [x]No   Pain Management  [] Yes  [x]No   Home Infusion Therapy  [] Yes  [x]No   Oxygen Therapy  [] Yes  [x]No   DME  [] Yes  [x]No   Long Term Care Placement  [] Yes  [x]No   Rehab  [] Yes  [x]No   Physical Therapy  [] Yes  [x]No   Needs Anticipated At This Time  [] Yes  [x]No     Intra-Hospital Referral:    [facility] [] Yes  [x]No     [] Yes  [x]No   Patient Representative  [] Yes  [x]No   Staff for Teaching Needs  [] Yes  [x]No   Specialty Teaching Needs     Diabetic Educator  [] Yes  [x]No   Referral for Diabetic Educator Needed  [] Yes  [x]No  If Yes, place order for Nutritionist or Diabetic Consult     Tentative Discharge Plan:    Home with No Services  [x] Yes  []No   Home with [address]  [] Yes  []No        If Yes, specify type    [address]  [] Yes  []No        If Yes, specify type    Meals on Wheels  [] Yes  []No   Office of Aging  [] Yes  []No   NHP  [] Yes  []No   Return to the [address]  [] Yes  []No   Rehab Therapy  [] Yes  []No   Acute Rehab  [] Yes  []No   Subacute Rehab  [] Yes  []No   Private Care  [] Yes  []No   Substance Abuse Referral  [] Yes  []No   Transportation  [] Yes  []No   Chore Service  [] Yes  []No   Inpatient Hospice  [] Yes  []No   OP RT  [] Yes  [] No   OP Hemo  [] Yes  [] No   OP PT  [] Yes  []No   Support Group  [] Yes  []No   Reach to Recovery  [] Yes  []No   OP Oncology Clinic  [] Yes  []No   Clinic Appointment  [] Yes  []No   DME  [] Yes  []No   Comments    Name of D/C Planner or  Given to Patient or Family Cheli donis rn cm    Phone Number [phone]        Extension    Date 4/5/17   Time    If you are discharged home, whom do you designate to participate in your discharge plan and receive any information needed?      Enter name of designee         Phone # of designee         Address of designee         Updated         Patient refused to designate any           individual

## 2017-04-05 NOTE — PROGRESS NOTES
Problem: Self Care Deficits Care Plan (Adult)  Goal: *Acute Goals and Plan of Care (Insert Text)  Occupational Therapy Goals  Initiated 4/5/2017 within 7 day(s). 1. Patient will perform grooming tasks with independence. 2. Patient will perform LB dressing with independence. 3. Patient will perform bilateral functional task, integrating affected side (LUE), independently to increase strength/function for ADLs. 4. Patient will perform toilet transfers with modified independence. 5. Patient will perform all aspects of toileting with independence. 6. Patient will participate in upper extremity therapeutic exercise/activities with independence for 8 minutes to increase strength of BUEs for ADLs. 7. Patient will utilize energy conservation techniques during functional activities with verbal cues. Outcome: Progressing Towards Goal  OCCUPATIONAL THERAPY EVALUATION     Patient: Jayjay Vazquez (60 y.o. female)  Date: 4/5/2017  Primary Diagnosis: TIA        Precautions:  Fall      ASSESSMENT :  Based on the objective data described below, the patient presents with impairments with regard to functional mobility in preparation for ADLs, activity tolerance, LUE strength, and overall independence in ADLs. Pt up in chair finishing up with PT upon arrival. No complaints of pain but reports \"a strange sensation\" at the left side/base of skull Jasen Monday, MIA notified). Pt stood from chair with min A/additional time. Maneuvered to bathroom with CGA secondary to unsteadiness; two slight LOB noted which pt corrected with CGA. Pt performed facial hygiene and oral hygiene while standing at sink with CGA for safety. Pt is stand-by assist/additional time for LB dressing in chair. Pt left up in chair with needs within reach; educated on BUE exercises; pt demo'd understanding. Pt educated on role of OT, POC, and home safety. Recommend continued therapy during hospital stay. Josseline Ross RN made aware of session.      Patient will benefit from skilled intervention to address the above impairments. Patients rehabilitation potential is considered to be Good  Factors which may influence rehabilitation potential include:   [ ]             None noted  [ ]             Mental ability/status  [X]             Medical condition  [ ]             Home/family situation and support systems  [ ]             Safety awareness  [ ]             Pain tolerance/management  [ ]             Other:           PLAN :  Recommendations and Planned Interventions:  [X]               Self Care Training                  [X]        Therapeutic Activities  [X]               Functional Mobility Training    [ ]        Cognitive Retraining  [X]               Therapeutic Exercises           [X]        Endurance Activities  [X]               Balance Training                   [ ]        Neuromuscular Re-Education  [ ]               Visual/Perceptual Training     [X]   Home Safety Training  [X]               Patient Education                 [X]        Family Training/Education  [ ]               Other (comment):     Frequency/Duration: Patient will be followed by occupational therapy 4-7 times a week to address goals. Discharge Recommendations: Home Health  Further Equipment Recommendations for Discharge: shower chair, bedside commode       SUBJECTIVE:   Patient stated It's a strange sensation in my head. \"      OBJECTIVE DATA SUMMARY:       Past Medical History:   Diagnosis Date    Anxiety      CAD (coronary artery disease)       S/P Coronary stents ( LAD and Lcx)    Depression      Diabetes ([facility]      Hepatitis C      Hypercholesterolemia      Hypertension       Past Surgical History:   Procedure Laterality Date    HX BREAST BIOPSY         1971 2010 left breast lump removed excisional per patient     [address]     pre-eclampsia    HX CHOLECYSTECTOMY   2004    HX CORONARY STENT PLACEMENT   Nov 2013     4 stents    HX HEENT   2009     thyroidectomy due to nodules.  HX HYSTERECTOMY   2005     heavy periods    HX THYROIDECTOMY         Barriers to Learning/Limitations: None  Compensate with: visual, verbal, tactile, kinesthetic cues/model     GCODES:  Self Care  Current  CJ= 20-39%   Goal  CI= 1-19%. The severity rating is based on the Other Functional Assessment, MMT, ROM     Eval Complexity: History: LOW Complexity : Brief history review ; Examination: LOW Complexity : 1-3 performance deficits relating to physical, cognitive , or psychosocial skils that result in activity limitations and / or participation restrictions ; Decision Making:LOW Complexity : No comorbidities that affect functional and no verbal or physical assistance needed to complete eval tasks      Prior Level of Function/Home Situation: Pt was independent with basic self care tasks and functional mobility PTA. Home Situation  Home Environment: Apartment  # Steps to Enter: 3  Rails to Enter: Yes  One/Two Story Residence: One story  Living Alone: Yes  Support Systems: Family member(s)  Patient Expects to be Discharged to[de-identified] Apartment  Current DME Used/Available at Home: None  Tub or Shower Type: Tub/Shower combination  [X]  Right hand dominant          [ ]  Left hand dominant      Cognitive/Behavioral Status:  Neurologic State: Alert  Orientation Level: Oriented X4  Cognition: Follows commands  Safety/Judgement: Fall prevention; Awareness of environment      Skin: Intact (BUEs)  Edema: None noted (BUEs)     Vision/Perceptual:    Acuity: Able to read clock/calendar on wall without difficulty       Coordination:  Coordination: Within functional limits (BUEs)  Fine Motor Skills-Upper: Right Intact; Left Intact    Gross Motor Skills-Upper: Right Intact; Left Intact      Balance:  Sitting: Intact  Sitting - Static: Good (unsupported)  Sitting - Dynamic: Good (unsupported)  Standing: Impaired  Standing - Static: Good  Standing - Dynamic : Fair     Strength:  Strength: Generally decreased, functional (RUE 4+/5; LUE 4/5)     Tone & Sensation:  Tone: Normal (BUEs)  Sensation: Intact (BUEs)     Range of Motion:  AROM: Generally decreased, functional (RUE full shoulder flex; LUE 3/4 shoulder flex)  PROM: Within functional limits (BUEs)     Functional Mobility and Transfers for ADLs:  Bed Mobility:  Rolling:  (pt up in chair on arrival)  Supine to Sit:  (pt up in chair on arrival)     Transfers:  Sit to Stand: Minimum assistance (to stand from chair)     ADL Assessment:  Feeding: Setup  Oral Facial Hygiene/Grooming: Contact guard assistance  Bathing: Contact guard assistance;Minimum assistance  Upper Body Dressing: Independent  Lower Body Dressing: Stand-by assistance; Additional time  Toileting: Contact guard assistance      ADL Intervention:  Grooming  Grooming Assistance: Contact guard assistance (for safety)  Washing Face: Contact guard assistance  Washing Hands: Contact guard assistance  Brushing Teeth: Contact guard assistance  Cues: Verbal cues provided        Cognitive Retraining  Safety/Judgement: Fall prevention; Awareness of environment      Pain:  Pre treatment pain level: 0/10  Post treatment pain level: 0/10  Pain Scale 1: Visual  Pain Intensity 1: 0     Activity Tolerance:  Fair  Please refer to the flowsheet for vital signs taken during this treatment. After treatment:   [X] Patient left in no apparent distress sitting up in chair  [ ] Patient left in no apparent distress in bed  [X] Call bell left within reach  [X] Nursing notified  [ ] Caregiver present  [ ] Bed alarm activated      COMMUNICATION/EDUCATION: Patient educated on role of OT, POC, and home safety. She verbalized understanding.   [X] Home safety education was provided and the patient/caregiver indicated understanding. [X] Patient/family have participated as able in goal setting and plan of care. [X] Patient/family agree to work toward stated goals and plan of care.   [ ] Patient understands intent and goals of therapy, but is neutral about his/her participation. [ ] Patient is unable to participate in goal setting and plan of care.      Thank you for this referral.     Jovita Pod, MS OTR/L  Time Calculation: 23 mins

## 2017-04-05 NOTE — PROGRESS NOTES
Patient has designated ________________daughter________ to participate in his/her discharge plan and to receive any needed information.      Name: Pedro Wood  Address:  Phone number:[phone]

## 2017-04-05 NOTE — PROGRESS NOTES
2235 Call placed to Dr. Damion Rodriguez with update on patient's glucose level of 456. New order for very resistance sliding scale and NPO except medication. 2255 Return from MRI via wheelchair, patient noted with weakness on left side and unsteady gait. 2200 NIH completed with scoring 1 for left lower ext drift. Glucose 351. Patient drowsy from sedative administered for MRI. Awaken several times for NIH and admission questions.

## 2017-04-05 NOTE — DIABETES MGMT
NUTRITIONAL ASSESSMENT AND  PLAN OF CARE     Gely Chase           64 y.o.           4/4/2017                 1. Other specified transient cerebral ischemias    2. Hyperglycemia    3. Malignant hypertension    T2DM   INTERVENTIONS/PLAN:   1. On-going diabetes education  2. Monitor glycemic control, labs, weights and po intake. ASSESSMENT:   Nutritional Status:  Pt is 158% ideal wt; BMI (calculated): 31.8 kg/m2 (obese). Pt is well nourished and po intake appears adequate. Diabetes Management:   Chart review indicates pt seen by outpatient CDE on 8-1-14 for diet and DM education and weight is down by 11 # since that date. Pt seen today by glycemic control team and provided with on-going education - see Diabetes Education Record for details. Recent blood glucose:    4/5/17:  252, 275, 187  4/4/17:  515, 456, 351   Within target range (non-ICU: <140; ICU<180): [] Yes   [x]  No, but improving    Current Insulin regimen:   Levemir 20 units every 12 hours  Humalog, 5 units TID before meals  Corrective Humalog,  very insulin resistant dosing ACHS  Home medication/insulin regimen:   Tresiba 25 units/d  Novolog, 8 units TID  Metformin 850 mg BID  HbA1c: 9.7% - ave BG ~ 232 mg over past 3 months. Adequate glycemic control PTA:  [] Yes  [x] No, but improved A1C       SUBJECTIVE/OBJECTIVE:   Information obtained from: chart review, pt  Pt reports her appetite is good. Weight was stable PTA. Pt denies food allergies or problems with chewing or swallowing. Pt with seizure vs TIA and PMH of HTN, DM, CAD with stents x 4, hepatitis C, anxiety, and hypercholesteremia. Diet: consistent CHO - pt took 100% lunch meal today. No data found.     Medications: [x]                Reviewed   Pertinent:  Lipitor, 40 mg/d  Folvite  IVF:  NS at 150 ml/hr    Most Recent POC Glucose:   Recent Labs      04/04/17   1530   GLU  550*         Labs:   Lab Results   Component Value Date/Time    Hemoglobin A1c 9.7 04/04/2017 03:30 PM     Lab Results   Component Value Date/Time    Hemoglobin A1c 9.7 04/04/2017 03:30 PM    Hemoglobin A1c 12.0 01/25/2017 09:19 AM    Hemoglobin A1c 11.4 03/17/2016 01:10 PM     Lab Results   Component Value Date/Time    Sodium 130 04/04/2017 03:30 PM    Potassium 4.1 04/04/2017 03:30 PM    Chloride 92 04/04/2017 03:30 PM    CO2 27 04/04/2017 03:30 PM    Anion gap 11 04/04/2017 03:30 PM    Glucose 550 04/04/2017 03:30 PM    BUN 19 04/04/2017 03:30 PM    Creatinine 1.11 04/04/2017 03:30 PM    Calcium 9.2 04/04/2017 03:30 PM    Albumin 3.6 04/04/2017 03:30 PM       Anthropometrics: IBW : 50 kg (110 lb 3.7 oz), % IBW (Calculated): 157.85 %, BMI (calculated): 31.8  Wt Readings from Last 1 Encounters:   04/04/17 78.9 kg (174 lb)    7-16-14 weight - 185 lbs;  83.9 kg  Ht Readings from Last 1 Encounters:   04/04/17 5' 2\" (1.575 m)     Last Weight Metrics:  Weight Loss Metrics 4/4/2017 1/25/2017 11/29/2016 10/25/2016 5/19/2016 4/19/2016 3/17/2016   Today's Wt 174 lb 173 lb 178 lb 178 lb 175 lb 173 lb 174 lb   BMI 31.83 kg/m2 31.64 kg/m2 32.56 kg/m2 32.56 kg/m2 32 kg/m2 31.63 kg/m2 31.82 kg/m2         Estimated Nutrition Needs:  1603 Kcals/day, Protein (g): 60 g Fluid (ml): 1600 ml  Based on:   [x]          Actual BW    []          ABW   []            Adjusted BW        Nutrition Diagnoses: Altered nutrition related labs due to diabetes as evidenced by A1C of 9.7%. Obesity due to excessive energy intake as evidenced by BMI of 31.8 kg/m2. Nutrition Interventions:  CHO consistent diet  Diabetes education  Goal:   Blood glucose will be within target range of  mg/dL by 4/8/17. Weight maintenance (+/- 1-2 kg) or low, gradual weight loss of 1-2# by 4/15/17.         Nutrition Monitoring and Evaluation      [x]     Monitor po intake on meal rounds  [x]     Continue inpatient monitoring and intervention  []     Other:      Nutrition Risk:  []   High     []  Moderate    [x]  Minimal/Uncompromised    Delisa Emma Donohue, [address], CDE   Office:  [address] [place] [address] Pager:  [phone]

## 2017-04-06 VITALS
SYSTOLIC BLOOD PRESSURE: 119 MMHG | WEIGHT: 174 LBS | HEIGHT: 62 IN | BODY MASS INDEX: 32.02 KG/M2 | OXYGEN SATURATION: 94 % | RESPIRATION RATE: 17 BRPM | TEMPERATURE: 98 F | HEART RATE: 81 BPM | DIASTOLIC BLOOD PRESSURE: 75 MMHG

## 2017-04-06 LAB
ALBUMIN SERPL BCP-MCNC: 2.6 G/DL (ref 3.4–5)
ALBUMIN/GLOB SERPL: 0.6 {RATIO} (ref 0.8–1.7)
ALP SERPL-CCNC: 130 U/L (ref 45–117)
ALT SERPL-CCNC: 35 U/L (ref 13–56)
ANION GAP BLD CALC-SCNC: 10 MMOL/L (ref 3–18)
AST SERPL W P-5'-P-CCNC: 35 U/L (ref 15–37)
BASOPHILS # BLD AUTO: 0 K/UL (ref 0–0.06)
BASOPHILS # BLD: 0 % (ref 0–2)
BILIRUB SERPL-MCNC: 0.7 MG/DL (ref 0.2–1)
BUN SERPL-MCNC: 18 MG/DL (ref 7–18)
BUN/CREAT SERPL: 23 (ref 12–20)
CALCIUM SERPL-MCNC: 8.4 MG/DL (ref 8.5–10.1)
CHLORIDE SERPL-SCNC: 103 MMOL/L (ref 100–108)
CO2 SERPL-SCNC: 26 MMOL/L (ref 21–32)
CREAT SERPL-MCNC: 0.77 MG/DL (ref 0.6–1.3)
CRP SERPL HS-MCNC: 11.97 MG/L (ref 0–3)
DIFFERENTIAL METHOD BLD: ABNORMAL
EOSINOPHIL # BLD: 0 K/UL (ref 0–0.4)
EOSINOPHIL NFR BLD: 1 % (ref 0–5)
ERYTHROCYTE [DISTWIDTH] IN BLOOD BY AUTOMATED COUNT: 11.4 % (ref 11.6–14.5)
GLOBULIN SER CALC-MCNC: 4.2 G/DL (ref 2–4)
GLUCOSE BLD STRIP.AUTO-MCNC: 202 MG/DL (ref 70–110)
GLUCOSE BLD STRIP.AUTO-MCNC: 203 MG/DL (ref 70–110)
GLUCOSE SERPL-MCNC: 271 MG/DL (ref 74–99)
HCT VFR BLD AUTO: 37.2 % (ref 35–45)
HGB BLD-MCNC: 12.6 G/DL (ref 12–16)
LYMPHOCYTES # BLD AUTO: 38 % (ref 21–52)
LYMPHOCYTES # BLD: 2.1 K/UL (ref 0.9–3.6)
MCH RBC QN AUTO: 29.9 PG (ref 24–34)
MCHC RBC AUTO-ENTMCNC: 33.9 G/DL (ref 31–37)
MCV RBC AUTO: 88.2 FL (ref 74–97)
MONOCYTES # BLD: 0.6 K/UL (ref 0.05–1.2)
MONOCYTES NFR BLD AUTO: 11 % (ref 3–10)
NEUTS SEG # BLD: 2.8 K/UL (ref 1.8–8)
NEUTS SEG NFR BLD AUTO: 50 % (ref 40–73)
PLATELET # BLD AUTO: 209 K/UL (ref 135–420)
PMV BLD AUTO: 10.9 FL (ref 9.2–11.8)
POTASSIUM SERPL-SCNC: 3.6 MMOL/L (ref 3.5–5.5)
PROT SERPL-MCNC: 6.8 G/DL (ref 6.4–8.2)
RBC # BLD AUTO: 4.22 M/UL (ref 4.2–5.3)
SODIUM SERPL-SCNC: 139 MMOL/L (ref 136–145)
TSH SERPL DL<=0.05 MIU/L-ACNC: 2.39 UIU/ML (ref 0.36–3.74)
WBC # BLD AUTO: 5.6 K/UL (ref 4.6–13.2)

## 2017-04-06 PROCEDURE — 82962 GLUCOSE BLOOD TEST: CPT

## 2017-04-06 PROCEDURE — 80053 COMPREHEN METABOLIC PANEL: CPT | Performed by: HOSPITALIST

## 2017-04-06 PROCEDURE — 74011250637 HC RX REV CODE- 250/637: Performed by: HOSPITALIST

## 2017-04-06 PROCEDURE — 74011636637 HC RX REV CODE- 636/637: Performed by: HOSPITALIST

## 2017-04-06 PROCEDURE — 84443 ASSAY THYROID STIM HORMONE: CPT | Performed by: HOSPITALIST

## 2017-04-06 PROCEDURE — 97116 GAIT TRAINING THERAPY: CPT

## 2017-04-06 PROCEDURE — 99218 HC RM OBSERVATION: CPT

## 2017-04-06 PROCEDURE — 36415 COLL VENOUS BLD VENIPUNCTURE: CPT | Performed by: HOSPITALIST

## 2017-04-06 PROCEDURE — 85025 COMPLETE CBC W/AUTO DIFF WBC: CPT | Performed by: HOSPITALIST

## 2017-04-06 RX ADMIN — ATORVASTATIN CALCIUM 40 MG: 40 TABLET, FILM COATED ORAL at 09:31

## 2017-04-06 RX ADMIN — CITALOPRAM HYDROBROMIDE 40 MG: 20 TABLET ORAL at 09:31

## 2017-04-06 RX ADMIN — HYDRALAZINE HYDROCHLORIDE 25 MG: 25 TABLET, FILM COATED ORAL at 14:02

## 2017-04-06 RX ADMIN — ACETAMINOPHEN 650 MG: 325 TABLET, FILM COATED ORAL at 09:25

## 2017-04-06 RX ADMIN — LEVOTHYROXINE SODIUM 125 MCG: 100 TABLET ORAL at 09:31

## 2017-04-06 RX ADMIN — INSULIN LISPRO 6 UNITS: 100 INJECTION, SOLUTION INTRAVENOUS; SUBCUTANEOUS at 09:27

## 2017-04-06 RX ADMIN — METOPROLOL SUCCINATE 200 MG: 100 TABLET, EXTENDED RELEASE ORAL at 09:32

## 2017-04-06 RX ADMIN — ACETAMINOPHEN 650 MG: 325 TABLET, FILM COATED ORAL at 00:09

## 2017-04-06 RX ADMIN — INSULIN LISPRO 5 UNITS: 100 INJECTION, SOLUTION INTRAVENOUS; SUBCUTANEOUS at 13:43

## 2017-04-06 RX ADMIN — INSULIN LISPRO 5 UNITS: 100 INJECTION, SOLUTION INTRAVENOUS; SUBCUTANEOUS at 09:28

## 2017-04-06 RX ADMIN — BUPROPION HYDROCHLORIDE 100 MG: 100 TABLET, FILM COATED ORAL at 09:31

## 2017-04-06 RX ADMIN — AMLODIPINE BESYLATE 10 MG: 10 TABLET ORAL at 09:32

## 2017-04-06 RX ADMIN — CLOPIDOGREL BISULFATE 75 MG: 75 TABLET, FILM COATED ORAL at 09:31

## 2017-04-06 RX ADMIN — LOSARTAN POTASSIUM 100 MG: 50 TABLET ORAL at 09:31

## 2017-04-06 RX ADMIN — INSULIN LISPRO 6 UNITS: 100 INJECTION, SOLUTION INTRAVENOUS; SUBCUTANEOUS at 13:42

## 2017-04-06 RX ADMIN — INSULIN DETEMIR 20 UNITS: 100 INJECTION, SOLUTION SUBCUTANEOUS at 09:27

## 2017-04-06 RX ADMIN — ASPIRIN 325 MG ORAL TABLET 325 MG: 325 PILL ORAL at 09:30

## 2017-04-06 RX ADMIN — HYDROCHLOROTHIAZIDE 25 MG: 25 TABLET ORAL at 09:31

## 2017-04-06 RX ADMIN — HYDRALAZINE HYDROCHLORIDE 25 MG: 25 TABLET, FILM COATED ORAL at 09:30

## 2017-04-06 NOTE — ROUTINE PROCESS
Reviewed d/c instruction with patient. Patient stated she understood instructions. No scripts give. Iv removed, patient e-sign instructions. D/c to home.

## 2017-04-06 NOTE — PROGRESS NOTES
Assumed pt care. Received pt resting in bed, alert and oriented x 4. Denies any pain at this time. No s/s of distress. Call bell within reach. 0723> Bedside and Verbal shift change report given to Sabra Wagoner (oncoming nurse) by Alfreod Quiles   (offgoing nurse). Report included the following information SBAR, Kardex, Intake/Output and MAR.

## 2017-04-06 NOTE — PROGRESS NOTES
Problem: Mobility Impaired (Adult and Pediatric)  Goal: *Acute Goals and Plan of Care (Insert Text)  Physical Therapy Goals  Initiated 4/5/2017 and to be accomplished within 7 day(s)  1. Patient will move from supine to sit and sit to supine , scoot up and down and roll side to side in bed with modified independence. 2. Patient will transfer from bed to chair and chair to bed with modified independence using the least restrictive device. 3. Patient will perform sit to stand with modified independence. 4. Patient will ambulate with modified independence for 300 feet with the least restrictive device. 5. Patient will ascend/descend 3 stairs with handrail(s) with modified independence. Outcome: Progressing Towards Goal  PHYSICAL THERAPY TREATMENT     Patient: Jayjay Vazquez (89 y.o. female)  Date: 4/6/2017  Diagnosis: TIA Seizure disorder, focal motor [facility])  Precautions: Fall   Chart, physical therapy assessment, plan of care and goals were reviewed. ASSESSMENT: Charges not entered due to PTA treated this  pt. Pt sitting in chair upon entering room. Ambulated 200ft without an assistive device, steady pace, no LOB or path deviations. Pt returned to sitting in chair s/p gait. Education: s/s of CVA/TIA, stressed the importance of daily exercise, eating healthy, taking medications as prescribed by Dr for prevention  Progression toward goals:  [X]      Improving appropriately and progressing toward goals  [ ]      Improving slowly and progressing toward goals  [ ]      Not making progress toward goals and plan of care will be adjusted       PLAN:  Patient continues to benefit from skilled intervention to address the above impairments. Continue treatment per established plan of care. Discharge Recommendations:  None  Further Equipment Recommendations for Discharge:  N/A       SUBJECTIVE:   Patient stated Ok.       OBJECTIVE DATA SUMMARY:   Critical Behavior:  Neurologic State: Alert  Orientation Level: Oriented X4  Cognition: Follows commands  Safety/Judgement: Fall prevention, Awareness of environment  Functional Mobility Training:  Transfers:  Sit to Stand: Stand-by asssistance  Stand to Sit: Supervision  Balance:  Sitting: Intact  Sitting - Static: Good (unsupported)  Sitting - Dynamic: Good (unsupported)  Standing: Intact; Without support  Standing - Static: Good  Standing - Dynamic : Good  Ambulation/Gait Training:  Distance (ft): 200 Feet (ft)  Assistive Device: Gait belt  Speed/Mayra: Slow  Pain:  Pre 0  Post 0  Pain Scale 1: Numeric (0 - 10)  Pain Intensity 1: 0  Pain Location 1: Head  Pain Orientation 1: Anterior  Pain Description 1: Aching  Pain Intervention(s) 1: Medication (see MAR)  Activity Tolerance:   Good  Please refer to the flowsheet for vital signs taken during this treatment.   After treatment:   [X] Patient left in no apparent distress sitting up in chair  [ ] Patient left in no apparent distress in bed  [X] Call bell left within reach  [ ] Nursing notified  [ ] Caregiver present  [ ] Bed alarm activated      [address], [place]   Time Calculation: 9 mins

## 2017-04-06 NOTE — PROGRESS NOTES
To whom it may concern                                                 Ms Fritz Essex  was patient  in [facility] from  4/4 17 until 4/6/17.                                                                                       Yours truly,                                                                                       Talia Elmore MD

## 2017-04-06 NOTE — DISCHARGE SUMMARY
Discharge Summary    Patient: Christy Dumont               Sex: female          DOA: 4/4/2017         YOB: 1960      Age:  64 y.o.        LOS:  LOS: 0 days                Admit Date: 4/4/2017    Discharge Date: 4/6/2017    Admission Diagnoses: TIA    Discharge Diagnoses:    Problem List as of 4/6/2017  Date Reviewed: 1/30/2017          Codes Class Noted - Resolved    * (Principal)Seizure disorder, focal motor ([facility] ICD-10-CM: G40.109  ICD-9-CM: 345.50  4/5/2017 - Present        Noncompliance with medication regimen (Chronic) ICD-10-CM: Z91.14  ICD-9-CM: V15.81  4/5/2017 - Present        Nausea ICD-10-CM: R11.0  ICD-9-CM: 787.02  3/9/2015 - Present        Family history of colon cancer (Chronic) ICD-10-CM: Z80.0  ICD-9-CM: V16.0  3/9/2015 - Present        Chest pain ICD-10-CM: R07.9  ICD-9-CM: 786.50  2/3/2015 - Present        Hypertension ICD-10-CM: I10  ICD-9-CM: 401.9  6/24/2014 - Present        Uncontrolled type 2 diabetes mellitus with diabetic polyneuropathy ([facility] ICD-10-CM: E11.42, E11.65  ICD-9-CM: 250.62, 357.2  6/24/2014 - Present        CAD (coronary artery disease) ICD-10-CM: I25.10  ICD-9-CM: 414.00  6/24/2014 - Present        Hypothyroidism, acquired ICD-10-CM: E03.9  ICD-9-CM: 244.9  6/24/2014 - Present    Overview Signed 6/24/2014  2:44 PM by Shruthi Khan DO     Thyroid removed due to nodules             Hepatitis C ICD-10-CM: B19.20  ICD-9-CM: 070.70  6/24/2014 - Present        Hyperlipidemia LDL goal < 100 ICD-10-CM: E78.5  ICD-9-CM: 272.4  6/24/2014 - Present              Discharge Condition:  Improved    Hospital Course:pt is a 64year old female who was admitted to the hospital after she had a possible seizure . The pt has a h/o diabetes mellitus type 2 and has been very poorly controlled over the years . The  Pt also has a h/o htn .  On the day of admission the   Pt went to the bathroom to urinate and  After she had finished her left hand began  to twist and then her body began to shake  . The pt ws helped by others and she sat on the floor aortic stenosis per the pt the shaking lasted for about a minute . As per the note from neurology the pt had some word finding difficulty and became sleepy afterwards. .there was c/o numbness and weakness of the left hand that lasted for an hour. The pt came to the er and a ct scan of the head showed nothing  acute  theophylline mri of the head showed  No acute infarct  . there was mild nonspecific  white matter disease  . 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 The pt was seen by neurology and the initial diagnosis was felt to be consistent with a tia but this was changed as more history became available . The pt has had a probable seizure . She will not be treated on her first seizure . The main medical problem was her not taking her insulin and her medications including her bp meds . She has depression and says that she does not take her insulin even though she knows that she should . Help from psychiatry was mentioned to the patient . The pt will bve dc'd and will follow up with her neurologist and her pcpc .she has sleep apnea and is on cpap . Consults:    Treatment Team: Attending Provider: Benny Conteh MD; Scribe: Rolf Chow; Consulting Provider: Jessica Brody DO; Utilization Review: Matteo Quintana RN; Occupational Therapist: Leonarda Gupta OT; Physical Therapy Assistant: Mary Rodriguez PTA    Significant Diagnostic Studies:     Discharge Medications:     Current Discharge Medication List      CONTINUE these medications which have NOT CHANGED    Details   RABEprazole (ACIPHEX) 20 mg tablet take 1 tablet by mouth once daily  Qty: 30 Tab, Refills: 3    Associated Diagnoses: Gastric reflux      insulin degludec (TRESIBA FLEXTOUCH U-100) 100 unit/mL (3 mL) inpn 25 Units by SubCUTAneous route daily.   Qty: 2 Box, Refills: 11    Associated Diagnoses: Type 2 diabetes mellitus without complication, without long-term current use of insulin ([facility])      insulin aspart (NOVOLOG) 100 unit/mL inpn Take 8 units with each meal.  Qty: 10 Pen, Refills: 11    Associated Diagnoses: Type 2 diabetes mellitus without complication, without long-term current use of insulin ([facility])      amLODIPine (NORVASC) 10 mg tablet Take 1 Tab by mouth daily. Qty: 90 Tab, Refills: 3    Associated Diagnoses: Malignant hypertension; Essential hypertension      losartan (COZAAR) 100 mg tablet Take 1 Tab by mouth daily. Qty: 90 Tab, Refills: 3    Associated Diagnoses: Malignant hypertension; Essential hypertension      metoprolol succinate (TOPROL-XL) 200 mg XL tablet Take 1 Tab by mouth daily. Qty: 90 Tab, Refills: 3    Associated Diagnoses: Malignant hypertension; Essential hypertension      hydrALAZINE (APRESOLINE) 50 mg tablet Take 0.5 Tabs by mouth four (4) times daily. Qty: 180 Tab, Refills: 3    Associated Diagnoses: Malignant hypertension; Essential hypertension      hydroCHLOROthiazide (HYDRODIURIL) 25 mg tablet Take 1 Tab by mouth daily. Qty: 90 Tab, Refills: 3    Associated Diagnoses: Malignant hypertension; Essential hypertension      atorvastatin (LIPITOR) 40 mg tablet Take 1 Tab by mouth daily. Qty: 90 Tab, Refills: 3    Associated Diagnoses: Malignant hypertension; Coronary artery disease involving native coronary artery of native heart without angina pectoris      metFORMIN (GLUCOPHAGE) 850 mg tablet Take 1 Tab by mouth two (2) times daily (with meals). Qty: 180 Tab, Refills: 3    Associated Diagnoses: Controlled type 2 diabetes mellitus with other specified complication, with long-term current use of insulin ([facility])      levothyroxine (SYNTHROID) 125 mcg tablet Take 1 Tab by mouth Daily (before breakfast). Qty: 90 Tab, Refills: 3    Associated Diagnoses: Hypothyroidism, acquired      clopidogrel (PLAVIX) 75 mg tablet Take 1 Tab by mouth daily.   Qty: 90 Tab, Refills: 3    Associated Diagnoses: Coronary artery disease due to lipid rich plaque      buPROPion (WELLBUTRIN) 100 mg tablet Take 1 Tab by mouth daily. Qty: 90 Tab, Refills: 3    Associated Diagnoses: Other depression      citalopram (CELEXA) 40 mg tablet Take 1 Tab by mouth daily. Qty: 90 Tab, Refills: 3    Associated Diagnoses: Other depression      Insulin Needles, Disposable, (DORA PEN NEEDLE) 32 gauge x 5/32\" ndle Use one needle to give insulin 4 times a day. Qty: 400 Pen Needle, Refills: 15    Associated Diagnoses: Controlled type 2 diabetes mellitus with other specified complication, with long-term current use of insulin ([facility])      aspirin delayed-release 81 mg tablet Take  by mouth daily.          STOP taking these medications       ibuprofen (MOTRIN) 800 mg tablet Comments:   Reason for Stopping:               Activity: as tolerated     Diet: diabetic diet    Wound Care:none    Follow-up: with pcp in one week as well as her neurologist

## 2017-04-06 NOTE — DISCHARGE INSTRUCTIONS
DISCHARGE SUMMARY from Nurse    The following personal items are in your possession at time of discharge:    Dental Appliances: None  Visual Aid: None     Home Medications: None  Jewelry: None  Clothing: Footwear, Pants, Shirt, Socks, Undergarments  Other Valuables: Cell Phone  Personal Items Sent to Safe: no          PATIENT INSTRUCTIONS:    After general anesthesia or intravenous sedation, for 24 hours or while taking prescription Narcotics:  · Limit your activities  · Do not drive and operate hazardous machinery  · Do not make important personal or business decisions  · Do  not drink alcoholic beverages  · If you have not urinated within 8 hours after discharge, please contact your surgeon on call. Report the following to your surgeon:  · Excessive pain, swelling, redness or odor of or around the surgical area  · Temperature over 100.5  · Nausea and vomiting lasting longer than 4 hours or if unable to take medications  · Any signs of decreased circulation or nerve impairment to extremity: change in color, persistent  numbness, tingling, coldness or increase pain  · Any questions        What to do at Home:  Recommended activity: as physician advised    If you experience any of the following symptoms listed under Warning Signs of a Heart Attack, Signs and Symptoms of a Stroke, and \"When to Call for Help\" listed under discharge teaching, please follow up with your primary care physician and/or call 911. *  Please give a list of your current medications to your Primary Care Provider. *  Please update this list whenever your medications are discontinued, doses are      changed, or new medications (including over-the-counter products) are added. *  Please carry medication information at all times in case of emergency situations.           These are general instructions for a healthy lifestyle:    No smoking/ No tobacco products/ Avoid exposure to second hand smoke    Surgeon General's Warning:  Quitting smoking now greatly reduces serious risk to your health. Obesity, smoking, and sedentary lifestyle greatly increases your risk for illness    A healthy diet, regular physical exercise & weight monitoring are important for maintaining a healthy lifestyle    You may be retaining fluid if you have a history of heart failure or if you experience any of the following symptoms:  Weight gain of 3 pounds or more overnight or 5 pounds in a week, increased swelling in our hands or feet or shortness of breath while lying flat in bed. Please call your doctor as soon as you notice any of these symptoms; do not wait until your next office visit. Recognize signs and symptoms of STROKE:    F-face looks uneven    A-arms unable to move or move unevenly    S-speech slurred or non-existent    T-time-call 911 as soon as signs and symptoms begin-DO NOT go       Back to bed or wait to see if you get better-TIME IS BRAIN. Warning Signs of HEART ATTACK     Call 911 if you have these symptoms:   Chest discomfort. Most heart attacks involve discomfort in the center of the chest that lasts more than a few minutes, or that goes away and comes back. It can feel like uncomfortable pressure, squeezing, fullness, or pain.  Discomfort in other areas of the upper body. Symptoms can include pain or discomfort in one or both arms, the back, neck, jaw, or stomach.  Shortness of breath with or without chest discomfort.  Other signs may include breaking out in a cold sweat, nausea, or lightheadedness. Don't wait more than five minutes to call 911 - MINUTES MATTER! Fast action can save your life. Calling 911 is almost always the fastest way to get lifesaving treatment. Emergency Medical Services staff can begin treatment when they arrive -- up to an hour sooner than if someone gets to the hospital by car. The discharge information has been reviewed with the patient. The patient verbalized understanding.     Discharge medications reviewed with the patient and appropriate educational materials and side effects teaching were provided. Learning About Diabetes Food Guidelines  Your Care Instructions  Meal planning is important to manage diabetes. It helps keep your blood sugar at a target level (which you set with your doctor). You don't have to eat special foods. You can eat what your family eats, including sweets once in a while. But you do have to pay attention to how often you eat and how much you eat of certain foods. You may want to work with a dietitian or a certified diabetes educator (CDE) to help you plan meals and snacks. A dietitian or CDE can also help you lose weight if that is one of your goals. What should you know about eating carbs? Managing the amount of carbohydrate (carbs) you eat is an important part of healthy meals when you have diabetes. Carbohydrate is found in many foods. · Learn which foods have carbs. And learn the amounts of carbs in different foods. ¨ Bread, cereal, pasta, and rice have about 15 grams of carbs in a serving. A serving is 1 slice of bread (1 ounce), ½ cup of cooked cereal, or 1/3 cup of cooked pasta or rice. ¨ Fruits have 15 grams of carbs in a serving. A serving is 1 small fresh fruit, such as an apple or orange; ½ of a banana; ½ cup of cooked or canned fruit; ½ cup of fruit juice; 1 cup of melon or raspberries; or 2 tablespoons of dried fruit. ¨ Milk and no-sugar-added yogurt have 15 grams of carbs in a serving. A serving is 1 cup of milk or 2/3 cup of no-sugar-added yogurt. ¨ Starchy vegetables have 15 grams of carbs in a serving. A serving is ½ cup of mashed potatoes or sweet potato; 1 cup winter squash; ½ of a small baked potato; ½ cup of cooked beans; or ½ cup cooked corn or green peas. · Learn how much carbs to eat each day and at each meal. A dietitian or CDE can teach you how to keep track of the amount of carbs you eat. This is called carbohydrate counting.   · If you are not sure how to count carbohydrate grams, use the Plate Method to plan meals. It is a good, quick way to make sure that you have a balanced meal. It also helps you spread carbs throughout the day. ¨ Divide your plate by types of foods. Put non-starchy vegetables on half the plate, meat or other protein food on one-quarter of the plate, and a grain or starchy vegetable in the final quarter of the plate. To this you can add a small piece of fruit and 1 cup of milk or yogurt, depending on how many carbs you are supposed to eat at a meal.  · Try to eat about the same amount of carbs at each meal. Do not \"save up\" your daily allowance of carbs to eat at one meal.  · Proteins have very little or no carbs per serving. Examples of proteins are beef, chicken, turkey, fish, eggs, tofu, cheese, cottage cheese, and peanut butter. A serving size of meat is 3 ounces, which is about the size of a deck of cards. Examples of meat substitute serving sizes (equal to 1 ounce of meat) are 1/4 cup of cottage cheese, 1 egg, 1 tablespoon of peanut butter, and ½ cup of tofu. How can you eat out and still eat healthy? · Learn to estimate the serving sizes of foods that have carbohydrate. If you measure food at home, it will be easier to estimate the amount in a serving of restaurant food. · If the meal you order has too much carbohydrate (such as potatoes, corn, or baked beans), ask to have a low-carbohydrate food instead. Ask for a salad or green vegetables. · If you use insulin, check your blood sugar before and after eating out to help you plan how much to eat in the future. · If you eat more carbohydrate at a meal than you had planned, take a walk or do other exercise. This will help lower your blood sugar. What else should you know? · Limit saturated fat, such as the fat from meat and dairy products. This is a healthy choice because people who have diabetes are at higher risk of heart disease.  So choose lean cuts of meat and nonfat or low-fat dairy products. Use olive or canola oil instead of butter or shortening when cooking. · Don't skip meals. Your blood sugar may drop too low if you skip meals and take insulin or certain medicines for diabetes. · Check with your doctor before you drink alcohol. Alcohol can cause your blood sugar to drop too low. Alcohol can also cause a bad reaction if you take certain diabetes medicines. Follow-up care is a key part of your treatment and safety. Be sure to make and go to all appointments, and call your doctor if you are having problems. It's also a good idea to know your test results and keep a list of the medicines you take. Where can you learn more? Go to http://staci-zeinab.info/. Enter Z210 in the search box to learn more about \"Learning About Diabetes Food Guidelines. \"  Current as of: May 23, 2016  Content Version: 11.2  © 6457-8675 Department of Health and Human Services. Care instructions adapted under license by [employer] (which disclaims liability or warranty for this information). If you have questions about a medical condition or this instruction, always ask your healthcare professional. [place] [address] any warranty or liability for your use of this information. Diabetes and Preventing Falls: Care Instructions  Your Care Instructions  If you are an older adult who has diabetes, you may have a higher risk of falling. Complications of diabetes--such as nerve damage, foot problems, and reduced vision--may increase your risk of a fall. Some of your medicines also may add to your risk. By making your home safer, you can lower your risk of falling. Doing things to prevent diabetes complications may also help to lower your risk. You can make your home safer with a few simple measures. Follow-up care is a key part of your treatment and safety. Be sure to make and go to all appointments, and call your doctor if you are having problems.  It's also a good idea to know your test results and keep a list of the medicines you take. How can you care for yourself at home? Taking care of yourself  · Keep your blood sugar at a target level (which you set with your doctor). · Exercise regularly to improve your strength, muscle tone, and balance. Walk if you can. Swimming may be a good choice if you cannot walk easily. · Have your vision checked as often as your doctor recommends. It is usually once a year or more often if you have eye problems. · Know the side effects of the medicines you take. Ask your doctor or pharmacist whether the medicines you take can affect your balance. Sleeping pills or sedatives can affect your balance. · Limit the amount of alcohol you drink. Alcohol can impair your balance and other senses. · Have your doctor check your feet during each visit. If you have a foot problem, see your doctor. Preventing falls at home  · Remove raised doorway thresholds, throw rugs, and clutter. Repair loose carpet or raised areas in the floor. · Move furniture and electrical cords to keep them out of walking paths. · Use nonskid floor wax, and wipe up spills right away, especially on ceramic tile floors. · If you use a walker or cane, put rubber tips on it. If you use crutches, clean the bottoms of them regularly with an abrasive pad, such as steel wool. · Keep your house well lit, especially [place], and outside walkways. Use night-lights in areas such as hallways and bathrooms. Add extra light switches or use remote switches (such as switches that go on or off when you clap your hands) to make it easier to turn lights on if you have to get up during the night. · Install sturdy handrails on stairways. Put grab bars near your shower, bathtub, and toilet. · Store household items on low shelves so that you do not have to climb or reach high. Or use a reaching device that you can get at a medical supply store.  If you have to climb for something, use a step stool with handrails, or ask someone to get it for you. · Keep a cordless phone and a flashlight with new batteries by your bed. If possible, put a phone in each of the main rooms of your house, or carry a cell phone in case you fall and cannot reach a phone. Or you can wear a device around your neck or wrist. You push a button that sends a signal for help. · Wear low-heeled shoes that fit well and give your feet good support. Use footwear with nonskid soles. Check the heels and soles of your shoes for wear. Repair or replace worn heels or soles. · Do not wear socks without shoes on wood floors. · Walk on the grass when the sidewalks are slippery. If you live in an area that gets snow and ice in the winter, sprinkle salt on slippery steps and sidewalks. Where can you learn more? Go to http://staci-zeinab.info/. Enter N814 in the search box to learn more about \"Diabetes and Preventing Falls: Care Instructions. \"  Current as of: August 4, 2016  Content Version: 11.2  © 1963-3177 [employer]. Care instructions adapted under license by [employer] (which disclaims liability or warranty for this information). If you have questions about a medical condition or this instruction, always ask your healthcare professional. [place] [address] any warranty or liability for your use of this information. Counting Carbohydrate When You Take Insulin: Care Instructions  Your Care Instructions  You don't have to eat special foods when you take insulin. You just have to be careful to eat healthy foods. And you have to spread throughout the day the carbohydrate you eat. Carbohydrate raises blood sugar higher and more quickly than any other nutrient. It is found in desserts, breads and cereals, and fruit. It's also found in starchy vegetables such as potatoes and corn, grains such as rice and pasta, and milk and yogurt.   The more carbohydrate, or carbs, you eat at one time, the higher your blood sugar will rise. Spreading carbs throughout the day helps keep your blood sugar levels within your target range. Counting carbs is one of the best ways to keep your blood sugar under control when you use insulin. It helps you match the right amount of insulin to the number of grams of carbohydrate in a meal. You need to test your blood sugar several times a day to learn how carbs affect you. Then you can change your diet and insulin dose as needed. A registered dietitian or certified diabetes educator can help you plan meals and snacks. Follow-up care is a key part of your treatment and safety. Be sure to make and go to all appointments, and call your doctor if you are having problems. It's also a good idea to know your test results and keep a list of the medicines you take. How can you care for yourself at home? Know your daily amount of carbohydrate  Your daily amount depends on several things, including your weight, how active you are, which diabetes medicines you take, and what your goals are for your blood sugar levels. A registered dietitian or certified diabetes educator can help you plan how much carbohydrate to include in each meal and snack. For most adults, a guideline for the daily amount of carbohydrate is:  · 45 to 60 grams at each meal. That's about the same as 3 to 4 carbohydrate servings. · 15 to 20 grams at each snack. That's about the same as 1 carbohydrate serving. Count carbs  If you take insulin, you need to know how many grams of carbohydrate are in a meal. This lets you know how much rapid-acting insulin to take before you eat. If you use an insulin pump, you get a constant rate of insulin during the day. So the pump must be programmed at meals to give you extra insulin to cover the rise in blood sugar after meals. When you know how much carbohydrate you will eat, you can take the right amount of insulin.  Or, if you always use the same amount of insulin, you need to make sure that you eat the same amount of carbohydrate at meals. · Learn your own insulin-to-carbohydrate ratio. You and your diabetes health professional will figure out the ratio. You can do this by testing your blood sugar after meals. For example, you may need a certain amount of insulin for every 15 grams of carbohydrate. · Add up the carbohydrate grams in a meal. Then you can figure out how many units of insulin to take based on your insulin-to-carbohydrate ratio. · Look at labels on packaged foods. This can tell you how much carbohydrate is in a serving. You can also use guides from the American Diabetes Association. · Be aware of portions, or serving sizes. If a package has two servings and you eat the whole package, you need to double the number of grams of carbohydrate listed for one serving. · Protein, fat, and fiber do not raise blood sugar as much as carbs do. If you eat a lot of these nutrients in a meal, your blood sugar will rise more slowly than it would otherwise. · Exercise lowers blood sugar. You can use less insulin than you would if you were not doing exercise. Keep in mind that timing matters. If you exercise within 1 hour after a meal, your body may need less insulin for that meal than it would if you exercised 3 hours after the meal. Test your blood sugar to find out how exercise affects your need for insulin. Eat from all food groups  · Eat at least three meals a day. · Plan meals to include food from all the food groups. ¨ Grains: 1 slice of bread (1 ounce), ½ cup of cooked cereal, and 1/3 cup of cooked pasta or rice. These have about 15 grams of carbohydrate in a serving. Choose whole grains. These include whole wheat bread or crackers, oatmeal, and brown rice. Have them more often than refined grains.   ¨ Fruit: 1 small fresh fruit, such as an apple or orange; ½ of a banana; ½ cup of chopped, cooked, or canned fruit; ½ cup of fruit juice; 1 cup of melon or raspberries; and 2 tablespoons of dried fruit. These have about 15 grams of carbohydrate in a serving. ¨ Dairy: 1 cup of nonfat or low-fat milk and 2/3 cup of plain yogurt. These have about 15 grams of carbohydrate in a serving. ¨ Protein foods: Beef, chicken, turkey, fish, eggs, tofu, cheese, cottage cheese, and peanut butter. A serving size of meat is 3 ounces. This is about the size of a deck of cards. Examples of meat substitute serving sizes (equal to 1 ounce of meat) are 1/4 cup of cottage cheese, 1 egg, 1 tablespoon of peanut butter, and ½ cup of tofu. These have very little or no carbohydrate per serving. ¨ Vegetables: Starchy vegetables such as ½ cup of cooked beans, peas, potatoes, or corn have about 15 grams of carbohydrate. Nonstarchy vegetables have very little carbohydrate. These include 1 cup of raw leafy vegetables (such as spinach), ½ cup of other vegetables (cooked or chopped), and 3/4 cup of vegetable juice. · Talk to your dietitian or diabetes educator about ways to add limited amounts of sweets into your meal plan. · If you drink alcohol:  ¨ Limit it to no more than 1 drink a day for women and 2 drinks a day for men. (One drink is 12 fl oz of beer, 5 fl oz of wine, or 1.5 fl oz liquor.)  ¨ Make sure to count drink mixers that have sugar in your total carbohydrate count. These include cola, tonic water, wilman mix, and fruit juice. ¨ Eat a carbohydrate food along with your alcoholic drink. ¨ Check your blood sugar more often. This is because alcohol can lower your blood sugar too much. This may happen even hours later while you sleep. You may want to eat and adjust your insulin dose when you drink alcohol to prevent severe low blood sugar. ¨ Talk to your doctor. Alcohol may not be recommended when you are taking certain diabetes medicines. Where can you learn more? Go to http://jersey.info/.   Enter U423 in the search box to learn more about \"Counting Carbohydrate When You Take Insulin: Care Instructions. \"  Current as of: July 5, 2016  Content Version: 11.2  © 5121-3878 [employer]. Care instructions adapted under license by [employer] (which disclaims liability or warranty for this information). If you have questions about a medical condition or this instruction, always ask your healthcare professional. [facility] any warranty or liability for your use of this information. Diabetes Foot Health: Care Instructions  Your Care Instructions    When you have diabetes, your feet need extra care and attention. Diabetes can damage the nerve endings and blood vessels in your feet, making you less likely to notice when your feet are injured. Diabetes also limits your body's ability to fight infection and get blood to areas that need it. If you get a minor foot injury, it could become an ulcer or a serious infection. With good foot care, you can prevent most of these problems. Caring for your feet can be quick and easy. Most of the care can be done when you are bathing or getting ready for bed. Follow-up care is a key part of your treatment and safety. Be sure to make and go to all appointments, and call your doctor if you are having problems. Its also a good idea to know your test results and keep a list of the medicines you take. How can you care for yourself at home? · Keep your blood sugar close to normal by watching what and how much you eat, monitoring blood sugar, taking medicines if prescribed, and getting regular exercise. · Do not smoke. Smoking affects blood flow and can make foot problems worse. If you need help quitting, talk to your doctor about stop-smoking programs and medicines. These can increase your chances of quitting for good. · Eat a diet that is low in fats. High fat intake can cause fat to build up in your blood vessels and decrease blood flow.   · Inspect your feet daily for blisters, cuts, cracks, or sores. If you cannot see well, use a mirror or have someone help you. · Take care of your feet:  [facility] your feet every day. Use warm (not hot) water. Check the water temperature with your wrists or other part of your body, not your feet. ¨ Dry your feet well. Pat them dry. Do not rub the skin on your feet too hard. Dry well between your toes. If the skin on your feet stays moist, bacteria or a fungus can grow, which can lead to infection. ¨ Keep your skin soft. Use moisturizing skin cream to keep the skin on your feet soft and prevent calluses and cracks. But do not put the cream between your toes, and stop using any cream that causes a rash. ¨ Clean underneath your toenails carefully. Do not use a sharp object to clean underneath your toenails. Use the blunt end of a nail file or other rounded tool. ¨ Trim and file your toenails straight across to prevent ingrown toenails. Use a nail clipper, not scissors. Use an emery board to smooth the edges. · Change socks daily. Socks without seams are best, because seams often rub the feet. You can find socks for people with diabetes from specialty catalogs. · Look inside your shoes every day for things like gravel or torn linings, which could cause blisters or sores. · Buy shoes that fit well:  ¨ Look for shoes that have plenty of space around the toes. This helps prevent bunions and blisters. ¨ Try on shoes while wearing the kind of socks you will usually wear with the shoes. ¨ Avoid plastic shoes. They may rub your feet and cause blisters. Good shoes should be made of materials that are flexible and breathable, such as leather or cloth. ¨ Break in new shoes slowly by wearing them for no more than an hour a day for several days. Take extra time to check your feet for red areas, blisters, or other problems after you wear new shoes. · Do not go barefoot. Do not wear sandals, and do not wear shoes with very thin soles.  Thin soles are easy to puncture. They also do not protect your feet from hot pavement or cold weather. · Have your doctor check your feet during each visit. If you have a foot problem, see your doctor. Do not try to treat an early foot problem at home. Home remedies or treatments that you can buy without a prescription (such as corn removers) can be harmful. · Always get early treatment for foot problems. A minor irritation can lead to a major problem if not properly cared for early. When should you call for help? Call your doctor now or seek immediate medical care if:  · You have a foot sore, an ulcer or break in the skin that is not healing after 4 days, bleeding corns or calluses, or an ingrown toenail. · You have blue or black areas, which can mean bruising or blood flow problems. · You have peeling skin or tiny blisters between your toes or cracking or oozing of the skin. · You have a fever for more than 24 hours and a foot sore. · You have new numbness or tingling in your feet that does not go away after you move your feet or change positions. · You have unexplained or unusual swelling of the foot or ankle. Watch closely for changes in your health, and be sure to contact your doctor if:  · You cannot do proper foot care. Where can you learn more? Go to http://staci-zeinab.info/. Enter A739 in the search box to learn more about \"Diabetes Foot Health: Care Instructions. \"  Current as of: May 23, 2016  Content Version: 11.2  © 1558-7177 [employer]. Care instructions adapted under license by [employer] (which disclaims liability or warranty for this information). If you have questions about a medical condition or this instruction, always ask your healthcare professional. [place] [address] any warranty or liability for your use of this information.       Nutrition Tips for Diabetes: After Your Visit  Your Care Instructions  A healthy diet is important to manage diabetes. It helps you lose weight (if you need to) and keep it off. It gives you the nutrition and energy your body needs and helps prevent heart disease. But a diet for diabetes does not mean that you have to eat special foods. You can eat what your family eats, including occasional sweets and other favorites. But you do have to pay attention to how often you eat and how much you eat of certain foods. The right plan for you will give you meals that help you keep your blood sugar at healthy levels. Try to eat a variety of foods and to spread carbohydrate throughout the day. Carbohydrate raises blood sugar higher and more quickly than any other nutrient does. Carbohydrate is found in sugar, breads and cereals, fruit, starchy vegetables such as potatoes and corn, and milk and yogurt. You may want to work with a dietitian or diabetes educator to help you plan meals and snacks. A dietitian or diabetes educator also can help you lose weight if that is one of your goals. The following tips can help you enjoy your meals and stay healthy. Follow-up care is a key part of your treatment and safety. Be sure to make and go to all appointments, and call your doctor if you are having problems. Its also a good idea to know your test results and keep a list of the medicines you take. How can you care for yourself at home? · Learn which foods have carbohydrate and how much carbohydrate to eat. A dietitian or diabetes educator can help you learn to keep track of how much carbohydrate you eat. · Spread carbohydrate throughout the day. Eat some carbohydrate at all meals, but do not eat too much at any one time. · Plan meals to include food from all the food groups. These are the food groups and some example portion sizes:  ¨ Grains: 1 slice of bread (1 ounce), ½ cup of cooked cereal, and 1/3 cup of cooked pasta or rice. These have about 15 grams of carbohydrate in a serving.  Choose whole grains such as whole wheat bread or crackers, oatmeal, and brown rice more often than refined grains. ¨ Fruit: 1 small fresh fruit, such as an apple or orange; ½ of a banana; ½ cup of chopped, cooked, or canned fruit; ½ cup of fruit juice; 1 cup of melon or raspberries; and 2 tablespoons of dried fruit. These have about 15 grams of carbohydrate in a serving. ¨ Dairy: 1 cup of nonfat or low-fat milk and 2/3 cup of plain yogurt. These have about 15 grams of carbohydrate in a serving. ¨ Protein foods: Beef, chicken, turkey, fish, eggs, tofu, cheese, cottage cheese, and peanut butter. A serving size of meat is 3 ounces, which is about the size of a deck of cards. Examples of meat substitute serving sizes (equal to 1 ounce of meat) are 1/4 cup of cottage cheese, 1 egg, 1 tablespoon of peanut butter, and ½ cup of tofu. These have very little or no carbohydrate per serving. ¨ Vegetables: Starchy vegetables such as ½ cup of cooked dried beans, peas, potatoes, or corn have about 15 grams of carbohydrate. Nonstarchy vegetables have very little carbohydrate, such as 1 cup of raw leafy vegetables (such as spinach), ½ cup of other vegetables (cooked or chopped), and 3/4 cup of vegetable juice. · Use the plate format to plan meals. It is a good, quick way to make sure that you have a balanced meal. It also helps you spread carbohydrate throughout the day. You divide your plate by types of foods. Put vegetables on half the plate, meat or meat substitutes on one-quarter of the plate, and a grain or starchy vegetable (such as brown rice or a potato) in the final quarter of the plate. To this you can add a small piece of fruit and 1 cup of milk or yogurt, depending on how much carbohydrate you are supposed to eat at a meal.  · Talk to your dietitian or diabetes educator about ways to add limited amounts of sweets into your meal plan.  You can eat these foods now and then, as long as you include the amount of carbohydrate they have in your daily carbohydrate allowance. · If you drink alcohol, limit it to no more than 1 drink a day for women and 2 drinks a day for men. If you are pregnant, no amount of alcohol is known to be safe. · Protein, fat, and fiber do not raise blood sugar as much as carbohydrate does. If you eat a lot of these nutrients in a meal, your blood sugar will rise more slowly than it would otherwise. · Limit saturated fats, such as those from meat and dairy products. Try to replace it with monounsaturated fat, such as olive oil. This is a healthier choice because people who have diabetes are at higher-than-average risk of heart disease. But use a modest amount of olive oil. A tablespoon of olive oil has 14 grams of fat and 120 calories. · Exercise lowers blood sugar. If you take insulin by shots or pump, you can use less than you would if you were not exercising. Keep in mind that timing matters. If you exercise within 1 hour after a meal, your body may need less insulin for that meal than it would if you exercised 3 hours after the meal. Test your blood sugar to find out how exercise affects your need for insulin. · Exercise on most days of the week. Aim for at least 30 minutes. Exercise helps you stay at a healthy weight and helps your body use insulin. Walking is an easy way to get exercise. Gradually increase the amount you walk every day. You also may want to swim, bike, or do other activities. When you eat out  · Learn to estimate the serving sizes of foods that have carbohydrate. If you measure food at home, it will be easier to estimate the amount in a serving of restaurant food. · If the meal you order has too much carbohydrate (such as potatoes, corn, or baked beans), ask to have a low-carbohydrate food instead. Ask for a salad or green vegetables. · If you use insulin, check your blood sugar before and after eating out to help you plan how much to eat in the future.   · If you eat more carbohydrate at a meal than you had planned, take a walk or do other exercise. This will help lower your blood sugar. Where can you learn more? Go to Iagn[employer]  Enter Q035 in the search box to learn more about \"Nutrition Tips for Diabetes: After Your Visit. \"   © 2986-7072 [employer]. Care instructions adapted under license by Samir Khan (which disclaims liability or warranty for this information). This care instruction is for use with your licensed healthcare professional. If you have questions about a medical condition or this instruction, always ask your healthcare professional. [address] any warranty or liability for your use of this information. Content Version: 06.4.047851; Current as of: June 4, 2014                   Diabetic Neuropathy: Care Instructions  Your Care Instructions  When you have diabetes, your blood sugar level may get too high. Over time, high blood sugar levels can damage nerves. This is called diabetic neuropathy. Nerve damage can cause pain, burning, tingling, and numbness and may leave you feeling weak. The feet are often affected. When you have nerve damage in your feet, you cannot feel your feet and toes as well as normal and may not notice cuts or sores. Even a small injury can lead to a serious infection. It is very important that you follow your doctor's advice on foot care. Sometimes diabetes damages nerves that help the body function. If this happens, your blood pressure, sweating, digestion, and urination might be affected. Your doctor may give you a target blood sugar level that is higher or lower than you are used to. Try to keep your blood sugar very close to this target level to prevent more damage. Follow-up care is a key part of your treatment and safety. Be sure to make and go to all appointments, and call your doctor if you are having problems. Its also a good idea to know your test results and keep a list of the medicines you take.   How can you care for yourself at home? · Take your medicines exactly as prescribed. Call your doctor if you think you are having a problem with your medicine. It is very important that you take your insulin or diabetes pills as your doctor tells you. · Try to keep blood sugar at your target level. ¨ Eat a variety of healthy foods, with carbohydrate spread out in your meals. A dietitian can help you plan meals. ¨ Try to get at least 30 minutes of exercise on most days. ¨ Check your blood sugar as many times each day as your doctor recommends. · Take and record your blood pressure at home if your doctor tells you to. Learn the importance of the two measures of blood pressure (such as 130 over 80, or 130/80). To take your blood pressure at home:  ¨ Ask your doctor to check your blood pressure monitor to be sure it is accurate and the cuff fits you. Also ask your doctor to watch you to make sure that you are using it right. ¨ Do not use medicine known to raise blood pressure (such as some nasal decongestant sprays) before taking your blood pressure. ¨ Avoid taking your blood pressure if you have just exercised or are nervous or upset. Rest at least 15 minutes before you take a reading. · Take pain medicines exactly as directed. ¨ If the doctor gave you a prescription medicine for pain, take it as prescribed. ¨ If you are not taking a prescription pain medicine, ask your doctor if you can take an over-the-counter medicine. · Do not smoke. Smoking can increase your chance for a heart attack or stroke. If you need help quitting, talk to your doctor about stop-smoking programs and medicines. These can increase your chances of quitting for good. · Limit alcohol to 2 drinks a day for men and 1 drink a day for women. Too much alcohol can cause health problems. · Eat small meals often, rather than 2 or 3 large meals a day. To care for your feet  · Prevent injury by wearing shoes at all times, even when you are indoors.   · Do foot care as part of your daily routine. Wash your feet and then rub lotion on your feet, but not between your toes. Use a handheld mirror or magnifying mirror to inspect your feet for blisters, cuts, cracks, or sores. · Have your toenails trimmed and filed straight across. · Wear shoes and socks that fit well. Soft shoes that have good support and that fit well (such as tennis shoes) are best for your feet. · Check your shoes for any loose objects or rough edges before you put them on. · Ask your doctor to check your feet during each visit. Your doctor may notice a foot problem you have missed. · Get early treatment for any foot problem, even a minor one. When should you call for help? Call your doctor now or seek immediate medical care if:  · You have a sore or any type of injured skin on your toes or feet. · Your feet have blue or black areas, which can mean bruising or blood flow problems. · You have peeling skin or tiny blisters between your toes, or cracking or oozing of the skin. · You have new numbness or tingling in your feet that does not go away after you move your feet or change positions. Watch closely for changes in your health, and be sure to contact your doctor if:  · You want help with foot care or cutting your toenails. · You have frequent problems with high or low blood sugar levels. Your medicines or your insulin may need to be changed. Where can you learn more? Go to http://staci-zeinab.info/. Enter E002 in the search box to learn more about \"Diabetic Neuropathy: Care Instructions. \"  Current as of: May 23, 2016  Content Version: 11.2  © 3404-2901 [employer]. Care instructions adapted under license by [employer] (which disclaims liability or warranty for this information).  If you have questions about a medical condition or this instruction, always ask your healthcare professional. Garth Ortez disclaims any warranty or liability for your use of this information. Epilepsy: Care Instructions  Your Care Instructions  Epilepsy is a common condition that causes repeated seizures. The seizures are caused by bursts of electrical activity in the brain that aren't normal. Seizures may cause problems with muscle control, movement, speech, vision, or awareness. They can be scary. Epilepsy affects each person differently. Some people have only a few seizures. Others get them more often. If you know what triggers a seizure, you may be able to avoid having one. You can take medicines to control and reduce seizures. You and your doctor will need to find the right combination, schedule, and dose of medicine. This may take time and careful changes. Seizures may get worse and happen more often over time. Follow-up care is a key part of your treatment and safety. Be sure to make and go to all appointments, and call your doctor if you are having problems. It's also a good idea to know your test results and keep a list of the medicines you take. How can you care for yourself at home? · Be safe with medicines. Take your medicines exactly as prescribed. Call your doctor if you think you are having a problem with your medicine. · Make a treatment plan with your doctor. Be sure to follow your plan. · Try to identify and avoid things that may make you more likely to have a seizure. These may include:  ¨ Not getting enough sleep. ¨ Using drugs or alcohol. ¨ Being emotionally stressed. ¨ Skipping meals. · Keep a record of any seizures you have. Note the date, time of day, and any details about the seizure that you can remember. Your doctor can use this information to plan or adjust your medicine or other treatment. · Be sure that any doctor treating you for another condition knows that you have epilepsy. Each doctor should know what medicines you are taking, if any. · Wear a medical ID bracelet. You can buy this at most [facility].  If you have a seizure that leaves you unconscious or unable to speak for yourself, this bracelet will let those who are treating you know that you have epilepsy. · Talk to your doctor about whether it is safe for you to do certain activities, such as drive or swim. When should you call for help? Call 911 anytime you think you may need emergency care. For example, call if:  · A seizure does not stop as it normally does. · You have new symptoms such as:  ¨ Numbness, tingling, or weakness on one side of your body or face. ¨ Vision changes. ¨ Trouble speaking or thinking clearly. Call your doctor now or seek immediate medical care if:  · You have a fever. · You have a severe headache. Watch closely for changes in your health, and be sure to contact your doctor if:  · The normal pattern or features of your seizures change. Where can you learn more? Go to http://staciRetroficiencyzeinab.info/. Mike Martinez in the search box to learn more about \"Epilepsy: Care Instructions. \"  Current as of: October 14, 2016  Content Version: 11.2  © 1043-4458 "[employer]. Care instructions adapted under license by [employer] (which disclaims liability or warranty for this information). If you have questions about a medical condition or this instruction, always ask your healthcare professional. [place] [address] any warranty or liability for your use of this information. High Blood Pressure: Care Instructions  Your Care Instructions  If your blood pressure is usually above 140/90, you have high blood pressure, or hypertension. That means the top number is 140 or higher or the bottom number is 90 or higher, or both. Despite what a lot of people think, high blood pressure usually doesn't cause headaches or make you feel dizzy or lightheaded. It usually has no symptoms. But it does increase your risk for heart attack, stroke, and kidney or eye damage.  The higher your blood pressure, the more your risk increases. Your doctor will give you a goal for your blood pressure. Your goal will be based on your health and your age. An example of a goal is to keep your blood pressure below 140/90. Lifestyle changes, such as eating healthy and being active, are always important to help lower blood pressure. You might also take medicine to reach your blood pressure goal.  Follow-up care is a key part of your treatment and safety. Be sure to make and go to all appointments, and call your doctor if you are having problems. It's also a good idea to know your test results and keep a list of the medicines you take. How can you care for yourself at home? Medical treatment  · If you stop taking your medicine, your blood pressure will go back up. You may take one or more types of medicine to lower your blood pressure. Be safe with medicines. Take your medicine exactly as prescribed. Call your doctor if you think you are having a problem with your medicine. · Talk to your doctor before you start taking aspirin every day. Aspirin can help certain people lower their risk of a heart attack or stroke. But taking aspirin isn't right for everyone, because it can cause serious bleeding. · See your doctor regularly. You may need to see the doctor more often at first or until your blood pressure comes down. · If you are taking blood pressure medicine, talk to your doctor before you take decongestants or anti-inflammatory medicine, such as ibuprofen. Some of these medicines can raise blood pressure. · Learn how to check your blood pressure at home. Lifestyle changes  · Stay at a healthy weight. This is especially important if you put on weight around the waist. Losing even 10 pounds can help you lower your blood pressure. · If your doctor recommends it, get more exercise. Walking is a good choice. Bit by bit, increase the amount you walk every day. Try for at least 30 minutes on most days of the week.  You also may want to swim, bike, or do other activities. · Avoid or limit alcohol. Talk to your doctor about whether you can drink any alcohol. · Try to limit how much sodium you eat to less than 2,300 milligrams (mg) a day. Your doctor may ask you to try to eat less than 1,500 mg a day. · Eat plenty of fruits (such as bananas and oranges), vegetables, legumes, whole grains, and low-fat dairy products. · Lower the amount of saturated fat in your diet. Saturated fat is found in animal products such as milk, cheese, and meat. Limiting these foods may help you lose weight and also lower your risk for heart disease. · Do not smoke. Smoking increases your risk for heart attack and stroke. If you need help quitting, talk to your doctor about stop-smoking programs and medicines. These can increase your chances of quitting for good. When should you call for help? Call 911 anytime you think you may need emergency care. This may mean having symptoms that suggest that your blood pressure is causing a serious heart or blood vessel problem. Your blood pressure may be over 180/110. For example, call 911 if:  · You have symptoms of a heart attack. These may include:  ¨ Chest pain or pressure, or a strange feeling in the chest.  ¨ Sweating. ¨ Shortness of breath. ¨ Nausea or vomiting. ¨ Pain, pressure, or a strange feeling in the back, neck, jaw, or upper belly or in one or both shoulders or arms. ¨ Lightheadedness or sudden weakness. ¨ A fast or irregular heartbeat. · You have symptoms of a stroke. These may include:  ¨ Sudden numbness, tingling, weakness, or loss of movement in your face, arm, or leg, especially on only one side of your body. ¨ Sudden vision changes. ¨ Sudden trouble speaking. ¨ Sudden confusion or trouble understanding simple statements. ¨ Sudden problems with walking or balance. ¨ A sudden, severe headache that is different from past headaches. · You have severe back or belly pain.   Do not wait until your blood pressure comes down on its own. Get help right away. Call your doctor now or seek immediate care if:  · Your blood pressure is much higher than normal (such as 180/110 or higher), but you don't have symptoms. · You think high blood pressure is causing symptoms, such as:  ¨ Severe headache. ¨ Blurry vision. Watch closely for changes in your health, and be sure to contact your doctor if:  · Your blood pressure measures 140/90 or higher at least 2 times. That means the top number is 140 or higher or the bottom number is 90 or higher, or both. · You think you may be having side effects from your blood pressure medicine. · Your blood pressure is usually normal, but it goes above normal at least 2 times. Where can you learn more? Go to http://staci-zeinab.info/. Enter S332 in the search box to learn more about \"High Blood Pressure: Care Instructions. \"  Current as of: August 8, 2016  Content Version: 11.2  © 9906-0783 [employer]. Care instructions adapted under license by [employer] (which disclaims liability or warranty for this information). If you have questions about a medical condition or this instruction, always ask your healthcare professional. [place] [address] any warranty or liability for your use of this information. Learning About Transient Ischemic Attack (TIA)  What is a TIA? A transient ischemic attack (TIA) means that the blood flow to a part of the brain is blocked for a short time. A TIA feels like a stroke but usually lasts only 10 to 20 minutes. Unlike a stroke, a TIA does not cause lasting brain damage. A TIA is usually caused by a blood clot that blocks blood flow in the brain. A blood clot can form in another part of the body (often the heart) and travel through the bloodstream to the brain. When blood flow to part of the brain is blocked, the brain cells in that area are affected within seconds.  This causes symptoms in parts of the body controlled by those brain cells. When the blood clot dissolves, blood flow returns, and the symptoms go away. Blood clots can be the result of hardening of the arteries (atherosclerosis) or a heart attack. Sometimes a TIA is caused by a sharp drop in blood pressure that reduces blood flow to the brain. This is called a \"low-flow\" TIA. It is not as common as a TIA caused by a blood clot. What happens after a TIA? TIA is a serious warning sign of a possible stroke in the future. If you have other medical conditions such as coronary artery disease or atherosclerosis, you may also have an increased risk for a heart attack. Talk to your doctor about your risk. Understanding your risk will help you and your doctor plan your treatment options. You can do a lot to lower your chance of having another TIA or a stroke. Medicines can help, and you may also need to make lifestyle changes. What are the symptoms? Symptoms of a TIA are the same as symptoms of a stroke. But symptoms of a TIA don't last very long. Most of the time, they go away in 10 to 20 minutes. They may include:  · Sudden numbness, tingling, weakness, or loss of movement in your face, arm, or leg, especially on only one side of your body. · Sudden vision changes. · Sudden trouble speaking. · Sudden confusion or trouble understanding simple statements. · Sudden problems with walking or balance. If you have any of these symptoms, call 911 or other emergency services right away. Ask your family, friends, and coworkers to learn the signs of a TIA. They may notice these signs before you do. Make sure they know to call 911 if these signs appear. How is a TIA treated? If you've had a TIA, you may need more testing and treatment after you get checked by your doctor. If you have a high risk of stroke, you may have to stay in the hospital for treatment.   Your treatment for a TIA may include taking medicines to prevent blood clots or a stroke, or having surgery to reopen narrow arteries. How can you prevent another TIA? · Work with your doctor to treat any health problems you have. High blood pressure, high cholesterol, atrial fibrillation, and diabetes all raise your chances of having a stroke. · Be safe with medicines. Take your medicine exactly as prescribed. Call your doctor if you think you are having a problem with your medicine. · Have a healthy lifestyle. ¨ Do not smoke or allow others to smoke around you. If you need help quitting, talk to your doctor about stop-smoking programs and medicines. These can increase your chances of quitting for good. Smoking makes a stroke more likely. ¨ Limit alcohol to 2 drinks a day for men and 1 drink a day for women. ¨ Lose weight if you need to. A healthy weight will help you keep your heart and body healthy. ¨ Be active. Ask your doctor what type and level of activity are safe for you. ¨ Eat heart-healthy foods, like fruits, vegetables, and high-fiber foods. Follow-up care is a key part of your treatment and safety. Be sure to make and go to all appointments, and call your doctor if you are having problems. It's also a good idea to know your test results and keep a list of the medicines you take. Where can you learn more? Go to http://staci-zeinab.info/. Enter P746 in the search box to learn more about \"Learning About Transient Ischemic Attack (TIA). \"  Current as of: July 27, 2016  Content Version: 11.2  © 8513-8119 [employer], [employer]. Care instructions adapted under license by [employer] (which disclaims liability or warranty for this information). If you have questions about a medical condition or this instruction, always ask your healthcare professional. [place] [address] any warranty or liability for your use of this information. Patient armband removed and shredded.

## 2017-04-06 NOTE — PROCEDURES
[address]    Name:  Elvira Ivy  MR#:  [id number]  :  1960  Account #:  [de-identified]  Date of Adm:  2017  Date of Service:  2017      ELECTROENCEPHALOGRAM NUMBER:     INDICATION:  Evaluation for convulsions starting on the left side of the  body and then spreading to all four extremities. DESCRIPTION:  A 21-channel digital recording is performed at the  patient's bedside with 9-10 Hz moderate amplitude regular activity  recorded symmetrically which attenuates with eye opening. Photic  stimulation elicits a minimal driving response. Hyperventilation is  omitted. Sleep is achieved without sedation with normal symmetric  sleep patterns recorded. No focal or paroxysmal abnormalities are  noted. INTERPRETATION:  This is a normal awake and asleep EEG with no  evidence of epileptiform activity. Thank you for referring this patient.         Aury Rutledge MD    [address] / [place]  D:  2017   17:20  T:  2017   17:26  Job #:  [id number]

## 2017-04-06 NOTE — PROGRESS NOTES
Assumed care of patient, bed in lowest position. Call bell in reach, patient resting in bed, c/o headache 3/10.  Sbar report received from [employer]

## 2017-04-06 NOTE — PROGRESS NOTES
Neurology Progress Note    Admit Date: 4/4/2017  Length of Stay: 0  Primary Care: Jason Lopez, DO     Patient seen independently. No further shaking. Feels well and getting ready to go home. Balance has been good. EEG is normal.  Have notified our office and patient to have follow up with Dr. Dalia Leon. Interim History: No overnight issues. TTE with EF 55-60% and mild regurgitation from mitral and tricuspid valves. EEG pending read. Assessment:   A&O x4.  Speech clear and easily understandable. Full strength to all extremities. Decreased sharp/dull sensation to bilateral feet from ankles down. Principle Problem: Seizure disorder, focal motor [facility]     Problem List: Principal Problem:    Seizure disorder, focal motor ([facility] (4/5/2017)    Active Problems:    Uncontrolled type 2 diabetes mellitus with diabetic polyneuropathy ([facility] [address] (6/24/2014)      Noncompliance with medication regimen (4/5/2017)        Plan:   Seizure vs TIA- seizure more likely from description events. Reinforced no driving for 6 months and follow-up with Dr. Dalia Leon in her office. Hyperglycemia-  Improving. Blood glucose tests ranging . Reinforced medication compliance. Follow-up with PCP in next two weeks. Results reviewed:     CT Results (most recent):    Results from [place] encounter on 04/04/17   CT HEAD WO CONT   Narrative EXAM:  CT of the brain without intravenous contrast.    COMPARISON:     PROVIDED REASON FOR EXAM: \"Suspected Stroke\". Slurred speech. Left-sided  weakness. DOSE REDUCTION:  One or more dose reduction techniques were used on this CT:  automated exposure control, adjustment of the mAs and/or kVp according to  patient's size, and iterative reconstruction techniques.  The specific techniques  utilized on this CT exam have been documented in the patient's electronic  medical record.    _______________    FINDINGS:         IMAGE QUALITY:  There is no significant motion degradation of the images. BRAIN AND EXTRA-AXIAL SPACE:  2 cm in diameter patch of mildly decreased  attenuation in the right anterior corona radiata likely reflects age  indeterminant ischemic change. This occurs in concert with an otherwise mild  burden of patchy white matter hypodensity that also likely represents chronic  microvascular ischemic change. There is a mild burden of calcific intracranial  atherosclerosis. Mild diffuse volume loss. There is no evidence of mass, hemorrhage, extra-axial fluid collection, or  territorial subacute cortical infarct, and no hydrocephalus. MISCELLANEOUS:  Subcentimeter low-attenuation lesion in the high right  frontal bone on axial image 21 is statistically most likely benign. The mastoid  air cells and imaged superior portions of the paranasal sinuses are clear.     _______________         Impression IMPRESSION:    Low-grade chronic changes, however, no acute findings. Note: Dr. Artemus Kocher discussed the stroke code results with Dr. Dorian Rutledge at 3:12 PM  on 4/4/2017.     _______________                    MRI Results (most recent):    Results from Hospital Encounter encounter on 04/04/17   MRA NECK W WO CONT   Narrative MRA neck with and without contrast.    History: Left-sided weakness, left facial droop with slurred speech, headache    Technique:  MR angiography of the neck was performed utilizing contrast enhanced  coronal acquisitions with multiplanar reconstructions. Additional axial 2-D  time-of-flight non-contrast imaging was also acquired. Findings: The left carotid artery bifurcation is mildly irregular. Mild kinking is seen  along the mid left common carotid artery with only slight narrowing. Estimated  minimal luminal diameter of proximal ICA is 4.2 mm. Estimated diameter of  normal distal cervical segment ICA is 5.4 mm. Estimated stenosis of left ICA  based upon NASCET criteria is 23%.     The right carotid artery bifurcation is mildly irregular. Estimated minimal  luminal diameter of proximal ICA is 3.7 mm. There is additional kinking  identified along the proximal to mid cervical segment narrowing diameter to  estimated 2.3 mm. Estimated diameter of normal distal cervical segment ICA is  5.5 mm. Estimated stenosis of right ICA based upon NASCET criteria is 59% along  the kinking and 33% along the proximal carotid bulb. The origins of the great vessels are unremarkable. Mild narrowing is present  along the proximal left subclavian artery proximal to the vertebral artery  origin. The vertebral arteries are relatively equal in size. No focal vertebral  artery stenosis is seen. Bilateral antegrade flow seen on time-of-flight images. Impression IMPRESSION:    1. Mild proximal ICA irregularity with additional proximal and mid right ICA  cervical segment kinking. Estimated proximal right ICA stenosis 33% with 59%  along the cervical segment kinking, based on NASCET criteria. Estimated proximal  left ICA stenosis 23%. 2.  No focal vertebral artery stenosis is seen. Preliminary report of this study was provided by another radiologist, Dr. Ashley Lopez 4/4/2017 at 11:03 PM.    MRA Brain  Findings:       Mild motion artifact is present.      There is no evidence of aneurysm. There is no focal high-grade stenosis within  the intracranial arteries. No focal carotid siphon stenosis is seen. The  carotid terminus is unremarkable. Anterior cerebral arteries are within normal  limits. The middle cerebral artery bifurcations are within normal limits given  motion artifact. Distal MCA branch flow is seen bilaterally. The vertebral  arteries are relatively equal in size. The basilar artery is unremarkable. Posterior cerebral arteries are unremarkable. A very small posterior  communicating artery is present bilaterally.      IMPRESSION  IMPRESSION:      Mildly motion degraded study.      1. No high-grade intracranial stenosis is seen.      2.  No intracranial aneurysm is seen.     MRI BRain  Findings:       The ventricles and sulci are normal in their size and configuration. A mild  amount of T2/FLAIR hyperintense white matter lesions are seen within the  periventricular and subcortical white matter , which are nonspecific, but likely  represent chronic small vessel changes. Probable chronic small infarct  involving anterosuperior right lentiform nucleus/corona radiata. There is no  evidence of mass effect, hemorrhage, midline shift, or herniation. There is no  abnormal restricted diffusion to suggest acute infarct. No abnormal contrast  enhancement is present. The major intracranial vascular flow voids are grossly  normal.       Mild mucoperiosteal thickening is scattered in the paranasal sinuses. No  air-fluid levels are present. Orbits and mastoid air cells are unremarkable.      IMPRESSION  IMPRESSION:      1. No acute infarct, hemorrhage, mass effect, or herniation.      2. Mild nonspecific white matter disease likely representing chronic small  vessel changes.       3. 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 TTE 4/5/17  SUMMARY:  Left ventricle: Systolic function was normal. Ejection fraction was  estimated in the range of 55 % to 60 %. There were no regional wall motion  abnormalities. Wall thickness was increased. Left atrium: The atrium was dilated. Atrial septum: There was no evidence of intracardiac shunt by  peripherally-administered agitated saline contrast.    Mitral valve: There was mild regurgitation. Aortic valve: There was no stenosis. Tricuspid valve: There was mild regurgitation.     Latest Hemoglobin A1C:  Lab Results   Component Value Date/Time    Hemoglobin A1c 9.7 04/04/2017 03:30 PM    Hemoglobin A1c (POC) 10.8 05/19/2016 12:56 PM       Latest Cardiology Procedure:  Results for orders placed or performed during the hospital encounter of 04/04/17   EKG, 12 LEAD, INITIAL   Result Value Ref Range    Ventricular Rate 119 BPM    Atrial Rate 119 BPM    P-R Interval 150 ms    QRS Duration 100 ms    Q-T Interval 352 ms    QTC Calculation (Bezet) 495 ms    Calculated P Axis 52 degrees    Calculated R Axis -35 degrees    Calculated T Axis 74 degrees    Diagnosis       Sinus tachycardia  Possible Left atrial enlargement  Left axis deviation  Nonspecific ST and T wave abnormality  Abnormal ECG  When compared with ECG of 04-FEB-2015 04:09,  Vent. rate has increased BY  39 BPM  Nonspecific T wave abnormality, improved in Lateral leads  Confirmed by Remy Montgomery ([id number]) on 4/5/2017 7:35:01 AM         Important Labs:  No results found for: FOL, RBCF  Lab Results   Component Value Date/Time    Cholesterol, total 207 01/25/2017 09:19 AM    HDL Cholesterol 61 01/25/2017 09:19 AM    LDL, calculated 104.8 01/25/2017 09:19 AM    VLDL, calculated 41.2 01/25/2017 09:19 AM    Triglyceride 206 01/25/2017 09:19 AM    CHOL/HDL Ratio 3.4 01/25/2017 09:19 AM     Lab Results   Component Value Date/Time    TSH 2.39 04/06/2017 03:58 AM    Triiodothyronine (T3), free 2.6 04/04/2017 10:50 PM    T4, Free 1.0 04/04/2017 10:50 PM     No results found for: DS35, PHEN, PHENO, PHENT, DILF, DS39, PHENY, PTN, VALF2, VALAC, VALP, VALPR, DS6, CRBAM, CRBAMP, CARB2, XCRBAM    Discussed with: patient    Allergies: Allergies   Allergen Reactions    Contrast Agent [Iodine] Rash and Sneezing         Vital Signs:   Visit Vitals    BP (!) 164/94    Pulse (!) 163    Temp 97.9 °F (36.6 °C)    Resp 17    Ht 5' 2\" (1.575 m)    Wt 78.9 kg (174 lb)    SpO2 95%    Breastfeeding No    BMI 31.83 kg/m2        Neurological examination:    Appearance: In no acute distress, well developed, well nourished, cooperative   Cardiovascular: Heart is regular rate and rhythm, peripheral edema is absent.  Mental status examination: Alert and oriented X 3. Normal speech and language. Normal attention, memory and fund of knowledge.    Cranial Nerves: II: visual fields Full to confrontation   II: pupils Equal, round, reactive to light   III,VII: ptosis none   III,IV,VI: extraocular muscles  Full ROM   V: facial light touch sensation  normal   V,VII: corneal reflex     VII: facial muscle function  normal   VIII: hearing normal   IX: soft palate elevation  normal   X phonation normal   XI: sternocleidomastoid strength 5/5   XII: tongue strength  normal      Motor exam: Station, gait:  normal.   Pronator drift absent, moves all 4 extremities well, symmetrically   Sensory: Intact to light touch. Decreased sharp/dull sensation to bilateral feet from ankles down.  Coordination: Normal rapid alternating movements, finger to nose testing.  Reflexes: Symmetric and 2+ in bilateral biceps, triceps, brachioradialis, patellar, ankle reflexes. Plantar reflexes are flexor.        Review of Systems:   Y  N   Constitutional: [] [] recent weight change  [] [] fever  [] [] sleep difficulties   ENT/Mouth:  [] [] hearing loss  [] [] swallowing problems   Cardiovascular:  [] [x] chest pain   [] [] palpitations    Respiratory: [] [x] cough   [] [x] shortness of breath  [] [] sleep apnea  [] [] intubated   Gastrointestinal: [] [] abdominal pain  [] [] nausea   Genitourinary: [] [] frequent urination  [] [] incontinence    Musculoskeletal:   [] [] joint pain  [] [] muscle pain   Integument:   [] [] rash/itching   Neurological:  [] [] dizziness/vertigo  [] [] speech problems  [] [] language difficulty  [] [] sedation  [] [] confusion  [] [] agitation/combativeness  [] [x] loss of consciousness  [] [] numbness/tingling sensation  [] [x] tremors  [] [] weakness in limbs  [] [] difficulty with balance  [] [] frequent or recurring headaches  [] [] memory loss   [] [] comatose  [] [x] seizures   Psychiatric:   [] [] depression  [] [] hallucinations           PMH:   Past Medical History:   Diagnosis Date    Anxiety     CAD (coronary artery disease)     S/P Coronary stents ( LAD and Lcx)    Depression     Diabetes ([facility]     Hepatitis C     Hypercholesterolemia     Hypertension       FH:   Family History   Problem Relation Age of Onset    Diabetes Mother     Hypertension Mother     Cancer Mother     Heart Disease Mother     Heart Attack Mother    Geovanna Stack Diabetes Father     Hypertension Father     Cancer Father       SH:   Social History     Social History    Marital status:      Spouse name: N/A    Number of children: N/A    Years of education: N/A     Social History Main Topics    Smoking status: Never Smoker    Smokeless tobacco: Never Used    Alcohol use No    Drug use: No    Sexual activity: Not Currently     Other Topics Concern    Not on file     Social History Narrative          Medications:    [x] REVIEWED  Current Facility-Administered Medications   Medication    insulin detemir (LEVEMIR) injection 22 Units    insulin lispro (HUMALOG) injection 5 Units    aspirin (ASPIRIN) tablet 325 mg    glucose chewable tablet 16 g    glucagon (GLUCAGEN) injection 1 mg    dextrose (D50W) injection syrg 12.5-25 g    amLODIPine (NORVASC) tablet 10 mg    atorvastatin (LIPITOR) tablet 40 mg    buPROPion (WELLBUTRIN) tablet 100 mg    citalopram (CELEXA) tablet 40 mg    clopidogrel (PLAVIX) tablet 75 mg    hydrALAZINE (APRESOLINE) tablet 25 mg    hydroCHLOROthiazide (HYDRODIURIL) tablet 25 mg    levothyroxine (SYNTHROID) tablet 125 mcg    losartan (COZAAR) tablet 100 mg    metoprolol succinate (TOPROL-XL) tablet 200 mg    enoxaparin (LOVENOX) injection 40 mg    ondansetron (ZOFRAN) injection 1 mg    acetaminophen (TYLENOL) tablet 650 mg    acetaminophen (TYLENOL) suppository 650 mg    senna-docusate (PERICOLACE) 8.6-50 mg per tablet 2 Tab    albuterol (PROVENTIL VENTOLIN) nebulizer solution 2.5 mg    folic acid (FOLVITE) 1 mg, thiamine (B-1) 100 mg in 0.9% sodium chloride 50 mL ivpb    insulin lispro (HUMALOG) injection     Data:      [x] REVIEWED  Recent Results (from the past 24 hour(s))   GLUCOSE, POC    Collection Time: 04/05/17 12:38 PM   Result Value Ref Range    Glucose (POC) 187 (H) 70 - 110 mg/dL   GLUCOSE, POC    Collection Time: 04/05/17  6:11 PM   Result Value Ref Range    Glucose (POC) 239 (H) 70 - 110 mg/dL   GLUCOSE, POC    Collection Time: 04/05/17  9:09 PM   Result Value Ref Range    Glucose (POC) 97 70 - 110 mg/dL   CBC WITH AUTOMATED DIFF    Collection Time: 04/06/17  3:58 AM   Result Value Ref Range    WBC 5.6 4.6 - 13.2 K/uL    RBC 4.22 4.20 - 5.30 M/uL    HGB 12.6 12.0 - 16.0 g/dL    HCT 37.2 35.0 - 45.0 %    MCV 88.2 74.0 - 97.0 FL    MCH 29.9 24.0 - 34.0 PG    MCHC 33.9 31.0 - 37.0 g/dL    RDW 11.4 (L) 11.6 - 14.5 %    PLATELET 530 258 - 938 K/uL    MPV 10.9 9.2 - 11.8 FL    NEUTROPHILS 50 40 - 73 %    LYMPHOCYTES 38 21 - 52 %    MONOCYTES 11 (H) 3 - 10 %    EOSINOPHILS 1 0 - 5 %    BASOPHILS 0 0 - 2 %    ABS. NEUTROPHILS 2.8 1.8 - 8.0 K/UL    ABS. LYMPHOCYTES 2.1 0.9 - 3.6 K/UL    ABS. MONOCYTES 0.6 0.05 - 1.2 K/UL    ABS. EOSINOPHILS 0.0 0.0 - 0.4 K/UL    ABS. BASOPHILS 0.0 0.0 - 0.06 K/UL    DF AUTOMATED     METABOLIC PANEL, COMPREHENSIVE    Collection Time: 04/06/17  3:58 AM   Result Value Ref Range    Sodium 139 136 - 145 mmol/L    Potassium 3.6 3.5 - 5.5 mmol/L    Chloride 103 100 - 108 mmol/L    CO2 26 21 - 32 mmol/L    Anion gap 10 3.0 - 18 mmol/L    Glucose 271 (H) 74 - 99 mg/dL    BUN 18 7.0 - 18 MG/DL    Creatinine 0.77 0.6 - 1.3 MG/DL    BUN/Creatinine ratio 23 (H) 12 - 20      GFR est AA >60 >60 ml/min/1.73m2    GFR est non-AA >60 >60 ml/min/1.73m2    Calcium 8.4 (L) 8.5 - 10.1 MG/DL    Bilirubin, total 0.7 0.2 - 1.0 MG/DL    ALT (SGPT) 35 13 - 56 U/L    AST (SGOT) 35 15 - 37 U/L    Alk.  phosphatase 130 (H) 45 - 117 U/L    Protein, total 6.8 6.4 - 8.2 g/dL    Albumin 2.6 (L) 3.4 - 5.0 g/dL    Globulin 4.2 (H) 2.0 - 4.0 g/dL    A-G Ratio 0.6 (L) 0.8 - 1.7     TSH 3RD GENERATION    Collection Time: 04/06/17  3:58 AM   Result Value Ref Range    TSH 2.39 0.36 - 3.74 uIU/mL   GLUCOSE, POC    Collection Time: 04/06/17  8:02 AM   Result Value Ref Range    Glucose (POC) 202 (H) 70 - 110 mg/dL   GLUCOSE, POC    Collection Time: 04/06/17 12:26 PM   Result Value Ref Range    Glucose (POC) 203 (H) 70 - 110 mg/dL

## 2017-04-09 ENCOUNTER — APPOINTMENT (OUTPATIENT)
Dept: GENERAL RADIOLOGY | Age: 57
End: 2017-04-09
Attending: PHYSICIAN ASSISTANT
Payer: OTHER GOVERNMENT

## 2017-04-09 ENCOUNTER — HOSPITAL ENCOUNTER (EMERGENCY)
Age: 57
Discharge: HOME | End: 2017-04-09
Attending: EMERGENCY MEDICINE
Payer: OTHER GOVERNMENT

## 2017-04-09 VITALS
OXYGEN SATURATION: 96 % | DIASTOLIC BLOOD PRESSURE: 94 MMHG | HEART RATE: 74 BPM | RESPIRATION RATE: 15 BRPM | SYSTOLIC BLOOD PRESSURE: 185 MMHG

## 2017-04-09 DIAGNOSIS — N39.0 URINARY TRACT INFECTION WITHOUT HEMATURIA, SITE UNSPECIFIED: Primary | ICD-10-CM

## 2017-04-09 DIAGNOSIS — R03.0 ELEVATED BLOOD PRESSURE READING: ICD-10-CM

## 2017-04-09 LAB
ALBUMIN SERPL BCP-MCNC: 3.2 G/DL (ref 3.4–5)
ALBUMIN/GLOB SERPL: 0.6 {RATIO} (ref 0.8–1.7)
ALP SERPL-CCNC: 163 U/L (ref 45–117)
ALT SERPL-CCNC: 64 U/L (ref 13–56)
AMORPH CRY URNS QL MICRO: ABNORMAL
ANION GAP BLD CALC-SCNC: 8 MMOL/L (ref 3–18)
APPEARANCE UR: ABNORMAL
AST SERPL W P-5'-P-CCNC: 55 U/L (ref 15–37)
BACTERIA URNS QL MICRO: ABNORMAL /HPF
BASOPHILS # BLD AUTO: 0 K/UL (ref 0–0.06)
BASOPHILS # BLD: 0 % (ref 0–2)
BILIRUB SERPL-MCNC: 1 MG/DL (ref 0.2–1)
BILIRUB UR QL: ABNORMAL
BUN SERPL-MCNC: 24 MG/DL (ref 7–18)
BUN/CREAT SERPL: 32 (ref 12–20)
CALCIUM SERPL-MCNC: 9 MG/DL (ref 8.5–10.1)
CHLORIDE SERPL-SCNC: 104 MMOL/L (ref 100–108)
CO2 SERPL-SCNC: 29 MMOL/L (ref 21–32)
COLOR UR: ABNORMAL
CREAT SERPL-MCNC: 0.75 MG/DL (ref 0.6–1.3)
DIFFERENTIAL METHOD BLD: ABNORMAL
EOSINOPHIL # BLD: 0 K/UL (ref 0–0.4)
EOSINOPHIL NFR BLD: 0 % (ref 0–5)
EPITH CASTS URNS QL MICRO: ABNORMAL /LPF (ref 0–5)
ERYTHROCYTE [DISTWIDTH] IN BLOOD BY AUTOMATED COUNT: 11.2 % (ref 11.6–14.5)
GLOBULIN SER CALC-MCNC: 5.1 G/DL (ref 2–4)
GLUCOSE SERPL-MCNC: 91 MG/DL (ref 74–99)
GLUCOSE UR STRIP.AUTO-MCNC: 250 MG/DL
HCT VFR BLD AUTO: 38.9 % (ref 35–45)
HGB BLD-MCNC: 13.4 G/DL (ref 12–16)
HGB UR QL STRIP: NEGATIVE
KETONES UR QL STRIP.AUTO: NEGATIVE MG/DL
LEUKOCYTE ESTERASE UR QL STRIP.AUTO: ABNORMAL
LYMPHOCYTES # BLD AUTO: 24 % (ref 21–52)
LYMPHOCYTES # BLD: 1.8 K/UL (ref 0.9–3.6)
MCH RBC QN AUTO: 30.5 PG (ref 24–34)
MCHC RBC AUTO-ENTMCNC: 34.4 G/DL (ref 31–37)
MCV RBC AUTO: 88.4 FL (ref 74–97)
MONOCYTES # BLD: 0.8 K/UL (ref 0.05–1.2)
MONOCYTES NFR BLD AUTO: 11 % (ref 3–10)
NEUTS SEG # BLD: 4.8 K/UL (ref 1.8–8)
NEUTS SEG NFR BLD AUTO: 65 % (ref 40–73)
NITRITE UR QL STRIP.AUTO: NEGATIVE
PH UR STRIP: 5.5 [PH] (ref 5–8)
PLATELET # BLD AUTO: 240 K/UL (ref 135–420)
PMV BLD AUTO: 10.9 FL (ref 9.2–11.8)
POTASSIUM SERPL-SCNC: 4 MMOL/L (ref 3.5–5.5)
PROT SERPL-MCNC: 8.3 G/DL (ref 6.4–8.2)
PROT UR STRIP-MCNC: 100 MG/DL
RBC # BLD AUTO: 4.4 M/UL (ref 4.2–5.3)
RBC #/AREA URNS HPF: 0 /HPF (ref 0–5)
SODIUM SERPL-SCNC: 141 MMOL/L (ref 136–145)
SP GR UR REFRACTOMETRY: 1.02 (ref 1–1.03)
UROBILINOGEN UR QL STRIP.AUTO: 4 EU/DL (ref 0.2–1)
WBC # BLD AUTO: 7.3 K/UL (ref 4.6–13.2)
WBC URNS QL MICRO: ABNORMAL /HPF (ref 0–4)

## 2017-04-09 PROCEDURE — 81001 URINALYSIS AUTO W/SCOPE: CPT | Performed by: PHYSICIAN ASSISTANT

## 2017-04-09 PROCEDURE — 87086 URINE CULTURE/COLONY COUNT: CPT | Performed by: PHYSICIAN ASSISTANT

## 2017-04-09 PROCEDURE — 93005 ELECTROCARDIOGRAM TRACING: CPT

## 2017-04-09 PROCEDURE — 71010 XR CHEST PORT: CPT

## 2017-04-09 PROCEDURE — 80053 COMPREHEN METABOLIC PANEL: CPT | Performed by: PHYSICIAN ASSISTANT

## 2017-04-09 PROCEDURE — 85025 COMPLETE CBC W/AUTO DIFF WBC: CPT | Performed by: PHYSICIAN ASSISTANT

## 2017-04-09 PROCEDURE — 99285 EMERGENCY DEPT VISIT HI MDM: CPT

## 2017-04-09 RX ORDER — NITROFURANTOIN 25; 75 MG/1; MG/1
100 CAPSULE ORAL 2 TIMES DAILY
Qty: 14 CAP | Refills: 0 | Status: SHIPPED | OUTPATIENT
Start: 2017-04-09 | End: 2017-04-16

## 2017-04-09 NOTE — DISCHARGE INSTRUCTIONS
Elevated Blood Pressure: Care Instructions  Your Care Instructions    Blood pressure is a measure of how hard the blood pushes against the walls of your arteries. It's normal for blood pressure to go up and down throughout the day. But if it stays up over time, you have high blood pressure. Two numbers tell you your blood pressure. The first number is the systolic pressure. It shows how hard the blood pushes when your heart is pumping. The second number is the diastolic pressure. It shows how hard the blood pushes between heartbeats, when your heart is relaxed and filling with blood. An ideal blood pressure in adults is less than 120/80 (say \"120 over 80\"). High blood pressure is 140/90 or higher. You have high blood pressure if your top number is 140 or higher or your bottom number is 90 or higher, or both. The main test for high blood pressure is simple, fast, and painless. To diagnose high blood pressure, your doctor will test your blood pressure at different times. After testing your blood pressure, your doctor may ask you to test it again when you are home. If you are diagnosed with high blood pressure, you can work with your doctor to make a long-term plan to manage it. Follow-up care is a key part of your treatment and safety. Be sure to make and go to all appointments, and call your doctor if you are having problems. It's also a good idea to know your test results and keep a list of the medicines you take. How can you care for yourself at home? · Do not smoke. Smoking increases your risk for heart attack and stroke. If you need help quitting, talk to your doctor about stop-smoking programs and medicines. These can increase your chances of quitting for good. · Stay at a healthy weight. · Try to limit how much sodium you eat to less than 2,300 milligrams (mg) a day. Your doctor may ask you to try to eat less than 1,500 mg a day. · Be physically active.  Get at least 30 minutes of exercise on most days of the week. Walking is a good choice. You also may want to do other activities, such as running, swimming, cycling, or playing tennis or team sports. · Avoid or limit alcohol. Talk to your doctor about whether you can drink any alcohol. · Eat plenty of fruits, vegetables, and low-fat dairy products. Eat less saturated and total fats. · Learn how to check your blood pressure at home. When should you call for help? Call your doctor now or seek immediate medical care if:  · Your blood pressure is much higher than normal (such as 180/110 or higher). · You think high blood pressure is causing symptoms such as:  ¨ Severe headache. ¨ Blurry vision. Watch closely for changes in your health, and be sure to contact your doctor if:  · You do not get better as expected. Where can you learn more? Go to http://staci-zeinab.info/. Enter L961 in the search box to learn more about \"Elevated Blood Pressure: Care Instructions. \"  Current as of: October 19, 2016  Content Version: 11.2  © 3643-8601 [employer]. Care instructions adapted under license by [employer] (which disclaims liability or warranty for this information). If you have questions about a medical condition or this instruction, always ask your healthcare professional. [place] [address] any warranty or liability for your use of this information. Urinary Tract Infection in Women: Care Instructions  Your Care Instructions    A urinary tract infection, or UTI, is a general term for an infection anywhere between the kidneys and the urethra (where urine comes out). Most UTIs are bladder infections. They often cause pain or burning when you urinate. UTIs are caused by bacteria and can be cured with antibiotics. Be sure to complete your treatment so that the infection goes away. Follow-up care is a key part of your treatment and safety.  Be sure to make and go to all appointments, and call your doctor if you are having problems. It's also a good idea to know your test results and keep a list of the medicines you take. How can you care for yourself at home? · Take your antibiotics as directed. Do not stop taking them just because you feel better. You need to take the full course of antibiotics. · Drink extra water and other fluids for the next day or two. This may help wash out the bacteria that are causing the infection. (If you have kidney, heart, or liver disease and have to limit fluids, talk with your doctor before you increase your fluid intake.)  · Avoid drinks that are carbonated or have caffeine. They can irritate the bladder. · Urinate often. Try to empty your bladder each time. · To relieve pain, take a hot bath or lay a heating pad set on low over your lower belly or genital area. Never go to sleep with a heating pad in place. To prevent UTIs  · Drink plenty of water each day. This helps you urinate often, which clears bacteria from your system. (If you have kidney, heart, or liver disease and have to limit fluids, talk with your doctor before you increase your fluid intake.)  · Urinate when you need to. · Urinate right after you have sex. · Change sanitary pads often. · Avoid douches, bubble baths, feminine hygiene sprays, and other feminine hygiene products that have deodorants. · After going to the bathroom, wipe from front to back. When should you call for help? Call your doctor now or seek immediate medical care if:  · Symptoms such as fever, chills, nausea, or vomiting get worse or appear for the first time. · You have new pain in your back just below your rib cage. This is called flank pain. · There is new blood or pus in your urine. · You have any problems with your antibiotic medicine. Watch closely for changes in your health, and be sure to contact your doctor if:  · You are not getting better after taking an antibiotic for 2 days.   · Your symptoms go away but then come back.  Where can you learn more? Go to http://staci-zeinab.info/. Enter I965 in the search box to learn more about \"Urinary Tract Infection in Women: Care Instructions. \"  Current as of: November 28, 2016  Content Version: 11.2  © 6452-8180 [employer]. Care instructions adapted under license by [employer] (which disclaims liability or warranty for this information). If you have questions about a medical condition or this instruction, always ask your healthcare professional. [address] any warranty or liability for your use of this information.

## 2017-04-09 NOTE — ED NOTES
Purposeful rounding completed:    Side rails up x 1:  YES   Bed low and wheels and locked: YES   Call bell in reach: YES   Comfort addressed: YES     Toileting needs addressed: YES   Plan of care reviewed/updated with patient and or family members: YES   IV site assessed: YES  Pain assessed and addressed: Sveta Cuenca

## 2017-04-09 NOTE — ED NOTES
Purposeful rounding completed:    Side rails up x 1:  YES   Bed low and wheels and locked: YES   Call bell in reach: YES   Comfort addressed: YES     Toileting needs addressed: YES   Plan of care reviewed/updated with patient and or family members: YES   IV site assessed: YES  Pain assessed and addressed: Feli Wagner

## 2017-04-09 NOTE — ED PROVIDER NOTES
Patient is a 64 y.o. female presenting with dizziness. The history is provided by the patient. Dizziness     Denys Beebe is a 64 y.o. female presents with c/o dizziness that started today while in [employer].  felt lightheaded.  has taken her diabetes medication this morning but not her HTN medication.  has some mild left side flank pain. Denies fever or n/v.    Past Medical History:   Diagnosis Date    Anxiety     CAD (coronary artery disease)     S/P Coronary stents ( LAD and Lcx)    Depression     Diabetes ([facility] [address]     Hepatitis C     Hypercholesterolemia     Hypertension        Past Surgical History:   Procedure Laterality Date    HX BREAST BIOPSY      1971 2010 left breast lump removed excisional per patient     [address]    pre-eclampsia    HX CHOLECYSTECTOMY  2004    HX CORONARY STENT PLACEMENT  Nov 2013    4 stents    HX HEENT  2009    thyroidectomy due to nodules.  HX HYSTERECTOMY  2005    heavy periods    HX THYROIDECTOMY           Family History:   Problem Relation Age of Onset    Diabetes Mother     Hypertension Mother     Cancer Mother     Heart Disease Mother     Heart Attack Mother     Diabetes Father     Hypertension Father     Cancer Father        Social History     Social History    Marital status:      Spouse name: N/A    Number of children: N/A    Years of education: N/A     Occupational History    Not on file. Social History Main Topics    Smoking status: Never Smoker    Smokeless tobacco: Never Used    Alcohol use No    Drug use: No    Sexual activity: Not Currently     Other Topics Concern    Not on file     Social History Narrative         ALLERGIES: Contrast agent [iodine]    Review of Systems   Neurological: Positive for dizziness. Constitutional:  Denies malaise, fever, chills. Head:  Denies injury. Face:  Denies injury or pain. ENMT:  Denies sore throat. Neck:  Denies injury or pain.    Chest:  Denies injury. Cardiac:  Denies chest pain or palpitations. Respiratory:  Denies cough, wheezing, difficulty breathing, shortness of breath. GI/ABD:  Denies injury, pain, distention, nausea, vomiting, diarrhea. :  Denies injury, pain, dysuria or urgency. Back:  Denies injury or pain. Pelvis:  Denies injury or pain. Extremity/MS:  Denies injury or pain. Neuro:  dizziness, Denies headache, LOC,  neurologic symptoms/deficits/paresthesias. Skin: Denies injury, rash, itching or skin changes. Vitals:    04/09/17 1200 04/09/17 1215   BP: 144/79 149/84   Pulse: 76 75   Resp: 15 18   SpO2: 95% 96%            Physical Exam   Nursing note and vitals reviewed. CONSTITUTIONAL: Alert, in no apparent distress; well-developed; well-nourished. HEAD:  Normocephalic, atraumatic. EYES: PERRL; EOM's intact. ENTM: Nose: No rhinorrhea; Throat: mucous membranes moist. Posterior pharynx-normal.  Neck:  No JVD, supple without lymphadenopathy. RESP: Chest clear, equal breath sounds. CV: S1 and S2 WNL; No murmurs, gallops or rubs. GI: Abdomen soft and minimal left lateral side tenderness +BS,NG,NR. No masses or organomegaly. UPPER EXT:  Normal inspection. LOWER EXT: Normal inspection. NEURO: strength 5/5 and sym, sensation intact. SKIN: No rashes; Normal for age and stage. PSYCH:  Alert and oriented, normal affect.        MDM  Number of Diagnoses or Management Options  Elevated blood pressure reading:   Urinary tract infection without hematuria, site unspecified:   Diagnosis management comments: DDx: HA, migraine, sinusitis, meningitis, SAH, head injury/concussion, contusion, TIA, stroke, aneurism, change in visual acuity, vertigo, eye emergency, dental pain, viral illness, temporal/Giant cell arteritis, scalp/skin etiology, malignancy  IMPRESSION AND MEDICAL DECISION MAKING:  Based upon the patient's presentation with noted HPI and PE, along with the work up done in the emergency department, I believe that the patient has a UTI which may have caused her dizziness as well as being dehydrated. Pt states that she feels better at this time after fluids. Will treat UTI and have her follow up with her PCP. PROGRESS NOTE: One or more blood pressure readings were noted elevated during the Pt's presentation in the emergency department this date. This abnormal reading has been cited in the Pt's diagnosis, and they have been encouraged to follow up with their primary care physician, or referred to a consultant for further evaluation and treatment. Pt advised of elevated BP. Advised Pt the need for follow up for the elevated BP. Advised Pt to monitor and record BP readings. ACEP guidelines are  Initiating treatment for asymptomatic hypertension in the ED is not necessary when patients have follow-up; (2) Rapidly lowering blood pressure in asymptomatic patients in the ED is unnecessary and may be harmful in some patients; (3) When ED treatment for asymptomatic hypertension is initiated, blood pressure management should attempt to gradually lower blood pressure and should not be expected to be normalized during the initial ED visit. The patient will be discharged home. Warning signs of worsening condition were discussed and understood by the patient. Based on patient's age, coexisting illness, exam, and the results of this ED evaluation, the decision to treat as an outpatient was made. Based on the information available at time of discharge, acute pathology requiring immediate intervention was deemed relative unlikely. While it is impossible to completely exclude the possibility of underlying serious disease or worsening of condition, I feel the relative likelihood is extremely low. I discussed this uncertainty with the patient, who understood ED evaluation and treatment and felt comfortable with the outpatient treatment plan. All questions regarding care, test results, and follow up were answered.  The patient is stable and appropriate to discharge. They understand that they should return to the emergency department for any new or worsening symptoms. I stressed the importance of follow up for repeat assessment and possibly further evaluation/treatment. Amount and/or Complexity of Data Reviewed  Clinical lab tests: ordered and reviewed  Tests in the radiology section of CPT®: ordered and reviewed  Tests in the medicine section of CPT®: ordered and reviewed  Review and summarize past medical records: yes  Independent visualization of images, tracings, or specimens: yes    Patient Progress  Patient progress: (Pt states that she is feeling better at this point and that she is ready to go home. )    ED Course       Procedures      Vitals:  Patient Vitals for the past 12 hrs:   Pulse Resp BP SpO2   04/09/17 1215 75 18 149/84 96 %   04/09/17 1200 76 15 144/79 95 %         Medications ordered:   Medications - No data to display      Lab findings:  Recent Results (from the past 12 hour(s))   EKG, 12 LEAD, INITIAL    Collection Time: 04/09/17 11:59 AM   Result Value Ref Range    Ventricular Rate 76 BPM    Atrial Rate 76 BPM    P-R Interval 166 ms    QRS Duration 102 ms    Q-T Interval 396 ms    QTC Calculation (Bezet) 445 ms    Calculated P Axis 44 degrees    Calculated R Axis -27 degrees    Calculated T Axis 42 degrees    Diagnosis       Normal sinus rhythm  Nonspecific T wave abnormality  Abnormal ECG  When compared with ECG of 04-APR-2017 15:18,  Vent. rate has decreased BY  43 BPM         EKG interpretation by ED Physician:      X-Ray, CT or other radiology findings or impressions:  XR CHEST PORT    (Results Pending)       Progress notes, Consult notes or additional Procedure notes:       Disposition:  Diagnosis: No diagnosis found. Disposition:   12:50 PM  Pt reevaluated at this time and is resting comfortably in NAD. Discussed results and findings, as well as, diagnosis and plan for discharge.  Pt verbalizes understanding and agreement with plan. All questions addressed at this time. Follow-up Information     None           Patient's Medications   Start Taking    No medications on file   Continue Taking    AMLODIPINE (NORVASC) 10 MG TABLET    Take 1 Tab by mouth daily. ASPIRIN DELAYED-RELEASE 81 MG TABLET    Take  by mouth daily. ATORVASTATIN (LIPITOR) 40 MG TABLET    Take 1 Tab by mouth daily. BUPROPION (WELLBUTRIN) 100 MG TABLET    Take 1 Tab by mouth daily. CITALOPRAM (CELEXA) 40 MG TABLET    Take 1 Tab by mouth daily. CLOPIDOGREL (PLAVIX) 75 MG TABLET    Take 1 Tab by mouth daily. HYDRALAZINE (APRESOLINE) 50 MG TABLET    Take 0.5 Tabs by mouth four (4) times daily. HYDROCHLOROTHIAZIDE (HYDRODIURIL) 25 MG TABLET    Take 1 Tab by mouth daily. INSULIN ASPART (NOVOLOG) 100 UNIT/ML INPN    Take 8 units with each meal.    INSULIN DEGLUDEC (TRESIBA FLEXTOUCH U-100) 100 UNIT/ML (3 ML) INPN    25 Units by SubCUTAneous route daily. INSULIN NEEDLES, DISPOSABLE, (DORA PEN NEEDLE) 32 GAUGE X 5/32\" NDLE    Use one needle to give insulin 4 times a day. LEVOTHYROXINE (SYNTHROID) 125 MCG TABLET    Take 1 Tab by mouth Daily (before breakfast). LOSARTAN (COZAAR) 100 MG TABLET    Take 1 Tab by mouth daily. METFORMIN (GLUCOPHAGE) 850 MG TABLET    Take 1 Tab by mouth two (2) times daily (with meals). METOPROLOL SUCCINATE (TOPROL-XL) 200 MG XL TABLET    Take 1 Tab by mouth daily.     RABEPRAZOLE (ACIPHEX) 20 MG TABLET    take 1 tablet by mouth once daily   These Medications have changed    No medications on file   Stop Taking    No medications on file

## 2017-04-09 NOTE — ED TRIAGE NOTES
EMS states, \"Patient at [employer] became dizzy, nauseated, and hot. Pain on left side rates 3/10. Just released from [facility] for [facility]IA. \"  c

## 2017-04-09 NOTE — ED NOTES
Purposeful rounding completed:    Side rails up x 1:  YES   Bed low and wheels and locked: YES   Call bell in reach: YES   Comfort addressed: YES     Toileting needs addressed: YES   Plan of care reviewed/updated with patient and or family members: YES   IV site assessed: YES  Pain assessed and addressed: Aziza Dunn

## 2017-04-09 NOTE — ED NOTES
Purposeful rounding completed:    Side rails up x 1:  YES   Bed low and wheels and locked: YES   Call bell in reach: YES   Comfort addressed: YES     Toileting needs addressed: YES   Plan of care reviewed/updated with patient and or family members: YES   IV site assessed: YES  Pain assessed and addressed: Dudley Cuevas

## 2017-04-10 LAB
ATRIAL RATE: 76 BPM
CALCULATED P AXIS, ECG09: 44 DEGREES
CALCULATED R AXIS, ECG10: -27 DEGREES
CALCULATED T AXIS, ECG11: 42 DEGREES
DIAGNOSIS, 93000: NORMAL
P-R INTERVAL, ECG05: 166 MS
Q-T INTERVAL, ECG07: 396 MS
QRS DURATION, ECG06: 102 MS
QTC CALCULATION (BEZET), ECG08: 445 MS
VENTRICULAR RATE, ECG03: 76 BPM

## 2017-04-11 LAB
BACTERIA SPEC CULT: NORMAL
SERVICE CMNT-IMP: NORMAL

## 2017-04-25 ENCOUNTER — OFFICE VISIT (OUTPATIENT)
Dept: FAMILY MEDICINE CLINIC | Age: 57
End: 2017-04-25

## 2017-04-25 VITALS
SYSTOLIC BLOOD PRESSURE: 130 MMHG | OXYGEN SATURATION: 98 % | WEIGHT: 174 LBS | TEMPERATURE: 98.1 F | RESPIRATION RATE: 16 BRPM | HEIGHT: 62 IN | BODY MASS INDEX: 32.02 KG/M2 | HEART RATE: 83 BPM | DIASTOLIC BLOOD PRESSURE: 86 MMHG

## 2017-04-25 DIAGNOSIS — E03.9 HYPOTHYROIDISM, ACQUIRED: ICD-10-CM

## 2017-04-25 DIAGNOSIS — I25.10 CORONARY ARTERY DISEASE INVOLVING NATIVE CORONARY ARTERY OF NATIVE HEART WITHOUT ANGINA PECTORIS: ICD-10-CM

## 2017-04-25 DIAGNOSIS — I10 ESSENTIAL HYPERTENSION: ICD-10-CM

## 2017-04-25 DIAGNOSIS — I25.10 CORONARY ARTERY DISEASE DUE TO LIPID RICH PLAQUE: ICD-10-CM

## 2017-04-25 DIAGNOSIS — I25.83 CORONARY ARTERY DISEASE DUE TO LIPID RICH PLAQUE: ICD-10-CM

## 2017-04-25 DIAGNOSIS — E11.69 CONTROLLED TYPE 2 DIABETES MELLITUS WITH OTHER SPECIFIED COMPLICATION, WITH LONG-TERM CURRENT USE OF INSULIN (HCC): ICD-10-CM

## 2017-04-25 DIAGNOSIS — F32.89 OTHER DEPRESSION: ICD-10-CM

## 2017-04-25 DIAGNOSIS — Z79.4 CONTROLLED TYPE 2 DIABETES MELLITUS WITH OTHER SPECIFIED COMPLICATION, WITH LONG-TERM CURRENT USE OF INSULIN (HCC): ICD-10-CM

## 2017-04-25 DIAGNOSIS — E11.9 TYPE 2 DIABETES MELLITUS WITHOUT COMPLICATION, WITHOUT LONG-TERM CURRENT USE OF INSULIN (HCC): ICD-10-CM

## 2017-04-25 DIAGNOSIS — I10 MALIGNANT HYPERTENSION: ICD-10-CM

## 2017-04-25 RX ORDER — LOSARTAN POTASSIUM 100 MG/1
100 TABLET ORAL DAILY
Qty: 90 TAB | Refills: 3 | Status: SHIPPED | OUTPATIENT
Start: 2017-04-25 | End: 2018-07-31 | Stop reason: SDUPTHER

## 2017-04-25 RX ORDER — HYDRALAZINE HYDROCHLORIDE 50 MG/1
25 TABLET, FILM COATED ORAL 4 TIMES DAILY
Qty: 180 TAB | Refills: 3 | Status: SHIPPED | OUTPATIENT
Start: 2017-04-25 | End: 2018-07-31 | Stop reason: SDUPTHER

## 2017-04-25 RX ORDER — AMLODIPINE BESYLATE 10 MG/1
10 TABLET ORAL DAILY
Qty: 90 TAB | Refills: 3 | Status: SHIPPED | OUTPATIENT
Start: 2017-04-25 | End: 2018-07-31 | Stop reason: SDUPTHER

## 2017-04-25 RX ORDER — HYDROCHLOROTHIAZIDE 25 MG/1
25 TABLET ORAL DAILY
Qty: 90 TAB | Refills: 3 | Status: SHIPPED | OUTPATIENT
Start: 2017-04-25 | End: 2018-07-31 | Stop reason: SDUPTHER

## 2017-04-25 RX ORDER — INSULIN DEGLUDEC 100 U/ML
25 INJECTION, SOLUTION SUBCUTANEOUS DAILY
Qty: 10 PEN | Refills: 5 | Status: SHIPPED | OUTPATIENT
Start: 2017-04-25 | End: 2017-06-06 | Stop reason: ALTCHOICE

## 2017-04-25 RX ORDER — INSULIN ASPART 100 [IU]/ML
INJECTION, SOLUTION INTRAVENOUS; SUBCUTANEOUS
Qty: 10 PEN | Refills: 11 | Status: SHIPPED | OUTPATIENT
Start: 2017-04-25 | End: 2017-08-02 | Stop reason: SDUPTHER

## 2017-04-25 RX ORDER — PEN NEEDLE, DIABETIC 31 GX3/16"
NEEDLE, DISPOSABLE MISCELLANEOUS
Qty: 400 PEN NEEDLE | Refills: 15 | Status: SHIPPED | OUTPATIENT
Start: 2017-04-25 | End: 2019-12-20 | Stop reason: SDUPTHER

## 2017-04-25 RX ORDER — BUPROPION HYDROCHLORIDE 100 MG/1
100 TABLET ORAL DAILY
Qty: 90 TAB | Refills: 3 | Status: SHIPPED | OUTPATIENT
Start: 2017-04-25 | End: 2018-07-31 | Stop reason: SDUPTHER

## 2017-04-25 RX ORDER — CITALOPRAM 40 MG/1
40 TABLET, FILM COATED ORAL DAILY
Qty: 90 TAB | Refills: 3 | Status: SHIPPED | OUTPATIENT
Start: 2017-04-25 | End: 2018-07-31 | Stop reason: SDUPTHER

## 2017-04-25 RX ORDER — METFORMIN HYDROCHLORIDE 850 MG/1
850 TABLET ORAL 2 TIMES DAILY WITH MEALS
Qty: 180 TAB | Refills: 3 | Status: SHIPPED | OUTPATIENT
Start: 2017-04-25 | End: 2017-06-06 | Stop reason: ALTCHOICE

## 2017-04-25 RX ORDER — ATORVASTATIN CALCIUM 40 MG/1
40 TABLET, FILM COATED ORAL DAILY
Qty: 90 TAB | Refills: 3 | Status: SHIPPED | OUTPATIENT
Start: 2017-04-25 | End: 2017-06-06 | Stop reason: SDUPTHER

## 2017-04-25 RX ORDER — LEVOTHYROXINE SODIUM 125 UG/1
125 TABLET ORAL
Qty: 90 TAB | Refills: 3 | Status: SHIPPED | OUTPATIENT
Start: 2017-04-25 | End: 2018-07-31 | Stop reason: SDUPTHER

## 2017-04-25 RX ORDER — CLOPIDOGREL BISULFATE 75 MG/1
75 TABLET ORAL DAILY
Qty: 90 TAB | Refills: 3 | Status: SHIPPED | OUTPATIENT
Start: 2017-04-25 | End: 2017-12-19

## 2017-04-25 RX ORDER — METOPROLOL SUCCINATE 200 MG/1
200 TABLET, EXTENDED RELEASE ORAL DAILY
Qty: 90 TAB | Refills: 3 | Status: SHIPPED | OUTPATIENT
Start: 2017-04-25 | End: 2018-05-05 | Stop reason: SDUPTHER

## 2017-04-25 NOTE — PROGRESS NOTES
Subjective:     HPI:  Jordyn Dunaway is a 64 y.o. female who presents to the office for follow up on hospital encounter, ED visit, diabetes, and hypertension. Hospital Encounter: Patient admitted to [facility] 4/4/17 - 4/6/17 for seizure. This was patient's first seizure. Neurologist attributed seizure to patient's noncompliance with medication. Patient reports she felt numbness and tingling in her left forearm with shaking and weakness. Patient reports she has not experienced these symptoms since hospital admission. ED Visit: Patient seen in [facility] ED on 4/9/17 for dizziness. She was diagnosed with a UTI and elevated blood pressure. Diabetes Mellitus:  female has diabetes mellitus, and  hypertension. Diabetic ROS - medication compliance: noncompliant much of the time, patient states she looks at her medication and knows she needs to take, but then doesn't. She states she does not know why she does this. She states while she is swallowing the medication she feels the compulsion to throw the medication back up. Patient reports she has been taking her medication daily since hospitalization. home glucose monitoring: is performed regularly, fasting values range ,   further diabetic ROS: no chest pain, dyspnea or TIA's, no unusual visual symptoms, no medication side effects noted. Lab review:  Lab Results   Component Value Date/Time    Hemoglobin A1c 9.7 04/04/2017 03:30 PM    Hemoglobin A1c (POC) 10.8 05/19/2016 12:56 PM       Hypertension  The patient presents for follow up of hypertension. Pt's BP is 130/86 in the office today. Cardiovascular ROS: no medication side effects noted, no TIA's, no chest pain on exertion, no dyspnea on exertion, no swelling of ankles. Hypothyroidism: Patient is taking Levothyroxine as prescribed; no medication side effects noted. Patient requests prescription be sent to different pharmacy.      Depression: Patient is taking Celexa and Wellbutrin as prescribed; no medication side effects noted. Patient requests prescription be sent to different pharmacy. Hyperlipidemia/CAD: Patient is taking Plavix and Lipitor as prescribed; no medication side effects noted. Patient requests prescription be sent to different pharmacy. Current Outpatient Prescriptions   Medication Sig Dispense Refill    insulin degludec (TRESIBA FLEXTOUCH U-100) 100 unit/mL (3 mL) inpn 25 Units by SubCUTAneous route daily. 10 Pen 5    insulin aspart (NOVOLOG) 100 unit/mL inpn Take 8 units with each meal. 10 Pen 11    amLODIPine (NORVASC) 10 mg tablet Take 1 Tab by mouth daily. 90 Tab 3    metoprolol succinate (TOPROL-XL) 200 mg XL tablet Take 1 Tab by mouth daily. 90 Tab 3    losartan (COZAAR) 100 mg tablet Take 1 Tab by mouth daily. 90 Tab 3    hydrALAZINE (APRESOLINE) 50 mg tablet Take 0.5 Tabs by mouth four (4) times daily. 180 Tab 3    hydroCHLOROthiazide (HYDRODIURIL) 25 mg tablet Take 1 Tab by mouth daily. 90 Tab 3    atorvastatin (LIPITOR) 40 mg tablet Take 1 Tab by mouth daily. 90 Tab 3    metFORMIN (GLUCOPHAGE) 850 mg tablet Take 1 Tab by mouth two (2) times daily (with meals). 180 Tab 3    levothyroxine (SYNTHROID) 125 mcg tablet Take 1 Tab by mouth Daily (before breakfast). 90 Tab 3    clopidogrel (PLAVIX) 75 mg tab Take 1 Tab by mouth daily. 90 Tab 3    buPROPion (WELLBUTRIN) 100 mg tablet Take 1 Tab by mouth daily. 90 Tab 3    citalopram (CELEXA) 40 mg tablet Take 1 Tab by mouth daily. 90 Tab 3    Insulin Needles, Disposable, (DORA PEN NEEDLE) 32 gauge x 5/32\" ndle Use one needle to give insulin 4 times a day. 400 Pen Needle 15    RABEprazole (ACIPHEX) 20 mg tablet take 1 tablet by mouth once daily 30 Tab 3    aspirin delayed-release 81 mg tablet Take  by mouth daily.           Allergies   Allergen Reactions    Contrast Agent [Iodine] Rash and Sneezing       Past Medical History:   Diagnosis Date    Anxiety     CAD (coronary artery disease)     S/P Coronary stents ( LAD and Lcx)    Depression     Diabetes ([facility] [address]     Hepatitis C     Hypercholesterolemia     Hypertension         Past Surgical History:   Procedure Laterality Date    HX BREAST BIOPSY      1971 2010 left breast lump removed excisional per patient     [address]    pre-eclampsia    HX CHOLECYSTECTOMY  2004    HX CORONARY STENT PLACEMENT  Nov 2013    4 stents    HX HEENT  2009    thyroidectomy due to nodules.  HX HYSTERECTOMY  2005    heavy periods    HX THYROIDECTOMY         Family History   Problem Relation Age of Onset    Diabetes Mother     Hypertension Mother     Cancer Mother     Heart Disease Mother     Heart Attack Mother     Diabetes Father     Hypertension Father     Cancer Father        Social History     Social History    Marital status:      Spouse name: N/A    Number of children: N/A    Years of education: N/A     Occupational History    Not on file. Social History Main Topics    Smoking status: Never Smoker    Smokeless tobacco: Never Used    Alcohol use No    Drug use: No    Sexual activity: Not Currently     Other Topics Concern    Not on file     Social History Narrative       REVIEW OF SYSTEM:  Review of Systems   Constitutional: Negative for chills and fever. Eyes: Negative for blurred vision. Respiratory: Negative for shortness of breath. Cardiovascular: Negative for chest pain, palpitations and leg swelling. Gastrointestinal: Negative for constipation, diarrhea, nausea and vomiting. Musculoskeletal: Negative for joint pain. Neurological: Negative for headaches.        Objective:     Visit Vitals    /86 (BP 1 Location: Right arm, BP Patient Position: Sitting)    Pulse 83    Temp 98.1 °F (36.7 °C) (Oral)    Resp 16    Ht 5' 2\" (1.575 m)    Wt 174 lb (78.9 kg)    SpO2 98%    BMI 31.83 kg/m2       PHYSICAL EXAM:  Physical Exam   Constitutional: She is oriented to person, place, and time and well-developed, well-nourished, and in no distress. HENT:   Right Ear: Tympanic membrane, external ear and ear canal normal.   Left Ear: Tympanic membrane, external ear and ear canal normal.   Nose: Nose normal.   Mouth/Throat: Oropharynx is clear and moist.   Eyes: Pupils are equal, round, and reactive to light. Neck: Normal range of motion. Neck supple. No thyromegaly present. Cardiovascular: Normal rate, regular rhythm, normal heart sounds and intact distal pulses. No murmur heard. Pulmonary/Chest: Effort normal and breath sounds normal. She has no wheezes. Neurological: She is alert and oriented to person, place, and time. Skin: Skin is warm and dry. Vitals reviewed. Assessment/Plan:       ICD-10-CM ICD-9-CM    1. Type 2 diabetes mellitus without complication, without long-term current use of insulin ([facility]) E11.9 250.00 insulin degludec (TRESIBA FLEXTOUCH U-100) 100 unit/mL (3 mL) inpn      insulin aspart (NOVOLOG) 100 unit/mL inpn   2. Malignant hypertension I10 401.0 amLODIPine (NORVASC) 10 mg tablet      metoprolol succinate (TOPROL-XL) 200 mg XL tablet      losartan (COZAAR) 100 mg tablet      hydrALAZINE (APRESOLINE) 50 mg tablet      hydroCHLOROthiazide (HYDRODIURIL) 25 mg tablet      atorvastatin (LIPITOR) 40 mg tablet   3. Essential hypertension I10 401.9 amLODIPine (NORVASC) 10 mg tablet      metoprolol succinate (TOPROL-XL) 200 mg XL tablet      losartan (COZAAR) 100 mg tablet      hydrALAZINE (APRESOLINE) 50 mg tablet      hydroCHLOROthiazide (HYDRODIURIL) 25 mg tablet   4. Coronary artery disease involving native coronary artery of native heart without angina pectoris I25.10 414.01 atorvastatin (LIPITOR) 40 mg tablet   5. Controlled type 2 diabetes mellitus with other specified complication, with long-term current use of insulin ([facility]) E11.69 250.80 metFORMIN (GLUCOPHAGE) 850 mg tablet    Z79.4 V58.67 Insulin Needles, Disposable, (DORA PEN NEEDLE) 32 gauge x 5/32\" ndle   6. Hypothyroidism, acquired E03.9 244.9 levothyroxine (SYNTHROID) 125 mcg tablet   7. Coronary artery disease due to lipid rich plaque I25.10 414.00 clopidogrel (PLAVIX) 75 mg tab    I25.83 414.3    8. Other depression F32.89 311 buPROPion (WELLBUTRIN) 100 mg tablet      citalopram (CELEXA) 40 mg tablet     Patient advised to keep skin moisturized. Will order thyroid labs at next office visit. Patient given opportunity to ask questions. Questions answered. Patient understands plan of care. Follow-up Disposition:  Return in about 1 month (around 5/25/2017). Written by Demetrius Cano, as dictated by DO. AMOS Bassett, Dr. Elkin Sanderson, confirm that all documentation is accurate.

## 2017-04-25 NOTE — MR AVS SNAPSHOT
Visit Information Date & Time Provider Department Dept. Phone Encounter #  
 4/25/2017  8:20 AM Daniela JohnsRadha [phone] Follow-up Instructions Return in about 1 month (around 5/25/2017). Upcoming Health Maintenance Date Due  
 EYE EXAM RETINAL OR DILATED Q1 8/24/1970 DTaP/Tdap/Td series (1 - Tdap) 8/24/1981 PAP AKA CERVICAL CYTOLOGY 8/24/1981 FOBT Q 1 YEAR AGE 50-75 8/24/2010 HEMOGLOBIN A1C Q6M 10/4/2017 FOOT EXAM Q1 10/25/2017 MICROALBUMIN Q1 1/25/2018 LIPID PANEL Q1 1/25/2018 BREAST CANCER SCRN MAMMOGRAM 11/16/2018 Allergies as of 4/25/2017  Review Complete On: 4/25/2017 By: Daniela Johns DO Severity Noted Reaction Type Reactions Contrast Agent [Iodine]  06/24/2014    Rash, Sneezing Current Immunizations  Never Reviewed Name Date Influenza Vaccine 9/1/2016 Influenza Vaccine (Madin Divya Canine Kidney) PF 2/5/2015  3:56 PM  
 Influenza Vaccine (Quad) PF 11/30/2015 Pneumococcal Polysaccharide (PPSV-23) 10/25/2016 Not reviewed this visit You Were Diagnosed With   
  
 Codes Comments Type 2 diabetes mellitus without complication, without long-term current use of insulin (HCC)     ICD-10-CM: E11.9 ICD-9-CM: 250.00 Malignant hypertension     ICD-10-CM: I10 
ICD-9-CM: 401.0 Essential hypertension     ICD-10-CM: I10 
ICD-9-CM: 401.9 Coronary artery disease involving native coronary artery of native heart without angina pectoris     ICD-10-CM: I25.10 ICD-9-CM: 414.01 Controlled type 2 diabetes mellitus with other specified complication, with long-term current use of insulin (HCC)     ICD-10-CM: E11.69, Z79.4 ICD-9-CM: 250.80, V58.67 Hypothyroidism, acquired     ICD-10-CM: E03.9 ICD-9-CM: 244.9 Coronary artery disease due to lipid rich plaque     ICD-10-CM: I25.10, I25.83 ICD-9-CM: 414.00, 414.3  Other depression     ICD-10-CM: F32.89 
 ICD-9-CM: 132 Vitals BP Pulse Temp Resp Height(growth percentile) Weight(growth percentile) 130/86 (BP 1 Location: Right arm, BP Patient Position: Sitting) 83 98.1 °F (36.7 °C) (Oral) 16 5' 2\" (1.575 m) 174 lb (78.9 kg) SpO2 BMI OB Status Smoking Status 98% 31.83 kg/m2 Hysterectomy Never Smoker BMI and BSA Data Body Mass Index Body Surface Area  
 31.83 kg/m 2 1.86 m 2 Preferred Pharmacy Pharmacy Name Phone [facility], [address] [phone] Your Updated Medication List  
  
   
This list is accurate as of: 4/25/17  8:59 AM.  Always use your most recent med list. amLODIPine 10 mg tablet Commonly known as:  Roxie Russell Take 1 Tab by mouth daily. aspirin delayed-release 81 mg tablet Take  by mouth daily. atorvastatin 40 mg tablet Commonly known as:  LIPITOR Take 1 Tab by mouth daily. buPROPion 100 mg tablet Commonly known as:  [facility] Take 1 Tab by mouth daily. citalopram 40 mg tablet Commonly known as:  Metta Sensing Take 1 Tab by mouth daily. clopidogrel 75 mg Tab Commonly known as:  PLAVIX Take 1 Tab by mouth daily. hydrALAZINE 50 mg tablet Commonly known as:  APRESOLINE Take 0.5 Tabs by mouth four (4) times daily. hydroCHLOROthiazide 25 mg tablet Commonly known as:  HYDRODIURIL Take 1 Tab by mouth daily. insulin aspart 100 unit/mL Inpn Commonly known as:  Lagaldino Fuller Take 8 units with each meal.  
  
 insulin degludec 100 unit/mL (3 mL) Inpn Commonly known as:  TRESIBA FLEXTOUCH U-100  
25 Units by SubCUTAneous route daily. Insulin Needles (Disposable) 32 gauge x 5/32\" Ndle Commonly known as:  Mona Pen Needle Use one needle to give insulin 4 times a day. levothyroxine 125 mcg tablet Commonly known as:  SYNTHROID Take 1 Tab by mouth Daily (before breakfast). losartan 100 mg tablet Commonly known as:  COZAAR Take 1 Tab by mouth daily. metFORMIN 850 mg tablet Commonly known as:  GLUCOPHAGE Take 1 Tab by mouth two (2) times daily (with meals). metoprolol succinate 200 mg XL tablet Commonly known as:  TOPROL-XL Take 1 Tab by mouth daily. RABEprazole 20 mg tablet Commonly known as:  ACIPHEX  
take 1 tablet by mouth once daily Prescriptions Sent to Pharmacy Refills  
 insulin degludec (TRESIBA FLEXTOUCH U-100) 100 unit/mL (3 mL) inpn 5 Si Units by SubCUTAneous route daily. Class: Normal  
 Pharmacy: [facility] [address] [place] [address], [address] [place] [address] Ph #: [phone] Route: SubCUTAneous  
 insulin aspart (NOVOLOG) 100 unit/mL inpn 11 Sig: Take 8 units with each meal.  
 Class: Normal  
 Pharmacy: [facility] [address] [place] [address], [address] [place] [address] Ph #: [phone]  
 amLODIPine (NORVASC) 10 mg tablet 3 Sig: Take 1 Tab by mouth daily. Class: Normal  
 Pharmacy: [facility] [address] [place] [address], [address] [place] [address] Ph #: [phone] Route: Oral  
 metoprolol succinate (TOPROL-XL) 200 mg XL tablet 3 Sig: Take 1 Tab by mouth daily. Class: Normal  
 Pharmacy: [facility] [address] [place] [address], [address] [place] [address] Ph #: [phone] Route: Oral  
 losartan (COZAAR) 100 mg tablet 3 Sig: Take 1 Tab by mouth daily. Class: Normal  
 Pharmacy: [facility] [address] [place] [address], [address] [place] [address] Ph #: [phone] Route: Oral  
 hydrALAZINE (APRESOLINE) 50 mg tablet 3 Sig: Take 0.5 Tabs by mouth four (4) times daily. Class: Normal  
 Pharmacy: [facility] [address] [place] [address], [address] [place] [address] Ph #: [phone] Route: Oral  
 hydroCHLOROthiazide (HYDRODIURIL) 25 mg tablet 3 Sig: Take 1 Tab by mouth daily. Class: Normal  
 Pharmacy: [facility] [address] [place] [address] Ph #: [phone] Route: Oral  
 atorvastatin (LIPITOR) 40 mg tablet 3 Sig: Take 1 Tab by mouth daily. Class: Normal  
 Pharmacy: [facility] [address] [place] [address] Ph #: [phone] Route: Oral  
 metFORMIN (GLUCOPHAGE) 850 mg tablet 3 Sig: Take 1 Tab by mouth two (2) times daily (with meals). Class: Normal  
 Pharmacy: [facility] [address] [place] [address] Ph #: [phone] Route: Oral  
 levothyroxine (SYNTHROID) 125 mcg tablet 3 Sig: Take 1 Tab by mouth Daily (before breakfast). Class: Normal  
 Pharmacy: [facility] [address] [place] [address] Ph #: [phone] Route: Oral  
 clopidogrel (PLAVIX) 75 mg tab 3 Sig: Take 1 Tab by mouth daily. Class: Normal  
 Pharmacy: [facility] [address] [place] [address], [address] [place] [address] Ph #: [phone] Route: Oral  
 buPROPion (WELLBUTRIN) 100 mg tablet 3 Sig: Take 1 Tab by mouth daily. Class: Normal  
 Pharmacy: [facility] [address] [place] [address], [address] [place] [address] Ph #: [phone] Route: Oral  
 citalopram (CELEXA) 40 mg tablet 3 Sig: Take 1 Tab by mouth daily. Class: Normal  
 Pharmacy: [facility] [address] [place] [address], [address] [place] [address] Ph #: [phone] Route: Oral  
 Insulin Needles, Disposable, (DORA PEN NEEDLE) 32 gauge x 5/32\" ndle 15 Sig: Use one needle to give insulin 4 times a day.   
 Class: Normal  
 Pharmacy: [facility] [address] [place] [address], [address] AT [address] [place] [address] #: [phone] Follow-up Instructions Return in about 1 month (around 5/25/2017). Introducing [facility] & [facility] SERVICES! Dear Roberto Jorge: 
Thank you for requesting a [employer] account. Our records indicate that you already have an active [employer] account. You can access your account anytime at [email or web address] Did you know that you can access your hospital and ER discharge instructions at any time in [employer]? You can also review all of your test results from your hospital stay or ER visit. Additional Information If you have questions, please visit the Frequently Asked Questions section of the [employer] website at [email or web address] Remember, [employer] is NOT to be used for urgent needs. For medical emergencies, dial 911. Now available from your iPhone and Android! Please provide this summary of care documentation to your next provider. Your primary care clinician is listed as Kurt Bull. If you have any questions after today's visit, please call [phone].

## 2017-04-25 NOTE — PROGRESS NOTES
1. Have you been to the ER, urgent care clinic since your last visit? Hospitalized since your last visit? No    2. Have you seen or consulted any other health care providers outside of the [address] [place] [address] since your last visit? Include any pap smears or colon screening.  No

## 2017-05-09 ENCOUNTER — TELEPHONE (OUTPATIENT)
Dept: CARDIOLOGY CLINIC | Age: 57
End: 2017-05-09

## 2017-05-18 ENCOUNTER — APPOINTMENT (OUTPATIENT)
Dept: CT IMAGING | Age: 57
End: 2017-05-18
Attending: EMERGENCY MEDICINE
Payer: OTHER GOVERNMENT

## 2017-05-18 ENCOUNTER — HOSPITAL ENCOUNTER (EMERGENCY)
Age: 57
Discharge: HOME | End: 2017-05-18
Attending: EMERGENCY MEDICINE
Payer: OTHER GOVERNMENT

## 2017-05-18 VITALS
DIASTOLIC BLOOD PRESSURE: 103 MMHG | WEIGHT: 176 LBS | OXYGEN SATURATION: 98 % | RESPIRATION RATE: 18 BRPM | BODY MASS INDEX: 32.39 KG/M2 | TEMPERATURE: 99.5 F | HEIGHT: 62 IN | HEART RATE: 96 BPM | SYSTOLIC BLOOD PRESSURE: 191 MMHG

## 2017-05-18 DIAGNOSIS — R10.9 ACUTE RIGHT FLANK PAIN: Primary | ICD-10-CM

## 2017-05-18 LAB
ALBUMIN SERPL BCP-MCNC: 3.4 G/DL (ref 3.4–5)
ALBUMIN/GLOB SERPL: 0.7 {RATIO} (ref 0.8–1.7)
ALP SERPL-CCNC: 138 U/L (ref 45–117)
ALT SERPL-CCNC: 36 U/L (ref 13–56)
ANION GAP BLD CALC-SCNC: 9 MMOL/L (ref 3–18)
APPEARANCE UR: CLEAR
AST SERPL W P-5'-P-CCNC: 23 U/L (ref 15–37)
BACTERIA URNS QL MICRO: NEGATIVE /HPF
BASOPHILS # BLD AUTO: 0 K/UL (ref 0–0.06)
BASOPHILS # BLD: 0 % (ref 0–2)
BILIRUB SERPL-MCNC: 0.5 MG/DL (ref 0.2–1)
BILIRUB UR QL: NEGATIVE
BUN SERPL-MCNC: 19 MG/DL (ref 7–18)
BUN/CREAT SERPL: 22 (ref 12–20)
CALCIUM SERPL-MCNC: 9.3 MG/DL (ref 8.5–10.1)
CHLORIDE SERPL-SCNC: 99 MMOL/L (ref 100–108)
CO2 SERPL-SCNC: 29 MMOL/L (ref 21–32)
COLOR UR: YELLOW
CREAT SERPL-MCNC: 0.88 MG/DL (ref 0.6–1.3)
DIFFERENTIAL METHOD BLD: ABNORMAL
EOSINOPHIL # BLD: 0 K/UL (ref 0–0.4)
EOSINOPHIL NFR BLD: 1 % (ref 0–5)
EPITH CASTS URNS QL MICRO: NORMAL /LPF (ref 0–5)
ERYTHROCYTE [DISTWIDTH] IN BLOOD BY AUTOMATED COUNT: 11.4 % (ref 11.6–14.5)
GLOBULIN SER CALC-MCNC: 5.1 G/DL (ref 2–4)
GLUCOSE SERPL-MCNC: 386 MG/DL (ref 74–99)
GLUCOSE UR STRIP.AUTO-MCNC: >1000 MG/DL
HCT VFR BLD AUTO: 36.6 % (ref 35–45)
HGB BLD-MCNC: 12.9 G/DL (ref 12–16)
HGB UR QL STRIP: NEGATIVE
KETONES UR QL STRIP.AUTO: NEGATIVE MG/DL
LEUKOCYTE ESTERASE UR QL STRIP.AUTO: NEGATIVE
LYMPHOCYTES # BLD AUTO: 25 % (ref 21–52)
LYMPHOCYTES # BLD: 1.5 K/UL (ref 0.9–3.6)
MCH RBC QN AUTO: 30.9 PG (ref 24–34)
MCHC RBC AUTO-ENTMCNC: 35.2 G/DL (ref 31–37)
MCV RBC AUTO: 87.8 FL (ref 74–97)
MONOCYTES # BLD: 0.4 K/UL (ref 0.05–1.2)
MONOCYTES NFR BLD AUTO: 7 % (ref 3–10)
NEUTS SEG # BLD: 4.1 K/UL (ref 1.8–8)
NEUTS SEG NFR BLD AUTO: 67 % (ref 40–73)
NITRITE UR QL STRIP.AUTO: NEGATIVE
PH UR STRIP: 6.5 [PH] (ref 5–8)
PLATELET # BLD AUTO: 224 K/UL (ref 135–420)
PMV BLD AUTO: 10 FL (ref 9.2–11.8)
POTASSIUM SERPL-SCNC: 3.5 MMOL/L (ref 3.5–5.5)
PROT SERPL-MCNC: 8.5 G/DL (ref 6.4–8.2)
PROT UR STRIP-MCNC: 100 MG/DL
RBC # BLD AUTO: 4.17 M/UL (ref 4.2–5.3)
RBC #/AREA URNS HPF: NORMAL /HPF (ref 0–5)
SODIUM SERPL-SCNC: 137 MMOL/L (ref 136–145)
SP GR UR REFRACTOMETRY: 1.03 (ref 1–1.03)
UROBILINOGEN UR QL STRIP.AUTO: 1 EU/DL (ref 0.2–1)
WBC # BLD AUTO: 6.1 K/UL (ref 4.6–13.2)
WBC URNS QL MICRO: NORMAL /HPF (ref 0–4)

## 2017-05-18 PROCEDURE — 99283 EMERGENCY DEPT VISIT LOW MDM: CPT

## 2017-05-18 PROCEDURE — 81001 URINALYSIS AUTO W/SCOPE: CPT | Performed by: EMERGENCY MEDICINE

## 2017-05-18 PROCEDURE — 74176 CT ABD & PELVIS W/O CONTRAST: CPT

## 2017-05-18 PROCEDURE — 96361 HYDRATE IV INFUSION ADD-ON: CPT

## 2017-05-18 PROCEDURE — 96375 TX/PRO/DX INJ NEW DRUG ADDON: CPT

## 2017-05-18 PROCEDURE — 85025 COMPLETE CBC W/AUTO DIFF WBC: CPT | Performed by: EMERGENCY MEDICINE

## 2017-05-18 PROCEDURE — 80053 COMPREHEN METABOLIC PANEL: CPT | Performed by: EMERGENCY MEDICINE

## 2017-05-18 PROCEDURE — 96374 THER/PROPH/DIAG INJ IV PUSH: CPT

## 2017-05-18 PROCEDURE — 74011250636 HC RX REV CODE- 250/636: Performed by: EMERGENCY MEDICINE

## 2017-05-18 RX ORDER — ONDANSETRON 4 MG/1
4 TABLET, ORALLY DISINTEGRATING ORAL
Qty: 10 TAB | Refills: 0 | Status: SHIPPED | OUTPATIENT
Start: 2017-05-18 | End: 2017-12-28 | Stop reason: ALTCHOICE

## 2017-05-18 RX ORDER — ACETAMINOPHEN 500 MG
1000 TABLET ORAL
Qty: 40 TAB | Refills: 0 | Status: SHIPPED | OUTPATIENT
Start: 2017-05-18 | End: 2020-04-07

## 2017-05-18 RX ORDER — KETOROLAC TROMETHAMINE 30 MG/ML
30 INJECTION, SOLUTION INTRAMUSCULAR; INTRAVENOUS
Status: COMPLETED | OUTPATIENT
Start: 2017-05-18 | End: 2017-05-18

## 2017-05-18 RX ORDER — ONDANSETRON 2 MG/ML
4 INJECTION INTRAMUSCULAR; INTRAVENOUS
Status: COMPLETED | OUTPATIENT
Start: 2017-05-18 | End: 2017-05-18

## 2017-05-18 RX ORDER — TRAMADOL HYDROCHLORIDE 50 MG/1
50 TABLET ORAL
Qty: 8 TAB | Refills: 0 | Status: SHIPPED | OUTPATIENT
Start: 2017-05-18 | End: 2017-05-21 | Stop reason: ALTCHOICE

## 2017-05-18 RX ADMIN — KETOROLAC TROMETHAMINE 30 MG: 30 INJECTION, SOLUTION INTRAMUSCULAR at 01:52

## 2017-05-18 RX ADMIN — ONDANSETRON 4 MG: 2 INJECTION INTRAMUSCULAR; INTRAVENOUS at 01:52

## 2017-05-18 RX ADMIN — SODIUM CHLORIDE 1000 ML: 900 INJECTION, SOLUTION INTRAVENOUS at 01:52

## 2017-05-18 NOTE — ED NOTES
I have reviewed discharge instructions with the patient. The patient verbalized understanding. Pt agreeable to dc plan. Pt in nad, ambulatory to ED lobby. Pt report sx have improved at time of dc.

## 2017-05-18 NOTE — LETTER
NOTIFICATION RETURN TO WORK / SCHOOL 
 
5/18/2017 3:13 AM 
 
Ms. Zhanna Atkinson [address] [facility] [phone] To Whom It May Concern: 
 
Zhanna Atkinson is currently under the care of [facility] EMERGENCY DEPT. She will return to work/school on: 5/19/17 If there are questions or concerns please have the patient contact our office. Sincerely, Ady Dial MD

## 2017-05-18 NOTE — ED PROVIDER NOTES
HPI Comments: Beronica Vigil is a 64 y.o. Female with c/o rlq abd pain, right flank pain that started gradually about 4 hours ago and has progressed. nv x 1. No diarrhea, urinary sx. No fcs, cp, cough. No h/o kidney stones. H/o hysterectomy, gallbladder. Felt well prior to onset of sx. Sharp, worse with movement, palpation    The history is provided by the patient. Past Medical History:   Diagnosis Date    Anxiety     CAD (coronary artery disease)     S/P Coronary stents ( LAD and Lcx)    Depression     Diabetes ([facility] [address]     Hepatitis C     Hypercholesterolemia     Hypertension        Past Surgical History:   Procedure Laterality Date    HX BREAST BIOPSY      1971 2010 left breast lump removed excisional per patient     [address]    pre-eclampsia    HX CHOLECYSTECTOMY  2004    HX CORONARY STENT PLACEMENT  Nov 2013    4 stents    HX HEENT  2009    thyroidectomy due to nodules.  HX HYSTERECTOMY  2005    heavy periods    HX THYROIDECTOMY           Family History:   Problem Relation Age of Onset    Diabetes Mother     Hypertension Mother     Cancer Mother     Heart Disease Mother     Heart Attack Mother     Diabetes Father     Hypertension Father     Cancer Father        Social History     Social History    Marital status:      Spouse name: N/A    Number of children: N/A    Years of education: N/A     Occupational History    Not on file. Social History Main Topics    Smoking status: Never Smoker    Smokeless tobacco: Never Used    Alcohol use No    Drug use: No    Sexual activity: Not Currently     Other Topics Concern    Not on file     Social History Narrative         ALLERGIES: Contrast agent [iodine]    Review of Systems   Constitutional: Negative for fever. HENT: Negative for sore throat. Eyes: Negative for visual disturbance. Respiratory: Negative for cough. Cardiovascular: Negative for chest pain.    Gastrointestinal: Positive for abdominal pain. Negative for blood in stool, constipation and diarrhea. Endocrine: Negative for polyuria. Genitourinary: Positive for flank pain. Negative for difficulty urinating. Musculoskeletal: Positive for back pain. Negative for gait problem. Skin: Negative for rash. Neurological: Negative for syncope. Hematological: Bruises/bleeds easily. Psychiatric/Behavioral: Positive for sleep disturbance. Vitals:    05/18/17 0027   BP: (!) 191/103   Pulse: 96   Resp: 18   Temp: 99.5 °F (37.5 °C)   SpO2: 98%   Weight: 79.8 kg (176 lb)   Height: 5' 2\" (1.575 m)            Physical Exam   Constitutional: She is oriented to person, place, and time. She appears well-developed and well-nourished. No distress. HENT:   Head: Normocephalic and atraumatic. Right Ear: External ear normal.   Left Ear: External ear normal.   Nose: Nose normal.   Mouth/Throat: Uvula is midline, oropharynx is clear and moist and mucous membranes are normal.   Eyes: Conjunctivae are normal. No scleral icterus. Neck: Neck supple. Cardiovascular: Normal rate, regular rhythm, normal heart sounds and intact distal pulses. Pulmonary/Chest: Effort normal and breath sounds normal.   Abdominal: Soft. Normal appearance. There is no hepatosplenomegaly. There is tenderness. There is guarding and CVA tenderness. There is no rigidity and no rebound. Musculoskeletal: She exhibits no edema. Neurological: She is alert and oriented to person, place, and time. Gait normal.   Skin: Skin is warm and dry. She is not diaphoretic. Psychiatric: Her behavior is normal.   Nursing note and vitals reviewed.        [facility]  ED Course       Procedures    Vitals:  Patient Vitals for the past 12 hrs:   Temp Pulse Resp BP SpO2   05/18/17 0027 99.5 °F (37.5 °C) 96 18 (!) 191/103 98 %         Medications ordered:   Medications   sodium chloride 0.9 % bolus infusion 1,000 mL (1,000 mL IntraVENous New Bag 5/18/17 0152)   ketorolac (TORADOL) injection 30 mg (30 mg IntraVENous Given 5/18/17 0152)   ondansetron (ZOFRAN) injection 4 mg (4 mg IntraVENous Given 5/18/17 0152)         Lab findings:  Recent Results (from the past 12 hour(s))   URINALYSIS W/ RFLX MICROSCOPIC    Collection Time: 05/18/17 12:30 AM   Result Value Ref Range    Color YELLOW      Appearance CLEAR      Specific gravity 1.030 1.005 - 1.030      pH (UA) 6.5 5.0 - 8.0      Protein 100 (A) NEG mg/dL    Glucose >1000 (A) NEG mg/dL    Ketone NEGATIVE  NEG mg/dL    Bilirubin NEGATIVE  NEG      Blood NEGATIVE  NEG      Urobilinogen 1.0 0.2 - 1.0 EU/dL    Nitrites NEGATIVE  NEG      Leukocyte Esterase NEGATIVE  NEG     URINE MICROSCOPIC ONLY    Collection Time: 05/18/17 12:30 AM   Result Value Ref Range    WBC NONE 0 - 4 /hpf    RBC 0 to 3 0 - 5 /hpf    Epithelial cells 2+ 0 - 5 /lpf    Bacteria NEGATIVE  NEG /hpf   CBC WITH AUTOMATED DIFF    Collection Time: 05/18/17  1:17 AM   Result Value Ref Range    WBC 6.1 4.6 - 13.2 K/uL    RBC 4.17 (L) 4.20 - 5.30 M/uL    HGB 12.9 12.0 - 16.0 g/dL    HCT 36.6 35.0 - 45.0 %    MCV 87.8 74.0 - 97.0 FL    MCH 30.9 24.0 - 34.0 PG    MCHC 35.2 31.0 - 37.0 g/dL    RDW 11.4 (L) 11.6 - 14.5 %    PLATELET 151 293 - 346 K/uL    MPV 10.0 9.2 - 11.8 FL    NEUTROPHILS 67 40 - 73 %    LYMPHOCYTES 25 21 - 52 %    MONOCYTES 7 3 - 10 %    EOSINOPHILS 1 0 - 5 %    BASOPHILS 0 0 - 2 %    ABS. NEUTROPHILS 4.1 1.8 - 8.0 K/UL    ABS. LYMPHOCYTES 1.5 0.9 - 3.6 K/UL    ABS. MONOCYTES 0.4 0.05 - 1.2 K/UL    ABS. EOSINOPHILS 0.0 0.0 - 0.4 K/UL    ABS.  BASOPHILS 0.0 0.0 - 0.06 K/UL    DF AUTOMATED     METABOLIC PANEL, COMPREHENSIVE    Collection Time: 05/18/17  1:17 AM   Result Value Ref Range    Sodium 137 136 - 145 mmol/L    Potassium 3.5 3.5 - 5.5 mmol/L    Chloride 99 (L) 100 - 108 mmol/L    CO2 29 21 - 32 mmol/L    Anion gap 9 3.0 - 18 mmol/L    Glucose 386 (H) 74 - 99 mg/dL    BUN 19 (H) 7.0 - 18 MG/DL    Creatinine 0.88 0.6 - 1.3 MG/DL    BUN/Creatinine ratio 22 (H) 12 - 20      GFR est AA >60 >60 ml/min/1.73m2    GFR est non-AA >60 >60 ml/min/1.73m2    Calcium 9.3 8.5 - 10.1 MG/DL    Bilirubin, total 0.5 0.2 - 1.0 MG/DL    ALT (SGPT) 36 13 - 56 U/L    AST (SGOT) 23 15 - 37 U/L    Alk. phosphatase 138 (H) 45 - 117 U/L    Protein, total 8.5 (H) 6.4 - 8.2 g/dL    Albumin 3.4 3.4 - 5.0 g/dL    Globulin 5.1 (H) 2.0 - 4.0 g/dL    A-G Ratio 0.7 (L) 0.8 - 1.7         EKG interpretation by ED Physician:      X-Ray, CT or other radiology findings or impressions:  CT ABD PELV WO CONT    (Results Pending)   nothing acute noted on prelim read    Progress notes, Consult notes or additional Procedure notes:   Pain improved. Unclear etiology which was d/w pt  I have discussed with patient and/or family/sig other the results, interpretation of any imaging if performed, suspected diagnosis and treatment plan to include instructions regarding the diagnoses listed to which understanding was expressed with all questions answered      Reevaluation of patient:   Stable for d/c     Disposition:  Diagnosis:   1. Acute right flank pain    2. IDDM (insulin dependent diabetes mellitus) ([facility]        Disposition: home      Follow-up Information     Follow up With Details Comments Contact Info    Zeyad Multani DO Schedule an appointment as soon as possible for a visit  [id number] [place] [address]  [phone]      [facility] EMERGENCY DEPT  If symptoms worsen [phone] [facility] [address] [phone]            Patient's Medications   Start Taking    ACETAMINOPHEN (TYLENOL EXTRA STRENGTH) 500 MG TABLET    Take 2 Tabs by mouth every six (6) hours as needed for Pain. ONDANSETRON (ZOFRAN ODT) 4 MG DISINTEGRATING TABLET    Take 1 Tab by mouth every eight (8) hours as needed for Nausea. TRAMADOL (ULTRAM) 50 MG TABLET    Take 1 Tab by mouth every six (6) hours as needed for Pain. Max Daily Amount: 200 mg.    Continue Taking    AMLODIPINE (NORVASC) 10 MG TABLET    Take 1 Tab by mouth daily. ASPIRIN DELAYED-RELEASE 81 MG TABLET    Take  by mouth daily. ATORVASTATIN (LIPITOR) 40 MG TABLET    Take 1 Tab by mouth daily. BUPROPION (WELLBUTRIN) 100 MG TABLET    Take 1 Tab by mouth daily. CITALOPRAM (CELEXA) 40 MG TABLET    Take 1 Tab by mouth daily. CLOPIDOGREL (PLAVIX) 75 MG TAB    Take 1 Tab by mouth daily. HYDRALAZINE (APRESOLINE) 50 MG TABLET    Take 0.5 Tabs by mouth four (4) times daily. HYDROCHLOROTHIAZIDE (HYDRODIURIL) 25 MG TABLET    Take 1 Tab by mouth daily. INSULIN ASPART (NOVOLOG) 100 UNIT/ML INPN    Take 8 units with each meal.    INSULIN DEGLUDEC (TRESIBA FLEXTOUCH U-100) 100 UNIT/ML (3 ML) INPN    25 Units by SubCUTAneous route daily. INSULIN NEEDLES, DISPOSABLE, (DORA PEN NEEDLE) 32 GAUGE X 5/32\" NDLE    Use one needle to give insulin 4 times a day. LEVOTHYROXINE (SYNTHROID) 125 MCG TABLET    Take 1 Tab by mouth Daily (before breakfast). LOSARTAN (COZAAR) 100 MG TABLET    Take 1 Tab by mouth daily. METFORMIN (GLUCOPHAGE) 850 MG TABLET    Take 1 Tab by mouth two (2) times daily (with meals). METOPROLOL SUCCINATE (TOPROL-XL) 200 MG XL TABLET    Take 1 Tab by mouth daily.     RABEPRAZOLE (ACIPHEX) 20 MG TABLET    take 1 tablet by mouth once daily   These Medications have changed    No medications on file   Stop Taking    No medications on file

## 2017-05-19 ENCOUNTER — HOSPITAL ENCOUNTER (EMERGENCY)
Age: 57
Discharge: HOME | End: 2017-05-19
Attending: EMERGENCY MEDICINE
Payer: OTHER GOVERNMENT

## 2017-05-19 ENCOUNTER — APPOINTMENT (OUTPATIENT)
Dept: GENERAL RADIOLOGY | Age: 57
End: 2017-05-19
Attending: EMERGENCY MEDICINE
Payer: OTHER GOVERNMENT

## 2017-05-19 VITALS
BODY MASS INDEX: 32.19 KG/M2 | WEIGHT: 176 LBS | SYSTOLIC BLOOD PRESSURE: 167 MMHG | TEMPERATURE: 98.8 F | RESPIRATION RATE: 16 BRPM | OXYGEN SATURATION: 98 % | HEART RATE: 98 BPM | DIASTOLIC BLOOD PRESSURE: 87 MMHG

## 2017-05-19 DIAGNOSIS — R82.4 KETONURIA: ICD-10-CM

## 2017-05-19 DIAGNOSIS — R10.31 ABDOMINAL PAIN, RIGHT LOWER QUADRANT: Primary | ICD-10-CM

## 2017-05-19 DIAGNOSIS — R03.0 ELEVATED BLOOD PRESSURE READING: ICD-10-CM

## 2017-05-19 DIAGNOSIS — R80.9 PROTEINURIA, UNSPECIFIED TYPE: ICD-10-CM

## 2017-05-19 LAB
ALBUMIN SERPL BCP-MCNC: 3.7 G/DL (ref 3.4–5)
ALBUMIN/GLOB SERPL: 0.7 {RATIO} (ref 0.8–1.7)
ALP SERPL-CCNC: 151 U/L (ref 45–117)
ALT SERPL-CCNC: 35 U/L (ref 13–56)
ANION GAP BLD CALC-SCNC: 11 MMOL/L (ref 3–18)
APPEARANCE UR: ABNORMAL
AST SERPL W P-5'-P-CCNC: 23 U/L (ref 15–37)
BACTERIA URNS QL MICRO: ABNORMAL /HPF
BASOPHILS # BLD AUTO: 0 K/UL (ref 0–0.06)
BASOPHILS # BLD: 0 % (ref 0–2)
BILIRUB SERPL-MCNC: 0.6 MG/DL (ref 0.2–1)
BILIRUB UR QL: NEGATIVE
BUN SERPL-MCNC: 15 MG/DL (ref 7–18)
BUN/CREAT SERPL: 17 (ref 12–20)
CALCIUM SERPL-MCNC: 9.5 MG/DL (ref 8.5–10.1)
CHLORIDE SERPL-SCNC: 95 MMOL/L (ref 100–108)
CO2 SERPL-SCNC: 29 MMOL/L (ref 21–32)
COLOR UR: YELLOW
CREAT SERPL-MCNC: 0.88 MG/DL (ref 0.6–1.3)
DIFFERENTIAL METHOD BLD: ABNORMAL
EOSINOPHIL # BLD: 0 K/UL (ref 0–0.4)
EOSINOPHIL NFR BLD: 0 % (ref 0–5)
EPITH CASTS URNS QL MICRO: ABNORMAL /LPF (ref 0–5)
ERYTHROCYTE [DISTWIDTH] IN BLOOD BY AUTOMATED COUNT: 11.3 % (ref 11.6–14.5)
GLOBULIN SER CALC-MCNC: 5.5 G/DL (ref 2–4)
GLUCOSE SERPL-MCNC: 288 MG/DL (ref 74–99)
GLUCOSE UR STRIP.AUTO-MCNC: >1000 MG/DL
HCT VFR BLD AUTO: 41.8 % (ref 35–45)
HGB BLD-MCNC: 14.9 G/DL (ref 12–16)
HGB UR QL STRIP: ABNORMAL
KETONES UR QL STRIP.AUTO: 40 MG/DL
LEUKOCYTE ESTERASE UR QL STRIP.AUTO: NEGATIVE
LIPASE SERPL-CCNC: 97 U/L (ref 73–393)
LYMPHOCYTES # BLD AUTO: 19 % (ref 21–52)
LYMPHOCYTES # BLD: 1.4 K/UL (ref 0.9–3.6)
MCH RBC QN AUTO: 31.4 PG (ref 24–34)
MCHC RBC AUTO-ENTMCNC: 35.6 G/DL (ref 31–37)
MCV RBC AUTO: 88 FL (ref 74–97)
MONOCYTES # BLD: 0.5 K/UL (ref 0.05–1.2)
MONOCYTES NFR BLD AUTO: 7 % (ref 3–10)
NEUTS SEG # BLD: 5.4 K/UL (ref 1.8–8)
NEUTS SEG NFR BLD AUTO: 74 % (ref 40–73)
NITRITE UR QL STRIP.AUTO: NEGATIVE
PH UR STRIP: 6 [PH] (ref 5–8)
PLATELET # BLD AUTO: 247 K/UL (ref 135–420)
PMV BLD AUTO: 9.9 FL (ref 9.2–11.8)
POTASSIUM SERPL-SCNC: 3.8 MMOL/L (ref 3.5–5.5)
PROT SERPL-MCNC: 9.2 G/DL (ref 6.4–8.2)
PROT UR STRIP-MCNC: >1000 MG/DL
RBC # BLD AUTO: 4.75 M/UL (ref 4.2–5.3)
RBC #/AREA URNS HPF: ABNORMAL /HPF (ref 0–5)
SODIUM SERPL-SCNC: 135 MMOL/L (ref 136–145)
SP GR UR REFRACTOMETRY: >1.03 (ref 1–1.03)
UROBILINOGEN UR QL STRIP.AUTO: 1 EU/DL (ref 0.2–1)
WBC # BLD AUTO: 7.2 K/UL (ref 4.6–13.2)
WBC URNS QL MICRO: ABNORMAL /HPF (ref 0–4)

## 2017-05-19 PROCEDURE — 74011250637 HC RX REV CODE- 250/637: Performed by: EMERGENCY MEDICINE

## 2017-05-19 PROCEDURE — 74020 XR ABD (AP AND ERECT OR DECUB): CPT

## 2017-05-19 PROCEDURE — 74011250636 HC RX REV CODE- 250/636: Performed by: EMERGENCY MEDICINE

## 2017-05-19 PROCEDURE — 85025 COMPLETE CBC W/AUTO DIFF WBC: CPT | Performed by: EMERGENCY MEDICINE

## 2017-05-19 PROCEDURE — 96374 THER/PROPH/DIAG INJ IV PUSH: CPT

## 2017-05-19 PROCEDURE — 96361 HYDRATE IV INFUSION ADD-ON: CPT

## 2017-05-19 PROCEDURE — 80053 COMPREHEN METABOLIC PANEL: CPT | Performed by: EMERGENCY MEDICINE

## 2017-05-19 PROCEDURE — 83690 ASSAY OF LIPASE: CPT | Performed by: EMERGENCY MEDICINE

## 2017-05-19 PROCEDURE — 99284 EMERGENCY DEPT VISIT MOD MDM: CPT

## 2017-05-19 PROCEDURE — 81001 URINALYSIS AUTO W/SCOPE: CPT | Performed by: EMERGENCY MEDICINE

## 2017-05-19 PROCEDURE — 96375 TX/PRO/DX INJ NEW DRUG ADDON: CPT

## 2017-05-19 RX ORDER — CLONIDINE HYDROCHLORIDE 0.1 MG/1
0.2 TABLET ORAL
Status: COMPLETED | OUTPATIENT
Start: 2017-05-19 | End: 2017-05-19

## 2017-05-19 RX ORDER — ONDANSETRON 2 MG/ML
4 INJECTION INTRAMUSCULAR; INTRAVENOUS
Status: COMPLETED | OUTPATIENT
Start: 2017-05-19 | End: 2017-05-19

## 2017-05-19 RX ORDER — MORPHINE SULFATE 4 MG/ML
2 INJECTION, SOLUTION INTRAMUSCULAR; INTRAVENOUS
Status: COMPLETED | OUTPATIENT
Start: 2017-05-19 | End: 2017-05-19

## 2017-05-19 RX ADMIN — SODIUM CHLORIDE 1000 ML: 900 INJECTION, SOLUTION INTRAVENOUS at 12:02

## 2017-05-19 RX ADMIN — CLONIDINE HYDROCHLORIDE 0.2 MG: 0.1 TABLET ORAL at 11:52

## 2017-05-19 RX ADMIN — ONDANSETRON 4 MG: 2 INJECTION INTRAMUSCULAR; INTRAVENOUS at 11:22

## 2017-05-19 RX ADMIN — Medication 2 MG: at 11:22

## 2017-05-19 NOTE — ED PROVIDER NOTES
HPI Comments: 10:14 AM Fany Hayes is a 64 y.o. female with history of CAD, HTN and DM who presents to the ED c/o lower right abdominal pain which she was seen here for yesterday. Pt states the pain has worsened and she is complaining of vomiting. Pt states she has had a full hysterectomy, cholecystectomy, and a thyroid removal. Pt states her last BM was yesterday. Pt states she has not taken her blood pressure medication for the past three days. Pt denies any drug, alcohol, or tobacco use. Pt denies any diarrhea, or any other symptoms at this time. PCP: Alejandrina Qiu DO      The history is provided by the patient. Past Medical History:   Diagnosis Date    Anxiety     CAD (coronary artery disease)     S/P Coronary stents ( LAD and Lcx)    Depression     Diabetes ([facility] [address]     Hepatitis C     Hypercholesterolemia     Hypertension        Past Surgical History:   Procedure Laterality Date    HX BREAST BIOPSY      1971 2010 left breast lump removed excisional per patient     [address]    pre-eclampsia    HX CHOLECYSTECTOMY  2004    HX CORONARY STENT PLACEMENT  Nov 2013    4 stents    HX HEENT  2009    thyroidectomy due to nodules.  HX HYSTERECTOMY  2005    heavy periods    HX THYROIDECTOMY           Family History:   Problem Relation Age of Onset    Diabetes Mother     Hypertension Mother     Cancer Mother     Heart Disease Mother     Heart Attack Mother     Diabetes Father     Hypertension Father     Cancer Father        Social History     Social History    Marital status:      Spouse name: N/A    Number of children: N/A    Years of education: N/A     Occupational History    Not on file.      Social History Main Topics    Smoking status: Never Smoker    Smokeless tobacco: Never Used    Alcohol use No    Drug use: No    Sexual activity: Not Currently     Other Topics Concern    Not on file     Social History Narrative         ALLERGIES: Contrast agent [iodine]    Review of Systems   Constitutional: Negative. HENT: Negative. Eyes: Negative. Respiratory: Negative. Cardiovascular: Negative. Gastrointestinal: Positive for abdominal pain and vomiting. Negative for diarrhea. Endocrine: Negative. Genitourinary: Negative. Musculoskeletal: Negative. Skin: Negative. Allergic/Immunologic: Negative. Neurological: Negative. Hematological: Negative. Psychiatric/Behavioral: Negative. All other systems reviewed and are negative. Vitals:    05/19/17 1120 05/19/17 1123 05/19/17 1154 05/19/17 1238   BP: (!) 195/105  183/87 167/87   Pulse:   98    Resp:   16    Temp:   98.8 °F (37.1 °C)    SpO2:  100% 98%    Weight:                Physical Exam   Constitutional: She is oriented to person, place, and time. She appears well-developed and well-nourished. HENT:   Head: Normocephalic and atraumatic. Eyes: Conjunctivae and EOM are normal. Pupils are equal, round, and reactive to light. Neck: Normal range of motion. Neck supple. Cardiovascular: Normal rate, regular rhythm, normal heart sounds and intact distal pulses. Pulses:       Radial pulses are 2+ on the right side, and 2+ on the left side. Cap refill >2sec. Pulmonary/Chest: Effort normal and breath sounds normal.   Abdominal: Soft. Bowel sounds are normal.   Non surgical abdomen. Musculoskeletal: Normal range of motion. Neurological: She is alert and oriented to person, place, and time. Skin: Skin is warm and dry. Psychiatric: She has a normal mood and affect. Her behavior is normal. Judgment and thought content normal.   Nursing note and vitals reviewed.        [facility]  ED Course       Procedures  Vitals:  Patient Vitals for the past 12 hrs:   Temp Pulse Resp BP SpO2   05/19/17 1238 - - - 167/87 -   05/19/17 1154 98.8 °F (37.1 °C) 98 16 183/87 98 %   05/19/17 1123 - - - - 100 %   05/19/17 1120 - - - (!) 195/105 -   05/19/17 1007 98.9 °F (37.2 °C) (!) 102 17 (!) 190/100 96 %        Medications ordered:   Medications   morphine injection 2 mg (2 mg IntraVENous Given 5/19/17 1122)   ondansetron (ZOFRAN) injection 4 mg (4 mg IntraVENous Given 5/19/17 1122)   cloNIDine HCl (CATAPRES) tablet 0.2 mg (0.2 mg Oral Given 5/19/17 1152)   sodium chloride 0.9 % bolus infusion 1,000 mL (1,000 mL IntraVENous New Bag 5/19/17 1202)         Lab findings:  Recent Results (from the past 12 hour(s))   CBC WITH AUTOMATED DIFF    Collection Time: 05/19/17 10:30 AM   Result Value Ref Range    WBC 7.2 4.6 - 13.2 K/uL    RBC 4.75 4.20 - 5.30 M/uL    HGB 14.9 12.0 - 16.0 g/dL    HCT 41.8 35.0 - 45.0 %    MCV 88.0 74.0 - 97.0 FL    MCH 31.4 24.0 - 34.0 PG    MCHC 35.6 31.0 - 37.0 g/dL    RDW 11.3 (L) 11.6 - 14.5 %    PLATELET 654 677 - 122 K/uL    MPV 9.9 9.2 - 11.8 FL    NEUTROPHILS 74 (H) 40 - 73 %    LYMPHOCYTES 19 (L) 21 - 52 %    MONOCYTES 7 3 - 10 %    EOSINOPHILS 0 0 - 5 %    BASOPHILS 0 0 - 2 %    ABS. NEUTROPHILS 5.4 1.8 - 8.0 K/UL    ABS. LYMPHOCYTES 1.4 0.9 - 3.6 K/UL    ABS. MONOCYTES 0.5 0.05 - 1.2 K/UL    ABS. EOSINOPHILS 0.0 0.0 - 0.4 K/UL    ABS. BASOPHILS 0.0 0.0 - 0.06 K/UL    DF AUTOMATED     LIPASE    Collection Time: 05/19/17 10:30 AM   Result Value Ref Range    Lipase 97 73 - 540 U/L   METABOLIC PANEL, COMPREHENSIVE    Collection Time: 05/19/17 10:30 AM   Result Value Ref Range    Sodium 135 (L) 136 - 145 mmol/L    Potassium 3.8 3.5 - 5.5 mmol/L    Chloride 95 (L) 100 - 108 mmol/L    CO2 29 21 - 32 mmol/L    Anion gap 11 3.0 - 18 mmol/L    Glucose 288 (H) 74 - 99 mg/dL    BUN 15 7.0 - 18 MG/DL    Creatinine 0.88 0.6 - 1.3 MG/DL    BUN/Creatinine ratio 17 12 - 20      GFR est AA >60 >60 ml/min/1.73m2    GFR est non-AA >60 >60 ml/min/1.73m2    Calcium 9.5 8.5 - 10.1 MG/DL    Bilirubin, total 0.6 0.2 - 1.0 MG/DL    ALT (SGPT) 35 13 - 56 U/L    AST (SGOT) 23 15 - 37 U/L    Alk.  phosphatase 151 (H) 45 - 117 U/L    Protein, total 9.2 (H) 6.4 - 8.2 g/dL    Albumin 3.7 3.4 - 5.0 g/dL Globulin 5.5 (H) 2.0 - 4.0 g/dL    A-G Ratio 0.7 (L) 0.8 - 1.7     URINALYSIS W/ RFLX MICROSCOPIC    Collection Time: 05/19/17 11:00 AM   Result Value Ref Range    Color YELLOW      Appearance CLOUDY      Specific gravity >1.030 (H) 1.005 - 1.030    pH (UA) 6.0 5.0 - 8.0      Protein >1000 (A) NEG mg/dL    Glucose >1000 (A) NEG mg/dL    Ketone 40 (A) NEG mg/dL    Bilirubin NEGATIVE  NEG      Blood TRACE (A) NEG      Urobilinogen 1.0 0.2 - 1.0 EU/dL    Nitrites NEGATIVE  NEG      Leukocyte Esterase NEGATIVE  NEG     URINE MICROSCOPIC ONLY    Collection Time: 05/19/17 11:00 AM   Result Value Ref Range    WBC 0 to 3 0 - 4 /hpf    RBC 0 to 3 0 - 5 /hpf    Epithelial cells 2+ 0 - 5 /lpf    Bacteria 1+ (A) NEG /hpf       EKG interpretation by ED Physician:       X-Ray, CT or other radiology findings or impressions:  XR ABD (AP AND ERECT OR DECUB)   Final Result   Impression:  Nonobstructive bowel gas pattern. No evidence of acute abnormality. Prior  Cholecystectomy.     Per radiologist       Orders:  Orders Placed This Encounter    XR ABD (AP AND ERECT OR DECUB)     Standing Status:   Standing     Number of Occurrences:   1     Order Specific Question:   Transport     Answer:   Stretcher [5]     Order Specific Question:   Reason for Exam     Answer:   Abdominal pain    CBC WITH AUTOMATED DIFF     Standing Status:   Standing     Number of Occurrences:   1    LIPASE     Standing Status:   Standing     Number of Occurrences:   1    METABOLIC PANEL, COMPREHENSIVE     Standing Status:   Standing     Number of Occurrences:   1    URINALYSIS W/ RFLX MICROSCOPIC     Standing Status:   Standing     Number of Occurrences:   1    URINE MICROSCOPIC ONLY     Standing Status:   Standing     Number of Occurrences:   1    INSERT PERIPHERAL IV ONE TIME STAT     Standing Status:   Standing     Number of Occurrences:   1    morphine injection 2 mg    ondansetron (ZOFRAN) injection 4 mg    cloNIDine HCl (CATAPRES) tablet 0.2 mg    sodium chloride 0.9 % bolus infusion 1,000 mL       Reevaluation, Progress notes, Consult notes, or additional Procedure notes:   11:34 AM Pt rechecked. Discussed results with patient. 12:48 PM I have reassessed the patient and discussed their results and diagnosis. Pt will be discharged in stable condition. Patient is to return to emergency department if any new or worsening condition. Patient understands and verbalizes agreement with plan. Pt instructed to follow up with nephrology due to UA results. Discussed high blood pressure and elevated glucose with patient. Return precautions given. BP improved and patient hydrated with IVF. Disposition:  Diagnosis:   1. Abdominal pain, right lower quadrant    2. Proteinuria, unspecified type    3. Ketonuria    4. Elevated blood pressure reading        Disposition: Discharged    Follow-up Information     Follow up With Details Comments Contact Info    Zeyad Multani,  Schedule an appointment as soon as possible for a visit in 2 days  [address] [facility]  [address] [facility]  [phone]      [facility] [address] [facility]. Schedule an appointment as soon as possible for a visit in 3 days  [address] [facility], [place] [phone] [facility] EMERGENCY DEPT  If symptoms worsen [address] [facility]  [phone]           Patient's Medications   Start Taking    No medications on file   Continue Taking    ACETAMINOPHEN (TYLENOL EXTRA STRENGTH) 500 MG TABLET    Take 2 Tabs by mouth every six (6) hours as needed for Pain. AMLODIPINE (NORVASC) 10 MG TABLET    Take 1 Tab by mouth daily. ASPIRIN DELAYED-RELEASE 81 MG TABLET    Take  by mouth daily. ATORVASTATIN (LIPITOR) 40 MG TABLET    Take 1 Tab by mouth daily. BUPROPION (WELLBUTRIN) 100 MG TABLET    Take 1 Tab by mouth daily. CITALOPRAM (CELEXA) 40 MG TABLET    Take 1 Tab by mouth daily.     CLOPIDOGREL (PLAVIX) 75 MG TAB    Take 1 Tab by mouth daily. HYDRALAZINE (APRESOLINE) 50 MG TABLET    Take 0.5 Tabs by mouth four (4) times daily. HYDROCHLOROTHIAZIDE (HYDRODIURIL) 25 MG TABLET    Take 1 Tab by mouth daily. INSULIN ASPART (NOVOLOG) 100 UNIT/ML INPN    Take 8 units with each meal.    INSULIN DEGLUDEC (TRESIBA FLEXTOUCH U-100) 100 UNIT/ML (3 ML) INPN    25 Units by SubCUTAneous route daily. INSULIN NEEDLES, DISPOSABLE, (DORA PEN NEEDLE) 32 GAUGE X 5/32\" NDLE    Use one needle to give insulin 4 times a day. LEVOTHYROXINE (SYNTHROID) 125 MCG TABLET    Take 1 Tab by mouth Daily (before breakfast). LOSARTAN (COZAAR) 100 MG TABLET    Take 1 Tab by mouth daily. METFORMIN (GLUCOPHAGE) 850 MG TABLET    Take 1 Tab by mouth two (2) times daily (with meals). METOPROLOL SUCCINATE (TOPROL-XL) 200 MG XL TABLET    Take 1 Tab by mouth daily. ONDANSETRON (ZOFRAN ODT) 4 MG DISINTEGRATING TABLET    Take 1 Tab by mouth every eight (8) hours as needed for Nausea. RABEPRAZOLE (ACIPHEX) 20 MG TABLET    take 1 tablet by mouth once daily    TRAMADOL (ULTRAM) 50 MG TABLET    Take 1 Tab by mouth every six (6) hours as needed for Pain. Max Daily Amount: 200 mg. These Medications have changed    No medications on file   Stop Taking    No medications on file         Scribe Attestation  Hamzah Holliday scribing for and in the presence of Zoë Willis MD (05/19/17)      Physician Attestation  I personally performed the services described in this documentation, reviewed and edited the documentation which was dictated to the scribe in my presence, and it accurately records my words and actions.     Zoë Willis MD (05/19/17)      Signed by: Enrrique Campbell, May 19, 2017 at 10:18 AM

## 2017-05-19 NOTE — DISCHARGE INSTRUCTIONS
Elevated Blood Pressure: Care Instructions  Your Care Instructions    Blood pressure is a measure of how hard the blood pushes against the walls of your arteries. It's normal for blood pressure to go up and down throughout the day. But if it stays up over time, you have high blood pressure. Two numbers tell you your blood pressure. The first number is the systolic pressure. It shows how hard the blood pushes when your heart is pumping. The second number is the diastolic pressure. It shows how hard the blood pushes between heartbeats, when your heart is relaxed and filling with blood. An ideal blood pressure in adults is less than 120/80 (say \"120 over 80\"). High blood pressure is 140/90 or higher. You have high blood pressure if your top number is 140 or higher or your bottom number is 90 or higher, or both. The main test for high blood pressure is simple, fast, and painless. To diagnose high blood pressure, your doctor will test your blood pressure at different times. After testing your blood pressure, your doctor may ask you to test it again when you are home. If you are diagnosed with high blood pressure, you can work with your doctor to make a long-term plan to manage it. Follow-up care is a key part of your treatment and safety. Be sure to make and go to all appointments, and call your doctor if you are having problems. It's also a good idea to know your test results and keep a list of the medicines you take. How can you care for yourself at home? · Do not smoke. Smoking increases your risk for heart attack and stroke. If you need help quitting, talk to your doctor about stop-smoking programs and medicines. These can increase your chances of quitting for good. · Stay at a healthy weight. · Try to limit how much sodium you eat to less than 2,300 milligrams (mg) a day. Your doctor may ask you to try to eat less than 1,500 mg a day. · Be physically active.  Get at least 30 minutes of exercise on most days of the week. Walking is a good choice. You also may want to do other activities, such as running, swimming, cycling, or playing tennis or team sports. · Avoid or limit alcohol. Talk to your doctor about whether you can drink any alcohol. · Eat plenty of fruits, vegetables, and low-fat dairy products. Eat less saturated and total fats. · Learn how to check your blood pressure at home. When should you call for help? Call your doctor now or seek immediate medical care if:  · Your blood pressure is much higher than normal (such as 180/110 or higher). · You think high blood pressure is causing symptoms such as:  ¨ Severe headache. ¨ Blurry vision. Watch closely for changes in your health, and be sure to contact your doctor if:  · You do not get better as expected. Where can you learn more? Go to http://staci-zeinab.info/. Enter F013 in the search box to learn more about \"Elevated Blood Pressure: Care Instructions. \"  Current as of: October 19, 2016  Content Version: 11.2  © 4396-4930 [employer]. Care instructions adapted under license by [employer] (which disclaims liability or warranty for this information). If you have questions about a medical condition or this instruction, always ask your healthcare professional. [address] any warranty or liability for your use of this information. Abdominal Pain: Care Instructions  Your Care Instructions    Abdominal pain has many possible causes. Some aren't serious and get better on their own in a few days. Others need more testing and treatment. If your pain continues or gets worse, you need to be rechecked and may need more tests to find out what is wrong. You may need surgery to correct the problem. Don't ignore new symptoms, such as fever, nausea and vomiting, urination problems, pain that gets worse, and dizziness. These may be signs of a more serious problem.   Your doctor may have recommended a follow-up visit in the next 8 to 12 hours. If you are not getting better, you may need more tests or treatment. The doctor has checked you carefully, but problems can develop later. If you notice any problems or new symptoms, get medical treatment right away. Follow-up care is a key part of your treatment and safety. Be sure to make and go to all appointments, and call your doctor if you are having problems. It's also a good idea to know your test results and keep a list of the medicines you take. How can you care for yourself at home? · Rest until you feel better. · To prevent dehydration, drink plenty of fluids, enough so that your urine is light yellow or clear like water. Choose water and other caffeine-free clear liquids until you feel better. If you have kidney, heart, or liver disease and have to limit fluids, talk with your doctor before you increase the amount of fluids you drink. · If your stomach is upset, eat mild foods, such as rice, dry toast or crackers, bananas, and applesauce. Try eating several small meals instead of two or three large ones. · Wait until 48 hours after all symptoms have gone away before you have spicy foods, alcohol, and drinks that contain caffeine. · Do not eat foods that are high in fat. · Avoid anti-inflammatory medicines such as aspirin, ibuprofen (Advil, Motrin), and naproxen (Aleve). These can cause stomach upset. Talk to your doctor if you take daily aspirin for another health problem. When should you call for help? Call 911 anytime you think you may need emergency care. For example, call if:  · You passed out (lost consciousness). · You pass maroon or very bloody stools. · You vomit blood or what looks like coffee grounds. · You have new, severe belly pain. Call your doctor now or seek immediate medical care if:  · Your pain gets worse, especially if it becomes focused in one area of your belly. · You have a new or higher fever.   · Your stools are black and look like tar, or they have streaks of blood. · You have unexpected vaginal bleeding. · You have symptoms of a urinary tract infection. These may include:  ¨ Pain when you urinate. ¨ Urinating more often than usual.  ¨ Blood in your urine. · You are dizzy or lightheaded, or you feel like you may faint. Watch closely for changes in your health, and be sure to contact your doctor if:  · You are not getting better after 1 day (24 hours). Where can you learn more? Go to http://staci-zeinab.info/. Enter U220 in the search box to learn more about \"Abdominal Pain: Care Instructions. \"  Current as of: May 27, 2016  Content Version: 11.2  © 5525-4664 [employer]. Care instructions adapted under license by [employer] (which disclaims liability or warranty for this information). If you have questions about a medical condition or this instruction, always ask your healthcare professional. [place] [address] any warranty or liability for your use of this information.

## 2017-05-21 ENCOUNTER — HOSPITAL ENCOUNTER (EMERGENCY)
Age: 57
Discharge: HOME | End: 2017-05-21
Attending: EMERGENCY MEDICINE
Payer: OTHER GOVERNMENT

## 2017-05-21 VITALS
BODY MASS INDEX: 32.39 KG/M2 | HEIGHT: 62 IN | DIASTOLIC BLOOD PRESSURE: 101 MMHG | HEART RATE: 97 BPM | RESPIRATION RATE: 16 BRPM | OXYGEN SATURATION: 96 % | TEMPERATURE: 98.8 F | SYSTOLIC BLOOD PRESSURE: 207 MMHG | WEIGHT: 176 LBS

## 2017-05-21 DIAGNOSIS — R11.0 NAUSEA WITHOUT VOMITING: ICD-10-CM

## 2017-05-21 DIAGNOSIS — I10 ESSENTIAL HYPERTENSION: ICD-10-CM

## 2017-05-21 DIAGNOSIS — R10.31 ABDOMINAL PAIN, RIGHT LOWER QUADRANT: Primary | ICD-10-CM

## 2017-05-21 DIAGNOSIS — K21.9 GASTRIC REFLUX: ICD-10-CM

## 2017-05-21 LAB
ALBUMIN SERPL BCP-MCNC: 3.6 G/DL (ref 3.4–5)
ALBUMIN/GLOB SERPL: 0.6 {RATIO} (ref 0.8–1.7)
ALP SERPL-CCNC: 146 U/L (ref 45–117)
ALT SERPL-CCNC: 35 U/L (ref 13–56)
ANION GAP BLD CALC-SCNC: 11 MMOL/L (ref 3–18)
APPEARANCE UR: CLEAR
AST SERPL W P-5'-P-CCNC: 27 U/L (ref 15–37)
BACTERIA URNS QL MICRO: ABNORMAL /HPF
BASOPHILS # BLD AUTO: 0 K/UL (ref 0–0.06)
BASOPHILS # BLD: 0 % (ref 0–2)
BILIRUB SERPL-MCNC: 0.7 MG/DL (ref 0.2–1)
BILIRUB UR QL: NEGATIVE
BUN SERPL-MCNC: 21 MG/DL (ref 7–18)
BUN/CREAT SERPL: 21 (ref 12–20)
CALCIUM SERPL-MCNC: 9.5 MG/DL (ref 8.5–10.1)
CHLORIDE SERPL-SCNC: 95 MMOL/L (ref 100–108)
CO2 SERPL-SCNC: 27 MMOL/L (ref 21–32)
COLOR UR: YELLOW
CREAT SERPL-MCNC: 0.99 MG/DL (ref 0.6–1.3)
DIFFERENTIAL METHOD BLD: ABNORMAL
EOSINOPHIL # BLD: 0 K/UL (ref 0–0.4)
EOSINOPHIL NFR BLD: 0 % (ref 0–5)
EPITH CASTS URNS QL MICRO: ABNORMAL /LPF (ref 0–5)
ERYTHROCYTE [DISTWIDTH] IN BLOOD BY AUTOMATED COUNT: 11.2 % (ref 11.6–14.5)
GLOBULIN SER CALC-MCNC: 5.8 G/DL (ref 2–4)
GLUCOSE SERPL-MCNC: 313 MG/DL (ref 74–99)
GLUCOSE UR STRIP.AUTO-MCNC: >1000 MG/DL
HCT VFR BLD AUTO: 41.3 % (ref 35–45)
HGB BLD-MCNC: 14.6 G/DL (ref 12–16)
HGB UR QL STRIP: ABNORMAL
KETONES UR QL STRIP.AUTO: 15 MG/DL
LACTATE BLD-SCNC: 1.6 MMOL/L (ref 0.4–2)
LEUKOCYTE ESTERASE UR QL STRIP.AUTO: NEGATIVE
LIPASE SERPL-CCNC: 100 U/L (ref 73–393)
LYMPHOCYTES # BLD AUTO: 23 % (ref 21–52)
LYMPHOCYTES # BLD: 1.8 K/UL (ref 0.9–3.6)
MCH RBC QN AUTO: 30.8 PG (ref 24–34)
MCHC RBC AUTO-ENTMCNC: 35.4 G/DL (ref 31–37)
MCV RBC AUTO: 87.1 FL (ref 74–97)
MONOCYTES # BLD: 0.7 K/UL (ref 0.05–1.2)
MONOCYTES NFR BLD AUTO: 9 % (ref 3–10)
NEUTS SEG # BLD: 5.3 K/UL (ref 1.8–8)
NEUTS SEG NFR BLD AUTO: 68 % (ref 40–73)
NITRITE UR QL STRIP.AUTO: NEGATIVE
PH UR STRIP: 6 [PH] (ref 5–8)
PLATELET # BLD AUTO: 235 K/UL (ref 135–420)
PMV BLD AUTO: 10 FL (ref 9.2–11.8)
POTASSIUM SERPL-SCNC: 3.9 MMOL/L (ref 3.5–5.5)
PROT SERPL-MCNC: 9.4 G/DL (ref 6.4–8.2)
PROT UR STRIP-MCNC: 300 MG/DL
RBC # BLD AUTO: 4.74 M/UL (ref 4.2–5.3)
RBC #/AREA URNS HPF: 0 /HPF (ref 0–5)
SODIUM SERPL-SCNC: 133 MMOL/L (ref 136–145)
SP GR UR REFRACTOMETRY: >1.03 (ref 1–1.03)
UROBILINOGEN UR QL STRIP.AUTO: 1 EU/DL (ref 0.2–1)
WBC # BLD AUTO: 7.8 K/UL (ref 4.6–13.2)
WBC URNS QL MICRO: ABNORMAL /HPF (ref 0–4)

## 2017-05-21 PROCEDURE — 99284 EMERGENCY DEPT VISIT MOD MDM: CPT

## 2017-05-21 PROCEDURE — 85025 COMPLETE CBC W/AUTO DIFF WBC: CPT | Performed by: EMERGENCY MEDICINE

## 2017-05-21 PROCEDURE — 83605 ASSAY OF LACTIC ACID: CPT

## 2017-05-21 PROCEDURE — 96375 TX/PRO/DX INJ NEW DRUG ADDON: CPT

## 2017-05-21 PROCEDURE — 74011250637 HC RX REV CODE- 250/637

## 2017-05-21 PROCEDURE — 81001 URINALYSIS AUTO W/SCOPE: CPT | Performed by: EMERGENCY MEDICINE

## 2017-05-21 PROCEDURE — 74011250637 HC RX REV CODE- 250/637: Performed by: PHYSICIAN ASSISTANT

## 2017-05-21 PROCEDURE — 80053 COMPREHEN METABOLIC PANEL: CPT | Performed by: EMERGENCY MEDICINE

## 2017-05-21 PROCEDURE — 74011250636 HC RX REV CODE- 250/636: Performed by: NURSE PRACTITIONER

## 2017-05-21 PROCEDURE — 74011000258 HC RX REV CODE- 258: Performed by: NURSE PRACTITIONER

## 2017-05-21 PROCEDURE — 74011250637 HC RX REV CODE- 250/637: Performed by: EMERGENCY MEDICINE

## 2017-05-21 PROCEDURE — 96361 HYDRATE IV INFUSION ADD-ON: CPT

## 2017-05-21 PROCEDURE — 83690 ASSAY OF LIPASE: CPT | Performed by: EMERGENCY MEDICINE

## 2017-05-21 PROCEDURE — 96374 THER/PROPH/DIAG INJ IV PUSH: CPT

## 2017-05-21 RX ORDER — LOSARTAN POTASSIUM 50 MG/1
100 TABLET ORAL DAILY
Status: DISCONTINUED | OUTPATIENT
Start: 2017-05-22 | End: 2017-05-21

## 2017-05-21 RX ORDER — ONDANSETRON 2 MG/ML
4 INJECTION INTRAMUSCULAR; INTRAVENOUS
Status: DISCONTINUED | OUTPATIENT
Start: 2017-05-21 | End: 2017-05-22 | Stop reason: HOSPADM

## 2017-05-21 RX ORDER — MORPHINE SULFATE 4 MG/ML
4 INJECTION, SOLUTION INTRAMUSCULAR; INTRAVENOUS
Status: COMPLETED | OUTPATIENT
Start: 2017-05-21 | End: 2017-05-21

## 2017-05-21 RX ORDER — AMLODIPINE BESYLATE 5 MG/1
10 TABLET ORAL
Status: COMPLETED | OUTPATIENT
Start: 2017-05-21 | End: 2017-05-21

## 2017-05-21 RX ORDER — PROMETHAZINE HYDROCHLORIDE 25 MG/1
25 TABLET ORAL
Qty: 12 TAB | Refills: 0 | Status: SHIPPED | OUTPATIENT
Start: 2017-05-21 | End: 2017-12-28 | Stop reason: ALTCHOICE

## 2017-05-21 RX ORDER — HYDROCHLOROTHIAZIDE 25 MG/1
25 TABLET ORAL
Status: COMPLETED | OUTPATIENT
Start: 2017-05-21 | End: 2017-05-21

## 2017-05-21 RX ORDER — TRAMADOL HYDROCHLORIDE 50 MG/1
50 TABLET ORAL
Qty: 4 TAB | Refills: 0 | Status: SHIPPED | OUTPATIENT
Start: 2017-05-21 | End: 2017-12-18 | Stop reason: ALTCHOICE

## 2017-05-21 RX ORDER — ONDANSETRON 8 MG/1
8 TABLET, ORALLY DISINTEGRATING ORAL
Status: DISCONTINUED | OUTPATIENT
Start: 2017-05-21 | End: 2017-05-21

## 2017-05-21 RX ORDER — LOSARTAN POTASSIUM 50 MG/1
TABLET ORAL
Status: COMPLETED
Start: 2017-05-21 | End: 2017-05-21

## 2017-05-21 RX ORDER — LOSARTAN POTASSIUM 50 MG/1
100 TABLET ORAL DAILY
Status: DISCONTINUED | OUTPATIENT
Start: 2017-05-21 | End: 2017-05-22 | Stop reason: HOSPADM

## 2017-05-21 RX ADMIN — ONDANSETRON 8 MG: 8 TABLET, ORALLY DISINTEGRATING ORAL at 16:59

## 2017-05-21 RX ADMIN — SODIUM CHLORIDE 1000 ML: 900 INJECTION, SOLUTION INTRAVENOUS at 18:08

## 2017-05-21 RX ADMIN — Medication 4 MG: at 17:59

## 2017-05-21 RX ADMIN — LOSARTAN POTASSIUM 100 MG: 50 TABLET ORAL at 23:23

## 2017-05-21 RX ADMIN — HYDROCHLOROTHIAZIDE 25 MG: 25 TABLET ORAL at 22:04

## 2017-05-21 RX ADMIN — AMLODIPINE BESYLATE 10 MG: 5 TABLET ORAL at 22:05

## 2017-05-21 RX ADMIN — PROMETHAZINE HYDROCHLORIDE 25 MG: 25 INJECTION, SOLUTION INTRAMUSCULAR; INTRAVENOUS at 18:00

## 2017-05-21 NOTE — ED NOTES
Assume care of patient, patient here 2 times already for same, patient states that her pain has not gotten any better and is still vomiting.   Purposeful rounding completed:    Side rails up x 2:  YES  Bed in low position and wheels locked: YES  Call bell within reach: YES  Comfort addressed: YES    Toileting needs addressed: YES  Plan of care reviewed/updated with patient and or family members: YES  IV site assessed: N/A  Pain assessed and addressed: YES, 5

## 2017-05-21 NOTE — ED PROVIDER NOTES
HPI Comments:   4:39 PM   64 y.o. female presents to ED C/O N/V, abdominal pain. Patient has a HX of coronary stent placement, Hysterectomy, cholecystomy, Hep C, HTN, Diabetes, CAD, depression. Patient reports she is still having N/V, right lower abdominal pain. Patient seen on 05/18,05/19 for same complaint. Patient reports she returned today because she vomited bright red blood today twice. Patient repots vomiting 4-5 times daily, unable to keep anything down, unable to tolerate keeping BP medications down for 5 days. Patient reports RLQ pain is slightly worse than previous, feels swollen on right side. Patient reports last BM was 5 days ago, she did pass gas today. Patient denies dysuria, CP, SOB, fever, blood in stool. Patient is  Nonsmoker. Patient has tried tramadol and zofran at home without improvement in symptoms. Pt denies any other sxs or complaints. Written by Erin SYED      The history is provided by the patient. History limited by: No language barrier. Past Medical History:   Diagnosis Date    Anxiety     CAD (coronary artery disease)     S/P Coronary stents ( LAD and Lcx)    Depression     Diabetes ([facility] [address]     Hepatitis C     Hypercholesterolemia     Hypertension        Past Surgical History:   Procedure Laterality Date    HX BREAST BIOPSY      1971 2010 left breast lump removed excisional per patient     [address]    pre-eclampsia    HX CHOLECYSTECTOMY  2004    HX CORONARY STENT PLACEMENT  Nov 2013    4 stents    HX HEENT  2009    thyroidectomy due to nodules.      HX HYSTERECTOMY  2005    heavy periods    HX THYROIDECTOMY           Family History:   Problem Relation Age of Onset    Diabetes Mother     Hypertension Mother     Cancer Mother     Heart Disease Mother     Heart Attack Mother     Diabetes Father     Hypertension Father     Cancer Father        Social History     Social History    Marital status:      Spouse name: N/A    Number of children: N/A    Years of education: N/A     Occupational History    Not on file. Social History Main Topics    Smoking status: Never Smoker    Smokeless tobacco: Never Used    Alcohol use No    Drug use: No    Sexual activity: Not Currently     Other Topics Concern    Not on file     Social History Narrative         ALLERGIES: Contrast agent [iodine]    Review of Systems   Constitutional: Positive for appetite change. Negative for fever. HENT: Negative for congestion, rhinorrhea and sore throat. Respiratory: Negative for cough, shortness of breath and wheezing. Cardiovascular: Negative for chest pain and leg swelling. Gastrointestinal: Positive for abdominal pain, constipation, nausea and vomiting. Negative for diarrhea. Genitourinary: Negative for dysuria. Musculoskeletal: Negative for arthralgias and back pain. Neurological: Negative for dizziness, syncope and headaches. Vitals:    05/21/17 1745 05/21/17 1800 05/21/17 1815 05/21/17 1830   BP: (!) 230/107 (!) 218/106 (!) 210/99 (!) 186/120   Pulse: 94 95 99 98   Resp: 13 15 15 16   Temp:       SpO2: 97% 98% 95% 96%   Weight:       Height:                Physical Exam   Constitutional: She is oriented to person, place, and time. She appears well-developed and well-nourished. Patient appears uncomfortable. HENT:   Head: Normocephalic and atraumatic. Mouth/Throat: Mucous membranes are dry. Eyes: Conjunctivae and EOM are normal.   Cardiovascular: Regular rhythm. Tachycardia present. Pulmonary/Chest: Effort normal and breath sounds normal. No respiratory distress. She has no wheezes. She has no rales. Abdominal: Soft. Normal appearance. There is tenderness in the right lower quadrant. There is no rigidity, no rebound, no guarding and no CVA tenderness. Musculoskeletal:        Lumbar back: She exhibits tenderness and pain. She exhibits no bony tenderness and normal pulse.         Back:    Neurological: She is alert and oriented to person, place, and time. Coordination normal.   Skin: Skin is warm and dry. She is not diaphoretic. Nursing note and vitals reviewed. MDM  Number of Diagnoses or Management Options  Diagnosis management comments: Differential Diagnosis: appendicitis, constipation, gastroenteritis, IBS, IBD, celiac disease, diverticulitis, nephrolithiasis, pyelonephritis, cystitis, mesenteric ischemia, mesenteric adenitis, ruptured AAA, SBO, LBO      Plan:  Repeat labs, monitor BP, medicate for pain and nausea and re-evaluate possible need for repeat imagining  6:00PM - Turning over to [place] during normal change of shift after discussion of HPI, PE, and available labs. ED Course       Procedures             RESULTS:    No orders to display       Labs Reviewed   CBC WITH AUTOMATED DIFF - Abnormal; Notable for the following:        Result Value    RDW 11.2 (*)     All other components within normal limits   METABOLIC PANEL, COMPREHENSIVE - Abnormal; Notable for the following:     Sodium 133 (*)     Chloride 95 (*)     Glucose 313 (*)     BUN 21 (*)     BUN/Creatinine ratio 21 (*)     GFR est non-AA 58 (*)     Alk.  phosphatase 146 (*)     Protein, total 9.4 (*)     Globulin 5.8 (*)     A-G Ratio 0.6 (*)     All other components within normal limits   URINALYSIS W/ RFLX MICROSCOPIC - Abnormal; Notable for the following:     Specific gravity >1.030 (*)     Protein 300 (*)     Glucose >1000 (*)     Ketone 15 (*)     Blood TRACE (*)     All other components within normal limits   URINE MICROSCOPIC ONLY - Abnormal; Notable for the following:     Bacteria 1+ (*)     All other components within normal limits   LIPASE   POC LACTIC ACID       Recent Results (from the past 12 hour(s))   URINALYSIS W/ RFLX MICROSCOPIC    Collection Time: 05/21/17  4:24 PM   Result Value Ref Range    Color YELLOW      Appearance CLEAR      Specific gravity >1.030 (H) 1.005 - 1.030    pH (UA) 6.0 5.0 - 8.0 Protein 300 (A) NEG mg/dL    Glucose >1000 (A) NEG mg/dL    Ketone 15 (A) NEG mg/dL    Bilirubin NEGATIVE  NEG      Blood TRACE (A) NEG      Urobilinogen 1.0 0.2 - 1.0 EU/dL    Nitrites NEGATIVE  NEG      Leukocyte Esterase NEGATIVE  NEG     URINE MICROSCOPIC ONLY    Collection Time: 05/21/17  4:24 PM   Result Value Ref Range    WBC 2 to 4 0 - 4 /hpf    RBC 0 0 - 5 /hpf    Epithelial cells 1+ 0 - 5 /lpf    Bacteria 1+ (A) NEG /hpf   CBC WITH AUTOMATED DIFF    Collection Time: 05/21/17  4:50 PM   Result Value Ref Range    WBC 7.8 4.6 - 13.2 K/uL    RBC 4.74 4.20 - 5.30 M/uL    HGB 14.6 12.0 - 16.0 g/dL    HCT 41.3 35.0 - 45.0 %    MCV 87.1 74.0 - 97.0 FL    MCH 30.8 24.0 - 34.0 PG    MCHC 35.4 31.0 - 37.0 g/dL    RDW 11.2 (L) 11.6 - 14.5 %    PLATELET 780 855 - 016 K/uL    MPV 10.0 9.2 - 11.8 FL    NEUTROPHILS 68 40 - 73 %    LYMPHOCYTES 23 21 - 52 %    MONOCYTES 9 3 - 10 %    EOSINOPHILS 0 0 - 5 %    BASOPHILS 0 0 - 2 %    ABS. NEUTROPHILS 5.3 1.8 - 8.0 K/UL    ABS. LYMPHOCYTES 1.8 0.9 - 3.6 K/UL    ABS. MONOCYTES 0.7 0.05 - 1.2 K/UL    ABS. EOSINOPHILS 0.0 0.0 - 0.4 K/UL    ABS. BASOPHILS 0.0 0.0 - 0.06 K/UL    DF AUTOMATED     METABOLIC PANEL, COMPREHENSIVE    Collection Time: 05/21/17  4:50 PM   Result Value Ref Range    Sodium 133 (L) 136 - 145 mmol/L    Potassium 3.9 3.5 - 5.5 mmol/L    Chloride 95 (L) 100 - 108 mmol/L    CO2 27 21 - 32 mmol/L    Anion gap 11 3.0 - 18 mmol/L    Glucose 313 (H) 74 - 99 mg/dL    BUN 21 (H) 7.0 - 18 MG/DL    Creatinine 0.99 0.6 - 1.3 MG/DL    BUN/Creatinine ratio 21 (H) 12 - 20      GFR est AA >60 >60 ml/min/1.73m2    GFR est non-AA 58 (L) >60 ml/min/1.73m2    Calcium 9.5 8.5 - 10.1 MG/DL    Bilirubin, total 0.7 0.2 - 1.0 MG/DL    ALT (SGPT) 35 13 - 56 U/L    AST (SGOT) 27 15 - 37 U/L    Alk.  phosphatase 146 (H) 45 - 117 U/L    Protein, total 9.4 (H) 6.4 - 8.2 g/dL    Albumin 3.6 3.4 - 5.0 g/dL    Globulin 5.8 (H) 2.0 - 4.0 g/dL    A-G Ratio 0.6 (L) 0.8 - 1.7     LIPASE Collection Time: 05/21/17  4:50 PM   Result Value Ref Range    Lipase 100 73 - 393 U/L   POC LACTIC ACID    Collection Time: 05/21/17  4:57 PM   Result Value Ref Range    Lactic Acid (POC) 1.6 0.4 - 2.0 mmol/L       PROGRESS NOTE:   4:39 PM   Initial assessment completed. Written by Natalya SYED       6:00 PM   Patient turned over to [facility] pending labs and re-evaluation of pain, nausea and BP after medication.  Supriya Duong NP

## 2017-05-21 NOTE — LETTER
NOTIFICATION RETURN TO WORK / SCHOOL 
 
5/21/2017 11:29 PM 
 
Ms. Fany Hayes [address] [facility] [phone] To Whom It May Concern: 
 
Fany Hayes is currently under the care of [facility] EMERGENCY DEPT. She will return to work/school on: 5.24.17 If there are questions or concerns please have the patient contact our office.  
 
 
 
Sincerely, 
 
 
IRINA Hidalgo

## 2017-05-22 NOTE — DISCHARGE INSTRUCTIONS
Abdominal Pain: Care Instructions  Your Care Instructions    Abdominal pain has many possible causes. Some aren't serious and get better on their own in a few days. Others need more testing and treatment. If your pain continues or gets worse, you need to be rechecked and may need more tests to find out what is wrong. You may need surgery to correct the problem. Don't ignore new symptoms, such as fever, nausea and vomiting, urination problems, pain that gets worse, and dizziness. These may be signs of a more serious problem. Your doctor may have recommended a follow-up visit in the next 8 to 12 hours. If you are not getting better, you may need more tests or treatment. The doctor has checked you carefully, but problems can develop later. If you notice any problems or new symptoms, get medical treatment right away. Follow-up care is a key part of your treatment and safety. Be sure to make and go to all appointments, and call your doctor if you are having problems. It's also a good idea to know your test results and keep a list of the medicines you take. How can you care for yourself at home? · Rest until you feel better. · To prevent dehydration, drink plenty of fluids, enough so that your urine is light yellow or clear like water. Choose water and other caffeine-free clear liquids until you feel better. If you have kidney, heart, or liver disease and have to limit fluids, talk with your doctor before you increase the amount of fluids you drink. · If your stomach is upset, eat mild foods, such as rice, dry toast or crackers, bananas, and applesauce. Try eating several small meals instead of two or three large ones. · Wait until 48 hours after all symptoms have gone away before you have spicy foods, alcohol, and drinks that contain caffeine. · Do not eat foods that are high in fat. · Avoid anti-inflammatory medicines such as aspirin, ibuprofen (Advil, Motrin), and naproxen (Aleve).  These can cause stomach upset. Talk to your doctor if you take daily aspirin for another health problem. When should you call for help? Call 911 anytime you think you may need emergency care. For example, call if:  · You passed out (lost consciousness). · You pass maroon or very bloody stools. · You vomit blood or what looks like coffee grounds. · You have new, severe belly pain. Call your doctor now or seek immediate medical care if:  · Your pain gets worse, especially if it becomes focused in one area of your belly. · You have a new or higher fever. · Your stools are black and look like tar, or they have streaks of blood. · You have unexpected vaginal bleeding. · You have symptoms of a urinary tract infection. These may include:  ¨ Pain when you urinate. ¨ Urinating more often than usual.  ¨ Blood in your urine. · You are dizzy or lightheaded, or you feel like you may faint. Watch closely for changes in your health, and be sure to contact your doctor if:  · You are not getting better after 1 day (24 hours). Where can you learn more? Go to http://staciVuCast Mediazeinab.info/. Enter K769 in the search box to learn more about \"Abdominal Pain: Care Instructions. \"  Current as of: May 27, 2016  Content Version: 11.2  © 0142-1626 [employer]. Care instructions adapted under license by [employer] (which disclaims liability or warranty for this information). If you have questions about a medical condition or this instruction, always ask your healthcare professional. [place] [address] any warranty or liability for your use of this information. Nausea and Vomiting: Care Instructions  Your Care Instructions    When you are nauseated, you may feel weak and sweaty and notice a lot of saliva in your mouth. Nausea often leads to vomiting. Most of the time you do not need to worry about nausea and vomiting, but they can be signs of other illnesses.   Two common causes of nausea and vomiting are stomach flu and food poisoning. Nausea and vomiting from viral stomach flu will usually start to improve within 24 hours. Nausea and vomiting from food poisoning may last from 12 to 48 hours. The doctor has checked you carefully, but problems can develop later. If you notice any problems or new symptoms, get medical treatment right away. Follow-up care is a key part of your treatment and safety. Be sure to make and go to all appointments, and call your doctor if you are having problems. It's also a good idea to know your test results and keep a list of the medicines you take. How can you care for yourself at home? · To prevent dehydration, drink plenty of fluids, enough so that your urine is light yellow or clear like water. Choose water and other caffeine-free clear liquids until you feel better. If you have kidney, heart, or liver disease and have to limit fluids, talk with your doctor before you increase the amount of fluids you drink. · Rest in bed until you feel better. · When you are able to eat, try clear soups, mild foods, and liquids until all symptoms are gone for 12 to 48 hours. Other good choices include dry toast, crackers, cooked cereal, and gelatin dessert, such as Jell-O. When should you call for help? Call 911 anytime you think you may need emergency care. For example, call if:  · You passed out (lost consciousness). Call your doctor now or seek immediate medical care if:  · You have symptoms of dehydration, such as:  ¨ Dry eyes and a dry mouth. ¨ Passing only a little dark urine. ¨ Feeling thirstier than usual.  · You have new or worsening belly pain. · You have a new or higher fever. · You vomit blood or what looks like coffee grounds. Watch closely for changes in your health, and be sure to contact your doctor if:  · You have ongoing nausea and vomiting. · Your vomiting is getting worse. · Your vomiting lasts longer than 2 days.   · You are not getting better as expected. Where can you learn more? Go to http://staci-zeinab.info/. Enter 25 [id number] in the search box to learn more about \"Nausea and Vomiting: Care Instructions. \"  Current as of: May 27, 2016  Content Version: 11.2  © 9732-3159 [employer]. Care instructions adapted under license by [employer] (which disclaims liability or warranty for this information). If you have questions about a medical condition or this instruction, always ask your healthcare professional. [place] [address] any warranty or liability for your use of this information.

## 2017-05-22 NOTE — ED NOTES
10:51 PM Pt handled PO challenge. Was able to keep down medications and liquids although she states that she is still nauseous. Discussed discharging patient with nausea and pain medications and the importance of follow up with PCP. Pt states that she has appointment with PCP Thursday of this week but will call to see if it can be moved up.

## 2017-05-22 NOTE — ED NOTES
/101, PA notified, continue to d/c. Pt states her daughter is on her way to pick her up. I have reviewed discharge instructions with the patient. The patient verbalized understanding. Patient received two presription(s). Patient told not to drive with medication. Patient was ambulatory upon discharge. Patient was in stable condition. Patient was accompanied with family member.

## 2017-05-23 RX ORDER — RABEPRAZOLE SODIUM 20 MG/1
TABLET, DELAYED RELEASE ORAL
Qty: 90 TAB | Refills: 2 | Status: SHIPPED | OUTPATIENT
Start: 2017-05-23 | End: 2017-07-22 | Stop reason: SDUPTHER

## 2017-05-25 ENCOUNTER — OFFICE VISIT (OUTPATIENT)
Dept: FAMILY MEDICINE CLINIC | Age: 57
End: 2017-05-25

## 2017-05-25 ENCOUNTER — HOSPITAL ENCOUNTER (OUTPATIENT)
Dept: LAB | Age: 57
Discharge: HOME | End: 2017-05-25
Payer: OTHER GOVERNMENT

## 2017-05-25 VITALS
RESPIRATION RATE: 16 BRPM | HEIGHT: 62 IN | WEIGHT: 167 LBS | SYSTOLIC BLOOD PRESSURE: 146 MMHG | HEART RATE: 90 BPM | OXYGEN SATURATION: 96 % | BODY MASS INDEX: 30.73 KG/M2 | TEMPERATURE: 99.1 F | DIASTOLIC BLOOD PRESSURE: 88 MMHG

## 2017-05-25 DIAGNOSIS — R73.09 ELEVATED GLUCOSE: ICD-10-CM

## 2017-05-25 DIAGNOSIS — E87.8 HYPOCHLOREMIA: ICD-10-CM

## 2017-05-25 DIAGNOSIS — B02.8 HERPES ZOSTER WITH COMPLICATION: ICD-10-CM

## 2017-05-25 DIAGNOSIS — R10.13 EPIGASTRIC ABDOMINAL PAIN: ICD-10-CM

## 2017-05-25 DIAGNOSIS — R10.11 RIGHT UPPER QUADRANT ABDOMINAL PAIN: ICD-10-CM

## 2017-05-25 DIAGNOSIS — E87.1 HYPONATREMIA: ICD-10-CM

## 2017-05-25 DIAGNOSIS — R10.31 RIGHT LOWER QUADRANT ABDOMINAL PAIN: ICD-10-CM

## 2017-05-25 DIAGNOSIS — R10.9 FLANK PAIN: Primary | ICD-10-CM

## 2017-05-25 LAB
ALBUMIN SERPL BCP-MCNC: 3.6 G/DL (ref 3.4–5)
ALBUMIN/GLOB SERPL: 0.7 {RATIO} (ref 0.8–1.7)
ALP SERPL-CCNC: 128 U/L (ref 45–117)
ALT SERPL-CCNC: 29 U/L (ref 13–56)
ANION GAP BLD CALC-SCNC: 10 MMOL/L (ref 3–18)
AST SERPL W P-5'-P-CCNC: 18 U/L (ref 15–37)
BILIRUB SERPL-MCNC: 0.6 MG/DL (ref 0.2–1)
BUN SERPL-MCNC: 25 MG/DL (ref 7–18)
BUN/CREAT SERPL: 22 (ref 12–20)
CALCIUM SERPL-MCNC: 9.8 MG/DL (ref 8.5–10.1)
CHLORIDE SERPL-SCNC: 93 MMOL/L (ref 100–108)
CO2 SERPL-SCNC: 31 MMOL/L (ref 21–32)
CREAT SERPL-MCNC: 1.12 MG/DL (ref 0.6–1.3)
EST. AVERAGE GLUCOSE BLD GHB EST-MCNC: 186 MG/DL
GLOBULIN SER CALC-MCNC: 5.2 G/DL (ref 2–4)
GLUCOSE SERPL-MCNC: 261 MG/DL (ref 74–99)
HBA1C MFR BLD: 8.1 % (ref 4.2–5.6)
POTASSIUM SERPL-SCNC: 3.7 MMOL/L (ref 3.5–5.5)
PROT SERPL-MCNC: 8.8 G/DL (ref 6.4–8.2)
SODIUM SERPL-SCNC: 134 MMOL/L (ref 136–145)

## 2017-05-25 PROCEDURE — 83036 HEMOGLOBIN GLYCOSYLATED A1C: CPT | Performed by: FAMILY MEDICINE

## 2017-05-25 PROCEDURE — 36415 COLL VENOUS BLD VENIPUNCTURE: CPT | Performed by: FAMILY MEDICINE

## 2017-05-25 PROCEDURE — 80053 COMPREHEN METABOLIC PANEL: CPT | Performed by: FAMILY MEDICINE

## 2017-05-25 RX ORDER — VALACYCLOVIR HYDROCHLORIDE 1 G/1
1000 TABLET, FILM COATED ORAL 3 TIMES DAILY
Qty: 21 TAB | Refills: 0 | Status: SHIPPED | OUTPATIENT
Start: 2017-05-25 | End: 2017-12-28 | Stop reason: ALTCHOICE

## 2017-05-25 RX ORDER — SUCRALFATE 1 G/10ML
1 SUSPENSION ORAL 4 TIMES DAILY
Qty: 414 ML | Refills: 0 | Status: SHIPPED | OUTPATIENT
Start: 2017-05-25 | End: 2018-07-31 | Stop reason: ALTCHOICE

## 2017-05-25 RX ORDER — GABAPENTIN 100 MG/1
100 CAPSULE ORAL 3 TIMES DAILY
Qty: 60 CAP | Refills: 0 | Status: SHIPPED | OUTPATIENT
Start: 2017-05-25 | End: 2017-05-25 | Stop reason: SDUPTHER

## 2017-05-25 RX ORDER — CIPROFLOXACIN 500 MG/1
500 TABLET ORAL 2 TIMES DAILY
Qty: 10 TAB | Refills: 0 | Status: SHIPPED | OUTPATIENT
Start: 2017-05-25 | End: 2017-05-30

## 2017-05-25 NOTE — PROGRESS NOTES
1. Have you been to the ER, urgent care clinic since your last visit? Hospitalized since your last visit? Yes, 5/21/17, [facility], abd pain    2. Have you seen or consulted any other health care providers outside of the [address] [place] [address] since your last visit? Include any pap smears or colon screening.  No

## 2017-05-25 NOTE — PROGRESS NOTES
Subjective:     HPI:  Nicanor Jain is a 64 y.o. female who presents to the office for follow up on ED visit, c/o pelvic pain, flank pain, nausea, and vomiting. ED Visits: Patient reports right-sided flank pain, nausea, and vomiting x 1 week. She reports pain from her right pelvic area that radiates around to her right lower back that began on 5/16/17. Patient was seen for these symptoms in the [address] [place] [address] ED on 5/18/17, 5/19/17, and 5/21/17. Patient has been prescribed Aciphex, Phenergan, Tramadol, Zofran, and Tylenol by ED which she has been taking without relief of symptoms. Patient reports she has been unable to keep down food or medication because of vomiting. She states she has been unable to keep down her diabetes medication. Patient has been taking Zofran for nausea without relief. Patient has been taking [place] and Novolog and prescribed. Patient's last colonoscopy was March 9, 2015 with Dr. Penelope Sanabria; test was normal. Patient has a history of hysterectomy in 2005 and cholecystectomy in 2004. Patient is positive for Hepatitis C. She states that she was unable to receive treatment because  would not approve Ammy Hardy. Patient has a history of abdominal pain. Patient reports pain has worsened in the last week. Abdominal pain first documented in 3/2015. At that time, pain was attributed to functional pain secondary to adhesions. Vaginal/Buttock Irritation: Patient also reports vaginal irritation and bumps on her buttocks which began 2 days ago. She reports she has numbness but also increased tenderness in her right buttock. Patient reports no new intimate partners. She states she has been celibate for the last 10 years. Wt Readings from Last 3 Encounters:   05/25/17 167 lb (75.8 kg)   05/21/17 176 lb (79.8 kg)   05/19/17 176 lb (79.8 kg)       XR ABD (5/19/17)  Impression:  Nonobstructive bowel gas pattern. No evidence of acute abnormality. Prior  cholecystectomy.     CT ABD PELV WO CONT (5/18/17)  IMPRESSION:  No nephrolithiasis or obstructive uropathy to explain patient's right flank  pain. No acute abnormality otherwise throughout the abdomen or pelvis. Normal  Appendix. Lab Results   Component Value Date/Time    Sodium 133 05/21/2017 04:50 PM    Potassium 3.9 05/21/2017 04:50 PM    Chloride 95 05/21/2017 04:50 PM    CO2 27 05/21/2017 04:50 PM    Anion gap 11 05/21/2017 04:50 PM    Glucose 313 05/21/2017 04:50 PM    BUN 21 05/21/2017 04:50 PM    Creatinine 0.99 05/21/2017 04:50 PM    BUN/Creatinine ratio 21 05/21/2017 04:50 PM    GFR est AA >60 05/21/2017 04:50 PM    GFR est non-AA 58 05/21/2017 04:50 PM    Calcium 9.5 05/21/2017 04:50 PM    Bilirubin, total 0.7 05/21/2017 04:50 PM    AST (SGOT) 27 05/21/2017 04:50 PM    Alk. phosphatase 146 05/21/2017 04:50 PM    Protein, total 9.4 05/21/2017 04:50 PM    Albumin 3.6 05/21/2017 04:50 PM    Globulin 5.8 05/21/2017 04:50 PM    A-G Ratio 0.6 05/21/2017 04:50 PM    ALT (SGPT) 35 05/21/2017 04:50 PM     Lab Results   Component Value Date/Time    WBC 7.8 05/21/2017 04:50 PM    Hemoglobin (POC) 13.3 04/04/2017 03:49 PM    HGB 14.6 05/21/2017 04:50 PM    Hematocrit (POC) 39 04/04/2017 03:49 PM    HCT 41.3 05/21/2017 04:50 PM    PLATELET 710 11/40/7995 04:50 PM    MCV 87.1 05/21/2017 04:50 PM     Lab Results   Component Value Date/Time    Lipase 100 05/21/2017 04:50 PM     Urinalysis (5/21/17): Component Value Flag Ref Range Units Status   Color YELLOW     Final   Appearance CLEAR     Final   Specific gravity >1.030 (H) 1.005 - 1.030  Final   pH (UA) 6.0  5.0 - 8.0   Final   Protein 300 (A) NEG mg/dL Final   Glucose >1000 (A) NEG mg/dL Final   Ketone 15 (A) NEG mg/dL Final   Bilirubin NEGATIVE   NEG   Final   Blood TRACE (A) NEG   Final   Urobilinogen 1.0  0.2 - 1.0 EU/dL Final   Nitrites NEGATIVE   NEG   Final   Leukocyte Esterase NEGATIVE   NEG   Final     Urine Microscopic (5/19/17):    Component Value Flag Ref Range Units Status   WBC 0 to 3  0 - 4 /hpf Final   RBC 0 to 3  0 - 5 /hpf Final   Epithelial cells 2+  0 - 5 /lpf Final   Bacteria 1+ (A) NEG /hpf Final     Urinalysis (5/18/17): Component Value Flag Ref Range Units Status   Color YELLOW     Final   Appearance CLEAR     Final   Specific gravity 1.030  1.005 - 1.030   Final   pH (UA) 6.5  5.0 - 8.0   Final   Protein 100 (A) NEG mg/dL Final   Glucose >1000 (A) NEG mg/dL Final   Ketone NEGATIVE   NEG mg/dL Final   Bilirubin NEGATIVE   NEG   Final   Blood NEGATIVE   NEG   Final   Urobilinogen 1.0  0.2 - 1.0 EU/dL Final   Nitrites NEGATIVE   NEG   Final   Leukocyte Esterase NEGATIVE   NEG   Final     Urine Microscopic (5/18/17): Component Value Flag Ref Range Units Status   WBC NONE  0 - 4 /hpf Final   RBC 0 to 3  0 - 5 /hpf Final   Epithelial cells 2+  0 - 5 /lpf Final   Bacteria NEGATIVE   NEG /hpf Final       Current Outpatient Prescriptions   Medication Sig Dispense Refill    ciprofloxacin HCl (CIPRO) 500 mg tablet Take 1 Tab by mouth two (2) times a day for 5 days. 10 Tab 0    sucralfate (CARAFATE) 100 mg/mL suspension Take 5 mL by mouth four (4) times daily. 414 mL 0    valACYclovir (VALTREX) 1 gram tablet Take 1 Tab by mouth three (3) times daily. 21 Tab 0    gabapentin (NEURONTIN) 100 mg capsule Take 1 Cap by mouth three (3) times daily. 60 Cap 0    RABEprazole (ACIPHEX) 20 mg tablet TAKE 1 TABLET BY MOUTH ONCE DAILY 90 Tab 2    promethazine (PHENERGAN) 25 mg tablet Take 1 Tab by mouth every six (6) hours as needed. 12 Tab 0    traMADol (ULTRAM) 50 mg tablet Take 1 Tab by mouth every six (6) hours as needed for Pain. Max Daily Amount: 200 mg. 4 Tab 0    acetaminophen (TYLENOL EXTRA STRENGTH) 500 mg tablet Take 2 Tabs by mouth every six (6) hours as needed for Pain. 40 Tab 0    ondansetron (ZOFRAN ODT) 4 mg disintegrating tablet Take 1 Tab by mouth every eight (8) hours as needed for Nausea.  10 Tab 0    insulin degludec (TRESIBA FLEXTOUCH U-100) 100 unit/mL (3 mL) inpn 25 Units by SubCUTAneous route daily. 10 Pen 5    insulin aspart (NOVOLOG) 100 unit/mL inpn Take 8 units with each meal. 10 Pen 11    amLODIPine (NORVASC) 10 mg tablet Take 1 Tab by mouth daily. 90 Tab 3    metoprolol succinate (TOPROL-XL) 200 mg XL tablet Take 1 Tab by mouth daily. 90 Tab 3    losartan (COZAAR) 100 mg tablet Take 1 Tab by mouth daily. 90 Tab 3    hydrALAZINE (APRESOLINE) 50 mg tablet Take 0.5 Tabs by mouth four (4) times daily. 180 Tab 3    hydroCHLOROthiazide (HYDRODIURIL) 25 mg tablet Take 1 Tab by mouth daily. 90 Tab 3    atorvastatin (LIPITOR) 40 mg tablet Take 1 Tab by mouth daily. 90 Tab 3    metFORMIN (GLUCOPHAGE) 850 mg tablet Take 1 Tab by mouth two (2) times daily (with meals). 180 Tab 3    levothyroxine (SYNTHROID) 125 mcg tablet Take 1 Tab by mouth Daily (before breakfast). 90 Tab 3    clopidogrel (PLAVIX) 75 mg tab Take 1 Tab by mouth daily. 90 Tab 3    buPROPion (WELLBUTRIN) 100 mg tablet Take 1 Tab by mouth daily. 90 Tab 3    citalopram (CELEXA) 40 mg tablet Take 1 Tab by mouth daily. 90 Tab 3    Insulin Needles, Disposable, (DORA PEN NEEDLE) 32 gauge x 5/32\" ndle Use one needle to give insulin 4 times a day. 400 Pen Needle 15    aspirin delayed-release 81 mg tablet Take  by mouth daily. Allergies   Allergen Reactions    Contrast Agent [Iodine] Rash and Sneezing       Past Medical History:   Diagnosis Date    Anxiety     CAD (coronary artery disease)     S/P Coronary stents ( LAD and Lcx)    Depression     Diabetes ([facility] [address]     Hepatitis C     Hypercholesterolemia     Hypertension         Past Surgical History:   Procedure Laterality Date    HX BREAST BIOPSY      1971 2010 left breast lump removed excisional per patient     [address]    pre-eclampsia    HX CHOLECYSTECTOMY  2004    HX CORONARY STENT PLACEMENT  Nov 2013    4 stents    HX HEENT  2009    thyroidectomy due to nodules.      HX HYSTERECTOMY  2005    heavy periods    HX THYROIDECTOMY Family History   Problem Relation Age of Onset    Diabetes Mother     Hypertension Mother     Cancer Mother     Heart Disease Mother     Heart Attack Mother     Diabetes Father     Hypertension Father     Cancer Father        Social History     Social History    Marital status:      Spouse name: N/A    Number of children: N/A    Years of education: N/A     Occupational History    Not on file. Social History Main Topics    Smoking status: Never Smoker    Smokeless tobacco: Never Used    Alcohol use No    Drug use: No    Sexual activity: Not Currently     Other Topics Concern    Not on file     Social History Narrative       REVIEW OF SYSTEM:  Review of Systems   Constitutional: Positive for malaise/fatigue. Negative for chills and fever. Eyes: Negative for blurred vision. Respiratory: Negative for shortness of breath. Cardiovascular: Negative for chest pain, palpitations and leg swelling. Gastrointestinal: Positive for abdominal pain, nausea and vomiting. Negative for constipation and diarrhea. Genitourinary: Positive for flank pain (right flank pain). Neurological: Positive for sensory change (pt reports increased tenderness with numbness in the right buttock). Negative for headaches. Objective:     Visit Vitals    /88 (BP 1 Location: Right arm, BP Patient Position: Sitting)    Pulse 90    Temp 99.1 °F (37.3 °C) (Oral)    Resp 16    Ht 5' 2\" (1.575 m)    Wt 167 lb (75.8 kg)    SpO2 96%    BMI 30.54 kg/m2       PHYSICAL EXAM:  Physical Exam   Constitutional: She is oriented to person, place, and time and well-developed, well-nourished, and in no distress. HENT:   Right Ear: Tympanic membrane, external ear and ear canal normal.   Left Ear: Tympanic membrane, external ear and ear canal normal.   Nose: Mucosal edema (left nostril) present. Mouth/Throat: Oropharynx is clear and moist.   Eyes: Pupils are equal, round, and reactive to light.    Neck: Normal range of motion. Neck supple. No thyromegaly present. Cardiovascular: Normal rate, regular rhythm, normal heart sounds and intact distal pulses. No murmur heard. Pulmonary/Chest: Effort normal and breath sounds normal. She has no wheezes. Abdominal: Soft. Normal appearance and bowel sounds are normal. There is tenderness in the right upper quadrant, right lower quadrant, epigastric area and suprapubic area. There is CVA tenderness (right sided). Musculoskeletal:        Lumbar back: She exhibits tenderness. Back:         Legs:  Neurological: She is alert and oriented to person, place, and time. GCS score is 15. Skin: Skin is warm and dry. Vitals reviewed. Assessment/Plan:       ICD-10-CM ICD-9-CM    1. Flank pain R10.9 789.09 CULTURE, URINE      ciprofloxacin HCl (CIPRO) 500 mg tablet      REFERRAL TO GENERAL SURGERY   2. Elevated glucose R73.09 790.29 HEMOGLOBIN A1C WITH EAG      METABOLIC PANEL, COMPREHENSIVE   3. Hyponatremia G00.7 893.7 METABOLIC PANEL, COMPREHENSIVE   4. Hypochloremia W02.9 508.7 METABOLIC PANEL, COMPREHENSIVE   5. Right upper quadrant abdominal pain R10.11 789.01 REFERRAL TO GENERAL SURGERY   6. Right lower quadrant abdominal pain R10.31 789.03 REFERRAL TO GENERAL SURGERY   7. Epigastric abdominal pain R10.13 789.06 sucralfate (CARAFATE) 100 mg/mL suspension   8. Herpes zoster with complication G10.3 662.5 valACYclovir (VALTREX) 1 gram tablet      gabapentin (NEURONTIN) 100 mg capsule     Patient advised to avoid spicy and greasy foods. Instructed to eat a bland diet. Take Aciphex 30 minutes prior to Carafate. Take Carafate 30 minutes prior to eating and taking additional medications. Will stop Metformin while stomach upset persists. Tresiba increased to 35 units daily. Continue Novolog as previously prescribed. Patient instructed to start Gabapentin at 1 tablet daily with increase of 1 tablet each sequential day to total of 3 tablets at day.  Patient advised if medication makes her drowsy she can take 3 tablets once nightly. Patient referred to surgery, may need exploratory surgery to investigate abdominal pain. ? adhesions vs other ailment not visible on recent imaging studies. Patient given opportunity to ask questions. Questions answered. Patient understands plan of care. Follow-up Disposition:  Return in about 1 week (around 6/1/2017). Written by Freda Lockett, as dictated by Layton Honeycutt DO.    I, Dr. Layton Honeycutt, confirm that all documentation is accurate.

## 2017-05-25 NOTE — PATIENT INSTRUCTIONS
Diabetes:     Stop metformin. For now. Increase Tresiba to 35 units daily. Shingles:     Start valacyclovir one tablet 3 times a day. Start gabapentin. One tablet 3 times per day. If you feel sleepy you can take 2-3 tablets at bedtime once a day. Stomach pain:   Take Carafate 30 minutes prior to eating. Flank pain:   Ciprofloxacin 500 mg twice a day for 5 days.

## 2017-05-25 NOTE — LETTER
NOTIFICATION RETURN TO WORK / SCHOOL 
 
5/25/2017 12:26 PM 
 
Ms. Weston Hunt [address] [facility] [address] [id number] To Whom It May Concern: 
 
Weston Hunt is currently under the care of Neelam Lee seen in the office 05/25/17 Maty Carr She will return to work/school on: Saturday, May 27, 2017 If there are questions or concerns please have the patient contact our office.  
 
 
 
Sincerely, 
 
 
Yeny Dejesus, DO

## 2017-05-25 NOTE — MR AVS SNAPSHOT
Visit Information Date & Time Provider Department Dept. Phone Encounter #  
 5/25/2017 12:20 PM Johny Hawkins [facility] [phone] [id number] Follow-up Instructions Return in about 1 week (around 6/1/2017). Your Appointments 6/6/2017  3:15 PM  
Follow Up with Jennifer Cody MD  
Cardio Specialist at [facility]/[address]) Appt Note: 6 month f/u per [facility] [address] Upcoming Health Maintenance Date Due  
 EYE EXAM RETINAL OR DILATED Q1 8/24/1970 DTaP/Tdap/Td series (1 - Tdap) 8/24/1981 PAP AKA CERVICAL CYTOLOGY 8/24/1981 FOBT Q 1 YEAR AGE 50-75 8/24/2010 INFLUENZA AGE 9 TO ADULT 8/1/2017 HEMOGLOBIN A1C Q6M 10/4/2017 FOOT EXAM Q1 10/25/2017 MICROALBUMIN Q1 1/25/2018 LIPID PANEL Q1 1/25/2018 BREAST CANCER SCRN MAMMOGRAM 11/16/2018 Allergies as of 5/25/2017  Review Complete On: 5/25/2017 By: [place] Band, LPN Severity Noted Reaction Type Reactions Contrast Agent [Iodine]  06/24/2014    Rash, Sneezing Current Immunizations  Never Reviewed Name Date Influenza Vaccine 9/1/2016 Influenza Vaccine (Madin Shelby Canine Kidney) PF 2/5/2015  3:56 PM  
 Influenza Vaccine (Quad) PF 11/30/2015 Pneumococcal Polysaccharide (PPSV-23) 10/25/2016 Not reviewed this visit You Were Diagnosed With   
  
 Codes Comments Flank pain    -  Primary ICD-10-CM: R10.9 ICD-9-CM: 789.09 Elevated glucose     ICD-10-CM: R73.09 
ICD-9-CM: 790.29 Hyponatremia     ICD-10-CM: E87.1 ICD-9-CM: 276.1 Hypochloremia     ICD-10-CM: E87.8 ICD-9-CM: 276.9 Right upper quadrant abdominal pain     ICD-10-CM: R10.11 ICD-9-CM: 789.01 Right lower quadrant abdominal pain     ICD-10-CM: R10.31 ICD-9-CM: 789.03 Epigastric abdominal pain     ICD-10-CM: R10.13 ICD-9-CM: 789.06 Herpes zoster with complication     BPN-57-TZ: B02.8 ICD-9-CM: 053.8 Vitals BP Pulse Temp Resp Height(growth percentile) Weight(growth percentile) 146/88 (BP 1 Location: Right arm, BP Patient Position: Sitting) 90 99.1 °F (37.3 °C) (Oral) 16 5' 2\" (1.575 m) 167 lb (75.8 kg) SpO2 BMI OB Status Smoking Status 96% 30.54 kg/m2 Hysterectomy Never Smoker Vitals History BMI and BSA Data Body Mass Index Body Surface Area 30.54 kg/m 2 1.82 m 2 Preferred Pharmacy Pharmacy Name Phone [facility] [place] [address] [facility] [address] [facility] [phone] Your Updated Medication List  
  
   
This list is accurate as of: 5/25/17 12:25 PM.  Always use your most recent med list.  
  
  
  
  
 acetaminophen 500 mg tablet Commonly known as:  [address] El[address] Kohli [facility] Take 2 Tabs by mouth every six (6) hours as needed for Pain. amLODIPine 10 mg tablet Commonly known as:  Micki Fast Take 1 Tab by mouth daily. aspirin delayed-release 81 mg tablet Take  by mouth daily. atorvastatin 40 mg tablet Commonly known as:  LIPITOR Take 1 Tab by mouth daily. buPROPion 100 mg tablet Commonly known as:  [facility] Take 1 Tab by mouth daily. ciprofloxacin HCl 500 mg tablet Commonly known as:  CIPRO Take 1 Tab by mouth two (2) times a day for 5 days. citalopram 40 mg tablet Commonly known as:  Sallye Force Take 1 Tab by mouth daily. clopidogrel 75 mg Tab Commonly known as:  PLAVIX Take 1 Tab by mouth daily. gabapentin 100 mg capsule Commonly known as:  NEURONTIN Take 1 Cap by mouth three (3) times daily. hydrALAZINE 50 mg tablet Commonly known as:  APRESOLINE Take 0.5 Tabs by mouth four (4) times daily. hydroCHLOROthiazide 25 mg tablet Commonly known as:  HYDRODIURIL Take 1 Tab by mouth daily. insulin aspart 100 unit/mL Inpn Commonly known as:  Eliana Flow Take 8 units with each meal.  
  
 insulin degludec 100 unit/mL (3 mL) Inpn Commonly known as:  TRESIBA FLEXTOUCH U-100  
25 Units by SubCUTAneous route daily. Insulin Needles (Disposable) 32 gauge x 5/32\" Ndle Commonly known as:  Mona Pen Needle Use one needle to give insulin 4 times a day. levothyroxine 125 mcg tablet Commonly known as:  SYNTHROID Take 1 Tab by mouth Daily (before breakfast). losartan 100 mg tablet Commonly known as:  COZAAR Take 1 Tab by mouth daily. metFORMIN 850 mg tablet Commonly known as:  GLUCOPHAGE Take 1 Tab by mouth two (2) times daily (with meals). metoprolol succinate 200 mg XL tablet Commonly known as:  TOPROL-XL Take 1 Tab by mouth daily. ondansetron 4 mg disintegrating tablet Commonly known as:  ZOFRAN ODT Take 1 Tab by mouth every eight (8) hours as needed for Nausea. promethazine 25 mg tablet Commonly known as:  PHENERGAN Take 1 Tab by mouth every six (6) hours as needed. RABEprazole 20 mg tablet Commonly known as:  ACIPHEX  
TAKE 1 TABLET BY MOUTH ONCE DAILY  
  
 sucralfate 100 mg/mL suspension Commonly known as:  Ana Reynolds Take 5 mL by mouth four (4) times daily. traMADol 50 mg tablet Commonly known as:  ULTRAM  
Take 1 Tab by mouth every six (6) hours as needed for Pain. Max Daily Amount: 200 mg.  
  
 valACYclovir 1 gram tablet Commonly known as:  VALTREX Take 1 Tab by mouth three (3) times daily. Prescriptions Sent to Pharmacy Refills  
 ciprofloxacin HCl (CIPRO) 500 mg tablet 0 Sig: Take 1 Tab by mouth two (2) times a day for 5 days. Class: Normal  
 Pharmacy: [facility] [address] [place] [address], [address] [place] [address] #: [phone] Route: Oral  
 sucralfate (CARAFATE) 100 mg/mL suspension 0 Sig: Take 5 mL by mouth four (4) times daily.   
 Class: Normal  
 Pharmacy: [facility] [address] [place] [address], [address] Ph #: [phone] Route: Oral  
 valACYclovir (VALTREX) 1 gram tablet 0 Sig: Take 1 Tab by mouth three (3) times daily. Class: Normal  
 Pharmacy: [facility] [address] [place] [address], [address] Ph #: [phone] Route: Oral  
 gabapentin (NEURONTIN) 100 mg capsule 0 Sig: Take 1 Cap by mouth three (3) times daily. Class: Normal  
 Pharmacy: [facility] [address] [place] [address], [address] [place] [address] Ph #: [phone] Route: Oral  
  
We Performed the Following REFERRAL TO GENERAL SURGERY [REF27 Custom] Comments:  
 Please evaluate patient for abdominal pain for 3 years becoming increasingly worse. Negative imaging studies. Follow-up Instructions Return in about 1 week (around 6/1/2017). To-Do List   
 05/25/2017 Microbiology:  CULTURE, URINE   
  
 05/25/2017 Lab:  HEMOGLOBIN A1C WITH EAG   
  
 05/25/2017 Lab:  METABOLIC PANEL, COMPREHENSIVE Referral Information Referral ID Referred By Referred To  
  
 [id number] Leana Magallon MD   
   [address] [address] Phone: [phone] Fax: [phone] Visits Status Start Date End Date 1 New Request 5/25/17 5/25/18 If your referral has a status of pending review or denied, additional information will be sent to support the outcome of this decision. Patient Instructions Diabetes:  
 
Stop metformin. For now. Increase Tresiba to 35 units daily. Shingles:  
 
Start valacyclovir one tablet 3 times a day. Start gabapentin. One tablet 3 times per day. If you feel sleepy you can take 2-3 tablets at bedtime once a day. Stomach pain:  
Take Carafate 30 minutes prior to eating. Flank pain:  
Ciprofloxacin 500 mg twice a day for 5 days. Introducing [facility]! Dear Luis Alberto Portillo: 
Thank you for requesting a [email or web address] account. Our records indicate that you already have an active [email or web address] account. You can access your account anytime at [email or web address] Did you know that you can access your hospital and ER discharge instructions at any time in [email or web address]? You can also review all of your test results from your hospital stay or ER visit. Additional Information If you have questions, please visit the Frequently Asked Questions section of the [email or web address] website at [email or web address] Remember, [email or web address] is NOT to be used for urgent needs. For medical emergencies, dial 911. Now available from your iPhone and Android! Please provide this summary of care documentation to your next provider. Your primary care clinician is listed as Kristin Workman. If you have any questions after today's visit, please call [phone].

## 2017-05-26 ENCOUNTER — HOSPITAL ENCOUNTER (OUTPATIENT)
Dept: LAB | Age: 57
Discharge: HOME | End: 2017-05-26
Payer: OTHER GOVERNMENT

## 2017-05-26 DIAGNOSIS — R10.9 FLANK PAIN: ICD-10-CM

## 2017-05-26 PROCEDURE — 87086 URINE CULTURE/COLONY COUNT: CPT | Performed by: FAMILY MEDICINE

## 2017-05-28 LAB
BACTERIA SPEC CULT: NORMAL
SERVICE CMNT-IMP: NORMAL

## 2017-05-29 RX ORDER — GABAPENTIN 100 MG/1
CAPSULE ORAL
Qty: 90 CAP | Refills: 3 | Status: SHIPPED | OUTPATIENT
Start: 2017-05-29 | End: 2017-06-13 | Stop reason: SDUPTHER

## 2017-05-30 ENCOUNTER — HOSPITAL ENCOUNTER (EMERGENCY)
Age: 57
Discharge: HOME | End: 2017-05-30
Attending: EMERGENCY MEDICINE | Admitting: EMERGENCY MEDICINE
Payer: OTHER GOVERNMENT

## 2017-05-30 VITALS
HEART RATE: 90 BPM | TEMPERATURE: 98.2 F | DIASTOLIC BLOOD PRESSURE: 102 MMHG | RESPIRATION RATE: 20 BRPM | SYSTOLIC BLOOD PRESSURE: 183 MMHG | OXYGEN SATURATION: 98 %

## 2017-05-30 DIAGNOSIS — R11.2 NON-INTRACTABLE VOMITING WITH NAUSEA, UNSPECIFIED VOMITING TYPE: ICD-10-CM

## 2017-05-30 DIAGNOSIS — R10.9 CHRONIC ABDOMINAL PAIN: Primary | ICD-10-CM

## 2017-05-30 DIAGNOSIS — R03.0 ELEVATED BLOOD PRESSURE READING: ICD-10-CM

## 2017-05-30 DIAGNOSIS — G89.29 CHRONIC ABDOMINAL PAIN: Primary | ICD-10-CM

## 2017-05-30 LAB
ALBUMIN SERPL BCP-MCNC: 3.3 G/DL (ref 3.4–5)
ALBUMIN/GLOB SERPL: 0.6 {RATIO} (ref 0.8–1.7)
ALP SERPL-CCNC: 125 U/L (ref 45–117)
ALT SERPL-CCNC: 29 U/L (ref 13–56)
ANION GAP BLD CALC-SCNC: 13 MMOL/L (ref 3–18)
APPEARANCE UR: ABNORMAL
AST SERPL W P-5'-P-CCNC: 24 U/L (ref 15–37)
BACTERIA URNS QL MICRO: ABNORMAL /HPF
BASOPHILS # BLD AUTO: 0 K/UL (ref 0–0.06)
BASOPHILS # BLD: 0 % (ref 0–2)
BILIRUB SERPL-MCNC: 0.6 MG/DL (ref 0.2–1)
BILIRUB UR QL: NEGATIVE
BUN SERPL-MCNC: 23 MG/DL (ref 7–18)
BUN/CREAT SERPL: 21 (ref 12–20)
CALCIUM SERPL-MCNC: 9.2 MG/DL (ref 8.5–10.1)
CHLORIDE SERPL-SCNC: 93 MMOL/L (ref 100–108)
CO2 SERPL-SCNC: 29 MMOL/L (ref 21–32)
COLOR UR: YELLOW
CREAT SERPL-MCNC: 1.07 MG/DL (ref 0.6–1.3)
DIFFERENTIAL METHOD BLD: ABNORMAL
EOSINOPHIL # BLD: 0 K/UL (ref 0–0.4)
EOSINOPHIL NFR BLD: 0 % (ref 0–5)
EPITH CASTS URNS QL MICRO: ABNORMAL /LPF (ref 0–5)
ERYTHROCYTE [DISTWIDTH] IN BLOOD BY AUTOMATED COUNT: 11 % (ref 11.6–14.5)
GLOBULIN SER CALC-MCNC: 5.2 G/DL (ref 2–4)
GLUCOSE SERPL-MCNC: 344 MG/DL (ref 74–99)
GLUCOSE UR STRIP.AUTO-MCNC: >1000 MG/DL
HCT VFR BLD AUTO: 40.5 % (ref 35–45)
HGB BLD-MCNC: 14.5 G/DL (ref 12–16)
HGB UR QL STRIP: NEGATIVE
KETONES UR QL STRIP.AUTO: ABNORMAL MG/DL
LEUKOCYTE ESTERASE UR QL STRIP.AUTO: NEGATIVE
LIPASE SERPL-CCNC: 121 U/L (ref 73–393)
LYMPHOCYTES # BLD AUTO: 30 % (ref 21–52)
LYMPHOCYTES # BLD: 2.1 K/UL (ref 0.9–3.6)
MCH RBC QN AUTO: 30.7 PG (ref 24–34)
MCHC RBC AUTO-ENTMCNC: 35.8 G/DL (ref 31–37)
MCV RBC AUTO: 85.6 FL (ref 74–97)
MONOCYTES # BLD: 0.4 K/UL (ref 0.05–1.2)
MONOCYTES NFR BLD AUTO: 6 % (ref 3–10)
NEUTS SEG # BLD: 4.5 K/UL (ref 1.8–8)
NEUTS SEG NFR BLD AUTO: 64 % (ref 40–73)
NITRITE UR QL STRIP.AUTO: NEGATIVE
PH UR STRIP: 6.5 [PH] (ref 5–8)
PLATELET # BLD AUTO: 230 K/UL (ref 135–420)
PMV BLD AUTO: 9.7 FL (ref 9.2–11.8)
POTASSIUM SERPL-SCNC: 3.4 MMOL/L (ref 3.5–5.5)
PROT SERPL-MCNC: 8.5 G/DL (ref 6.4–8.2)
PROT UR STRIP-MCNC: 300 MG/DL
RBC # BLD AUTO: 4.73 M/UL (ref 4.2–5.3)
RBC #/AREA URNS HPF: ABNORMAL /HPF (ref 0–5)
SODIUM SERPL-SCNC: 135 MMOL/L (ref 136–145)
SP GR UR REFRACTOMETRY: >1.03 (ref 1–1.03)
UROBILINOGEN UR QL STRIP.AUTO: 1 EU/DL (ref 0.2–1)
WBC # BLD AUTO: 7 K/UL (ref 4.6–13.2)
WBC URNS QL MICRO: ABNORMAL /HPF (ref 0–4)

## 2017-05-30 PROCEDURE — 74011250636 HC RX REV CODE- 250/636: Performed by: PHYSICIAN ASSISTANT

## 2017-05-30 PROCEDURE — 99284 EMERGENCY DEPT VISIT MOD MDM: CPT

## 2017-05-30 PROCEDURE — 85025 COMPLETE CBC W/AUTO DIFF WBC: CPT | Performed by: EMERGENCY MEDICINE

## 2017-05-30 PROCEDURE — 96375 TX/PRO/DX INJ NEW DRUG ADDON: CPT

## 2017-05-30 PROCEDURE — 83690 ASSAY OF LIPASE: CPT | Performed by: EMERGENCY MEDICINE

## 2017-05-30 PROCEDURE — 96374 THER/PROPH/DIAG INJ IV PUSH: CPT

## 2017-05-30 PROCEDURE — 80053 COMPREHEN METABOLIC PANEL: CPT | Performed by: EMERGENCY MEDICINE

## 2017-05-30 PROCEDURE — 81001 URINALYSIS AUTO W/SCOPE: CPT | Performed by: EMERGENCY MEDICINE

## 2017-05-30 PROCEDURE — 96376 TX/PRO/DX INJ SAME DRUG ADON: CPT

## 2017-05-30 PROCEDURE — 96361 HYDRATE IV INFUSION ADD-ON: CPT

## 2017-05-30 RX ORDER — HYDROMORPHONE HYDROCHLORIDE 1 MG/ML
1 INJECTION, SOLUTION INTRAMUSCULAR; INTRAVENOUS; SUBCUTANEOUS
Status: COMPLETED | OUTPATIENT
Start: 2017-05-30 | End: 2017-05-30

## 2017-05-30 RX ORDER — ONDANSETRON 2 MG/ML
4 INJECTION INTRAMUSCULAR; INTRAVENOUS
Status: COMPLETED | OUTPATIENT
Start: 2017-05-30 | End: 2017-05-30

## 2017-05-30 RX ADMIN — HYDROMORPHONE HYDROCHLORIDE 1 MG: 1 INJECTION, SOLUTION INTRAMUSCULAR; INTRAVENOUS; SUBCUTANEOUS at 09:30

## 2017-05-30 RX ADMIN — ONDANSETRON 4 MG: 2 INJECTION INTRAMUSCULAR; INTRAVENOUS at 06:15

## 2017-05-30 RX ADMIN — SODIUM CHLORIDE 1000 ML: 900 INJECTION, SOLUTION INTRAVENOUS at 07:45

## 2017-05-30 RX ADMIN — ONDANSETRON 4 MG: 2 SOLUTION INTRAMUSCULAR; INTRAVENOUS at 07:45

## 2017-05-30 RX ADMIN — SODIUM CHLORIDE 1000 ML: 900 INJECTION, SOLUTION INTRAVENOUS at 06:20

## 2017-05-30 RX ADMIN — HYDROMORPHONE HYDROCHLORIDE 1 MG: 1 INJECTION, SOLUTION INTRAMUSCULAR; INTRAVENOUS; SUBCUTANEOUS at 06:17

## 2017-05-30 NOTE — ED TRIAGE NOTES
Alert female reports continued rt abd pain, +nausea/vomiting, pain 10/10 and constant. Has had recent negative workup for pain. Pt reports also currently has shingles.

## 2017-05-30 NOTE — DISCHARGE INSTRUCTIONS
Abdominal Pain: Care Instructions  Your Care Instructions    Abdominal pain has many possible causes. Some aren't serious and get better on their own in a few days. Others need more testing and treatment. If your pain continues or gets worse, you need to be rechecked and may need more tests to find out what is wrong. You may need surgery to correct the problem. Don't ignore new symptoms, such as fever, nausea and vomiting, urination problems, pain that gets worse, and dizziness. These may be signs of a more serious problem. Your doctor may have recommended a follow-up visit in the next 8 to 12 hours. If you are not getting better, you may need more tests or treatment. The doctor has checked you carefully, but problems can develop later. If you notice any problems or new symptoms, get medical treatment right away. Follow-up care is a key part of your treatment and safety. Be sure to make and go to all appointments, and call your doctor if you are having problems. It's also a good idea to know your test results and keep a list of the medicines you take. How can you care for yourself at home? · Rest until you feel better. · To prevent dehydration, drink plenty of fluids, enough so that your urine is light yellow or clear like water. Choose water and other caffeine-free clear liquids until you feel better. If you have kidney, heart, or liver disease and have to limit fluids, talk with your doctor before you increase the amount of fluids you drink. · If your stomach is upset, eat mild foods, such as rice, dry toast or crackers, bananas, and applesauce. Try eating several small meals instead of two or three large ones. · Wait until 48 hours after all symptoms have gone away before you have spicy foods, alcohol, and drinks that contain caffeine. · Do not eat foods that are high in fat. · Avoid anti-inflammatory medicines such as aspirin, ibuprofen (Advil, Motrin), and naproxen (Aleve).  These can cause stomach upset. Talk to your doctor if you take daily aspirin for another health problem. When should you call for help? Call 911 anytime you think you may need emergency care. For example, call if:  · You passed out (lost consciousness). · You pass maroon or very bloody stools. · You vomit blood or what looks like coffee grounds. · You have new, severe belly pain. Call your doctor now or seek immediate medical care if:  · Your pain gets worse, especially if it becomes focused in one area of your belly. · You have a new or higher fever. · Your stools are black and look like tar, or they have streaks of blood. · You have unexpected vaginal bleeding. · You have symptoms of a urinary tract infection. These may include:  ¨ Pain when you urinate. ¨ Urinating more often than usual.  ¨ Blood in your urine. · You are dizzy or lightheaded, or you feel like you may faint. Watch closely for changes in your health, and be sure to contact your doctor if:  · You are not getting better after 1 day (24 hours). Where can you learn more? Go to http://staciCertificationPointzeinab.info/. Enter W656 in the search box to learn more about \"Abdominal Pain: Care Instructions. \"  Current as of: May 27, 2016  Content Version: 11.2  © 9378-8970 [employer]. Care instructions adapted under license by [employer] (which disclaims liability or warranty for this information). If you have questions about a medical condition or this instruction, always ask your healthcare professional. [place] [address] any warranty or liability for your use of this information. Nausea and Vomiting: Care Instructions  Your Care Instructions    When you are nauseated, you may feel weak and sweaty and notice a lot of saliva in your mouth. Nausea often leads to vomiting. Most of the time you do not need to worry about nausea and vomiting, but they can be signs of other illnesses.   Two common causes of nausea and vomiting are stomach flu and food poisoning. Nausea and vomiting from viral stomach flu will usually start to improve within 24 hours. Nausea and vomiting from food poisoning may last from 12 to 48 hours. The doctor has checked you carefully, but problems can develop later. If you notice any problems or new symptoms, get medical treatment right away. Follow-up care is a key part of your treatment and safety. Be sure to make and go to all appointments, and call your doctor if you are having problems. It's also a good idea to know your test results and keep a list of the medicines you take. How can you care for yourself at home? · To prevent dehydration, drink plenty of fluids, enough so that your urine is light yellow or clear like water. Choose water and other caffeine-free clear liquids until you feel better. If you have kidney, heart, or liver disease and have to limit fluids, talk with your doctor before you increase the amount of fluids you drink. · Rest in bed until you feel better. · When you are able to eat, try clear soups, mild foods, and liquids until all symptoms are gone for 12 to 48 hours. Other good choices include dry toast, crackers, cooked cereal, and gelatin dessert, such as Jell-O. When should you call for help? Call 911 anytime you think you may need emergency care. For example, call if:  · You passed out (lost consciousness). Call your doctor now or seek immediate medical care if:  · You have symptoms of dehydration, such as:  ¨ Dry eyes and a dry mouth. ¨ Passing only a little dark urine. ¨ Feeling thirstier than usual.  · You have new or worsening belly pain. · You have a new or higher fever. · You vomit blood or what looks like coffee grounds. Watch closely for changes in your health, and be sure to contact your doctor if:  · You have ongoing nausea and vomiting. · Your vomiting is getting worse. · Your vomiting lasts longer than 2 days.   · You are not getting better as expected. Where can you learn more? Go to http://staci-zeinab.info/. Enter 25 [id number] in the search box to learn more about \"Nausea and Vomiting: Care Instructions. \"  Current as of: May 27, 2016  Content Version: 11.2  © 6871-4311 [employer], [employer]. Care instructions adapted under license by [employer] (which disclaims liability or warranty for this information). If you have questions about a medical condition or this instruction, always ask your healthcare professional. [place] [address] any warranty or liability for your use of this information.

## 2017-05-30 NOTE — ED PROVIDER NOTES
Patient is a 64 y.o. female presenting with abdominal pain. The history is provided by the patient. Abdominal Pain      Selam Arreguin is a 64 y.o. female presents with c/o abdominal pain, nausea, vomiting. Right lower side. Pt has long history of abdominal pain with multiple work ups. Pt is scheduled to see surgeon on 6/15/17 and PCP 5/31/17. States has not been able to keep anything down since yesterday. Past Medical History:   Diagnosis Date    Anxiety     CAD (coronary artery disease)     S/P Coronary stents ( LAD and Lcx)    Depression     Diabetes ([address]     Hepatitis C     Hypercholesterolemia     Hypertension        Past Surgical History:   Procedure Laterality Date    HX BREAST BIOPSY      1971 2010 left breast lump removed excisional per patient    HX [address]    pre-eclampsia    HX CHOLECYSTECTOMY  2004    HX CORONARY STENT PLACEMENT  Nov 2013    4 stents    HX HEENT  2009    thyroidectomy due to nodules.  HX HYSTERECTOMY  2005    heavy periods    HX THYROIDECTOMY           Family History:   Problem Relation Age of Onset    Diabetes Mother     Hypertension Mother     Cancer Mother     Heart Disease Mother     Heart Attack Mother     Diabetes Father     Hypertension Father     Cancer Father        Social History     Social History    Marital status:      Spouse name: N/A    Number of children: N/A    Years of education: N/A     Occupational History    Not on file. Social History Main Topics    Smoking status: Never Smoker    Smokeless tobacco: Never Used    Alcohol use No    Drug use: No    Sexual activity: Not Currently     Other Topics Concern    Not on file     Social History Narrative         ALLERGIES: Contrast agent [iodine]    Review of Systems   Gastrointestinal: Positive for abdominal pain. Constitutional:  Denies malaise, fever, chills. Head:  Denies injury. Face:  Denies injury or pain. ENMT:  Denies sore throat. Neck:  Denies injury or pain. Chest:  Denies injury. Cardiac:  Denies chest pain or palpitations. Respiratory:  Denies cough, wheezing, difficulty breathing, shortness of breath. GI/ABD:  pain, nausea, vomiting, Denies injury, diarrhea. :  Denies injury, pain, dysuria or urgency. Back:  Denies injury or pain. Pelvis:  Denies injury or pain. Extremity/MS:  Denies injury or pain. Neuro:  Denies headache, LOC, dizziness, neurologic symptoms/deficits/paresthesias. Skin: Denies injury, rash, itching or skin changes. Vitals:    05/30/17 0536   BP: (!) 165/102   Pulse: (!) 106   Resp: 16   Temp: 98.2 °F (36.8 °C)   SpO2: 99%            Physical Exam   Nursing note and vitals reviewed. CONSTITUTIONAL: Alert, in no apparent distress; well-developed; well-nourished. HEAD:  Normocephalic, atraumatic. EYES: PERRL; EOM's intact. ENTM: Nose: No rhinorrhea; Throat: mucous membranes moist. Posterior pharynx-normal.  Neck:  No JVD, supple without lymphadenopathy. RESP: Chest clear, equal breath sounds. CV: S1 and S2 WNL; No murmurs, gallops or rubs. GI: Abdomen soft and mild diffuse RLQ tenderness, +BS, NG, NR. No masses or organomegaly. UPPER EXT:  Normal inspection. LOWER EXT: Normal inspection. NEURO: strength 5/5 and sym, sensation intact. SKIN: No rashes; Normal for age and stage. PSYCH:  Alert and oriented, normal affect.       MDM  Number of Diagnoses or Management Options  Diagnosis management comments: DDx: gastroenteritis, GERD, hernia, hepatitis, pancreatitis, gallbladder etiology, constipation, adhesions, UTI, pyelo, kidney stones, STD,  [id number]-Nxxg-Savlvc syndrome, preg, ectopic, ovarian cyst, ovarian torsion, tubo-ovarian abscess, appendicitis, diverticulitis, SBO, GI bleed, mesenteric ischemia, AAA, cardiac etiology, musculoskeletal pain/spasm, malignancy  IMPRESSION AND MEDICAL DECISION MAKING:  Based upon the patient's presentation with noted HPI and PE, along with the work up done in the emergency department, I believe that the patient has chronic abdominal pain and nausea. Controled here in ED today. Pt has not been able to keep her HTN meds down for the past two days. Pt has follow up tomorrow with her PCP. The patient will be discharged home. Warning signs of worsening condition were discussed and understood by the patient. Based on patient's age, coexisting illness, exam, and the results of this ED evaluation, the decision to treat as an outpatient was made. Based on the information available at time of discharge, acute pathology requiring immediate intervention was deemed relative unlikely. While it is impossible to completely exclude the possibility of underlying serious disease or worsening of condition, I feel the relative likelihood is extremely low. I discussed this uncertainty with the patient, who understood ED evaluation and treatment and felt comfortable with the outpatient treatment plan. All questions regarding care, test results, and follow up were answered. The patient is stable and appropriate to discharge. They understand that they should return to the emergency department for any new or worsening symptoms. I stressed the importance of follow up for repeat assessment and possibly further evaluation/treatment.            Amount and/or Complexity of Data Reviewed  Clinical lab tests: ordered and reviewed  Review and summarize past medical records: yes    Patient Progress  Patient progress: improved (Pt states that she feels better and is ready to go home at this time.)    ED Course       Procedures      Vitals:  Patient Vitals for the past 12 hrs:   Temp Pulse Resp BP SpO2   05/30/17 0800 - - - (!) 210/99 95 %   05/30/17 0730 - - - (!) 190/98 94 %   05/30/17 0700 - - - (!) 183/91 93 %   05/30/17 0630 - - - (!) 181/91 90 %   05/30/17 0536 98.2 °F (36.8 °C) (!) 106 16 (!) 165/102 99 %         Medications ordered:   Medications   sodium chloride 0.9 % bolus infusion 1,000 mL (0 mL IntraVENous IV Completed 5/30/17 0726)   ondansetron (ZOFRAN) injection 4 mg (4 mg IntraVENous Given 5/30/17 0615)   HYDROmorphone (PF) (DILAUDID) injection 1 mg (1 mg IntraVENous Given 5/30/17 0617)   sodium chloride 0.9 % bolus infusion 1,000 mL (0 mL IntraVENous IV Completed 5/30/17 0935)   ondansetron (ZOFRAN) injection 4 mg (4 mg IntraVENous Given 5/30/17 0745)   HYDROmorphone (PF) (DILAUDID) injection 1 mg (1 mg IntraVENous Given 5/30/17 0930)         Lab findings:  Recent Results (from the past 12 hour(s))   URINALYSIS W/ RFLX MICROSCOPIC    Collection Time: 05/30/17  5:30 AM   Result Value Ref Range    Color YELLOW      Appearance TURBID      Specific gravity >1.030 (H) 1.005 - 1.030    pH (UA) 6.5 5.0 - 8.0      Protein 300 (A) NEG mg/dL    Glucose >1000 (A) NEG mg/dL    Ketone TRACE (A) NEG mg/dL    Bilirubin NEGATIVE  NEG      Blood NEGATIVE  NEG      Urobilinogen 1.0 0.2 - 1.0 EU/dL    Nitrites NEGATIVE  NEG      Leukocyte Esterase NEGATIVE  NEG     URINE MICROSCOPIC ONLY    Collection Time: 05/30/17  5:30 AM   Result Value Ref Range    WBC 0 to 3 0 - 4 /hpf    RBC 0 to 3 0 - 5 /hpf    Epithelial cells 4+ 0 - 5 /lpf    Bacteria 3+ (A) NEG /hpf   CBC WITH AUTOMATED DIFF    Collection Time: 05/30/17  6:10 AM   Result Value Ref Range    WBC 7.0 4.6 - 13.2 K/uL    RBC 4.73 4.20 - 5.30 M/uL    HGB 14.5 12.0 - 16.0 g/dL    HCT 40.5 35.0 - 45.0 %    MCV 85.6 74.0 - 97.0 FL    MCH 30.7 24.0 - 34.0 PG    MCHC 35.8 31.0 - 37.0 g/dL    RDW 11.0 (L) 11.6 - 14.5 %    PLATELET 049 647 - 928 K/uL    MPV 9.7 9.2 - 11.8 FL    NEUTROPHILS 64 40 - 73 %    LYMPHOCYTES 30 21 - 52 %    MONOCYTES 6 3 - 10 %    EOSINOPHILS 0 0 - 5 %    BASOPHILS 0 0 - 2 %    ABS. NEUTROPHILS 4.5 1.8 - 8.0 K/UL    ABS. LYMPHOCYTES 2.1 0.9 - 3.6 K/UL    ABS. MONOCYTES 0.4 0.05 - 1.2 K/UL    ABS. EOSINOPHILS 0.0 0.0 - 0.4 K/UL    ABS.  BASOPHILS 0.0 0.0 - 0.06 K/UL    DF AUTOMATED     METABOLIC PANEL, COMPREHENSIVE Collection Time: 05/30/17  6:10 AM   Result Value Ref Range    Sodium 135 (L) 136 - 145 mmol/L    Potassium 3.4 (L) 3.5 - 5.5 mmol/L    Chloride 93 (L) 100 - 108 mmol/L    CO2 29 21 - 32 mmol/L    Anion gap 13 3.0 - 18 mmol/L    Glucose 344 (H) 74 - 99 mg/dL    BUN 23 (H) 7.0 - 18 MG/DL    Creatinine 1.07 0.6 - 1.3 MG/DL    BUN/Creatinine ratio 21 (H) 12 - 20      GFR est AA >60 >60 ml/min/1.73m2    GFR est non-AA 53 (L) >60 ml/min/1.73m2    Calcium 9.2 8.5 - 10.1 MG/DL    Bilirubin, total 0.6 0.2 - 1.0 MG/DL    ALT (SGPT) 29 13 - 56 U/L    AST (SGOT) 24 15 - 37 U/L    Alk. phosphatase 125 (H) 45 - 117 U/L    Protein, total 8.5 (H) 6.4 - 8.2 g/dL    Albumin 3.3 (L) 3.4 - 5.0 g/dL    Globulin 5.2 (H) 2.0 - 4.0 g/dL    A-G Ratio 0.6 (L) 0.8 - 1.7     LIPASE    Collection Time: 05/30/17  6:10 AM   Result Value Ref Range    Lipase 121 73 - 393 U/L       EKG interpretation by ED Physician:      X-Ray, CT or other radiology findings or impressions:  No orders to display       Progress notes, Consult notes or additional Procedure notes:       Disposition:  Diagnosis:   1. Chronic abdominal pain    2. Non-intractable vomiting with nausea, unspecified vomiting type    3. Elevated blood pressure reading        Disposition:   9:45 AM  Pt reevaluated at this time and is resting comfortably in NAD. Discussed results and findings, as well as, diagnosis and plan for discharge. Pt verbalizes understanding and agreement with plan. All questions addressed at this time.      Follow-up Information     Follow up With Details Comments Contact Info    DO Ravi Martinez on 5/31/2017  [id number] [place] [address]  [phone]      [facility] EMERGENCY DEPT  If symptoms worsen [address] [facility] [address]. [phone]           Patient's Medications   Start Taking    No medications on file   Continue Taking    ACETAMINOPHEN (TYLENOL EXTRA STRENGTH) 500 MG TABLET    Take 2 Tabs by mouth every six (6) hours as needed for Pain. AMLODIPINE (NORVASC) 10 MG TABLET    Take 1 Tab by mouth daily. ASPIRIN DELAYED-RELEASE 81 MG TABLET    Take  by mouth daily. ATORVASTATIN (LIPITOR) 40 MG TABLET    Take 1 Tab by mouth daily. BUPROPION (WELLBUTRIN) 100 MG TABLET    Take 1 Tab by mouth daily. CIPROFLOXACIN HCL (CIPRO) 500 MG TABLET    Take 1 Tab by mouth two (2) times a day for 5 days. CITALOPRAM (CELEXA) 40 MG TABLET    Take 1 Tab by mouth daily. CLOPIDOGREL (PLAVIX) 75 MG TAB    Take 1 Tab by mouth daily. GABAPENTIN (NEURONTIN) 100 MG CAPSULE    TAKE 1 CAPSULE BY MOUTH THREE TIMES DAILY    HYDRALAZINE (APRESOLINE) 50 MG TABLET    Take 0.5 Tabs by mouth four (4) times daily. HYDROCHLOROTHIAZIDE (HYDRODIURIL) 25 MG TABLET    Take 1 Tab by mouth daily. INSULIN ASPART (NOVOLOG) 100 UNIT/ML INPN    Take 8 units with each meal.    INSULIN DEGLUDEC (TRESIBA FLEXTOUCH U-100) 100 UNIT/ML (3 ML) INPN    25 Units by SubCUTAneous route daily. INSULIN NEEDLES, DISPOSABLE, (DORA PEN NEEDLE) 32 GAUGE X 5/32\" NDLE    Use one needle to give insulin 4 times a day. LEVOTHYROXINE (SYNTHROID) 125 MCG TABLET    Take 1 Tab by mouth Daily (before breakfast). LOSARTAN (COZAAR) 100 MG TABLET    Take 1 Tab by mouth daily. METFORMIN (GLUCOPHAGE) 850 MG TABLET    Take 1 Tab by mouth two (2) times daily (with meals). METOPROLOL SUCCINATE (TOPROL-XL) 200 MG XL TABLET    Take 1 Tab by mouth daily. ONDANSETRON (ZOFRAN ODT) 4 MG DISINTEGRATING TABLET    Take 1 Tab by mouth every eight (8) hours as needed for Nausea. PROMETHAZINE (PHENERGAN) 25 MG TABLET    Take 1 Tab by mouth every six (6) hours as needed. RABEPRAZOLE (ACIPHEX) 20 MG TABLET    TAKE 1 TABLET BY MOUTH ONCE DAILY    SUCRALFATE (CARAFATE) 100 MG/ML SUSPENSION    Take 5 mL by mouth four (4) times daily. TRAMADOL (ULTRAM) 50 MG TABLET    Take 1 Tab by mouth every six (6) hours as needed for Pain. Max Daily Amount: 200 mg. VALACYCLOVIR (VALTREX) 1 GRAM TABLET    Take 1 Tab by mouth three (3) times daily.    These Medications have changed    No medications on file   Stop Taking    No medications on file

## 2017-05-31 ENCOUNTER — TELEPHONE (OUTPATIENT)
Dept: FAMILY MEDICINE CLINIC | Age: 57
End: 2017-05-31

## 2017-05-31 ENCOUNTER — HOSPITAL ENCOUNTER (OUTPATIENT)
Dept: CT IMAGING | Age: 57
Discharge: HOME | End: 2017-05-31
Attending: FAMILY MEDICINE
Payer: OTHER GOVERNMENT

## 2017-05-31 ENCOUNTER — OFFICE VISIT (OUTPATIENT)
Dept: FAMILY MEDICINE CLINIC | Age: 57
End: 2017-05-31

## 2017-05-31 VITALS
DIASTOLIC BLOOD PRESSURE: 100 MMHG | OXYGEN SATURATION: 95 % | RESPIRATION RATE: 20 BRPM | TEMPERATURE: 98.9 F | SYSTOLIC BLOOD PRESSURE: 160 MMHG | WEIGHT: 165.4 LBS | HEIGHT: 62 IN | BODY MASS INDEX: 30.44 KG/M2 | HEART RATE: 105 BPM

## 2017-05-31 DIAGNOSIS — N30.90 CYSTITIS: ICD-10-CM

## 2017-05-31 DIAGNOSIS — T78.40XA ALLERGIC REACTION, INITIAL ENCOUNTER: ICD-10-CM

## 2017-05-31 DIAGNOSIS — R10.31 RIGHT LOWER QUADRANT ABDOMINAL PAIN: Primary | ICD-10-CM

## 2017-05-31 DIAGNOSIS — R10.31 RIGHT LOWER QUADRANT ABDOMINAL PAIN: ICD-10-CM

## 2017-05-31 LAB — CREAT UR-MCNC: 0.8 MG/DL (ref 0.6–1.3)

## 2017-05-31 PROCEDURE — 82565 ASSAY OF CREATININE: CPT

## 2017-05-31 PROCEDURE — 74011636320 HC RX REV CODE- 636/320: Performed by: FAMILY MEDICINE

## 2017-05-31 PROCEDURE — 74177 CT ABD & PELVIS W/CONTRAST: CPT

## 2017-05-31 RX ORDER — DIPHENHYDRAMINE HYDROCHLORIDE 50 MG/ML
50 INJECTION, SOLUTION INTRAMUSCULAR; INTRAVENOUS ONCE
Qty: 1 ML | Refills: 0
Start: 2017-05-31 | End: 2017-05-31

## 2017-05-31 RX ORDER — HYDROMORPHONE HYDROCHLORIDE 2 MG/1
2 TABLET ORAL
Qty: 60 TAB | Refills: 0 | Status: SHIPPED | OUTPATIENT
Start: 2017-05-31 | End: 2017-12-28 | Stop reason: ALTCHOICE

## 2017-05-31 RX ORDER — NITROFURANTOIN 25; 75 MG/1; MG/1
100 CAPSULE ORAL 2 TIMES DAILY
Qty: 14 CAP | Refills: 0 | Status: SHIPPED | OUTPATIENT
Start: 2017-05-31 | End: 2019-09-18

## 2017-05-31 RX ADMIN — IOHEXOL 50 ML: 240 INJECTION, SOLUTION INTRATHECAL; INTRAVASCULAR; INTRAVENOUS; ORAL at 13:15

## 2017-05-31 RX ADMIN — IOPAMIDOL 97 ML: 612 INJECTION, SOLUTION INTRAVENOUS at 13:16

## 2017-05-31 NOTE — MR AVS SNAPSHOT
Visit Information Date & Time Provider Department Dept. Phone Encounter #  
 5/31/2017  8:20 AM [address]  [id number] Your Appointments 6/6/2017  3:15 PM  
Follow Up with Dong Sparks MD  
Cardio Specialist at [facility]/[address]) Appt Note: [facility] [address] 6/15/2017  2:00 PM  
New Patient with Jesika Rivera MD  
[address]) Appt Note: Pt ref from [address] [address] [place] [address] [place]  
  
   
 [address] [place] [address] 88 710 [facility] [address] Upcoming Health Maintenance Date Due  
 EYE EXAM RETINAL OR DILATED Q1 8/24/1970 DTaP/Tdap/Td series (1 - Tdap) 8/24/1981 PAP AKA CERVICAL CYTOLOGY 8/24/1981 FOBT Q 1 YEAR AGE 50-75 8/24/2010 INFLUENZA AGE 9 TO ADULT 8/1/2017 FOOT EXAM Q1 10/25/2017 HEMOGLOBIN A1C Q6M 11/25/2017 MICROALBUMIN Q1 1/25/2018 LIPID PANEL Q1 1/25/2018 BREAST CANCER SCRN MAMMOGRAM 11/16/2018 Allergies as of 5/31/2017  Review Complete On: 5/31/2017 By: Devang Laughlin LPN Severity Noted Reaction Type Reactions Contrast Agent [Iodine]  06/24/2014    Rash, Sneezing Current Immunizations  Never Reviewed Name Date Influenza Vaccine 9/1/2016 Influenza Vaccine (Madin [place] Canine Kidney) PF 2/5/2015  3:56 PM  
 Influenza Vaccine (Quad) PF 11/30/2015 Pneumococcal Polysaccharide (PPSV-23) 10/25/2016 Not reviewed this visit You Were Diagnosed With   
  
 Codes Comments Right lower quadrant abdominal pain    -  Primary ICD-10-CM: R10.31 ICD-9-CM: 789.03 Allergic reaction, initial encounter     ICD-10-CM: T78.40XA ICD-9-CM: 508. 3 Vitals BP Pulse Temp Resp Height(growth percentile) Weight(growth percentile) (!) 160/100 (!) 105 98.9 °F (37.2 °C) (Oral) 20 5' 2\" (1.575 m) 165 lb 6.4 oz (75 kg) SpO2 BMI OB Status Smoking Status 95% 30.25 kg/m2 Hysterectomy Never Smoker Vitals History BMI and BSA Data Body Mass Index Body Surface Area  
 30.25 kg/m 2 1.81 m 2 Preferred Pharmacy Pharmacy Name Phone [facility] [place] [address] [phone] Your Updated Medication List  
  
   
This list is accurate as of: 5/31/17 10:43 AM.  Always use your most recent med list.  
  
  
  
  
 acetaminophen 500 mg tablet Commonly known as:  Jr Connie Ibrahim Se Take 2 Tabs by mouth every six (6) hours as needed for Pain. amLODIPine 10 mg tablet Commonly known as:  Angeli Coop Take 1 Tab by mouth daily. aspirin delayed-release 81 mg tablet Take  by mouth daily. atorvastatin 40 mg tablet Commonly known as:  LIPITOR Take 1 Tab by mouth daily. buPROPion 100 mg tablet Commonly known as:  [facility] Take 1 Tab by mouth daily. citalopram 40 mg tablet Commonly known as:  Orbie Curls Take 1 Tab by mouth daily. clopidogrel 75 mg Tab Commonly known as:  PLAVIX Take 1 Tab by mouth daily. diphenhydrAMINE 50 mg/mL injection Commonly known as:  BENADRYL  
1 mL by IntraMUSCular route once for 1 dose. gabapentin 100 mg capsule Commonly known as:  NEURONTIN  
TAKE 1 CAPSULE BY MOUTH THREE TIMES DAILY  
  
 hydrALAZINE 50 mg tablet Commonly known as:  APRESOLINE Take 0.5 Tabs by mouth four (4) times daily. hydroCHLOROthiazide 25 mg tablet Commonly known as:  HYDRODIURIL Take 1 Tab by mouth daily. HYDROmorphone 2 mg tablet Commonly known as:  DILAUDID Take 1 Tab by mouth every four (4) hours as needed for Pain. Max Daily Amount: 12 mg.  
  
 insulin aspart 100 unit/mL Inpn Commonly known as:  Claretta Stratton Take 8 units with each meal.  
  
 insulin degludec 100 unit/mL (3 mL) Inpn Commonly known as:  TRESIBA FLEXTOUCH U-100  
25 Units by SubCUTAneous route daily. Insulin Needles (Disposable) 32 gauge x 5/32\" Ndle Commonly known as:  Mona Pen Needle Use one needle to give insulin 4 times a day. levothyroxine 125 mcg tablet Commonly known as:  SYNTHROID Take 1 Tab by mouth Daily (before breakfast). losartan 100 mg tablet Commonly known as:  COZAAR Take 1 Tab by mouth daily. metFORMIN 850 mg tablet Commonly known as:  GLUCOPHAGE Take 1 Tab by mouth two (2) times daily (with meals). metoprolol succinate 200 mg XL tablet Commonly known as:  TOPROL-XL Take 1 Tab by mouth daily. ondansetron 4 mg disintegrating tablet Commonly known as:  ZOFRAN ODT Take 1 Tab by mouth every eight (8) hours as needed for Nausea. promethazine 25 mg tablet Commonly known as:  PHENERGAN Take 1 Tab by mouth every six (6) hours as needed. RABEprazole 20 mg tablet Commonly known as:  ACIPHEX  
TAKE 1 TABLET BY MOUTH ONCE DAILY  
  
 sucralfate 100 mg/mL suspension Commonly known as:  Valadez David Take 5 mL by mouth four (4) times daily. traMADol 50 mg tablet Commonly known as:  ULTRAM  
Take 1 Tab by mouth every six (6) hours as needed for Pain. Max Daily Amount: 200 mg.  
  
 valACYclovir 1 gram tablet Commonly known as:  VALTREX Take 1 Tab by mouth three (3) times daily. Prescriptions Printed Refills HYDROmorphone (DILAUDID) 2 mg tablet 0 Sig: Take 1 Tab by mouth every four (4) hours as needed for Pain. Max Daily Amount: 12 mg. Class: Print Route: Oral  
  
We Performed the Following DIPHENHYDRAMINE HCL INJECTION <= 50 MG [ [facility] [place]] MD THER/PROPH/DIAG INJECTION, SUBCUT/IM [id number] CPT(R)] To-Do List   
 05/31/2017   Imaging:  CT ABD PELV W CONT   
  
 05/31/2017 1:45 PM  
 Appointment with [facility] CT RM 2 at [facility] RAD CT ([phone]) ORAL CONTRAST/PREP   Oral Contrast/Prep from radiology  no later than one day prior to study date/time. DIET RESTRICTIONS  Nothing to eat or drink, 4 hours prior to study  May have water to take meds  May drink oral contrast if directed  GENERAL INSTRUCTIONS  If you were given premedications for IV contrast to take prior to having your study, please arrange to have someone drive you to your appointment. If you have had a creatinine level drawn within the past 30 days, please bring most recent results to your appt. MEDICATIONS  Bring a complete list of all medications you are currently taking to include prescriptions, over-the-counter meds, herbals, vitamins & any dietary supplements. RELATED STUDY INFORMATION  Bring any films, CDs, and reports related with you on the day of your exam.  This only includes studies done outside of [address] [place] [address], [place] [address], [facility], and [facility]. QUESTIONS  Notify the CT Department if you have any questions concerning your study. [place] [address] - [phone] [facility] [phone] Referral Information Referral ID Referred By Referred To  
  
 [id number] June BOLAÑOS Not Available Visits Status Start Date End Date 1 New Request 5/31/17 5/31/18 If your referral has a status of pending review or denied, additional information will be sent to support the outcome of this decision. Patient Instructions Please have CT with contrast done today. Follow up in the office as directed Introducing [facility]! Dear Barb Escobar: 
Thank you for requesting a [employer] account. Our records indicate that you already have an active [employer] account. You can access your account anytime at [email or web address] Did you know that you can access your hospital and ER discharge instructions at any time in [employer] You can also review all of your test results from your hospital stay or ER visit. Additional Information If you have questions, please visit the Frequently Asked Questions section of the [employer] website at [email or web address] Remember, [employer] is NOT to be used for urgent needs. For medical emergencies, dial 911. Now available from your iPhone and Android! Please provide this summary of care documentation to your next provider. Your primary care clinician is listed as Vishal Wiggins. If you have any questions after today's visit, please call [phone].

## 2017-05-31 NOTE — PROGRESS NOTES
1. Have you been to the ER, urgent care clinic since your last visit? Hospitalized since your last visit? Yes When: 5/30 Where: [facility] Reason for visit: abd    2. Have you seen or consulted any other health care providers outside of the [facility] since your last visit? Include any pap smears or colon screening.  No

## 2017-05-31 NOTE — PROGRESS NOTES
Subjective:     HPI:  Mounika Hermosillo is a 64 y.o. female who presents to the office for follow up on abdominal pain and ED visit. Abdominal Pain: Patient continues to experience right flank pain and nausea x 3 weeks. Patient describes pain as starting in her right lower quadrant and radiating around to her right lower back. She was seen in [facility] ED on 5/30/17 for right sided abdominal pain and nausea. Patient states she has an appointment with the surgeon on 6/15/17. She reports no improvement in pain with cessation of Metformin. Patient reports she had some relief of pain with Dilaudid in the ED but the medication caused nausea/vomiting. She states she has been taking Tramadol and Extra Strength Tylenol at home without relief of pain. Patient reports no improvement in pain with Gabapentin, Acyclovir, or Cipro. She rates pain in office today at 10/10. Of note, patient has lost 9 lbs in the last 10 days. Patient states she had a last bowel movement 1 week ago. She reports bowel movement was normal.     Wt Readings from Last 3 Encounters:   05/31/17 165 lb 6.4 oz (75 kg)   05/25/17 167 lb (75.8 kg)   05/21/17 176 lb (79.8 kg)       Labs Reviewed:   Lab Results   Component Value Date/Time    Sodium 135 05/30/2017 06:10 AM    Potassium 3.4 05/30/2017 06:10 AM    Chloride 93 05/30/2017 06:10 AM    CO2 29 05/30/2017 06:10 AM    Anion gap 13 05/30/2017 06:10 AM    Glucose 344 05/30/2017 06:10 AM    BUN 23 05/30/2017 06:10 AM    Creatinine 1.07 05/30/2017 06:10 AM    BUN/Creatinine ratio 21 05/30/2017 06:10 AM    GFR est AA >60 05/30/2017 06:10 AM    GFR est non-AA 53 05/30/2017 06:10 AM    Calcium 9.2 05/30/2017 06:10 AM    Bilirubin, total 0.6 05/30/2017 06:10 AM    AST (SGOT) 24 05/30/2017 06:10 AM    Alk.  phosphatase 125 05/30/2017 06:10 AM    Protein, total 8.5 05/30/2017 06:10 AM    Albumin 3.3 05/30/2017 06:10 AM    Globulin 5.2 05/30/2017 06:10 AM    A-G Ratio 0.6 05/30/2017 06:10 AM    ALT (SGPT) 29 05/30/2017 06:10 AM     Lab Results   Component Value Date/Time    WBC 7.0 05/30/2017 06:10 AM    Hemoglobin (POC) 13.3 04/04/2017 03:49 PM    HGB 14.5 05/30/2017 06:10 AM    Hematocrit (POC) 39 04/04/2017 03:49 PM    HCT 40.5 05/30/2017 06:10 AM    PLATELET 897 33/56/3971 06:10 AM    MCV 85.6 05/30/2017 06:10 AM     Lab Results   Component Value Date/Time    Lipase 121 05/30/2017 06:10 AM         Current Outpatient Prescriptions   Medication Sig Dispense Refill    diphenhydrAMINE (BENADRYL) 50 mg/mL injection 1 mL by IntraMUSCular route once for 1 dose. 1 mL 0    HYDROmorphone (DILAUDID) 2 mg tablet Take 1 Tab by mouth every four (4) hours as needed for Pain. Max Daily Amount: 12 mg. 60 Tab 0    gabapentin (NEURONTIN) 100 mg capsule TAKE 1 CAPSULE BY MOUTH THREE TIMES DAILY 90 Cap 3    sucralfate (CARAFATE) 100 mg/mL suspension Take 5 mL by mouth four (4) times daily. 414 mL 0    valACYclovir (VALTREX) 1 gram tablet Take 1 Tab by mouth three (3) times daily. 21 Tab 0    RABEprazole (ACIPHEX) 20 mg tablet TAKE 1 TABLET BY MOUTH ONCE DAILY 90 Tab 2    promethazine (PHENERGAN) 25 mg tablet Take 1 Tab by mouth every six (6) hours as needed. 12 Tab 0    traMADol (ULTRAM) 50 mg tablet Take 1 Tab by mouth every six (6) hours as needed for Pain. Max Daily Amount: 200 mg. 4 Tab 0    ondansetron (ZOFRAN ODT) 4 mg disintegrating tablet Take 1 Tab by mouth every eight (8) hours as needed for Nausea. 10 Tab 0    insulin degludec (TRESIBA FLEXTOUCH U-100) 100 unit/mL (3 mL) inpn 25 Units by SubCUTAneous route daily. 10 Pen 5    insulin aspart (NOVOLOG) 100 unit/mL inpn Take 8 units with each meal. 10 Pen 11    amLODIPine (NORVASC) 10 mg tablet Take 1 Tab by mouth daily. 90 Tab 3    metoprolol succinate (TOPROL-XL) 200 mg XL tablet Take 1 Tab by mouth daily. 90 Tab 3    losartan (COZAAR) 100 mg tablet Take 1 Tab by mouth daily.  90 Tab 3    hydrALAZINE (APRESOLINE) 50 mg tablet Take 0.5 Tabs by mouth four (4) times daily. 180 Tab 3    hydroCHLOROthiazide (HYDRODIURIL) 25 mg tablet Take 1 Tab by mouth daily. 90 Tab 3    atorvastatin (LIPITOR) 40 mg tablet Take 1 Tab by mouth daily. 90 Tab 3    metFORMIN (GLUCOPHAGE) 850 mg tablet Take 1 Tab by mouth two (2) times daily (with meals). 180 Tab 3    levothyroxine (SYNTHROID) 125 mcg tablet Take 1 Tab by mouth Daily (before breakfast). 90 Tab 3    clopidogrel (PLAVIX) 75 mg tab Take 1 Tab by mouth daily. 90 Tab 3    buPROPion (WELLBUTRIN) 100 mg tablet Take 1 Tab by mouth daily. 90 Tab 3    citalopram (CELEXA) 40 mg tablet Take 1 Tab by mouth daily. 90 Tab 3    Insulin Needles, Disposable, (DORA PEN NEEDLE) 32 gauge x 5/32\" ndle Use one needle to give insulin 4 times a day. 400 Pen Needle 15    aspirin delayed-release 81 mg tablet Take  by mouth daily.  acetaminophen (TYLENOL EXTRA STRENGTH) 500 mg tablet Take 2 Tabs by mouth every six (6) hours as needed for Pain. 36 Tab 0        Allergies   Allergen Reactions    Contrast Agent [Iodine] Rash and Sneezing       Past Medical History:   Diagnosis Date    Anxiety     CAD (coronary artery disease)     S/P Coronary stents ( LAD and Lcx)    Depression     Diabetes ([facility] [address]     Hepatitis C     Hypercholesterolemia     Hypertension         Past Surgical History:   Procedure Laterality Date    HX BREAST BIOPSY      1971 2010 left breast lump removed excisional per patient     [address]    pre-eclampsia    HX CHOLECYSTECTOMY  2004    HX CORONARY STENT PLACEMENT  Nov 2013    4 stents    HX HEENT  2009    thyroidectomy due to nodules.      HX HYSTERECTOMY  2005    heavy periods    HX THYROIDECTOMY         Family History   Problem Relation Age of Onset    Diabetes Mother     Hypertension Mother     Cancer Mother     Heart Disease Mother     Heart Attack Mother     Diabetes Father     Hypertension Father     Cancer Father        Social History     Social History    Marital status:  Spouse name: N/A    Number of children: N/A    Years of education: N/A     Occupational History    Not on file. Social History Main Topics    Smoking status: Never Smoker    Smokeless tobacco: Never Used    Alcohol use No    Drug use: No    Sexual activity: Not Currently     Other Topics Concern    Not on file     Social History Narrative       REVIEW OF SYSTEM:  Review of Systems   Constitutional: Positive for malaise/fatigue. Negative for chills and fever. Eyes: Negative for blurred vision. Respiratory: Negative for shortness of breath. Cardiovascular: Negative for chest pain, palpitations and leg swelling. Gastrointestinal: Positive for abdominal pain, nausea and vomiting. Negative for constipation and diarrhea. Genitourinary: Positive for flank pain (right flank pain). Neurological: Positive for sensory change (pt reports increased tenderness with numbness in the right buttock). Negative for headaches. Objective:     Visit Vitals    BP (!) 160/100  Comment: manually    Pulse (!) 105    Temp 98.9 °F (37.2 °C) (Oral)    Resp 20    Ht 5' 2\" (1.575 m)    Wt 165 lb 6.4 oz (75 kg)    SpO2 95%    BMI 30.25 kg/m2       PHYSICAL EXAM:  Physical Exam   Constitutional: She is oriented to person, place, and time and well-developed, well-nourished, and in no distress. HENT:   Mouth/Throat: Oropharynx is clear and moist.   Cardiovascular: Normal rate, regular rhythm, normal heart sounds and intact distal pulses. No murmur heard. Pulmonary/Chest: Effort normal and breath sounds normal. She has no wheezes. Abdominal: Soft. Normal appearance. There is tenderness in the right upper quadrant, right lower quadrant, periumbilical area and suprapubic area. There is CVA tenderness (right side). Neurological: She is alert and oriented to person, place, and time. GCS score is 15. Skin: Skin is warm and dry. Vitals reviewed. Assessment/Plan:       ICD-10-CM ICD-9-CM    1. Right lower quadrant abdominal pain R10.31 789.03 CT ABD PELV W CONT      HYDROmorphone (DILAUDID) 2 mg tablet   2. Allergic reaction, initial encounter T78.40XA 995.3 DIPHENHYDRAMINE HCL INJECTION <= 50 MG      [place] THER/PROPH/DIAG INJECTION, SUBCUT/IM   3. Cystitis N30.90 595.9 nitrofurantoin, macrocrystal-monohydrate, (MACROBID) 100 mg capsule      Surgeons' office contacted during patient's office visit today. Surgical nurse come to office to see patient and contacted surgeon. Dr. Tim Lopez came to office to consult today. He recommends a STAT CT abdomen/pelvic with contrast. Patient must have Benadryl prior to contrast dye. 50 mg Benadryl given in office prior to sending patient to Radiology. More than 50% of today's visit was spent with coordination of care and management of patient, face to face consults with specialist.   Patient given opportunity to ask questions. Questions answered. Patient understands plan of care. Follow-up Disposition:  Return for will call patient to discuss results from CT scan. .        Written by Isacc Regalado, as dictated by Fco Vasquez DO.    I, Dr. Fco Vasquez, confirm that all documentation is accurate.

## 2017-05-31 NOTE — LETTER
NOTIFICATION RETURN TO WORK / SCHOOL 
 
5/31/2017 10:42 AM 
 
Ms. Christy Dumont [address] [facility] [phone] To Whom It May Concern: 
 
Christy Dumont is currently under the care of Neelam Lee seen in the office 05/31/17 She will return to work/school on: Monday, June 5, 2017 If there are questions or concerns please have the patient contact our office.  
 
 
 
Sincerely, 
 
 
Shruthi Khan, DO

## 2017-06-06 ENCOUNTER — OFFICE VISIT (OUTPATIENT)
Dept: CARDIOLOGY CLINIC | Age: 57
End: 2017-06-06

## 2017-06-06 VITALS
HEIGHT: 63 IN | WEIGHT: 169 LBS | BODY MASS INDEX: 29.95 KG/M2 | HEART RATE: 107 BPM | SYSTOLIC BLOOD PRESSURE: 173 MMHG | OXYGEN SATURATION: 97 % | DIASTOLIC BLOOD PRESSURE: 101 MMHG

## 2017-06-06 DIAGNOSIS — I10 MALIGNANT HYPERTENSION: ICD-10-CM

## 2017-06-06 DIAGNOSIS — I25.10 CORONARY ARTERY DISEASE INVOLVING NATIVE CORONARY ARTERY OF NATIVE HEART WITHOUT ANGINA PECTORIS: ICD-10-CM

## 2017-06-06 DIAGNOSIS — E78.5 HYPERLIPIDEMIA, UNSPECIFIED HYPERLIPIDEMIA TYPE: Primary | ICD-10-CM

## 2017-06-06 RX ORDER — INSULIN DEGLUDEC 100 U/ML
35 INJECTION, SOLUTION SUBCUTANEOUS DAILY
COMMUNITY
End: 2017-09-14 | Stop reason: SDUPTHER

## 2017-06-06 RX ORDER — ATORVASTATIN CALCIUM 80 MG/1
80 TABLET, FILM COATED ORAL DAILY
Qty: 30 TAB | Refills: 9 | Status: SHIPPED | OUTPATIENT
Start: 2017-06-06 | End: 2017-06-06 | Stop reason: SDUPTHER

## 2017-06-06 NOTE — MR AVS SNAPSHOT
Visit Information Date & Time Provider Department Dept. Phone Encounter #  
 6/6/2017  3:15 PM Conner Arreguin  [facility] [phone] [id number] Follow-up Instructions Return in about 9 months (around 3/6/2018). Upcoming Health Maintenance Date Due  
 EYE EXAM RETINAL OR DILATED Q1 8/24/1970 DTaP/Tdap/Td series (1 - Tdap) 8/24/1981 PAP AKA CERVICAL CYTOLOGY 8/24/1981 FOBT Q 1 YEAR AGE 50-75 8/24/2010 INFLUENZA AGE 9 TO ADULT 8/1/2017 FOOT EXAM Q1 10/25/2017 HEMOGLOBIN A1C Q6M 11/25/2017 MICROALBUMIN Q1 1/25/2018 LIPID PANEL Q1 1/25/2018 BREAST CANCER SCRN MAMMOGRAM 11/16/2018 Allergies as of 6/6/2017  Review Complete On: 6/6/2017 By: Jalyn Jacobo LPN Severity Noted Reaction Type Reactions Contrast Agent [Iodine]  06/24/2014    Rash, Sneezing Current Immunizations  Never Reviewed Name Date Influenza Vaccine 9/1/2016 Influenza Vaccine (Madin [place] Canine Kidney) PF 2/5/2015  3:56 PM  
 Influenza Vaccine (Quad) PF 11/30/2015 Pneumococcal Polysaccharide (PPSV-23) 10/25/2016 Not reviewed this visit You Were Diagnosed With   
  
 Codes Comments Malignant hypertension     ICD-10-CM: I10 
ICD-9-CM: 401.0 Coronary artery disease involving native coronary artery of native heart without angina pectoris     ICD-10-CM: I25.10 ICD-9-CM: 414.01 Vitals BP Pulse Height(growth percentile) Weight(growth percentile) SpO2 BMI  
 (!) 173/101 (!) 107 5' 3\" (1.6 m) 169 lb (76.7 kg) 97% 29.94 kg/m2 OB Status Smoking Status Hysterectomy Never Smoker BMI and BSA Data Body Mass Index Body Surface Area  
 29.94 kg/m 2 1.85 m 2 Preferred Pharmacy Pharmacy Name Phone [facility] [address] [facility] [phone] Your Updated Medication List  
  
   
 This list is accurate as of: 6/6/17  3:48 PM.  Always use your most recent med list.  
  
  
  
  
 acetaminophen 500 mg tablet Commonly known as:  80 Elvin Kohli Jr Connie Do Take 2 Tabs by mouth every six (6) hours as needed for Pain. amLODIPine 10 mg tablet Commonly known as:  Taylor Arnt Take 1 Tab by mouth daily. aspirin delayed-release 81 mg tablet Take  by mouth daily. atorvastatin 40 mg tablet Commonly known as:  LIPITOR Take 1 Tab by mouth daily. buPROPion 100 mg tablet Commonly known as:  [facility] Take 1 Tab by mouth daily. citalopram 40 mg tablet Commonly known as:  Nevelyn Poke Take 1 Tab by mouth daily. clopidogrel 75 mg Tab Commonly known as:  PLAVIX Take 1 Tab by mouth daily. gabapentin 100 mg capsule Commonly known as:  NEURONTIN  
TAKE 1 CAPSULE BY MOUTH THREE TIMES DAILY  
  
 hydrALAZINE 50 mg tablet Commonly known as:  APRESOLINE Take 0.5 Tabs by mouth four (4) times daily. hydroCHLOROthiazide 25 mg tablet Commonly known as:  HYDRODIURIL Take 1 Tab by mouth daily. HYDROmorphone 2 mg tablet Commonly known as:  DILAUDID Take 1 Tab by mouth every four (4) hours as needed for Pain. Max Daily Amount: 12 mg.  
  
 insulin aspart 100 unit/mL Inpn Commonly known as:  Minerva Pathak Take 8 units with each meal.  
  
 Insulin Needles (Disposable) 32 gauge x 5/32\" Ndle Commonly known as:  Mona Pen Needle Use one needle to give insulin 4 times a day. levothyroxine 125 mcg tablet Commonly known as:  SYNTHROID Take 1 Tab by mouth Daily (before breakfast). losartan 100 mg tablet Commonly known as:  COZAAR Take 1 Tab by mouth daily. metoprolol succinate 200 mg XL tablet Commonly known as:  TOPROL-XL Take 1 Tab by mouth daily. nitrofurantoin (macrocrystal-monohydrate) 100 mg capsule Commonly known as:  MACROBID Take 1 Cap by mouth two (2) times a day. ondansetron 4 mg disintegrating tablet Commonly known as:  ZOFRAN ODT Take 1 Tab by mouth every eight (8) hours as needed for Nausea. promethazine 25 mg tablet Commonly known as:  PHENERGAN Take 1 Tab by mouth every six (6) hours as needed. RABEprazole 20 mg tablet Commonly known as:  ACIPHEX  
TAKE 1 TABLET BY MOUTH ONCE DAILY  
  
 sucralfate 100 mg/mL suspension Commonly known as:  Jinnie Gull Take 5 mL by mouth four (4) times daily. traMADol 50 mg tablet Commonly known as:  ULTRAM  
Take 1 Tab by mouth every six (6) hours as needed for Pain. Max Daily Amount: 200 mg. TRESIBA FLEXTOUCH U-100 100 unit/mL (3 mL) Inpn Generic drug:  insulin degludec 35 Units by SubCUTAneous route daily. valACYclovir 1 gram tablet Commonly known as:  VALTREX Take 1 Tab by mouth three (3) times daily. Follow-up Instructions Return in about 9 months (around 3/6/2018). Patient Instructions Increase Lipitor to 80mg Daily Have labs done prior to next visit - fasting 12-15 hours prior Introducing [facility]! Dear Anum Goyal: 
Thank you for requesting a [employer] account. Our records indicate that you already have an active [employer] account. You can access your account anytime at [email or web address] Did you know that you can access your hospital and ER discharge instructions at any time in [employer]? You can also review all of your test results from your hospital stay or ER visit. Additional Information If you have questions, please visit the Frequently Asked Questions section of the [employer] website at [email or web address] Remember, [employer] is NOT to be used for urgent needs. For medical emergencies, dial 911. Now available from your iPhone and Android! Please provide this summary of care documentation to your next provider. Your primary care clinician is listed as Nakul Cheng. If you have any questions after today's visit, please call [phone].

## 2017-06-06 NOTE — PROGRESS NOTES
[facility]      As you know, Mane Clayton is a pleasant 64 y.o. female with a history of myocardial infarction, status post four coronary stents in the past, hypertension, hyperlipidemia, diabetes, and anxiety and depression disorder. Ms. Lyndsay Hayes is here today for follow up appointment. She denies any use of sublingual nitroglycerin since last time. She denies any chest pain or chest tightness. Unfortunately she has been having some lower abdominal discomfort because of UTI. She has seen primary care provider and she says she has been started on antibiotics. She has finished a four day course. Her nausea has been much better. She said she did not take medication for her blood pressure this morning because of nausea, however she is feeling fine and is planning to take the medication once she goes home. Otherwise she tries to take her medications regularly. enies any nausea, vomiting, abdominal pain, fever, chills, sputum production. No hematuria or other bleeding complaints    Past Medical History:   Diagnosis Date    Anxiety     CAD (coronary artery disease)     S/P Coronary stents ( LAD and Lcx)    Depression     Diabetes ([facility] [address]     Hepatitis C     Hypercholesterolemia     Hypertension        Past Surgical History:   Procedure Laterality Date    HX BREAST BIOPSY      1971 2010 left breast lump removed excisional per patient     [address]    pre-eclampsia    HX CHOLECYSTECTOMY  2004    HX CORONARY STENT PLACEMENT  Nov 2013    4 stents    HX HEENT  2009    thyroidectomy due to nodules.  HX HYSTERECTOMY  2005    heavy periods    HX THYROIDECTOMY         Current Outpatient Prescriptions   Medication Sig    insulin degludec (TRESIBA FLEXTOUCH U-100) 100 unit/mL (3 mL) inpn 35 Units by SubCUTAneous route daily.     HYDROmorphone (DILAUDID) 2 mg tablet Take 1 Tab by mouth every four (4) hours as needed for Pain. Max Daily Amount: 12 mg.    nitrofurantoin, macrocrystal-monohydrate, (MACROBID) 100 mg capsule Take 1 Cap by mouth two (2) times a day.  gabapentin (NEURONTIN) 100 mg capsule TAKE 1 CAPSULE BY MOUTH THREE TIMES DAILY    sucralfate (CARAFATE) 100 mg/mL suspension Take 5 mL by mouth four (4) times daily.  valACYclovir (VALTREX) 1 gram tablet Take 1 Tab by mouth three (3) times daily.  RABEprazole (ACIPHEX) 20 mg tablet TAKE 1 TABLET BY MOUTH ONCE DAILY    promethazine (PHENERGAN) 25 mg tablet Take 1 Tab by mouth every six (6) hours as needed.  traMADol (ULTRAM) 50 mg tablet Take 1 Tab by mouth every six (6) hours as needed for Pain. Max Daily Amount: 200 mg.  acetaminophen (TYLENOL EXTRA STRENGTH) 500 mg tablet Take 2 Tabs by mouth every six (6) hours as needed for Pain.  ondansetron (ZOFRAN ODT) 4 mg disintegrating tablet Take 1 Tab by mouth every eight (8) hours as needed for Nausea.  insulin aspart (NOVOLOG) 100 unit/mL inpn Take 8 units with each meal.    amLODIPine (NORVASC) 10 mg tablet Take 1 Tab by mouth daily.  metoprolol succinate (TOPROL-XL) 200 mg XL tablet Take 1 Tab by mouth daily.  losartan (COZAAR) 100 mg tablet Take 1 Tab by mouth daily.  hydrALAZINE (APRESOLINE) 50 mg tablet Take 0.5 Tabs by mouth four (4) times daily.  hydroCHLOROthiazide (HYDRODIURIL) 25 mg tablet Take 1 Tab by mouth daily.  atorvastatin (LIPITOR) 40 mg tablet Take 1 Tab by mouth daily.  levothyroxine (SYNTHROID) 125 mcg tablet Take 1 Tab by mouth Daily (before breakfast).  clopidogrel (PLAVIX) 75 mg tab Take 1 Tab by mouth daily.  buPROPion (WELLBUTRIN) 100 mg tablet Take 1 Tab by mouth daily.  citalopram (CELEXA) 40 mg tablet Take 1 Tab by mouth daily.  Insulin Needles, Disposable, (DORA PEN NEEDLE) 32 gauge x 5/32\" ndle Use one needle to give insulin 4 times a day.  aspirin delayed-release 81 mg tablet Take  by mouth daily.      No current facility-administered medications for this visit. Allergies and Sensitivities:  Allergies   Allergen Reactions    Contrast Agent [Iodine] Rash and Sneezing       Family History:  Family History   Problem Relation Age of Onset    Diabetes Mother     Hypertension Mother     Cancer Mother     Heart Disease Mother     Heart Attack Mother     Diabetes Father     Hypertension Father     Cancer Father        Social History:  Social History   Substance Use Topics    Smoking status: Never Smoker    Smokeless tobacco: Never Used    Alcohol use No     She  reports that she has never smoked. She has never used smokeless tobacco.  She  reports that she does not drink alcohol. Review of Systems:  Cardiac symptoms as noted above in HPI. All others negative. Denies fatigue, malaise, skin rash, joint pain, blurring vision, photophobia, neck pain, hemoptysis, chronic cough, nausea, vomiting, hematuria, burning micturition, BRBPR, chronic headaches. Physical Exam:  BP Readings from Last 3 Encounters:   06/06/17 (!) 173/101   05/31/17 (!) 160/100   05/30/17 (!) 183/102         Pulse Readings from Last 3 Encounters:   06/06/17 (!) 107   05/31/17 (!) 105   05/30/17 90          Wt Readings from Last 3 Encounters:   06/06/17 169 lb (76.7 kg)   05/31/17 165 lb 6.4 oz (75 kg)   05/25/17 167 lb (75.8 kg)       Constitutional: Oriented to person, place, and time. HENT: Head: Normocephalic and atraumatic. Neck: No JVD present. Cardiovascular: Regular rhythm. No murmur, gallop or rubs appreciated  Lung: Breath sounds normal. No respiratory distress. No ronchi or rales appreciated  Abdominal: No tenderness. No rebound and no guarding. Musculoskeletal: There is no lower extremity edema.  No cynosis    Review of Data  LABS:   Lab Results   Component Value Date/Time    Sodium 135 05/30/2017 06:10 AM    Potassium 3.4 05/30/2017 06:10 AM    Chloride 93 05/30/2017 06:10 AM    CO2 29 05/30/2017 06:10 AM    Glucose 344 05/30/2017 06:10 AM BUN 23 05/30/2017 06:10 AM    Creatinine 1.07 05/30/2017 06:10 AM     Lipids Latest Ref Rng & Units 1/25/2017 7/24/2015 6/27/2014   Chol, Total <200 MG/(H) 209(H) 191   HDL 40 - 60 MG/DL 61(H) 71 63   LDL 0 - 100 MG/. 8(H) 116(H) 103(H)   Trig <150 MG/(H) 109 125   Chol/HDL Ratio 0 - 5.0   3.4 - -     Lab Results   Component Value Date/Time    ALT (SGPT) 29 05/30/2017 06:10 AM     Lab Results   Component Value Date/Time    Hemoglobin A1c 8.1 05/25/2017 12:36 PM    Hemoglobin A1c (POC) 10.8 05/19/2016 12:56 PM       EKG  (07/2014) Sinus  Rhythm ,WITHIN NORMAL LIMITS, Normal DE and QRS and normal axis. ECHO (10/14)  MMARY:  Left ventricle: Systolic function was at the lower limits of normal.  Ejection fraction was estimated in the range of 50 % to 55 %. There was  possible hypokinesis of the mid inferolateral wall(s). Wall thickness was  moderately increased. There was moderate concentric hypertrophy. Doppler  parameters were consistent with abnormal left ventricular relaxation  (grade 1 diastolic dysfunction). Right ventricle: Systolic function was normal. Estimated peak pressure was  in the range of 30 mmHg to 35 mmHg. Mitral valve: There was mild annular calcification. There was trivial regurgitation. Aortic valve: There was no stenosis. Tricuspid valve: There was trivial regurgitation. CATHETERIZATION (02/15)  · Left ventricle: End diastolic pressure was 15 mmHg. Left ventriculography: The EF was estimated to be around 55%. There was no diagnostic segmental wall motion abnormality. · Aorta: There was no significant systolic pressure gradient across the aortic valve. · Mitral valve: There was no mitral regurgitation. · Coronary Angiography:  · The coronary circulation was right dominant. · Left main: Angiographically normal. It bifurcated into the Left anterior descending and circumflex coronary arteries. · Left anterior descending: Stent in mid segment fully patent.  Distal LAD has a 60% distal stenosis  · Diagonal Branches: Angiographically normal.  · Circumflex: stent in mid segment was fully patent  · Obtuse Marginal Branches: Angiographically normal.  · Right coronary: Angiographically normal. Dominant    IMPRESSION & PLAN:  Irma Dela Cruz is a pleasant 64 y.o. female with history of coronary artery disease, diabetes, hypertension, hyperlipidemia. Coronary artery disease:  Known LAD and LCx stent. Last cardiac cath 02/15 with patent stent. Continue same meds including DAPT and beta blocker and statin and amlodipine  No use of S/L NTG since last time  No angina currently    Hypertension:  Her blood pressure is 170/110  Mm Hg. Repeat BP was 160/96 mm Hg. She did not take her BP meds today because of nausea. Asked her to go home and take it as she is not nauseous anymore. Hyperlipidemia: Continue atorvastatin 40 mg daily. Last LDL in 07/15 and it was 116. Will increase lipitor to 80 mg daily. Repeat profile before next visit. Diabetes:  Defer to PCP. Recommended goal for hemoglobin A1c should be less than 7 from a cardiovascular standpoint. Last hemoglobin A1c was  8.1. Importance of diet and exercise was discussed with patient. This plan was discussed with patient who is in agreement. Thank you for allowing me to participate in patient care. Please feel free to call me if you have any question or concern.

## 2017-06-06 NOTE — PROGRESS NOTES
1. Have you been to the ER, urgent care clinic since your last visit? Hospitalized since your last visit? Yes When: 5/30/17 Where: [facility] ER Reason for visit: Abdominal pain and UTI    2. Have you seen or consulted any other health care providers outside of the [address] [place] [address] since your last visit? Include any pap smears or colon screening.  No

## 2017-06-07 RX ORDER — ATORVASTATIN CALCIUM 80 MG/1
TABLET, FILM COATED ORAL
Qty: 90 TAB | Refills: 9 | Status: SHIPPED | OUTPATIENT
Start: 2017-06-07 | End: 2018-07-31 | Stop reason: SDUPTHER

## 2017-06-08 ENCOUNTER — HOSPITAL ENCOUNTER (EMERGENCY)
Age: 57
Discharge: HOME | End: 2017-06-09
Attending: EMERGENCY MEDICINE
Payer: OTHER GOVERNMENT

## 2017-06-08 DIAGNOSIS — R11.2 NON-INTRACTABLE VOMITING WITH NAUSEA, UNSPECIFIED VOMITING TYPE: ICD-10-CM

## 2017-06-08 DIAGNOSIS — R73.9 HYPERGLYCEMIA: Primary | ICD-10-CM

## 2017-06-08 DIAGNOSIS — R10.31 ABDOMINAL PAIN, RLQ: ICD-10-CM

## 2017-06-08 PROCEDURE — 99285 EMERGENCY DEPT VISIT HI MDM: CPT

## 2017-06-08 PROCEDURE — 96365 THER/PROPH/DIAG IV INF INIT: CPT

## 2017-06-08 PROCEDURE — 96361 HYDRATE IV INFUSION ADD-ON: CPT

## 2017-06-08 PROCEDURE — 96375 TX/PRO/DX INJ NEW DRUG ADDON: CPT

## 2017-06-09 VITALS
BODY MASS INDEX: 31.18 KG/M2 | SYSTOLIC BLOOD PRESSURE: 120 MMHG | WEIGHT: 176 LBS | HEART RATE: 85 BPM | RESPIRATION RATE: 17 BRPM | DIASTOLIC BLOOD PRESSURE: 74 MMHG | OXYGEN SATURATION: 98 % | TEMPERATURE: 98.3 F

## 2017-06-09 LAB
ADMINISTERED INITIALS, ADMINIT: NORMAL
ADMINISTERED INITIALS, ADMINIT: NORMAL
ALBUMIN SERPL BCP-MCNC: 3 G/DL (ref 3.4–5)
ALBUMIN/GLOB SERPL: 0.6 {RATIO} (ref 0.8–1.7)
ALP SERPL-CCNC: 135 U/L (ref 45–117)
ALT SERPL-CCNC: 35 U/L (ref 13–56)
ANION GAP BLD CALC-SCNC: 11 MMOL/L (ref 3–18)
AST SERPL W P-5'-P-CCNC: 26 U/L (ref 15–37)
BASOPHILS # BLD AUTO: 0 K/UL (ref 0–0.06)
BASOPHILS # BLD: 0 % (ref 0–2)
BILIRUB DIRECT SERPL-MCNC: 0.2 MG/DL (ref 0–0.2)
BILIRUB SERPL-MCNC: 0.4 MG/DL (ref 0.2–1)
BUN SERPL-MCNC: 27 MG/DL (ref 7–18)
BUN/CREAT SERPL: 21 (ref 12–20)
CALCIUM SERPL-MCNC: 9.2 MG/DL (ref 8.5–10.1)
CHLORIDE SERPL-SCNC: 87 MMOL/L (ref 100–108)
CO2 SERPL-SCNC: 29 MMOL/L (ref 21–32)
CREAT SERPL-MCNC: 1.26 MG/DL (ref 0.6–1.3)
D50 ADMINISTERED, D50ADM: 0 ML
D50 ADMINISTERED, D50ADM: 0 ML
D50 ORDER, D50ORD: 0 ML
D50 ORDER, D50ORD: 0 ML
DIFFERENTIAL METHOD BLD: ABNORMAL
EOSINOPHIL # BLD: 0 K/UL (ref 0–0.4)
EOSINOPHIL NFR BLD: 1 % (ref 0–5)
ERYTHROCYTE [DISTWIDTH] IN BLOOD BY AUTOMATED COUNT: 11.2 % (ref 11.6–14.5)
EST. AVERAGE GLUCOSE BLD GHB EST-MCNC: 235 MG/DL
GLOBULIN SER CALC-MCNC: 5 G/DL (ref 2–4)
GLUCOSE BLD STRIP.AUTO-MCNC: 268 MG/DL (ref 70–110)
GLUCOSE BLD STRIP.AUTO-MCNC: 391 MG/DL (ref 70–110)
GLUCOSE BLD STRIP.AUTO-MCNC: 591 MG/DL (ref 70–110)
GLUCOSE SERPL-MCNC: 661 MG/DL (ref 74–99)
GLUCOSE, GLC: 391 MG/DL
GLUCOSE, GLC: 661 MG/DL
HBA1C MFR BLD: 9.8 % (ref 4.2–5.6)
HCT VFR BLD AUTO: 37.8 % (ref 35–45)
HGB BLD-MCNC: 13.5 G/DL (ref 12–16)
HIGH TARGET, HITG: 180 MG/DL
HIGH TARGET, HITG: 180 MG/DL
INSULIN ADMINSTERED, INSADM: 12 UNITS/HOUR
INSULIN ADMINSTERED, INSADM: 3.3 UNITS/HOUR
INSULIN ORDER, INSORD: 12 UNITS/HOUR
INSULIN ORDER, INSORD: 3.3 UNITS/HOUR
LIPASE SERPL-CCNC: 237 U/L (ref 73–393)
LOW TARGET, LOT: 140 MG/DL
LOW TARGET, LOT: 140 MG/DL
LYMPHOCYTES # BLD AUTO: 23 % (ref 21–52)
LYMPHOCYTES # BLD: 1.6 K/UL (ref 0.9–3.6)
MAGNESIUM SERPL-MCNC: 2.1 MG/DL (ref 1.6–2.6)
MCH RBC QN AUTO: 30.6 PG (ref 24–34)
MCHC RBC AUTO-ENTMCNC: 35.7 G/DL (ref 31–37)
MCV RBC AUTO: 85.7 FL (ref 74–97)
MINUTES UNTIL NEXT BG, NBG: 60 MIN
MINUTES UNTIL NEXT BG, NBG: 60 MIN
MONOCYTES # BLD: 0.5 K/UL (ref 0.05–1.2)
MONOCYTES NFR BLD AUTO: 8 % (ref 3–10)
MULTIPLIER, MUL: 0.01
MULTIPLIER, MUL: 0.02
NEUTS SEG # BLD: 4.6 K/UL (ref 1.8–8)
NEUTS SEG NFR BLD AUTO: 68 % (ref 40–73)
ORDER INITIALS, ORDINIT: NORMAL
ORDER INITIALS, ORDINIT: NORMAL
PHOSPHATE SERPL-MCNC: 2.5 MG/DL (ref 2.5–4.9)
PLATELET # BLD AUTO: 234 K/UL (ref 135–420)
PMV BLD AUTO: 10.4 FL (ref 9.2–11.8)
POTASSIUM SERPL-SCNC: 3.9 MMOL/L (ref 3.5–5.5)
PROT SERPL-MCNC: 8 G/DL (ref 6.4–8.2)
RBC # BLD AUTO: 4.41 M/UL (ref 4.2–5.3)
SODIUM SERPL-SCNC: 127 MMOL/L (ref 136–145)
WBC # BLD AUTO: 6.8 K/UL (ref 4.6–13.2)

## 2017-06-09 PROCEDURE — 74011636637 HC RX REV CODE- 636/637

## 2017-06-09 PROCEDURE — 85025 COMPLETE CBC W/AUTO DIFF WBC: CPT | Performed by: EMERGENCY MEDICINE

## 2017-06-09 PROCEDURE — 74011636637 HC RX REV CODE- 636/637: Performed by: EMERGENCY MEDICINE

## 2017-06-09 PROCEDURE — 82962 GLUCOSE BLOOD TEST: CPT

## 2017-06-09 PROCEDURE — 83690 ASSAY OF LIPASE: CPT | Performed by: EMERGENCY MEDICINE

## 2017-06-09 PROCEDURE — 74011250636 HC RX REV CODE- 250/636: Performed by: EMERGENCY MEDICINE

## 2017-06-09 PROCEDURE — 84100 ASSAY OF PHOSPHORUS: CPT | Performed by: EMERGENCY MEDICINE

## 2017-06-09 PROCEDURE — 83735 ASSAY OF MAGNESIUM: CPT | Performed by: EMERGENCY MEDICINE

## 2017-06-09 PROCEDURE — 80076 HEPATIC FUNCTION PANEL: CPT | Performed by: EMERGENCY MEDICINE

## 2017-06-09 PROCEDURE — 83036 HEMOGLOBIN GLYCOSYLATED A1C: CPT | Performed by: EMERGENCY MEDICINE

## 2017-06-09 PROCEDURE — 80048 BASIC METABOLIC PNL TOTAL CA: CPT | Performed by: EMERGENCY MEDICINE

## 2017-06-09 RX ORDER — ONDANSETRON 2 MG/ML
4 INJECTION INTRAMUSCULAR; INTRAVENOUS
Status: COMPLETED | OUTPATIENT
Start: 2017-06-09 | End: 2017-06-09

## 2017-06-09 RX ORDER — ONDANSETRON 4 MG/1
8 TABLET, FILM COATED ORAL
Qty: 12 TAB | Refills: 0 | Status: SHIPPED | OUTPATIENT
Start: 2017-06-09 | End: 2017-12-28 | Stop reason: ALTCHOICE

## 2017-06-09 RX ORDER — DEXTROSE 50 % IN WATER (D50W) INTRAVENOUS SYRINGE
25-50 AS NEEDED
Status: DISCONTINUED | OUTPATIENT
Start: 2017-06-09 | End: 2017-06-09 | Stop reason: HOSPADM

## 2017-06-09 RX ORDER — MAGNESIUM SULFATE 100 %
4 CRYSTALS MISCELLANEOUS AS NEEDED
Status: DISCONTINUED | OUTPATIENT
Start: 2017-06-09 | End: 2017-06-09 | Stop reason: HOSPADM

## 2017-06-09 RX ADMIN — INSULIN HUMAN 10 UNITS: 100 INJECTION, SOLUTION PARENTERAL at 01:37

## 2017-06-09 RX ADMIN — ONDANSETRON 4 MG: 2 INJECTION INTRAMUSCULAR; INTRAVENOUS at 01:36

## 2017-06-09 RX ADMIN — Medication 12 UNITS/HR: at 02:25

## 2017-06-09 RX ADMIN — SODIUM CHLORIDE 1000 ML: 900 INJECTION, SOLUTION INTRAVENOUS at 01:36

## 2017-06-09 NOTE — ED TRIAGE NOTES
Pt states her L arm and hand started going numb just prior to arrival, after which she has an episode of full body tremors during which she was awake. States something similar happened 3 months ago and all scans checked out normal. Pt states she feel back to normal now. Does note that she has been unable to eat due to N/V for 3 weeks and that yesterday she started eating again. Pt had not been able to take her regular medications for a while due to vomiting. EMS states the glucometer read HI.

## 2017-06-09 NOTE — ED PROVIDER NOTES
[facility] EMERGENCY DEPT      64 y.o. female with noted past medical history who presents to the emergency department via EMS c/o high blood sugar. EMS states that the glucometer couldn't read the pt's glucose level due to it being extremely high. She does mention that she takes medication and insulin, but she has not been able to take her medication due to vomiting X 3 weeks. Pt states that she was seen X 3 weeks ago and was diagnosed with R lower back shingles and a severe kidney infection. Today she states that her appetite returned, so she ate some fish and took her medications. She then states that her L hand became stiff, and then the rest of her body started shaking. She states that the tremors was so bad that it knocked her \"off the bed and onto the floor\". She states that this has happened in the past, but the tremors resolved on their own. She also c/o RLQ abdominal pain. She denies diarrhea. No other complaints. No current facility-administered medications for this encounter. Current Outpatient Prescriptions   Medication Sig    atorvastatin (LIPITOR) 80 mg tablet TAKE 1 TABLET BY MOUTH DAILY    insulin degludec (TRESIBA FLEXTOUCH U-100) 100 unit/mL (3 mL) inpn 35 Units by SubCUTAneous route daily.  HYDROmorphone (DILAUDID) 2 mg tablet Take 1 Tab by mouth every four (4) hours as needed for Pain. Max Daily Amount: 12 mg.    nitrofurantoin, macrocrystal-monohydrate, (MACROBID) 100 mg capsule Take 1 Cap by mouth two (2) times a day.  gabapentin (NEURONTIN) 100 mg capsule TAKE 1 CAPSULE BY MOUTH THREE TIMES DAILY    sucralfate (CARAFATE) 100 mg/mL suspension Take 5 mL by mouth four (4) times daily.  valACYclovir (VALTREX) 1 gram tablet Take 1 Tab by mouth three (3) times daily.  RABEprazole (ACIPHEX) 20 mg tablet TAKE 1 TABLET BY MOUTH ONCE DAILY    promethazine (PHENERGAN) 25 mg tablet Take 1 Tab by mouth every six (6) hours as needed.     traMADol (ULTRAM) 50 mg tablet Take 1 Tab by mouth every six (6) hours as needed for Pain. Max Daily Amount: 200 mg.  acetaminophen (TYLENOL EXTRA STRENGTH) 500 mg tablet Take 2 Tabs by mouth every six (6) hours as needed for Pain.  ondansetron (ZOFRAN ODT) 4 mg disintegrating tablet Take 1 Tab by mouth every eight (8) hours as needed for Nausea.  insulin aspart (NOVOLOG) 100 unit/mL inpn Take 8 units with each meal.    amLODIPine (NORVASC) 10 mg tablet Take 1 Tab by mouth daily.  metoprolol succinate (TOPROL-XL) 200 mg XL tablet Take 1 Tab by mouth daily.  losartan (COZAAR) 100 mg tablet Take 1 Tab by mouth daily.  hydrALAZINE (APRESOLINE) 50 mg tablet Take 0.5 Tabs by mouth four (4) times daily.  hydroCHLOROthiazide (HYDRODIURIL) 25 mg tablet Take 1 Tab by mouth daily.  levothyroxine (SYNTHROID) 125 mcg tablet Take 1 Tab by mouth Daily (before breakfast).  clopidogrel (PLAVIX) 75 mg tab Take 1 Tab by mouth daily.  buPROPion (WELLBUTRIN) 100 mg tablet Take 1 Tab by mouth daily.  citalopram (CELEXA) 40 mg tablet Take 1 Tab by mouth daily.  Insulin Needles, Disposable, (DORA PEN NEEDLE) 32 gauge x 5/32\" ndle Use one needle to give insulin 4 times a day.  aspirin delayed-release 81 mg tablet Take  by mouth daily. Past Medical History:   Diagnosis Date    Anxiety     CAD (coronary artery disease)     S/P Coronary stents ( LAD and Lcx)    Depression     Diabetes ([facility] [address]     Hepatitis C     Hypercholesterolemia     Hypertension        Past Surgical History:   Procedure Laterality Date    HX BREAST BIOPSY      1971 2010 left breast lump removed excisional per patient     [address]    pre-eclampsia    HX CHOLECYSTECTOMY  2004    HX CORONARY STENT PLACEMENT  Nov 2013    4 stents    HX HEENT  2009    thyroidectomy due to nodules.      HX HYSTERECTOMY  2005    heavy periods    HX THYROIDECTOMY         Family History   Problem Relation Age of Onset    Diabetes Mother     Hypertension Mother     Cancer Mother     Heart Disease Mother     Heart Attack Mother     Diabetes Father     Hypertension Father     Cancer Father        Social History     Social History    Marital status:      Spouse name: N/A    Number of children: N/A    Years of education: N/A     Occupational History    Not on file. Social History Main Topics    Smoking status: Never Smoker    Smokeless tobacco: Never Used    Alcohol use No    Drug use: No    Sexual activity: Not Currently     Other Topics Concern    Not on file     Social History Narrative       Allergies   Allergen Reactions    Contrast Agent [Iodine] Rash and Sneezing       Patient's primary care provider (as noted in EPIC):  Zeyad Multani DO    REVIEW OF SYSTEMS:    Constitutional:  Negative for diaphoresis. HENT:  Negative for congestion. Respiratory:  Negative for cough and shortness of breath. Cardiovascular:  Negative for chest pain and palpitations. Gastrointestinal:  Positive for RLQ abdominal pain and vomiting. Negative for diarrhea. Genitourinary:  Negative for flank pain. Musculoskeletal:  Negative for back pain. Skin:  Negative for pallor. Neurological:  Positive for tremors. Negative for weakness. Visit Vitals    BP (!) 146/92    Pulse 96    Temp 98.3 °F (36.8 °C)    Resp 17    Wt 79.8 kg (176 lb)    SpO2 97%    BMI 31.18 kg/m2       PHYSICAL EXAM:    CONSTITUTIONAL:  Alert, in no apparent distress;  well developed;  well nourished. HEAD:  Normocephalic, atraumatic. EYES:  EOMI. Non-icteric sclera. Normal conjunctiva. ENTM:  Nose:  no rhinorrhea. Throat:  no erythema or exudate, mucous membranes moist.  NECK:  No JVD. Supple  RESPIRATORY:  Chest clear, equal breath sounds, good air movement. CARDIOVASCULAR:  Regular rate and rhythm. No murmurs, rubs, or gallops. GI:  Normal bowel sounds, abdomen soft with mild RLQ TTP. No rebound or guarding. BACK:  Non-tender.   UPPER EXT:  Normal inspection. LOWER EXT:  No edema, no calf tenderness. Distal pulses intact. NEURO:  Moves all four extremities, and grossly normal motor exam.  SKIN:  No rashes;  Normal for age. PSYCH:  Alert and normal affect. DIFFERENTIAL DIAGNOSES/ MEDICAL DECISION MAKING:  Hyperglycemia from medication non-compliance, underlying infection, initial presentation of diabetes (in undiagnosed patients), myocardial ischemia, intestinal ischemia, alcohol intoxication causing hyperglycemia, pregnancy, steroid use, other etiologies, versus combination of the above. Abnormal lab results from this emergency department encounter:  Labs Reviewed   CBC WITH AUTOMATED DIFF - Abnormal; Notable for the following:        Result Value    RDW 11.2 (*)     All other components within normal limits   METABOLIC PANEL, BASIC - Abnormal; Notable for the following:     Sodium 127 (*)     Chloride 87 (*)     Glucose 661 (*)     BUN 27 (*)     BUN/Creatinine ratio 21 (*)     GFR est AA 53 (*)     GFR est non-AA 44 (*)     All other components within normal limits   HEPATIC FUNCTION PANEL - Abnormal; Notable for the following: Albumin 3.0 (*)     Globulin 5.0 (*)     A-G Ratio 0.6 (*)     Alk.  phosphatase 135 (*)     All other components within normal limits   GLUCOSE, POC - Abnormal; Notable for the following:     Glucose (POC) 591 (*)     All other components within normal limits   MAGNESIUM   PHOSPHORUS   LIPASE       Lab values for this patient within approximately the last 12 hours:  Recent Results (from the past 12 hour(s))   CBC WITH AUTOMATED DIFF    Collection Time: 06/09/17  1:25 AM   Result Value Ref Range    WBC 6.8 4.6 - 13.2 K/uL    RBC 4.41 4.20 - 5.30 M/uL    HGB 13.5 12.0 - 16.0 g/dL    HCT 37.8 35.0 - 45.0 %    MCV 85.7 74.0 - 97.0 FL    MCH 30.6 24.0 - 34.0 PG    MCHC 35.7 31.0 - 37.0 g/dL    RDW 11.2 (L) 11.6 - 14.5 %    PLATELET 541 550 - 038 K/uL    MPV 10.4 9.2 - 11.8 FL    NEUTROPHILS 68 40 - 73 %    LYMPHOCYTES 23 21 - 52 %    MONOCYTES 8 3 - 10 %    EOSINOPHILS 1 0 - 5 %    BASOPHILS 0 0 - 2 %    ABS. NEUTROPHILS 4.6 1.8 - 8.0 K/UL    ABS. LYMPHOCYTES 1.6 0.9 - 3.6 K/UL    ABS. MONOCYTES 0.5 0.05 - 1.2 K/UL    ABS. EOSINOPHILS 0.0 0.0 - 0.4 K/UL    ABS. BASOPHILS 0.0 0.0 - 0.06 K/UL    DF AUTOMATED     METABOLIC PANEL, BASIC    Collection Time: 06/09/17  1:25 AM   Result Value Ref Range    Sodium 127 (L) 136 - 145 mmol/L    Potassium 3.9 3.5 - 5.5 mmol/L    Chloride 87 (L) 100 - 108 mmol/L    CO2 29 21 - 32 mmol/L    Anion gap 11 3.0 - 18 mmol/L    Glucose 661 (HH) 74 - 99 mg/dL    BUN 27 (H) 7.0 - 18 MG/DL    Creatinine 1.26 0.6 - 1.3 MG/DL    BUN/Creatinine ratio 21 (H) 12 - 20      GFR est AA 53 (L) >60 ml/min/1.73m2    GFR est non-AA 44 (L) >60 ml/min/1.73m2    Calcium 9.2 8.5 - 10.1 MG/DL   MAGNESIUM    Collection Time: 06/09/17  1:25 AM   Result Value Ref Range    Magnesium 2.1 1.6 - 2.6 mg/dL   PHOSPHORUS    Collection Time: 06/09/17  1:25 AM   Result Value Ref Range    Phosphorus 2.5 2.5 - 4.9 MG/DL   LIPASE    Collection Time: 06/09/17  1:25 AM   Result Value Ref Range    Lipase 237 73 - 393 U/L   HEPATIC FUNCTION PANEL    Collection Time: 06/09/17  1:25 AM   Result Value Ref Range    Protein, total 8.0 6.4 - 8.2 g/dL    Albumin 3.0 (L) 3.4 - 5.0 g/dL    Globulin 5.0 (H) 2.0 - 4.0 g/dL    A-G Ratio 0.6 (L) 0.8 - 1.7      Bilirubin, total 0.4 0.2 - 1.0 MG/DL    Bilirubin, direct 0.2 0.0 - 0.2 MG/DL    Alk. phosphatase 135 (H) 45 - 117 U/L    AST (SGOT) 26 15 - 37 U/L    ALT (SGPT) 35 13 - 56 U/L   GLUCOSE, POC    Collection Time: 06/09/17  1:30 AM   Result Value Ref Range    Glucose (POC) 591 (HH) 70 - 110 mg/dL       Radiologist and cardiologist interpretations if available at time of this note:  No results found.     Medication(s) ordered for patient during this emergency visit encounter:  Medications   sodium chloride 0.9 % bolus infusion 1,000 mL (1,000 mL IntraVENous New Bag 6/9/17 0136)   insulin regular (Margot Frank R, HUMULIN R) injection 10 Units (10 Units IntraVENous Given 6/9/17 0137)   ondansetron (ZOFRAN) injection 4 mg (4 mg IntraVENous Given 6/9/17 0136)       ED COURSE:  The patients finger blood sugar was improved with the noted medication in the emergency department. Normal CO2 and normal AG. IMPRESSION AND MEDICAL DECISION MAKING:  Based upon the patients presentation with noted HPI and PE, along with the work up done in the emergency department, I believe that the patient is having hyperglycemia in a diabetic, of uncertain etiology. Follow-up Activity limitations:  None  Condition on Discharge:  Stable     DIAGNOSIS:  1. Hyperglycemia in diabetic patient  2. Nausea and vomiting  3. Abdominal pain, RLQ    SPECIFIC PATIENT INSTRUCTIONS FROM THE PHYSICIAN WHO TREATED YOU IN THE ER TODAY:  1. Return if any concerns or worsening of condition(s)  2. Take your diabetic medications and/or insulin as previously prescribed. 3.  Check your finger blood sugar at the same time at least twice daily, and keep a log of these readings (or have a glucose monitor that keeps a log)  4. FOLLOW UP APPOINTMENT:  Your primary doctor in the next 1-2 days. Bring your log of your finger blood sugar readings with you to this appointment. Jovita Viveros M.D. Provider Attestation:  If a scribe was utilized in generation of this patient record, I personally performed the services described in the documentation, reviewed the documentation, as recorded by the scribe in my presence, and it accurately records the patient's history of presenting illness, review of systems, patient physical examination, and procedures performed by me as the attending physician. Jovita Viveros M.D.   [facility] Board Certified Emergency Physician  6/8/2017.  12:41 AM    SCRIBE ATTESTATION STATEMENT  Documented by: Briseyda Rothman scribing for, and in the presence of, Sri Monk MD 12:46 AM    Signed by: Enrrique Garcia, 06/09/17 12:46 AM    PROVIDER ATTESTATION STATEMENT  I personally performed the services described in the documentation, reviewed the documentation, as recorded by the scribe in my presence, and it accurately and completely records my words and actions.   Obdulia Scales MD

## 2017-06-09 NOTE — DISCHARGE INSTRUCTIONS
SPECIFIC PATIENT INSTRUCTIONS FROM THE PHYSICIAN WHO TREATED YOU IN THE ER TODAY:  1. Return if any concerns or worsening of condition(s)  2. Take your diabetic medications and/or insulin as previously prescribed. 3.  Check your finger blood sugar at the same time at least twice daily, and keep a log of these readings (or have a glucose monitor that keeps a log)  4. FOLLOW UP APPOINTMENT:  Your primary doctor in the next 1-2 days. Bring your log of your finger blood sugar readings with you to this appointment. 5.  Zofran as prescribed for nausea and/or vomiting. Abdominal Pain: Care Instructions  Your Care Instructions    Abdominal pain has many possible causes. Some aren't serious and get better on their own in a few days. Others need more testing and treatment. If your pain continues or gets worse, you need to be rechecked and may need more tests to find out what is wrong. You may need surgery to correct the problem. Don't ignore new symptoms, such as fever, nausea and vomiting, urination problems, pain that gets worse, and dizziness. These may be signs of a more serious problem. Your doctor may have recommended a follow-up visit in the next 8 to 12 hours. If you are not getting better, you may need more tests or treatment. The doctor has checked you carefully, but problems can develop later. If you notice any problems or new symptoms, get medical treatment right away. Follow-up care is a key part of your treatment and safety. Be sure to make and go to all appointments, and call your doctor if you are having problems. It's also a good idea to know your test results and keep a list of the medicines you take. How can you care for yourself at home? · Rest until you feel better. · To prevent dehydration, drink plenty of fluids, enough so that your urine is light yellow or clear like water. Choose water and other caffeine-free clear liquids until you feel better.  If you have kidney, heart, or liver disease and have to limit fluids, talk with your doctor before you increase the amount of fluids you drink. · If your stomach is upset, eat mild foods, such as rice, dry toast or crackers, bananas, and applesauce. Try eating several small meals instead of two or three large ones. · Wait until 48 hours after all symptoms have gone away before you have spicy foods, alcohol, and drinks that contain caffeine. · Do not eat foods that are high in fat. · Avoid anti-inflammatory medicines such as aspirin, ibuprofen (Advil, Motrin), and naproxen (Aleve). These can cause stomach upset. Talk to your doctor if you take daily aspirin for another health problem. When should you call for help? Call 911 anytime you think you may need emergency care. For example, call if:  · You passed out (lost consciousness). · You pass maroon or very bloody stools. · You vomit blood or what looks like coffee grounds. · You have new, severe belly pain. Call your doctor now or seek immediate medical care if:  · Your pain gets worse, especially if it becomes focused in one area of your belly. · You have a new or higher fever. · Your stools are black and look like tar, or they have streaks of blood. · You have unexpected vaginal bleeding. · You have symptoms of a urinary tract infection. These may include:  ¨ Pain when you urinate. ¨ Urinating more often than usual.  ¨ Blood in your urine. · You are dizzy or lightheaded, or you feel like you may faint. Watch closely for changes in your health, and be sure to contact your doctor if:  · You are not getting better after 1 day (24 hours). Where can you learn more? Go to http://staci-zeinab.info/. Enter B445 in the search box to learn more about \"Abdominal Pain: Care Instructions. \"  Current as of: May 27, 2016  Content Version: 11.2  © 2599-4543 [employer].  Care instructions adapted under license by [employer] (which disclaims liability or warranty for this information). If you have questions about a medical condition or this instruction, always ask your healthcare professional. [address] any warranty or liability for your use of this information. Nausea and Vomiting: Care Instructions  Your Care Instructions    When you are nauseated, you may feel weak and sweaty and notice a lot of saliva in your mouth. Nausea often leads to vomiting. Most of the time you do not need to worry about nausea and vomiting, but they can be signs of other illnesses. Two common causes of nausea and vomiting are stomach flu and food poisoning. Nausea and vomiting from viral stomach flu will usually start to improve within 24 hours. Nausea and vomiting from food poisoning may last from 12 to 48 hours. The doctor has checked you carefully, but problems can develop later. If you notice any problems or new symptoms, get medical treatment right away. Follow-up care is a key part of your treatment and safety. Be sure to make and go to all appointments, and call your doctor if you are having problems. It's also a good idea to know your test results and keep a list of the medicines you take. How can you care for yourself at home? · To prevent dehydration, drink plenty of fluids, enough so that your urine is light yellow or clear like water. Choose water and other caffeine-free clear liquids until you feel better. If you have kidney, heart, or liver disease and have to limit fluids, talk with your doctor before you increase the amount of fluids you drink. · Rest in bed until you feel better. · When you are able to eat, try clear soups, mild foods, and liquids until all symptoms are gone for 12 to 48 hours. Other good choices include dry toast, crackers, cooked cereal, and gelatin dessert, such as Jell-O. When should you call for help? Call 911 anytime you think you may need emergency care.  For example, call if:  · You passed out (lost consciousness). Call your doctor now or seek immediate medical care if:  · You have symptoms of dehydration, such as:  ¨ Dry eyes and a dry mouth. ¨ Passing only a little dark urine. ¨ Feeling thirstier than usual.  · You have new or worsening belly pain. · You have a new or higher fever. · You vomit blood or what looks like coffee grounds. Watch closely for changes in your health, and be sure to contact your doctor if:  · You have ongoing nausea and vomiting. · Your vomiting is getting worse. · Your vomiting lasts longer than 2 days. · You are not getting better as expected. Where can you learn more? Go to http://staci-zeinab.info/. Enter 56 180458 in the search box to learn more about \"Nausea and Vomiting: Care Instructions. \"  Current as of: May 27, 2016  Content Version: 11.2  © 3020-6654 [employer]. Care instructions adapted under license by [employer] (which disclaims liability or warranty for this information). If you have questions about a medical condition or this instruction, always ask your healthcare professional. [address] any warranty or liability for your use of this information. Wattage Activation    Thank you for requesting access to [employer]. Please follow the instructions below to securely access and download your online medical record. Wattage allows you to send messages to your doctor, view your test results, renew your prescriptions, schedule appointments, and more. How Do I Sign Up? 1. In your internet browser, go to [email or web address]. 2. Click on the First Time User? Click Here link in the Sign In box. You will see the New Member Sign Up page. 3. Enter your Watt[employer] Access Code exactly as it appears below. You will not need to use this code after youve completed the sign-up process. If you do not sign up before the expiration date, you must request a new code.     MyChart Access Code: Activation code not generated  Current [employer] Status: Active (This is the date your [employer] access code will )    4. Enter the last four digits of your Social Security Number (xxxx) and Date of Birth (mm/dd/yyyy) as indicated and click Submit. You will be taken to the next sign-up page. 5. Create a Vestorlyt ID. This will be your [employer] login ID and cannot be changed, so think of one that is secure and easy to remember. 6. Create a [employer] password. You can change your password at any time. 7. Enter your Password Reset Question and Answer. This can be used at a later time if you forget your password. 8. Enter your e-mail address. You will receive e-mail notification when new information is available in [address] 9. Click Sign Up. You can now view and download portions of your medical record. 10. Click the Download Summary menu link to download a portable copy of your medical information. Additional Information    If you have questions, please visit the Frequently Asked Questions section of the [employer] website at [email or web address] Remember, [employer] is NOT to be used for urgent needs. For medical emergencies, dial 911.

## 2017-06-12 DIAGNOSIS — K59.09 OTHER CONSTIPATION: Primary | ICD-10-CM

## 2017-06-12 DIAGNOSIS — B02.8 HERPES ZOSTER WITH COMPLICATION: ICD-10-CM

## 2017-06-13 DIAGNOSIS — B02.8 HERPES ZOSTER WITH COMPLICATION: ICD-10-CM

## 2017-06-19 RX ORDER — POLYETHYLENE GLYCOL 3350 17 G/17G
17 POWDER, FOR SOLUTION ORAL 2 TIMES DAILY
Qty: 60 PACKET | Refills: 1 | Status: SHIPPED | OUTPATIENT
Start: 2017-06-19 | End: 2018-10-09 | Stop reason: SDUPTHER

## 2017-06-19 RX ORDER — GABAPENTIN 100 MG/1
CAPSULE ORAL
Qty: 60 CAP | Refills: 0 | Status: SHIPPED | OUTPATIENT
Start: 2017-06-19 | End: 2017-12-18 | Stop reason: DRUGHIGH

## 2017-06-19 RX ORDER — GABAPENTIN 100 MG/1
CAPSULE ORAL
Qty: 90 CAP | Refills: 3 | Status: SHIPPED | OUTPATIENT
Start: 2017-06-19 | End: 2017-12-18 | Stop reason: DRUGHIGH

## 2017-06-20 NOTE — TELEPHONE ENCOUNTER
Prior authorization request recvd for patient's Tresiba flextouch pen ([id number]. Authorization started today by [facility].

## 2017-07-22 DIAGNOSIS — K21.9 GASTRIC REFLUX: ICD-10-CM

## 2017-07-26 RX ORDER — RABEPRAZOLE SODIUM 20 MG/1
TABLET, DELAYED RELEASE ORAL
Qty: 30 TAB | Refills: 5 | Status: SHIPPED | OUTPATIENT
Start: 2017-07-26 | End: 2018-07-31 | Stop reason: SDUPTHER

## 2017-07-27 ENCOUNTER — TELEPHONE (OUTPATIENT)
Dept: FAMILY MEDICINE CLINIC | Age: 57
End: 2017-07-27

## 2017-08-02 ENCOUNTER — TELEPHONE (OUTPATIENT)
Dept: FAMILY MEDICINE CLINIC | Age: 57
End: 2017-08-02

## 2017-08-02 DIAGNOSIS — E11.9 TYPE 2 DIABETES MELLITUS WITHOUT COMPLICATION, WITHOUT LONG-TERM CURRENT USE OF INSULIN (HCC): ICD-10-CM

## 2017-08-02 RX ORDER — INSULIN DEGLUDEC 100 U/ML
35 INJECTION, SOLUTION SUBCUTANEOUS DAILY
Status: CANCELLED | OUTPATIENT
Start: 2017-08-02

## 2017-08-02 RX ORDER — INSULIN ASPART 100 [IU]/ML
INJECTION, SOLUTION INTRAVENOUS; SUBCUTANEOUS
Qty: 10 PEN | Refills: 11 | Status: SHIPPED | OUTPATIENT
Start: 2017-08-02 | End: 2017-09-14 | Stop reason: SDUPTHER

## 2017-08-25 ENCOUNTER — OFFICE VISIT (OUTPATIENT)
Dept: FAMILY MEDICINE CLINIC | Age: 57
End: 2017-08-25

## 2017-08-25 VITALS
HEART RATE: 88 BPM | RESPIRATION RATE: 16 BRPM | TEMPERATURE: 98.7 F | DIASTOLIC BLOOD PRESSURE: 86 MMHG | WEIGHT: 177 LBS | HEIGHT: 63 IN | OXYGEN SATURATION: 98 % | BODY MASS INDEX: 31.36 KG/M2 | SYSTOLIC BLOOD PRESSURE: 156 MMHG

## 2017-08-25 DIAGNOSIS — M25.531 PAIN OF BOTH WRIST JOINTS: ICD-10-CM

## 2017-08-25 DIAGNOSIS — M25.532 PAIN OF BOTH WRIST JOINTS: ICD-10-CM

## 2017-08-25 DIAGNOSIS — F06.4 ANXIETY DISORDER DUE TO MEDICAL CONDITION: Primary | ICD-10-CM

## 2017-08-25 DIAGNOSIS — M25.561 CHRONIC PAIN OF BOTH KNEES: ICD-10-CM

## 2017-08-25 DIAGNOSIS — M25.562 CHRONIC PAIN OF BOTH KNEES: ICD-10-CM

## 2017-08-25 DIAGNOSIS — G89.29 CHRONIC PAIN OF BOTH KNEES: ICD-10-CM

## 2017-08-25 DIAGNOSIS — G47.01 INSOMNIA DUE TO MEDICAL CONDITION: ICD-10-CM

## 2017-08-25 NOTE — PROGRESS NOTES
Subjective:     HPI:  Verónica Chatterjee is a 62 y.o. female who presents to the office complaining of joint pain, anxiety and neuropathic pain. Neuropathic pain: Pt complains of R- sided abdominal pain and hip pain. Pt states that pain is tolerable when she takes 3 Gabapentin and 2 tylenol per day. Pt states that if she doesn't take the pain medication, her pain is very severe. Describes pain as a combination of throbbing, burning and stinging pain. Anxiety: Pt states that she is having trouble sleeping due to anxiety. Pt has had 5 heart attacks in the past. Pt reports that she is scared to sleep because she gets anxious that she won't wake up or may have another heart attack. Pt states she is constantly tossing and turning throughout the night. Pt takes tylenol pm once or twice per week to help her sleep. Pt reports that OTC melatonin didn't work. Pt states that when she watches scenes about heart attack or listens to someone talk about heart attack, she gets very nervous and anxious. Joint pain: Pt complains of joint pain in bilateral hand hands and knees X couple weeks. Pt states that occasionally she is unable to even open the grocery bags due to her pain. Pt works as a  at [employer]. Of note,  Pt got total hysterectomy secondary Menorrhagia. Labs Reviewed:  Lab Results   Component Value Date/Time    Sodium 127 06/09/2017 01:25 AM    Potassium 3.9 06/09/2017 01:25 AM    Chloride 87 06/09/2017 01:25 AM    CO2 29 06/09/2017 01:25 AM    Anion gap 11 06/09/2017 01:25 AM    Glucose 661 06/09/2017 01:25 AM    BUN 27 06/09/2017 01:25 AM    Creatinine 1.26 06/09/2017 01:25 AM    BUN/Creatinine ratio 21 06/09/2017 01:25 AM    GFR est AA 53 06/09/2017 01:25 AM    GFR est non-AA 44 06/09/2017 01:25 AM    Calcium 9.2 06/09/2017 01:25 AM    Bilirubin, total 0.4 06/09/2017 01:25 AM    AST (SGOT) 26 06/09/2017 01:25 AM    Alk.  phosphatase 135 06/09/2017 01:25 AM    Protein, total 8.0 06/09/2017 01:25 AM    Albumin 3.0 06/09/2017 01:25 AM    Globulin 5.0 06/09/2017 01:25 AM    A-G Ratio 0.6 06/09/2017 01:25 AM    ALT (SGPT) 35 06/09/2017 01:25 AM     Lab Results   Component Value Date/Time    WBC 6.8 06/09/2017 01:25 AM    Hemoglobin, POC 13.3 04/04/2017 03:49 PM    HGB 13.5 06/09/2017 01:25 AM    Hematocrit, POC 39 04/04/2017 03:49 PM    HCT 37.8 06/09/2017 01:25 AM    PLATELET 551 97/95/2533 01:25 AM    MCV 85.7 06/09/2017 01:25 AM     Lab Results   Component Value Date/Time    Cholesterol, total 207 01/25/2017 09:19 AM    HDL Cholesterol 61 01/25/2017 09:19 AM    LDL, calculated 104.8 01/25/2017 09:19 AM    VLDL, calculated 41.2 01/25/2017 09:19 AM    Triglyceride 206 01/25/2017 09:19 AM    CHOL/HDL Ratio 3.4 01/25/2017 09:19 AM     Lab Results   Component Value Date/Time    Hemoglobin A1c 9.8 06/09/2017 01:25 AM    Hemoglobin A1c 8.1 05/25/2017 12:36 PM    Hemoglobin A1c 9.7 04/04/2017 03:30 PM         Current Outpatient Prescriptions   Medication Sig Dispense Refill    insulin aspart (NOVOLOG) 100 unit/mL inpn Take 8 units with each meal. 10 Pen 11    RABEprazole (ACIPHEX) 20 mg tablet TAKE 1 TABLET BY MOUTH ONCE DAILY 30 Tab 5    gabapentin (NEURONTIN) 100 mg capsule TAKE 1 CAPSULE BY MOUTH THREE TIMES DAILY 60 Cap 0    gabapentin (NEURONTIN) 100 mg capsule TAKE 1 CAPSULE BY MOUTH THREE TIMES DAILY 90 Cap 3    polyethylene glycol (MIRALAX) 17 gram packet Take 1 Packet by mouth two (2) times a day. 60 Packet 1    ondansetron hcl (ZOFRAN, AS HYDROCHLORIDE,) 4 mg tablet Take 2 Tabs by mouth every eight (8) hours as needed for Nausea. 12 Tab 0    atorvastatin (LIPITOR) 80 mg tablet TAKE 1 TABLET BY MOUTH DAILY 90 Tab 9    insulin degludec (TRESIBA FLEXTOUCH U-100) 100 unit/mL (3 mL) inpn 35 Units by SubCUTAneous route daily.  HYDROmorphone (DILAUDID) 2 mg tablet Take 1 Tab by mouth every four (4) hours as needed for Pain.  Max Daily Amount: 12 mg. 60 Tab 0    nitrofurantoin, macrocrystal-monohydrate, (MACROBID) 100 mg capsule Take 1 Cap by mouth two (2) times a day. 14 Cap 0    sucralfate (CARAFATE) 100 mg/mL suspension Take 5 mL by mouth four (4) times daily. 414 mL 0    valACYclovir (VALTREX) 1 gram tablet Take 1 Tab by mouth three (3) times daily. 21 Tab 0    promethazine (PHENERGAN) 25 mg tablet Take 1 Tab by mouth every six (6) hours as needed. 12 Tab 0    traMADol (ULTRAM) 50 mg tablet Take 1 Tab by mouth every six (6) hours as needed for Pain. Max Daily Amount: 200 mg. 4 Tab 0    acetaminophen (TYLENOL EXTRA STRENGTH) 500 mg tablet Take 2 Tabs by mouth every six (6) hours as needed for Pain. 40 Tab 0    ondansetron (ZOFRAN ODT) 4 mg disintegrating tablet Take 1 Tab by mouth every eight (8) hours as needed for Nausea. 10 Tab 0    amLODIPine (NORVASC) 10 mg tablet Take 1 Tab by mouth daily. 90 Tab 3    metoprolol succinate (TOPROL-XL) 200 mg XL tablet Take 1 Tab by mouth daily. 90 Tab 3    losartan (COZAAR) 100 mg tablet Take 1 Tab by mouth daily. 90 Tab 3    hydrALAZINE (APRESOLINE) 50 mg tablet Take 0.5 Tabs by mouth four (4) times daily. 180 Tab 3    hydroCHLOROthiazide (HYDRODIURIL) 25 mg tablet Take 1 Tab by mouth daily. 90 Tab 3    levothyroxine (SYNTHROID) 125 mcg tablet Take 1 Tab by mouth Daily (before breakfast). 90 Tab 3    clopidogrel (PLAVIX) 75 mg tab Take 1 Tab by mouth daily. 90 Tab 3    buPROPion (WELLBUTRIN) 100 mg tablet Take 1 Tab by mouth daily. 90 Tab 3    citalopram (CELEXA) 40 mg tablet Take 1 Tab by mouth daily. 90 Tab 3    Insulin Needles, Disposable, (DORA PEN NEEDLE) 32 gauge x 5/32\" ndle Use one needle to give insulin 4 times a day. 400 Pen Needle 15    aspirin delayed-release 81 mg tablet Take  by mouth daily.           Allergies   Allergen Reactions    Contrast Agent [Iodine] Rash and Sneezing       Past Medical History:   Diagnosis Date    Anxiety     CAD (coronary artery disease)     S/P Coronary stents ( LAD and Lcx)    Depression     Diabetes ([facility] [address]     Hepatitis C     Hypercholesterolemia     Hypertension         Past Surgical History:   Procedure Laterality Date    HX BREAST BIOPSY      1971 2010 left breast lump removed excisional per patient     [address]    pre-eclampsia    HX CHOLECYSTECTOMY  2004    HX CORONARY STENT PLACEMENT  Nov 2013    4 stents    HX HEENT  2009    thyroidectomy due to nodules.  HX HYSTERECTOMY  2005    heavy periods    HX THYROIDECTOMY         Family History   Problem Relation Age of Onset    Diabetes Mother     Hypertension Mother     Cancer Mother     Heart Disease Mother     Heart Attack Mother     Diabetes Father     Hypertension Father     Cancer Father        Social History     Social History    Marital status:      Spouse name: N/A    Number of children: N/A    Years of education: N/A     Occupational History    Not on file. Social History Main Topics    Smoking status: Never Smoker    Smokeless tobacco: Never Used    Alcohol use No    Drug use: No    Sexual activity: Not Currently     Other Topics Concern    Not on file     Social History Narrative       REVIEW OF SYSTEM:  Review of Systems   Constitutional: Negative for chills and fever. Eyes: Negative for blurred vision. Respiratory: Negative for shortness of breath. Cardiovascular: Negative for chest pain, palpitations and leg swelling. Gastrointestinal: Positive for abdominal pain. Negative for constipation, diarrhea, nausea and vomiting. Musculoskeletal: Positive for joint pain and myalgias. Neurological: Negative for headaches.        Objective:     Visit Vitals    /86 (BP 1 Location: Right arm, BP Patient Position: Sitting)    Pulse 88    Temp 98.7 °F (37.1 °C) (Oral)    Resp 16    Ht 5' 3\" (1.6 m)    Wt 177 lb (80.3 kg)    SpO2 98%    BMI 31.35 kg/m2       PHYSICAL EXAM:  Physical Exam   Constitutional: She is oriented to person, place, and time and well-developed, well-nourished, and in no distress. HENT:   Right Ear: Tympanic membrane, external ear and ear canal normal.   Left Ear: Tympanic membrane, external ear and ear canal normal.   Nose: Nose normal.   Mouth/Throat: Oropharynx is clear and moist.   Eyes: Pupils are equal, round, and reactive to light. Neck: Normal range of motion. Neck supple. No thyromegaly present. Cardiovascular: Normal rate, regular rhythm, normal heart sounds and intact distal pulses. No murmur heard. Pulmonary/Chest: Effort normal and breath sounds normal. She has no wheezes. Musculoskeletal:        Right wrist: She exhibits normal range of motion and no crepitus. Left wrist: She exhibits normal range of motion and no deformity. Right knee: She exhibits normal range of motion. Left knee: She exhibits normal range of motion. Grinding in bilateral knees    Neurological: She is alert and oriented to person, place, and time. GCS score is 15. Skin: Skin is warm and dry. Vitals reviewed. Assessment/Plan:       ICD-10-CM ICD-9-CM    1. Anxiety disorder due to medical condition F41.8 293.84 REFERRAL TO PSYCHIATRY   2. Insomnia due to medical condition G47.01 327.01 REFERRAL TO PSYCHIATRY   3. Pain of both wrist joints M25.531 719.43 SED RATE (ESR)    M25.532  C REACTIVE PROTEIN, QT      RHEUMATOID FACTOR, QL      THEO, DIRECT, W/REFLEX      DNA AB, DOUBLE STRANDED, IGG      URIC ACID   4. Chronic pain of both knees M25.561 719.46 SED RATE (ESR)    M25.562 338.29 C REACTIVE PROTEIN, QT    G89.29  RHEUMATOID FACTOR, QL      THEO, DIRECT, W/REFLEX      DNA AB, DOUBLE STRANDED, IGG      URIC ACID   5. Uncontrolled type 2 diabetes mellitus with diabetic polyneuropathy, without long-term current use of insulin (HCC) E11.42 250.62 HEMOGLOBIN A1C WITH EAG    G68.72 968.2 METABOLIC PANEL, COMPREHENSIVE     Patient given opportunity to ask questions. Questions answered. Patient understands plan of care. Follow-up Disposition:  Return in about 2 weeks (around 9/8/2017) for follow up labs. .        Written by Lexis Perez, as dictated by DO. AMOS Dash, Dr. Marielos Alvarez, confirm that all documentation is accurate.

## 2017-08-25 NOTE — PROGRESS NOTES
1. Have you been to the ER, urgent care clinic since your last visit? Hospitalized since your last visit? Yes, 6/8/17, [facility], high blood sugar    2. Have you seen or consulted any other health care providers outside of the [facility] since your last visit? Include any pap smears or colon screening.  no

## 2017-08-25 NOTE — MR AVS SNAPSHOT
Visit Information Date & Time Provider Department Dept. Phone Encounter #  
 8/25/2017  4:20 PM [facility] [phone] [id number] Follow-up Instructions Return in about 2 weeks (around 9/8/2017) for follow up labs. Maddy Urrutia Your Appointments 8/25/2017  4:20 PM  
Follow Up with Zeyad Multani DO [address] [address] Appt Note: FOLLOW-UP FOR A MEDICATION REFILL; $0 CP, $0 BAL, 08/16/2017   
 [address] [facility] [address]  
  
   
 [address] [address] Upcoming Health Maintenance Date Due  
 EYE EXAM RETINAL OR DILATED Q1 8/24/1970 DTaP/Tdap/Td series (1 - Tdap) 8/24/1981 FOBT Q 1 YEAR AGE 50-75 8/24/2010 INFLUENZA AGE 9 TO ADULT 8/1/2017 FOOT EXAM Q1 10/25/2017 HEMOGLOBIN A1C Q6M 12/9/2017 MICROALBUMIN Q1 1/25/2018 LIPID PANEL Q1 1/25/2018 BREAST CANCER SCRN MAMMOGRAM 11/16/2018 Allergies as of 8/25/2017  Review Complete On: 8/25/2017 By: Wendy Mcgill DO Severity Noted Reaction Type Reactions Contrast Agent [Iodine]  06/24/2014    Rash, Sneezing Current Immunizations  Never Reviewed Name Date Influenza Vaccine 9/1/2016 Influenza Vaccine (Madin Dousman Canine Kidney) PF 2/5/2015  3:56 PM  
 Influenza Vaccine (Quad) PF 11/30/2015 Pneumococcal Polysaccharide (PPSV-23) 10/25/2016 Not reviewed this visit You Were Diagnosed With   
  
 Codes Comments Anxiety disorder due to medical condition    -  Primary ICD-10-CM: F41.8 ICD-9-CM: 293.84 Insomnia due to medical condition     ICD-10-CM: G47.01 
ICD-9-CM: 327.01 Pain of both wrist joints     ICD-10-CM: M25.531, M25.532 ICD-9-CM: 719.43 Chronic pain of both knees     ICD-10-CM: M25.561, M25.562, G89.29 ICD-9-CM: 719.46, 338.29  Uncontrolled type 2 diabetes mellitus with diabetic polyneuropathy, without long-term current use of insulin (HCC)     ICD-10-CM: E11.42, E11.65 ICD-9-CM: 250.62, 357.2 Vitals BP Pulse Temp Resp Height(growth percentile) Weight(growth percentile) 156/86 (BP 1 Location: Right arm, BP Patient Position: Sitting) 88 98.7 °F (37.1 °C) (Oral) 16 5' 3\" (1.6 m) 177 lb (80.3 kg) SpO2 BMI OB Status Smoking Status 98% 31.35 kg/m2 Hysterectomy Never Smoker BMI and BSA Data Body Mass Index Body Surface Area  
 31.35 kg/m 2 1.89 m 2 Preferred Pharmacy Pharmacy Name Phone [place], [address] [facility] [address] [facility] [phone] Your Updated Medication List  
  
   
This list is accurate as of: 8/25/17  4:17 PM.  Always use your most recent med list.  
  
  
  
  
 acetaminophen 500 mg tablet Commonly known as:  Jr Connie Ibrahim Se Take 2 Tabs by mouth every six (6) hours as needed for Pain. amLODIPine 10 mg tablet Commonly known as:  Dumont Daniel Take 1 Tab by mouth daily. aspirin delayed-release 81 mg tablet Take  by mouth daily. atorvastatin 80 mg tablet Commonly known as:  LIPITOR  
TAKE 1 TABLET BY MOUTH DAILY  
  
 buPROPion 100 mg tablet Commonly known as:  [facility] Take 1 Tab by mouth daily. citalopram 40 mg tablet Commonly known as:  Angeles Rachel Take 1 Tab by mouth daily. clopidogrel 75 mg Tab Commonly known as:  PLAVIX Take 1 Tab by mouth daily. * gabapentin 100 mg capsule Commonly known as:  NEURONTIN  
TAKE 1 CAPSULE BY MOUTH THREE TIMES DAILY * gabapentin 100 mg capsule Commonly known as:  NEURONTIN  
TAKE 1 CAPSULE BY MOUTH THREE TIMES DAILY  
  
 hydrALAZINE 50 mg tablet Commonly known as:  APRESOLINE Take 0.5 Tabs by mouth four (4) times daily. hydroCHLOROthiazide 25 mg tablet Commonly known as:  HYDRODIURIL Take 1 Tab by mouth daily. HYDROmorphone 2 mg tablet Commonly known as:  DILAUDID Take 1 Tab by mouth every four (4) hours as needed for Pain. Max Daily Amount: 12 mg.  
  
 insulin aspart 100 unit/mL Inpn Commonly known as:  Magaly Hale Take 8 units with each meal.  
  
 Insulin Needles (Disposable) 32 gauge x 5/32\" Ndle Commonly known as:  Mona Pen Needle Use one needle to give insulin 4 times a day. levothyroxine 125 mcg tablet Commonly known as:  SYNTHROID Take 1 Tab by mouth Daily (before breakfast). losartan 100 mg tablet Commonly known as:  COZAAR Take 1 Tab by mouth daily. metoprolol succinate 200 mg XL tablet Commonly known as:  TOPROL-XL Take 1 Tab by mouth daily. nitrofurantoin (macrocrystal-monohydrate) 100 mg capsule Commonly known as:  MACROBID Take 1 Cap by mouth two (2) times a day. ondansetron 4 mg disintegrating tablet Commonly known as:  ZOFRAN ODT Take 1 Tab by mouth every eight (8) hours as needed for Nausea. ondansetron hcl 4 mg tablet Commonly known as:  ZOFRAN (AS HYDROCHLORIDE) Take 2 Tabs by mouth every eight (8) hours as needed for Nausea. polyethylene glycol 17 gram packet Commonly known as:  Gregery Sprung Take 1 Packet by mouth two (2) times a day. promethazine 25 mg tablet Commonly known as:  PHENERGAN Take 1 Tab by mouth every six (6) hours as needed. RABEprazole 20 mg tablet Commonly known as:  ACIPHEX  
TAKE 1 TABLET BY MOUTH ONCE DAILY  
  
 sucralfate 100 mg/mL suspension Commonly known as:  Tamsen Ric Take 5 mL by mouth four (4) times daily. traMADol 50 mg tablet Commonly known as:  ULTRAM  
Take 1 Tab by mouth every six (6) hours as needed for Pain. Max Daily Amount: 200 mg. TRESIBA FLEXTOUCH U-100 100 unit/mL (3 mL) Inpn Generic drug:  insulin degludec 35 Units by SubCUTAneous route daily. valACYclovir 1 gram tablet Commonly known as:  VALTREX Take 1 Tab by mouth three (3) times daily. * Notice: This list has 2 medication(s) that are the same as other medications prescribed for you. Read the directions carefully, and ask your doctor or other care provider to review them with you. We Performed the Following REFERRAL TO PSYCHIATRY [REF91 Custom] Comments:  
 Please evaluate patient for anxiety and insomnia related to history of MI Follow-up Instructions Return in about 2 weeks (around 9/8/2017) for follow up labs. Aidan Kline To-Do List   
 08/25/2017 Lab:  THEO, DIRECT, W/REFLEX   
  
 08/25/2017 Lab:  C REACTIVE PROTEIN, QT   
  
 08/25/2017 Lab:  DNA AB, DOUBLE STRANDED, IGG   
  
 08/25/2017 Lab:  HEMOGLOBIN A1C WITH EAG   
  
 08/25/2017 Lab:  METABOLIC PANEL, COMPREHENSIVE   
  
 08/25/2017 Lab:  RHEUMATOID FACTOR, QL   
  
 08/25/2017 Lab:  SED RATE (ESR)   
  
 08/25/2017 Lab:  URIC ACID Referral Information Referral ID Referred By Referred To  
  
 [id number] Wilfrid Bosworth, MD   
   [address] [place] [address], [address] [place] [address] Phone: [phone] Fax: [phone] Visits Status Start Date End Date 1 New Request 8/25/17 8/25/18 If your referral has a status of pending review or denied, additional information will be sent to support the outcome of this decision. Introducing [facility]! Dear Cuong Perkins: 
Thank you for requesting a [employer] account. Our records indicate that you already have an active [employer] account. You can access your account anytime at [email or web address] Did you know that you can access your hospital and ER discharge instructions at any time in [employer]? You can also review all of your test results from your hospital stay or ER visit. Additional Information If you have questions, please visit the Frequently Asked Questions section of the [employer] website at [email or web address] Remember, MyChart is NOT to be used for urgent needs. For medical emergencies, dial 911. Now available from your iPhone and Android! Please provide this summary of care documentation to your next provider. Your primary care clinician is listed as Odalys Hoffmann. If you have any questions after today's visit, please call [phone].

## 2017-08-28 ENCOUNTER — HOSPITAL ENCOUNTER (OUTPATIENT)
Dept: LAB | Age: 57
Discharge: HOME | End: 2017-08-28
Payer: OTHER GOVERNMENT

## 2017-08-28 LAB
ALBUMIN SERPL-MCNC: 3.3 G/DL (ref 3.4–5)
ALBUMIN/GLOB SERPL: 0.8 {RATIO} (ref 0.8–1.7)
ALP SERPL-CCNC: 141 U/L (ref 45–117)
ALT SERPL-CCNC: 37 U/L (ref 13–56)
ANION GAP SERPL CALC-SCNC: 7 MMOL/L (ref 3–18)
AST SERPL-CCNC: 32 U/L (ref 15–37)
BILIRUB SERPL-MCNC: 0.6 MG/DL (ref 0.2–1)
BUN SERPL-MCNC: 24 MG/DL (ref 7–18)
BUN/CREAT SERPL: 20 (ref 12–20)
CALCIUM SERPL-MCNC: 9.2 MG/DL (ref 8.5–10.1)
CHLORIDE SERPL-SCNC: 102 MMOL/L (ref 100–108)
CO2 SERPL-SCNC: 28 MMOL/L (ref 21–32)
CREAT SERPL-MCNC: 1.2 MG/DL (ref 0.6–1.3)
CRP SERPL-MCNC: <0.3 MG/DL (ref 0–0.3)
ERYTHROCYTE [SEDIMENTATION RATE] IN BLOOD: 67 MM/HR (ref 0–30)
EST. AVERAGE GLUCOSE BLD GHB EST-MCNC: 203 MG/DL
GLOBULIN SER CALC-MCNC: 4.4 G/DL (ref 2–4)
GLUCOSE SERPL-MCNC: 374 MG/DL (ref 74–99)
HBA1C MFR BLD: 8.7 % (ref 4.2–5.6)
POTASSIUM SERPL-SCNC: 4.1 MMOL/L (ref 3.5–5.5)
PROT SERPL-MCNC: 7.7 G/DL (ref 6.4–8.2)
SODIUM SERPL-SCNC: 137 MMOL/L (ref 136–145)
URATE SERPL-MCNC: 4.6 MG/DL (ref 2.6–7.2)

## 2017-08-28 PROCEDURE — 36415 COLL VENOUS BLD VENIPUNCTURE: CPT | Performed by: FAMILY MEDICINE

## 2017-08-28 PROCEDURE — 86140 C-REACTIVE PROTEIN: CPT | Performed by: FAMILY MEDICINE

## 2017-08-28 PROCEDURE — 86225 DNA ANTIBODY NATIVE: CPT | Performed by: FAMILY MEDICINE

## 2017-08-28 PROCEDURE — 80053 COMPREHEN METABOLIC PANEL: CPT | Performed by: FAMILY MEDICINE

## 2017-08-28 PROCEDURE — 85652 RBC SED RATE AUTOMATED: CPT | Performed by: FAMILY MEDICINE

## 2017-08-28 PROCEDURE — 83036 HEMOGLOBIN GLYCOSYLATED A1C: CPT | Performed by: FAMILY MEDICINE

## 2017-08-28 PROCEDURE — 84550 ASSAY OF BLOOD/URIC ACID: CPT | Performed by: FAMILY MEDICINE

## 2017-08-29 LAB — DSDNA AB SER-ACNC: <1 IU/ML (ref 0–9)

## 2017-09-14 ENCOUNTER — OFFICE VISIT (OUTPATIENT)
Dept: FAMILY MEDICINE CLINIC | Age: 57
End: 2017-09-14

## 2017-09-14 VITALS
WEIGHT: 176 LBS | HEIGHT: 63 IN | SYSTOLIC BLOOD PRESSURE: 148 MMHG | HEART RATE: 84 BPM | RESPIRATION RATE: 18 BRPM | TEMPERATURE: 98.3 F | OXYGEN SATURATION: 98 % | DIASTOLIC BLOOD PRESSURE: 88 MMHG | BODY MASS INDEX: 31.18 KG/M2

## 2017-09-14 DIAGNOSIS — E11.9 TYPE 2 DIABETES MELLITUS WITHOUT COMPLICATION, WITHOUT LONG-TERM CURRENT USE OF INSULIN (HCC): ICD-10-CM

## 2017-09-14 RX ORDER — METFORMIN HYDROCHLORIDE 850 MG/1
850 TABLET ORAL 2 TIMES DAILY WITH MEALS
Qty: 60 TAB | Refills: 2 | Status: SHIPPED | OUTPATIENT
Start: 2017-09-14 | End: 2018-03-30 | Stop reason: SDUPTHER

## 2017-09-14 RX ORDER — INSULIN DEGLUDEC 100 U/ML
35 INJECTION, SOLUTION SUBCUTANEOUS DAILY
Qty: 15 PEN | Refills: 2 | Status: SHIPPED | OUTPATIENT
Start: 2017-09-14 | End: 2018-03-30 | Stop reason: SDUPTHER

## 2017-09-14 RX ORDER — INSULIN ASPART 100 [IU]/ML
INJECTION, SOLUTION INTRAVENOUS; SUBCUTANEOUS
Qty: 10 PEN | Refills: 11 | Status: SHIPPED | OUTPATIENT
Start: 2017-09-14 | End: 2018-03-30 | Stop reason: SDUPTHER

## 2017-09-14 NOTE — PROGRESS NOTES
Subjective:     HPI:  Bc Ramírez is a 62 y.o. female who presents for follow up of labs. Labs Reviewed: I have reviewed labs with pt. Lab Results   Component Value Date/Time    Hemoglobin A1c 8.7 08/28/2017 11:39 AM    Hemoglobin A1c (POC) 10.8 05/19/2016 12:56 PM     Lab Results   Component Value Date/Time    Uric acid 4.6 08/28/2017 11:39 AM     Lab Results   Component Value Date/Time    GFR est AA 56 08/28/2017 11:39 AM    GFR est non-AA 46 08/28/2017 11:39 AM    Creatinine, POC 0.8 05/31/2017 01:18 PM    Creatinine 1.20 08/28/2017 11:39 AM    BUN 24 08/28/2017 11:39 AM    BUN, POC 20 04/04/2017 03:49 PM    Sodium,  04/04/2017 03:49 PM    Sodium 137 08/28/2017 11:39 AM    Potassium 4.1 08/28/2017 11:39 AM    Potassium, POC 3.5 04/04/2017 03:49 PM    Chloride, POC 94 04/04/2017 03:49 PM    Chloride 102 08/28/2017 11:39 AM    CO2 28 08/28/2017 11:39 AM     Lab Results   Component Value Date/Time    Sodium 137 08/28/2017 11:39 AM    Potassium 4.1 08/28/2017 11:39 AM    Chloride 102 08/28/2017 11:39 AM    CO2 28 08/28/2017 11:39 AM    Anion gap 7 08/28/2017 11:39 AM    Glucose 374 08/28/2017 11:39 AM    BUN 24 08/28/2017 11:39 AM    Creatinine 1.20 08/28/2017 11:39 AM    BUN/Creatinine ratio 20 08/28/2017 11:39 AM    GFR est AA 56 08/28/2017 11:39 AM    GFR est non-AA 46 08/28/2017 11:39 AM    Calcium 9.2 08/28/2017 11:39 AM    Bilirubin, total 0.6 08/28/2017 11:39 AM    AST (SGOT) 32 08/28/2017 11:39 AM    Alk. phosphatase 141 08/28/2017 11:39 AM    Protein, total 7.7 08/28/2017 11:39 AM    Albumin 3.3 08/28/2017 11:39 AM    Globulin 4.4 08/28/2017 11:39 AM    A-G Ratio 0.8 08/28/2017 11:39 AM    ALT (SGPT) 37 08/28/2017 11:39 AM     Diabetes Mellitus:  female has diabetes mellitus, and hyperlipidemia, hypothyroidism, and CAD.   Diabetic ROS - medication compliance: compliant all of the time, diabetic diet compliance: noncompliant much of the time, home glucose monitoring: is performed regularly, needs more one touch strips, further diabetic ROS: no polyuria or polydipsia, no chest pain, dyspnea or TIA's, no numbness, tingling or pain in extremities. Lab review: labs reviewed, I note that glycosylated hemoglobin still high. Pt reports that she has been off the Metformin and just taking the Insulin because she was having what she thought was chest pains, but was actually just abdominal pain. Current Outpatient Prescriptions   Medication Sig Dispense Refill    metFORMIN (GLUCOPHAGE) 850 mg tablet Take 1 Tab by mouth two (2) times daily (with meals). 60 Tab 2    glucose blood VI test strips (BLOOD GLUCOSE TEST) strip Use one strip for each glucose check. Check glucose 2 times per day. 100 Strip 7    insulin aspart (NOVOLOG) 100 unit/mL inpn Take 8 units with each meal. 10 Pen 11    gabapentin (NEURONTIN) 100 mg capsule TAKE 1 CAPSULE BY MOUTH THREE TIMES DAILY 60 Cap 0    gabapentin (NEURONTIN) 100 mg capsule TAKE 1 CAPSULE BY MOUTH THREE TIMES DAILY 90 Cap 3    atorvastatin (LIPITOR) 80 mg tablet TAKE 1 TABLET BY MOUTH DAILY 90 Tab 9    insulin degludec (TRESIBA FLEXTOUCH U-100) 100 unit/mL (3 mL) inpn 35 Units by SubCUTAneous route daily.  HYDROmorphone (DILAUDID) 2 mg tablet Take 1 Tab by mouth every four (4) hours as needed for Pain. Max Daily Amount: 12 mg. 60 Tab 0    acetaminophen (TYLENOL EXTRA STRENGTH) 500 mg tablet Take 2 Tabs by mouth every six (6) hours as needed for Pain. 40 Tab 0    amLODIPine (NORVASC) 10 mg tablet Take 1 Tab by mouth daily. 90 Tab 3    metoprolol succinate (TOPROL-XL) 200 mg XL tablet Take 1 Tab by mouth daily. 90 Tab 3    losartan (COZAAR) 100 mg tablet Take 1 Tab by mouth daily. 90 Tab 3    hydrALAZINE (APRESOLINE) 50 mg tablet Take 0.5 Tabs by mouth four (4) times daily. 180 Tab 3    hydroCHLOROthiazide (HYDRODIURIL) 25 mg tablet Take 1 Tab by mouth daily.  90 Tab 3    levothyroxine (SYNTHROID) 125 mcg tablet Take 1 Tab by mouth Daily (before breakfast). 90 Tab 3    clopidogrel (PLAVIX) 75 mg tab Take 1 Tab by mouth daily. 90 Tab 3    buPROPion (WELLBUTRIN) 100 mg tablet Take 1 Tab by mouth daily. 90 Tab 3    citalopram (CELEXA) 40 mg tablet Take 1 Tab by mouth daily. 90 Tab 3    Insulin Needles, Disposable, (DORA PEN NEEDLE) 32 gauge x 5/32\" ndle Use one needle to give insulin 4 times a day. 400 Pen Needle 15    aspirin delayed-release 81 mg tablet Take  by mouth daily.  RABEprazole (ACIPHEX) 20 mg tablet TAKE 1 TABLET BY MOUTH ONCE DAILY 30 Tab 5    polyethylene glycol (MIRALAX) 17 gram packet Take 1 Packet by mouth two (2) times a day. 60 Packet 1    ondansetron hcl (ZOFRAN, AS HYDROCHLORIDE,) 4 mg tablet Take 2 Tabs by mouth every eight (8) hours as needed for Nausea. 12 Tab 0    nitrofurantoin, macrocrystal-monohydrate, (MACROBID) 100 mg capsule Take 1 Cap by mouth two (2) times a day. 14 Cap 0    sucralfate (CARAFATE) 100 mg/mL suspension Take 5 mL by mouth four (4) times daily. 414 mL 0    valACYclovir (VALTREX) 1 gram tablet Take 1 Tab by mouth three (3) times daily. 21 Tab 0    promethazine (PHENERGAN) 25 mg tablet Take 1 Tab by mouth every six (6) hours as needed. 12 Tab 0    traMADol (ULTRAM) 50 mg tablet Take 1 Tab by mouth every six (6) hours as needed for Pain. Max Daily Amount: 200 mg. 4 Tab 0    ondansetron (ZOFRAN ODT) 4 mg disintegrating tablet Take 1 Tab by mouth every eight (8) hours as needed for Nausea.  10 Tab 0        Allergies   Allergen Reactions    Contrast Agent [Iodine] Rash and Sneezing       Past Medical History:   Diagnosis Date    Anxiety     CAD (coronary artery disease)     S/P Coronary stents ( LAD and Lcx)    Depression     Diabetes ([facility]     Hepatitis C     Hypercholesterolemia     Hypertension        Past Surgical History:   Procedure Laterality Date    HX BREAST BIOPSY      1971 2010 left breast lump removed excisional per patient     [address]    pre-eclampsia    HX CHOLECYSTECTOMY  2004    HX CORONARY STENT PLACEMENT  Nov 2013    4 stents    HX HEENT  2009    thyroidectomy due to nodules.  HX HYSTERECTOMY  2005    heavy periods    HX THYROIDECTOMY         Family History   Problem Relation Age of Onset    Diabetes Mother     Hypertension Mother     Cancer Mother     Heart Disease Mother     Heart Attack Mother     Diabetes Father     Hypertension Father     Cancer Father        Social History     Social History    Marital status:      Spouse name: N/A    Number of children: N/A    Years of education: N/A     Occupational History    Not on file. Social History Main Topics    Smoking status: Never Smoker    Smokeless tobacco: Never Used    Alcohol use No    Drug use: No    Sexual activity: Not Currently     Other Topics Concern    Not on file     Social History Narrative       REVIEW OF SYSTEM:  Review of Systems   Constitutional: Negative for chills and fever. Eyes: Negative for blurred vision. Respiratory: Negative for shortness of breath. Cardiovascular: Negative for chest pain, palpitations and leg swelling. Gastrointestinal: Negative for constipation, diarrhea, nausea and vomiting. Musculoskeletal: Negative for joint pain. Neurological: Negative for headaches. Objective:     Visit Vitals    /88    Pulse 84    Temp 98.3 °F (36.8 °C)    Resp 18    Ht 5' 3\" (1.6 m)    Wt 176 lb (79.8 kg)    SpO2 98%    BMI 31.18 kg/m2       PHYSICAL EXAM:  Physical Exam   Constitutional: She is oriented to person, place, and time and well-developed, well-nourished, and in no distress. HENT:   Right Ear: Tympanic membrane, external ear and ear canal normal.   Left Ear: Tympanic membrane, external ear and ear canal normal.   Nose: Nose normal.   Mouth/Throat: Oropharynx is clear and moist.   Eyes: Pupils are equal, round, and reactive to light. Neck: Normal range of motion. Neck supple. No thyromegaly present. Cardiovascular: Normal rate, regular rhythm, normal heart sounds and intact distal pulses. No murmur heard. Pulmonary/Chest: Effort normal and breath sounds normal. She has no wheezes. Neurological: She is alert and oriented to person, place, and time. GCS score is 15. Skin: Skin is warm and dry. Vitals reviewed. Assessment/Plan:   I have started pt back on Metformin 850mg BID. Instructed to take 1 tablet nightly for 1 week then BID after a week. Call office if any side effects of abd pain and diarrhea occur and to discontinue the medicine. ICD-10-CM ICD-9-CM    1. Uncontrolled type 2 diabetes mellitus with diabetic polyneuropathy, without long-term current use of insulin ([facility]) E11.42 250.62 metFORMIN (GLUCOPHAGE) 850 mg tablet    E11.65 357.2 glucose blood VI test strips (BLOOD GLUCOSE TEST) strip     Patient given opportunity to ask questions. Questions answered. Patient understands plan of care. Follow-up Disposition:  Return in about 2 months (around 11/14/2017). Written by Deja Bennett, as dictated by Shireen Fragoso DO.    I, Dr. Shireen Fragoso, confirm that all documentation is accurate.

## 2017-09-14 NOTE — PROGRESS NOTES
Malachi Lee is a 62 y.o. female  Chief Complaint   Patient presents with    Results     1. Have you been to the ER, urgent care clinic since your last visit? Hospitalized since your last visit? No    2. Have you seen or consulted any other health care providers outside of the [address] [place] [address] since your last visit? Include any pap smears or colon screening.  No

## 2017-09-14 NOTE — MR AVS SNAPSHOT
Visit Information Date & Time Provider Department Dept. Phone Encounter #  
 9/14/2017  4:00 PM Wendy Mcgill[address]  [id number] Follow-up Instructions Return in about 2 months (around 11/14/2017). Upcoming Health Maintenance Date Due  
 EYE EXAM RETINAL OR DILATED Q1 8/24/1970 DTaP/Tdap/Td series (1 - Tdap) 8/24/1981 FOBT Q 1 YEAR AGE 50-75 8/24/2010 INFLUENZA AGE 9 TO ADULT 8/1/2017 FOOT EXAM Q1 10/25/2017 MICROALBUMIN Q1 1/25/2018 LIPID PANEL Q1 1/25/2018 HEMOGLOBIN A1C Q6M 2/28/2018 BREAST CANCER SCRN MAMMOGRAM 11/16/2018 Allergies as of 9/14/2017  Review Complete On: 9/14/2017 By: Brad Brandon LPN Severity Noted Reaction Type Reactions Contrast Agent [Iodine]  06/24/2014    Rash, Sneezing Current Immunizations  Never Reviewed Name Date Influenza Vaccine 9/1/2016 Influenza Vaccine (Madin Almira Canine Kidney) PF 2/5/2015  3:56 PM  
 Influenza Vaccine (Quad) PF 11/30/2015 Pneumococcal Polysaccharide (PPSV-23) 10/25/2016 Not reviewed this visit You Were Diagnosed With   
  
 Codes Comments Uncontrolled type 2 diabetes mellitus with diabetic polyneuropathy, without long-term current use of insulin (HCC)    -  Primary ICD-10-CM: E11.42, E11.65 ICD-9-CM: 250.62, 357.2 Type 2 diabetes mellitus without complication, without long-term current use of insulin (HCC)     ICD-10-CM: E11.9 ICD-9-CM: 250.00 Vitals BP Pulse Temp Resp Height(growth percentile) Weight(growth percentile) 148/88 84 98.3 °F (36.8 °C) 18 5' 3\" (1.6 m) 176 lb (79.8 kg) SpO2 BMI OB Status Smoking Status 98% 31.18 kg/m2 Hysterectomy Never Smoker Vitals History BMI and BSA Data Body Mass Index Body Surface Area  
 31.18 kg/m 2 1.88 m 2 Preferred Pharmacy Pharmacy Name Phone [address], [facility] [phone] Your Updated Medication List  
  
   
This list is accurate as of: 9/14/17  4:09 PM.  Always use your most recent med list.  
  
  
  
  
 acetaminophen 500 mg tablet Commonly known as:  Jr Connie Ibrahim Se Take 2 Tabs by mouth every six (6) hours as needed for Pain. amLODIPine 10 mg tablet Commonly known as:  Tim Sanchez Take 1 Tab by mouth daily. aspirin delayed-release 81 mg tablet Take  by mouth daily. atorvastatin 80 mg tablet Commonly known as:  LIPITOR  
TAKE 1 TABLET BY MOUTH DAILY  
  
 buPROPion 100 mg tablet Commonly known as:  [facility] Take 1 Tab by mouth daily. citalopram 40 mg tablet Commonly known as:  Angeles Rachel Take 1 Tab by mouth daily. clopidogrel 75 mg Tab Commonly known as:  PLAVIX Take 1 Tab by mouth daily. * gabapentin 100 mg capsule Commonly known as:  NEURONTIN  
TAKE 1 CAPSULE BY MOUTH THREE TIMES DAILY * gabapentin 100 mg capsule Commonly known as:  NEURONTIN  
TAKE 1 CAPSULE BY MOUTH THREE TIMES DAILY  
  
 glucose blood VI test strips strip Commonly known as:  blood glucose test  
Use one strip for each glucose check. Check glucose 2 times per day. hydrALAZINE 50 mg tablet Commonly known as:  APRESOLINE Take 0.5 Tabs by mouth four (4) times daily. hydroCHLOROthiazide 25 mg tablet Commonly known as:  HYDRODIURIL Take 1 Tab by mouth daily. HYDROmorphone 2 mg tablet Commonly known as:  DILAUDID Take 1 Tab by mouth every four (4) hours as needed for Pain. Max Daily Amount: 12 mg.  
  
 insulin aspart 100 unit/mL Inpn Commonly known as:  Tigre Lal Take 8 units with each meal.  
  
 insulin degludec 100 unit/mL (3 mL) Inpn Commonly known as:  TRESIBA FLEXTOUCH U-100  
35 Units by SubCUTAneous route daily. Insulin Needles (Disposable) 32 gauge x 5/32\" Ndle Commonly known as:  Mona Pen Needle Use one needle to give insulin 4 times a day. levothyroxine 125 mcg tablet Commonly known as:  SYNTHROID Take 1 Tab by mouth Daily (before breakfast). losartan 100 mg tablet Commonly known as:  COZAAR Take 1 Tab by mouth daily. metFORMIN 850 mg tablet Commonly known as:  GLUCOPHAGE Take 1 Tab by mouth two (2) times daily (with meals). metoprolol succinate 200 mg XL tablet Commonly known as:  TOPROL-XL Take 1 Tab by mouth daily. nitrofurantoin (macrocrystal-monohydrate) 100 mg capsule Commonly known as:  MACROBID Take 1 Cap by mouth two (2) times a day. ondansetron 4 mg disintegrating tablet Commonly known as:  ZOFRAN ODT Take 1 Tab by mouth every eight (8) hours as needed for Nausea. ondansetron hcl 4 mg tablet Commonly known as:  ZOFRAN (AS HYDROCHLORIDE) Take 2 Tabs by mouth every eight (8) hours as needed for Nausea. polyethylene glycol 17 gram packet Commonly known as:  Valerie Lobstein Take 1 Packet by mouth two (2) times a day. promethazine 25 mg tablet Commonly known as:  PHENERGAN Take 1 Tab by mouth every six (6) hours as needed. RABEprazole 20 mg tablet Commonly known as:  ACIPHEX  
TAKE 1 TABLET BY MOUTH ONCE DAILY  
  
 sucralfate 100 mg/mL suspension Commonly known as:  Mikel Hitch Take 5 mL by mouth four (4) times daily. traMADol 50 mg tablet Commonly known as:  ULTRAM  
Take 1 Tab by mouth every six (6) hours as needed for Pain. Max Daily Amount: 200 mg.  
  
 valACYclovir 1 gram tablet Commonly known as:  VALTREX Take 1 Tab by mouth three (3) times daily. * Notice: This list has 2 medication(s) that are the same as other medications prescribed for you. Read the directions carefully, and ask your doctor or other care provider to review them with you. Prescriptions Sent to Pharmacy Refills  
 metFORMIN (GLUCOPHAGE) 850 mg tablet 2 Sig: Take 1 Tab by mouth two (2) times daily (with meals).   
 Class: Normal  
 Pharmacy: [facility] [address] [facility], [address] Ph #: [phone] Route: Oral  
 glucose blood VI test strips (BLOOD GLUCOSE TEST) strip 7 Sig: Use one strip for each glucose check. Check glucose 2 times per day. Class: Normal  
 Pharmacy: [facility] [address] [facility], [address] [facility] Ph #: [phone]  
 insulin aspart (NOVOLOG) 100 unit/mL inpn 11 Sig: Take 8 units with each meal.  
 Class: Normal  
 Pharmacy: [address], [address] [place] [address] Ph #: [phone]  
 insulin degludec (TRESIBA FLEXTOUCH U-100) 100 unit/mL (3 mL) inpn 2 Si Units by SubCUTAneous route daily. Class: Normal  
 Pharmacy: [address], [address] Ph #: [phone] Route: SubCUTAneous Follow-up Instructions Return in about 2 months (around 2017). Introducing [facility]! Dear Jaziel Dickinson: 
Thank you for requesting a [employer] account. Our records indicate that you already have an active [employer] account. You can access your account anytime at [email or web address] Did you know that you can access your hospital and ER discharge instructions at any time in [employer]? You can also review all of your test results from your hospital stay or ER visit. Additional Information If you have questions, please visit the Frequently Asked Questions section of the [employer] website at [email or web address] Remember, [employer] is NOT to be used for urgent needs. For medical emergencies, dial 911. Now available from your iPhone and Android! Please provide this summary of care documentation to your next provider. Your primary care clinician is listed as Cinda Ann. If you have any questions after today's visit, please call [phone].

## 2017-12-18 ENCOUNTER — APPOINTMENT (OUTPATIENT)
Dept: GENERAL RADIOLOGY | Age: 57
DRG: 066 | End: 2017-12-18
Attending: EMERGENCY MEDICINE
Payer: OTHER GOVERNMENT

## 2017-12-18 ENCOUNTER — OFFICE VISIT (OUTPATIENT)
Dept: FAMILY MEDICINE CLINIC | Age: 57
End: 2017-12-18

## 2017-12-18 ENCOUNTER — HOSPITAL ENCOUNTER (OUTPATIENT)
Dept: LAB | Age: 57
Discharge: HOME | DRG: 066 | End: 2017-12-18
Payer: OTHER GOVERNMENT

## 2017-12-18 ENCOUNTER — APPOINTMENT (OUTPATIENT)
Dept: MRI IMAGING | Age: 57
DRG: 066 | End: 2017-12-18
Attending: EMERGENCY MEDICINE
Payer: OTHER GOVERNMENT

## 2017-12-18 ENCOUNTER — HOSPITAL ENCOUNTER (INPATIENT)
Age: 57
LOS: 1 days | Discharge: HOME | DRG: 066 | End: 2017-12-19
Attending: EMERGENCY MEDICINE | Admitting: FAMILY MEDICINE
Payer: OTHER GOVERNMENT

## 2017-12-18 ENCOUNTER — APPOINTMENT (OUTPATIENT)
Dept: CT IMAGING | Age: 57
DRG: 066 | End: 2017-12-18
Attending: EMERGENCY MEDICINE
Payer: OTHER GOVERNMENT

## 2017-12-18 VITALS
BODY MASS INDEX: 32.39 KG/M2 | SYSTOLIC BLOOD PRESSURE: 192 MMHG | HEIGHT: 63 IN | WEIGHT: 182.8 LBS | DIASTOLIC BLOOD PRESSURE: 96 MMHG | HEART RATE: 79 BPM | OXYGEN SATURATION: 97 % | TEMPERATURE: 98.6 F | RESPIRATION RATE: 16 BRPM

## 2017-12-18 DIAGNOSIS — I63.9 ACUTE CEREBROVASCULAR ACCIDENT (CVA) (HCC): Primary | ICD-10-CM

## 2017-12-18 DIAGNOSIS — E03.9 HYPOTHYROIDISM, ACQUIRED: ICD-10-CM

## 2017-12-18 DIAGNOSIS — I16.1 HYPERTENSIVE EMERGENCY: ICD-10-CM

## 2017-12-18 PROBLEM — E11.21 TYPE 2 DIABETES MELLITUS WITH NEPHROPATHY (HCC): Status: ACTIVE | Noted: 2017-12-18

## 2017-12-18 PROBLEM — E87.6 HYPOKALEMIA: Status: ACTIVE | Noted: 2017-12-18

## 2017-12-18 LAB
ALBUMIN SERPL-MCNC: 2.5 G/DL (ref 3.4–5)
ALBUMIN SERPL-MCNC: 2.6 G/DL (ref 3.4–5)
ALBUMIN/GLOB SERPL: 0.5 {RATIO} (ref 0.8–1.7)
ALBUMIN/GLOB SERPL: 0.6 {RATIO} (ref 0.8–1.7)
ALP SERPL-CCNC: 151 U/L (ref 45–117)
ALP SERPL-CCNC: 158 U/L (ref 45–117)
ALT SERPL-CCNC: 48 U/L (ref 13–56)
ALT SERPL-CCNC: 49 U/L (ref 13–56)
AMPHET UR QL SCN: NEGATIVE
ANION GAP SERPL CALC-SCNC: 10 MMOL/L (ref 3–18)
ANION GAP SERPL CALC-SCNC: 9 MMOL/L (ref 3–18)
APPEARANCE UR: CLEAR
ASPIRIN TEST, ASPIRN: 620 ARU (ref 620–672)
AST SERPL-CCNC: 44 U/L (ref 15–37)
AST SERPL-CCNC: 55 U/L (ref 15–37)
BACTERIA URNS QL MICRO: NEGATIVE /HPF
BARBITURATES UR QL SCN: NEGATIVE
BENZODIAZ UR QL: NEGATIVE
BILIRUB SERPL-MCNC: 0.7 MG/DL (ref 0.2–1)
BILIRUB SERPL-MCNC: 0.8 MG/DL (ref 0.2–1)
BILIRUB UR QL: NEGATIVE
BUN SERPL-MCNC: 22 MG/DL (ref 7–18)
BUN SERPL-MCNC: 22 MG/DL (ref 7–18)
BUN/CREAT SERPL: 24 (ref 12–20)
BUN/CREAT SERPL: 25 (ref 12–20)
CALCIUM SERPL-MCNC: 8.3 MG/DL (ref 8.5–10.1)
CALCIUM SERPL-MCNC: 8.3 MG/DL (ref 8.5–10.1)
CANNABINOIDS UR QL SCN: NEGATIVE
CHLORIDE SERPL-SCNC: 102 MMOL/L (ref 100–108)
CHLORIDE SERPL-SCNC: 102 MMOL/L (ref 100–108)
CK MB CFR SERPL CALC: 1.3 % (ref 0–4)
CK MB SERPL-MCNC: 1.3 NG/ML (ref 5–25)
CK SERPL-CCNC: 98 U/L (ref 26–192)
CO2 SERPL-SCNC: 27 MMOL/L (ref 21–32)
CO2 SERPL-SCNC: 29 MMOL/L (ref 21–32)
COCAINE UR QL SCN: NEGATIVE
COLOR UR: YELLOW
CREAT SERPL-MCNC: 0.89 MG/DL (ref 0.6–1.3)
CREAT SERPL-MCNC: 0.93 MG/DL (ref 0.6–1.3)
EPITH CASTS URNS QL MICRO: NORMAL /LPF (ref 0–5)
ERYTHROCYTE [DISTWIDTH] IN BLOOD BY AUTOMATED COUNT: 11.4 % (ref 11.6–14.5)
EST. AVERAGE GLUCOSE BLD GHB EST-MCNC: 154 MG/DL
FIBRINOGEN PPP-MCNC: 523 MG/DL (ref 210–451)
GLOBULIN SER CALC-MCNC: 4.6 G/DL (ref 2–4)
GLOBULIN SER CALC-MCNC: 5.2 G/DL (ref 2–4)
GLUCOSE BLD STRIP.AUTO-MCNC: 139 MG/DL (ref 70–110)
GLUCOSE SERPL-MCNC: 150 MG/DL (ref 74–99)
GLUCOSE SERPL-MCNC: 164 MG/DL (ref 74–99)
GLUCOSE UR STRIP.AUTO-MCNC: NEGATIVE MG/DL
HBA1C MFR BLD: 7 % (ref 4.2–5.6)
HCT VFR BLD AUTO: 36.9 % (ref 35–45)
HDSCOM,HDSCOM: NORMAL
HGB BLD-MCNC: 12.7 G/DL (ref 12–16)
HGB UR QL STRIP: NEGATIVE
INR PPP: 0.9 (ref 0.8–1.2)
KETONES UR QL STRIP.AUTO: NEGATIVE MG/DL
LEUKOCYTE ESTERASE UR QL STRIP.AUTO: ABNORMAL
MCH RBC QN AUTO: 30.8 PG (ref 24–34)
MCHC RBC AUTO-ENTMCNC: 34.4 G/DL (ref 31–37)
MCV RBC AUTO: 89.3 FL (ref 74–97)
METHADONE UR QL: NEGATIVE
NITRITE UR QL STRIP.AUTO: NEGATIVE
OPIATES UR QL: NEGATIVE
PCP UR QL: NEGATIVE
PH UR STRIP: 6.5 [PH] (ref 5–8)
PLATELET # BLD AUTO: 182 K/UL (ref 135–420)
PMV BLD AUTO: 10.3 FL (ref 9.2–11.8)
POTASSIUM SERPL-SCNC: 3.4 MMOL/L (ref 3.5–5.5)
POTASSIUM SERPL-SCNC: 3.9 MMOL/L (ref 3.5–5.5)
PROT SERPL-MCNC: 7.2 G/DL (ref 6.4–8.2)
PROT SERPL-MCNC: 7.7 G/DL (ref 6.4–8.2)
PROT UR STRIP-MCNC: 300 MG/DL
PROTHROMBIN TIME: 11.8 SEC (ref 11.5–15.2)
RBC # BLD AUTO: 4.13 M/UL (ref 4.2–5.3)
RBC #/AREA URNS HPF: 0 /HPF (ref 0–5)
SODIUM SERPL-SCNC: 139 MMOL/L (ref 136–145)
SODIUM SERPL-SCNC: 140 MMOL/L (ref 136–145)
SP GR UR REFRACTOMETRY: 1.01 (ref 1–1.03)
THROMBIN TIME: 18.6 SECS (ref 13.8–18.2)
TROPONIN I SERPL-MCNC: <0.02 NG/ML (ref 0–0.04)
TSH SERPL DL<=0.05 MIU/L-ACNC: 1.51 UIU/ML (ref 0.36–3.74)
UROBILINOGEN UR QL STRIP.AUTO: 1 EU/DL (ref 0.2–1)
WBC # BLD AUTO: 7.2 K/UL (ref 4.6–13.2)
WBC URNS QL MICRO: NORMAL /HPF (ref 0–4)

## 2017-12-18 PROCEDURE — 71010 XR CHEST PORT: CPT

## 2017-12-18 PROCEDURE — 85027 COMPLETE CBC AUTOMATED: CPT | Performed by: EMERGENCY MEDICINE

## 2017-12-18 PROCEDURE — 83036 HEMOGLOBIN GLYCOSYLATED A1C: CPT | Performed by: FAMILY MEDICINE

## 2017-12-18 PROCEDURE — 82533 TOTAL CORTISOL: CPT | Performed by: FAMILY MEDICINE

## 2017-12-18 PROCEDURE — 80053 COMPREHEN METABOLIC PANEL: CPT | Performed by: FAMILY MEDICINE

## 2017-12-18 PROCEDURE — 84443 ASSAY THYROID STIM HORMONE: CPT | Performed by: FAMILY MEDICINE

## 2017-12-18 PROCEDURE — 86141 C-REACTIVE PROTEIN HS: CPT | Performed by: FAMILY MEDICINE

## 2017-12-18 PROCEDURE — 74011250636 HC RX REV CODE- 250/636: Performed by: EMERGENCY MEDICINE

## 2017-12-18 PROCEDURE — 80061 LIPID PANEL: CPT | Performed by: FAMILY MEDICINE

## 2017-12-18 PROCEDURE — 84439 ASSAY OF FREE THYROXINE: CPT | Performed by: FAMILY MEDICINE

## 2017-12-18 PROCEDURE — 86900 BLOOD TYPING SEROLOGIC ABO: CPT | Performed by: FAMILY MEDICINE

## 2017-12-18 PROCEDURE — 83090 ASSAY OF HOMOCYSTEINE: CPT | Performed by: FAMILY MEDICINE

## 2017-12-18 PROCEDURE — 85384 FIBRINOGEN ACTIVITY: CPT | Performed by: EMERGENCY MEDICINE

## 2017-12-18 PROCEDURE — 36415 COLL VENOUS BLD VENIPUNCTURE: CPT | Performed by: FAMILY MEDICINE

## 2017-12-18 PROCEDURE — 93005 ELECTROCARDIOGRAM TRACING: CPT

## 2017-12-18 PROCEDURE — 77030020263 HC SOL INJ SOD CL0.9% LFCR 1000ML

## 2017-12-18 PROCEDURE — 70450 CT HEAD/BRAIN W/O DYE: CPT

## 2017-12-18 PROCEDURE — 80076 HEPATIC FUNCTION PANEL: CPT | Performed by: FAMILY MEDICINE

## 2017-12-18 PROCEDURE — 85730 THROMBOPLASTIN TIME PARTIAL: CPT | Performed by: FAMILY MEDICINE

## 2017-12-18 PROCEDURE — 85652 RBC SED RATE AUTOMATED: CPT | Performed by: FAMILY MEDICINE

## 2017-12-18 PROCEDURE — 65270000029 HC RM PRIVATE

## 2017-12-18 PROCEDURE — 80048 BASIC METABOLIC PNL TOTAL CA: CPT | Performed by: FAMILY MEDICINE

## 2017-12-18 PROCEDURE — 85610 PROTHROMBIN TIME: CPT | Performed by: EMERGENCY MEDICINE

## 2017-12-18 PROCEDURE — 85027 COMPLETE CBC AUTOMATED: CPT | Performed by: FAMILY MEDICINE

## 2017-12-18 PROCEDURE — 84481 FREE ASSAY (FT-3): CPT | Performed by: FAMILY MEDICINE

## 2017-12-18 PROCEDURE — 99285 EMERGENCY DEPT VISIT HI MDM: CPT

## 2017-12-18 PROCEDURE — 96375 TX/PRO/DX INJ NEW DRUG ADDON: CPT

## 2017-12-18 PROCEDURE — 83735 ASSAY OF MAGNESIUM: CPT | Performed by: FAMILY MEDICINE

## 2017-12-18 PROCEDURE — 81001 URINALYSIS AUTO W/SCOPE: CPT | Performed by: EMERGENCY MEDICINE

## 2017-12-18 PROCEDURE — 82550 ASSAY OF CK (CPK): CPT | Performed by: EMERGENCY MEDICINE

## 2017-12-18 PROCEDURE — 82550 ASSAY OF CK (CPK): CPT | Performed by: FAMILY MEDICINE

## 2017-12-18 PROCEDURE — 96374 THER/PROPH/DIAG INJ IV PUSH: CPT

## 2017-12-18 PROCEDURE — 82962 GLUCOSE BLOOD TEST: CPT

## 2017-12-18 PROCEDURE — 80307 DRUG TEST PRSMV CHEM ANLYZR: CPT | Performed by: EMERGENCY MEDICINE

## 2017-12-18 PROCEDURE — 85670 THROMBIN TIME PLASMA: CPT | Performed by: EMERGENCY MEDICINE

## 2017-12-18 PROCEDURE — 70551 MRI BRAIN STEM W/O DYE: CPT

## 2017-12-18 PROCEDURE — 83690 ASSAY OF LIPASE: CPT | Performed by: FAMILY MEDICINE

## 2017-12-18 PROCEDURE — 74011250637 HC RX REV CODE- 250/637: Performed by: EMERGENCY MEDICINE

## 2017-12-18 PROCEDURE — 85576 BLOOD PLATELET AGGREGATION: CPT | Performed by: EMERGENCY MEDICINE

## 2017-12-18 PROCEDURE — 85610 PROTHROMBIN TIME: CPT | Performed by: FAMILY MEDICINE

## 2017-12-18 PROCEDURE — 65660000000 HC RM CCU STEPDOWN

## 2017-12-18 PROCEDURE — 80053 COMPREHEN METABOLIC PANEL: CPT | Performed by: EMERGENCY MEDICINE

## 2017-12-18 PROCEDURE — 84100 ASSAY OF PHOSPHORUS: CPT | Performed by: FAMILY MEDICINE

## 2017-12-18 PROCEDURE — 96361 HYDRATE IV INFUSION ADD-ON: CPT

## 2017-12-18 RX ORDER — ACETAMINOPHEN 650 MG/1
650 SUPPOSITORY RECTAL
Status: DISCONTINUED | OUTPATIENT
Start: 2017-12-18 | End: 2017-12-19 | Stop reason: HOSPADM

## 2017-12-18 RX ORDER — AMOXICILLIN 250 MG
2 CAPSULE ORAL
Status: DISCONTINUED | OUTPATIENT
Start: 2017-12-19 | End: 2017-12-19 | Stop reason: HOSPADM

## 2017-12-18 RX ORDER — CLOPIDOGREL BISULFATE 75 MG/1
75 TABLET ORAL DAILY
Status: DISCONTINUED | OUTPATIENT
Start: 2017-12-19 | End: 2017-12-19 | Stop reason: HOSPADM

## 2017-12-18 RX ORDER — CITALOPRAM 20 MG/1
40 TABLET, FILM COATED ORAL DAILY
Status: DISCONTINUED | OUTPATIENT
Start: 2017-12-19 | End: 2017-12-19 | Stop reason: HOSPADM

## 2017-12-18 RX ORDER — ATORVASTATIN CALCIUM 40 MG/1
80 TABLET, FILM COATED ORAL
Status: DISCONTINUED | OUTPATIENT
Start: 2017-12-19 | End: 2017-12-19 | Stop reason: HOSPADM

## 2017-12-18 RX ORDER — INSULIN LISPRO 100 [IU]/ML
INJECTION, SOLUTION INTRAVENOUS; SUBCUTANEOUS EVERY 6 HOURS
Status: DISCONTINUED | OUTPATIENT
Start: 2017-12-19 | End: 2017-12-19

## 2017-12-18 RX ORDER — FACIAL-BODY WIPES
10 EACH TOPICAL DAILY PRN
Status: DISCONTINUED | OUTPATIENT
Start: 2017-12-18 | End: 2017-12-19 | Stop reason: HOSPADM

## 2017-12-18 RX ORDER — PROCHLORPERAZINE EDISYLATE 5 MG/ML
5 INJECTION INTRAMUSCULAR; INTRAVENOUS
Status: COMPLETED | OUTPATIENT
Start: 2017-12-18 | End: 2017-12-18

## 2017-12-18 RX ORDER — LORAZEPAM 1 MG/1
2 TABLET ORAL
Status: DISCONTINUED | OUTPATIENT
Start: 2017-12-18 | End: 2017-12-19 | Stop reason: SDUPTHER

## 2017-12-18 RX ORDER — ENOXAPARIN SODIUM 100 MG/ML
40 INJECTION SUBCUTANEOUS EVERY 24 HOURS
Status: DISCONTINUED | OUTPATIENT
Start: 2017-12-19 | End: 2017-12-19 | Stop reason: HOSPADM

## 2017-12-18 RX ORDER — BISACODYL 5 MG
5 TABLET, DELAYED RELEASE (ENTERIC COATED) ORAL DAILY PRN
Status: DISCONTINUED | OUTPATIENT
Start: 2017-12-18 | End: 2017-12-19 | Stop reason: HOSPADM

## 2017-12-18 RX ORDER — PANTOPRAZOLE SODIUM 40 MG/1
40 TABLET, DELAYED RELEASE ORAL EVERY 12 HOURS
Status: DISCONTINUED | OUTPATIENT
Start: 2017-12-19 | End: 2017-12-19 | Stop reason: HOSPADM

## 2017-12-18 RX ORDER — ONDANSETRON 2 MG/ML
2 INJECTION INTRAMUSCULAR; INTRAVENOUS
Status: DISCONTINUED | OUTPATIENT
Start: 2017-12-18 | End: 2017-12-19 | Stop reason: HOSPADM

## 2017-12-18 RX ORDER — PANTOPRAZOLE SODIUM 40 MG/1
40 GRANULE, DELAYED RELEASE ORAL EVERY 12 HOURS
Status: DISCONTINUED | OUTPATIENT
Start: 2017-12-19 | End: 2017-12-19 | Stop reason: HOSPADM

## 2017-12-18 RX ORDER — GABAPENTIN 300 MG/1
300 CAPSULE ORAL 3 TIMES DAILY
Qty: 90 CAP | Refills: 2 | Status: SHIPPED | OUTPATIENT
Start: 2017-12-18 | End: 2018-03-30 | Stop reason: SDUPTHER

## 2017-12-18 RX ORDER — GUAIFENESIN 100 MG/5ML
81 LIQUID (ML) ORAL
Status: COMPLETED | OUTPATIENT
Start: 2017-12-18 | End: 2017-12-19

## 2017-12-18 RX ORDER — PROCHLORPERAZINE EDISYLATE 5 MG/ML
5 INJECTION INTRAMUSCULAR; INTRAVENOUS
Status: DISCONTINUED | OUTPATIENT
Start: 2017-12-18 | End: 2017-12-18

## 2017-12-18 RX ORDER — LABETALOL HCL 20 MG/4 ML
20 SYRINGE (ML) INTRAVENOUS
Status: DISCONTINUED | OUTPATIENT
Start: 2017-12-18 | End: 2017-12-18

## 2017-12-18 RX ORDER — ALBUTEROL SULFATE 0.83 MG/ML
2.5 SOLUTION RESPIRATORY (INHALATION)
Status: DISCONTINUED | OUTPATIENT
Start: 2017-12-18 | End: 2017-12-19

## 2017-12-18 RX ORDER — ACETAMINOPHEN 325 MG/1
650 TABLET ORAL
Status: DISCONTINUED | OUTPATIENT
Start: 2017-12-18 | End: 2017-12-19 | Stop reason: HOSPADM

## 2017-12-18 RX ORDER — MAGNESIUM SULFATE 100 %
4 CRYSTALS MISCELLANEOUS AS NEEDED
Status: DISCONTINUED | OUTPATIENT
Start: 2017-12-18 | End: 2017-12-19 | Stop reason: HOSPADM

## 2017-12-18 RX ORDER — DIPHENHYDRAMINE HYDROCHLORIDE 50 MG/ML
12.5 INJECTION, SOLUTION INTRAMUSCULAR; INTRAVENOUS ONCE
Status: COMPLETED | OUTPATIENT
Start: 2017-12-18 | End: 2017-12-18

## 2017-12-18 RX ORDER — SODIUM CHLORIDE 9 MG/ML
100 INJECTION, SOLUTION INTRAVENOUS CONTINUOUS
Status: DISCONTINUED | OUTPATIENT
Start: 2017-12-19 | End: 2017-12-19

## 2017-12-18 RX ORDER — GUAIFENESIN 100 MG/5ML
81 LIQUID (ML) ORAL
Status: COMPLETED | OUTPATIENT
Start: 2017-12-18 | End: 2017-12-18

## 2017-12-18 RX ORDER — DEXTROSE 50 % IN WATER (D50W) INTRAVENOUS SYRINGE
25-50 AS NEEDED
Status: DISCONTINUED | OUTPATIENT
Start: 2017-12-18 | End: 2017-12-19 | Stop reason: HOSPADM

## 2017-12-18 RX ORDER — ASPIRIN 325 MG
325 TABLET ORAL DAILY
Status: DISCONTINUED | OUTPATIENT
Start: 2017-12-19 | End: 2017-12-19 | Stop reason: HOSPADM

## 2017-12-18 RX ORDER — GABAPENTIN 300 MG/1
300 CAPSULE ORAL 3 TIMES DAILY
Status: DISCONTINUED | OUTPATIENT
Start: 2017-12-19 | End: 2017-12-19 | Stop reason: HOSPADM

## 2017-12-18 RX ORDER — LABETALOL HYDROCHLORIDE 5 MG/ML
5 INJECTION, SOLUTION INTRAVENOUS
Status: DISCONTINUED | OUTPATIENT
Start: 2017-12-18 | End: 2017-12-19 | Stop reason: HOSPADM

## 2017-12-18 RX ORDER — BUPROPION HYDROCHLORIDE 100 MG/1
100 TABLET ORAL DAILY
Status: DISCONTINUED | OUTPATIENT
Start: 2017-12-19 | End: 2017-12-19 | Stop reason: HOSPADM

## 2017-12-18 RX ADMIN — DIPHENHYDRAMINE HYDROCHLORIDE 12.5 MG: 50 INJECTION, SOLUTION INTRAMUSCULAR; INTRAVENOUS at 13:59

## 2017-12-18 RX ADMIN — ASPIRIN 81 MG 81 MG: 81 TABLET ORAL at 13:20

## 2017-12-18 RX ADMIN — SODIUM CHLORIDE 500 ML: 900 INJECTION, SOLUTION INTRAVENOUS at 13:59

## 2017-12-18 RX ADMIN — PROCHLORPERAZINE EDISYLATE 5 MG: 5 INJECTION INTRAMUSCULAR; INTRAVENOUS at 13:59

## 2017-12-18 NOTE — MR AVS SNAPSHOT
Visit Information Date & Time Provider Department Dept. Phone Encounter #  
 12/18/2017  8:20 AM Areli Gibson[address]  [id number] Follow-up Instructions Return in about 1 week (around 12/25/2017). Upcoming Health Maintenance Date Due  
 EYE EXAM RETINAL OR DILATED Q1 8/24/1970 DTaP/Tdap/Td series (1 - Tdap) 8/24/1981 FOBT Q 1 YEAR AGE 50-75 8/24/2010 Influenza Age 5 to Adult 8/1/2017 FOOT EXAM Q1 10/25/2017 MICROALBUMIN Q1 1/25/2018 LIPID PANEL Q1 1/25/2018 HEMOGLOBIN A1C Q6M 2/28/2018 Allergies as of 12/18/2017  Review Complete On: 12/18/2017 By: Yocasta Villegas LPN Severity Noted Reaction Type Reactions Contrast Agent [Iodine]  06/24/2014    Rash, Sneezing Current Immunizations  Never Reviewed Name Date Influenza Vaccine 9/1/2016 Influenza Vaccine (Madin Hawthorne Canine Kidney) PF 2/5/2015  3:56 PM  
 Influenza Vaccine (Quad) PF 11/30/2015 Pneumococcal Polysaccharide (PPSV-23) 10/25/2016 Not reviewed this visit You Were Diagnosed With   
  
 Codes Comments Uncontrolled type 2 diabetes mellitus with diabetic polyneuropathy, with long-term current use of insulin (HCC)    -  Primary ICD-10-CM: E11.42, Z79.4, E11.65 ICD-9-CM: 250.62, 357.2, V58.67 Hypertensive emergency     ICD-10-CM: I16.1 ICD-9-CM: 401.9 Hypothyroidism, acquired     ICD-10-CM: E03.9 ICD-9-CM: 219. 9 Vitals BP Pulse Temp Resp Height(growth percentile) Weight(growth percentile) (!) 192/96 (BP 1 Location: Right arm, BP Patient Position: Sitting) 79 98.6 °F (37 °C) (Oral) 16 5' 3\" (1.6 m) 182 lb 12.8 oz (82.9 kg) SpO2 BMI OB Status Smoking Status 97% 32.38 kg/m2 Hysterectomy Never Smoker Vitals History BMI and BSA Data Body Mass Index Body Surface Area  
 32.38 kg/m 2 1.92 m 2 [phone] Your Updated Medication List  
  
   
This list is accurate as of: 12/18/17  9:34 AM.  Always use your most recent med list.  
  
  
  
  
 acetaminophen 500 mg tablet Commonly known as:  [address]  Take 2 Tabs by mouth every six (6) hours as needed for Pain. amLODIPine 10 mg tablet Commonly known as:  Parisa Anastasiya Take 1 Tab by mouth daily. aspirin delayed-release 81 mg tablet Take  by mouth daily. atorvastatin 80 mg tablet Commonly known as:  LIPITOR  
TAKE 1 TABLET BY MOUTH DAILY  
  
 buPROPion 100 mg tablet Commonly known as:  [facility] Take 1 Tab by mouth daily. citalopram 40 mg tablet Commonly known as:  Lovetta Rape Take 1 Tab by mouth daily. clopidogrel 75 mg Tab Commonly known as:  PLAVIX Take 1 Tab by mouth daily. gabapentin 300 mg capsule Commonly known as:  NEURONTIN Take 1 Cap by mouth three (3) times daily. glucose blood VI test strips strip Commonly known as:  blood glucose test  
Use one strip for each glucose check. Check glucose 2 times per day. hydrALAZINE 50 mg tablet Commonly known as:  APRESOLINE Take 0.5 Tabs by mouth four (4) times daily. hydroCHLOROthiazide 25 mg tablet Commonly known as:  HYDRODIURIL Take 1 Tab by mouth daily. HYDROmorphone 2 mg tablet Commonly known as:  DILAUDID Take 1 Tab by mouth every four (4) hours as needed for Pain. Max Daily Amount: 12 mg.  
  
 insulin aspart 100 unit/mL Inpn Commonly known as:  Emory Escudero Take 8 units with each meal.  
  
 insulin degludec 100 unit/mL (3 mL) Inpn Commonly known as:  TRESIBA FLEXTOUCH U-100  
35 Units by SubCUTAneous route daily. Insulin Needles (Disposable) 32 gauge x 5/32\" Ndle Commonly known as:  Mona Pen Needle Use one needle to give insulin 4 times a day. levothyroxine 125 mcg tablet Commonly known as:  SYNTHROID Take 1 Tab by mouth Daily (before breakfast). losartan 100 mg tablet Commonly known as:  COZAAR Take 1 Tab by mouth daily. metFORMIN 850 mg tablet Commonly known as:  GLUCOPHAGE Take 1 Tab by mouth two (2) times daily (with meals). metoprolol succinate 200 mg XL tablet Commonly known as:  TOPROL-XL Take 1 Tab by mouth daily. nitrofurantoin (macrocrystal-monohydrate) 100 mg capsule Commonly known as:  MACROBID Take 1 Cap by mouth two (2) times a day. ondansetron 4 mg disintegrating tablet Commonly known as:  ZOFRAN ODT Take 1 Tab by mouth every eight (8) hours as needed for Nausea. ondansetron hcl 4 mg tablet Commonly known as:  ZOFRAN (AS HYDROCHLORIDE) Take 2 Tabs by mouth every eight (8) hours as needed for Nausea. polyethylene glycol 17 gram packet Commonly known as:  Tonna Rober Take 1 Packet by mouth two (2) times a day. promethazine 25 mg tablet Commonly known as:  PHENERGAN Take 1 Tab by mouth every six (6) hours as needed. RABEprazole 20 mg tablet Commonly known as:  ACIPHEX  
TAKE 1 TABLET BY MOUTH ONCE DAILY  
  
 sucralfate 100 mg/mL suspension Commonly known as:  Alana Longmont Take 5 mL by mouth four (4) times daily. valACYclovir 1 gram tablet Commonly known as:  VALTREX Take 1 Tab by mouth three (3) times daily. Prescriptions Sent to Pharmacy Refills  
 gabapentin (NEURONTIN) 300 mg capsule 2 Sig: Take 1 Cap by mouth three (3) times daily. Class: Normal  
 Pharmacy: [address], [address] [place] [address] Ph #: [phone] Route: Oral  
  
We Performed the Following AMB POC EKG ROUTINE W/ 12 LEADS, INTER & REP [76380 CPT(R)] Follow-up Instructions Return in about 1 week (around 12/25/2017). To-Do List   
 12/18/2017 Lab:  HEMOGLOBIN A1C WITH EAG   
  
 12/18/2017 Lab: METABOLIC PANEL, COMPREHENSIVE   
  
 12/18/2017 Lab:  TSH 3RD GENERATION Introducing [facility]! Dear Joe Mccormack: 
Thank you for requesting a [employer] account. Our records indicate that you already have an active [employer] account. You can access your account anytime at [email or web address] Did you know that you can access your hospital and ER discharge instructions at any time in [employer]? You can also review all of your test results from your hospital stay or ER visit. Additional Information If you have questions, please visit the Frequently Asked Questions section of the [employer] website at [email or web address] Remember, [employer] is NOT to be used for urgent needs. For medical emergencies, dial 911. Now available from your iPhone and Android! Please provide this summary of care documentation to your next provider. [facility]. If you have any questions after today's visit, please call [phone].

## 2017-12-18 NOTE — IP AVS SNAPSHOT
[address] [address] 
[phone] Patient: Chadd Guerra MRN: [id number] About your hospitalization You were admitted on:  December 18, 2017 You last received care in the:  [address] [facility] You were discharged on:  December 19, 2017 Why you were hospitalized Your primary diagnosis was:  Cva (Cerebral Vascular Accident) (Hcc) Your diagnoses also included:  Type 2 Diabetes Mellitus With Nephropathy (Hcc), Hypertension, Cad (Coronary Artery Disease), Hepatitis C, Hypokalemia, Hypothyroidism, Acquired Things You Need To Do (next 8 weeks) Follow up with Lupe Abdul DO To discuss recent hospitalization Phone:  [phone] Where:  [address] [facility], [address] [address], [place] [address] Schedule an appointment with Braden Dobbins MD as soon as possible for a visit today  
follow-up recurrent CVAs Phone:  [phone] Where:  [address] [facility], [address] [facility], [address] [place] [address] Call Luis Eduardo Buckner MD today  
follow-up with your cardiologist or Dr Luzmaria Her for systolic cardiomyopathy Phone:  [phone] Where:  [address] [facility] [place] [id number] Thursday Dec 28, 2017 Follow Up with Lupe Abdul DO at 10:40 AM  
Where:  [address] [place] [address] ([facility]) Discharge Orders None A check augusto indicates which time of day the medication should be taken. My Medications STOP taking these medications   
 aspirin delayed-release 81 mg tablet Replaced by:  aspirin 325 mg tablet  
   
  
 clopidogrel 75 mg Tab Commonly known as:  PLAVIX TAKE these medications as instructed Instructions Each Dose to Equal  
 Morning Noon Evening Bedtime  
 acetaminophen 500 mg tablet Commonly known as:  [address] [facility]  
   
 Your last dose was: Your next dose is: Take 2 Tabs by mouth every six (6) hours as needed for Pain. 1000 mg  
    
   
   
   
  
 amLODIPine 10 mg tablet Commonly known as:  [place] Your last dose was: Your next dose is: Take 1 Tab by mouth daily. 10 mg  
    
   
   
   
  
 aspirin 325 mg tablet Commonly known as:  ASPIRIN Your last dose was: Your next dose is: Take 1 Tab by mouth daily. 325 mg  
    
   
   
   
  
 atorvastatin 80 mg tablet Commonly known as:  LIPITOR Your last dose was: Your next dose is: TAKE 1 TABLET BY MOUTH DAILY  
     
   
   
   
  
 buPROPion 100 mg tablet Commonly known as:  [facility] Your last dose was: Your next dose is: Take 1 Tab by mouth daily. 100 mg  
    
   
   
   
  
 citalopram 40 mg tablet Commonly known as:  Pratibha Rainey Your last dose was: Your next dose is: Take 1 Tab by mouth daily. 40 mg  
    
   
   
   
  
 gabapentin 300 mg capsule Commonly known as:  NEURONTIN Your last dose was: Your next dose is: Take 1 Cap by mouth three (3) times daily. 300 mg  
    
   
   
   
  
 glucose blood VI test strips strip Commonly known as:  blood glucose test  
   
Your last dose was: Your next dose is:    
   
   
 Use one strip for each glucose check. Check glucose 2 times per day. hydrALAZINE 50 mg tablet Commonly known as:  APRESOLINE Your last dose was: Your next dose is: Take 0.5 Tabs by mouth four (4) times daily. 25 mg  
    
   
   
   
  
 hydroCHLOROthiazide 25 mg tablet Commonly known as:  HYDRODIURIL Your last dose was: Your next dose is: Take 1 Tab by mouth daily. 25 mg HYDROmorphone 2 mg tablet Commonly known as:  DILAUDID  
   
 Your last dose was: Your next dose is: Take 1 Tab by mouth every four (4) hours as needed for Pain. Max Daily Amount: 12 mg.  
 2 mg  
    
   
   
   
  
 insulin aspart 100 unit/mL Inpn Commonly known as:  Emperatriz Chatterjee Your last dose was: Your next dose is: Take 8 units with each meal.  
     
   
   
   
  
 insulin degludec 100 unit/mL (3 mL) Inpn Commonly known as:  TRESIBA FLEXTOUCH U-100 Your last dose was: Your next dose is:    
   
   
 35 Units by SubCUTAneous route daily. 35 Units Insulin Needles (Disposable) 32 gauge x 5/32\" Ndle Commonly known as:  Mona Pen Needle Your last dose was: Your next dose is:    
   
   
 Use one needle to give insulin 4 times a day. levothyroxine 125 mcg tablet Commonly known as:  SYNTHROID Your last dose was: Your next dose is: Take 1 Tab by mouth Daily (before breakfast). 125 mcg  
    
   
   
   
  
 losartan 100 mg tablet Commonly known as:  COZAAR Your last dose was: Your next dose is: Take 1 Tab by mouth daily. 100 mg  
    
   
   
   
  
 metFORMIN 850 mg tablet Commonly known as:  GLUCOPHAGE Your last dose was: Your next dose is: Take 1 Tab by mouth two (2) times daily (with meals). 850 mg  
    
   
   
   
  
 metoprolol succinate 200 mg XL tablet Commonly known as:  TOPROL-XL Your last dose was: Your next dose is: Take 1 Tab by mouth daily. 200 mg  
    
   
   
   
  
 nitrofurantoin (macrocrystal-monohydrate) 100 mg capsule Commonly known as:  MACROBID Your last dose was: Your next dose is: Take 1 Cap by mouth two (2) times a day. 100 mg  
    
   
   
   
  
 ondansetron 4 mg disintegrating tablet Commonly known as:  ZOFRAN ODT Your last dose was: Your next dose is: Take 1 Tab by mouth every eight (8) hours as needed for Nausea. 4 mg  
    
   
   
   
  
 ondansetron hcl 4 mg tablet Commonly known as:  ZOFRAN (AS HYDROCHLORIDE) Your last dose was: Your next dose is: Take 2 Tabs by mouth every eight (8) hours as needed for Nausea. 8 mg  
    
   
   
   
  
 polyethylene glycol 17 gram packet Commonly known as:  Timbo Rober Your last dose was: Your next dose is: Take 1 Packet by mouth two (2) times a day. 17 g  
    
   
   
   
  
 prasugrel 10 mg tablet Commonly known as:  EFFIENT Your last dose was: Your next dose is: Take 1 Tab by mouth daily. Indications: Failed plavix. Recurrent CA. No P2Y12 response  
 10 mg  
    
   
   
   
  
 promethazine 25 mg tablet Commonly known as:  PHENERGAN Your last dose was: Your next dose is: Take 1 Tab by mouth every six (6) hours as needed. 25 mg  
    
   
   
   
  
 RABEprazole 20 mg tablet Commonly known as:  ACIPHEX Your last dose was: Your next dose is: TAKE 1 TABLET BY MOUTH ONCE DAILY  
     
   
   
   
  
 sucralfate 100 mg/mL suspension Commonly known as:  Alana Clear Your last dose was: Your next dose is: Take 5 mL by mouth four (4) times daily. 1 tsp  
    
   
   
   
  
 valACYclovir 1 gram tablet Commonly known as:  VALTREX Your last dose was: Your next dose is: Take 1 Tab by mouth three (3) times daily. 1000 mg Where to Get Your Medications Information on where to get these meds will be given to you by the nurse or doctor. ! Ask your nurse or doctor about these medications  
  aspirin 325 mg tablet  
 prasugrel 10 mg tablet Discharge Instructions DISCHARGE SUMMARY from Nurse PATIENT INSTRUCTIONS: 
 
 
F-face looks uneven A-arms unable to move or move unevenly S-speech slurred or non-existent T-time-call 911 as soon as signs and symptoms begin-DO NOT go Back to bed or wait to see if you get better-TIME IS BRAIN. Warning Signs of HEART ATTACK Call 911 if you have these symptoms: 
? Chest discomfort. Most heart attacks involve discomfort in the center of the chest that lasts more than a few minutes, or that goes away and comes back. It can feel like uncomfortable pressure, squeezing, fullness, or pain. ? Discomfort in other areas of the upper body. Symptoms can include pain or discomfort in one or both arms, the back, neck, jaw, or stomach. ? Shortness of breath with or without chest discomfort. ? Other signs may include breaking out in a cold sweat, nausea, or lightheadedness. Don't wait more than five minutes to call [address]! Fast action can save your life. Calling 911 is almost always the fastest way to get lifesaving treatment. Emergency Medical Services staff can begin treatment when they arrive  up to an hour sooner than if someone gets to the hospital by car. The discharge information has been reviewed with the patient. The patient verbalized understanding. Discharge medications reviewed with the patient and appropriate educational materials and side effects teaching were provided. ___________________________________________________________________________________________________________________________________ Learning About an Ischemic Stroke What is an ischemic stroke?  
 
An ischemic (say \"gjl-EWN-iyrp\") stroke occurs when a blood clot blocks a blood vessel in the brain. This means that blood cannot flow to some part of the brain. Without blood and the oxygen it carries, this part of the brain starts to die. The part of the body controlled by the damaged area of the brain cannot work properly. This is different from a hemorrhagic (say \"bun-vyu-NK-jick\") stroke, which happens when a blood vessel in the brain has burst open or has started to leak. The brain damage from a stroke starts within minutes. Quick treatment can help limit damage to the brain and make recovery more likely. People who have had a stroke may have a hard time talking, understanding things, and making decisions. They may have to relearn daily activities, such as how to eat, bathe, and dress. How well someone recovers from a stroke depends on how quickly the person gets to the hospital, where in the brain the stroke happened, and how severe it was. Training and therapy also make a difference in how well people recover. What are the symptoms? If you have any of these symptoms, call 911 or other emergency services right away: 
· You have symptoms of a stroke. These may include: 
¨ Sudden numbness, tingling, weakness, or loss of movement in your face, arm, or leg, especially on only one side of your body. ¨ Sudden vision changes. ¨ Sudden trouble speaking. ¨ Sudden confusion or trouble understanding simple statements. ¨ Sudden problems with walking or balance. ¨ A sudden, severe headache that is different from past headaches. See your doctor if you have symptoms that seem like a stroke, even if they go away quickly. You may have had a transient ischemic attack (TIA), sometimes called a mini-stroke. A TIA is a warning that a stroke may happen soon. Getting early treatment for a TIA can help prevent a stroke. What causes an ischemic stroke? An ischemic stroke is caused by a blood clot that blocks blood flow in the brain. The most common causes of blood clots include: · Hardening of the arteries (atherosclerosis). This is caused by high blood pressure, diabetes, high cholesterol, or smoking. · Atrial fibrillation. How is ischemic stroke treated? You may have to take several medicines, depending on what caused your stroke. Ask your doctor if a stroke rehab program is right for you. Rehab increases your chances of getting back some of the abilities you lost. 
How can you prevent another stroke? · Work with your doctor to treat any health problems you have. High blood pressure, high cholesterol, atrial fibrillation, and diabetes all raise your chances of having a stroke. · Be safe with medicines. Take your medicine exactly as prescribed. Call your doctor if you think you are having a problem with your medicine. · Have a healthy lifestyle. ¨ Do not smoke or allow others to smoke around you. If you need help quitting, talk to your doctor about stop-smoking programs and medicines. These can increase your chances of quitting for good. Smoking makes a stroke more likely. ¨ Limit alcohol to 2 drinks a day for men and 1 drink a day for women. ¨ Lose weight if you need to. A healthy weight will help you keep your heart and body healthy. ¨ Be active. Ask your doctor what type and level of activity is safe for you. ¨ Eat heart-healthy foods, like fruits, vegetables, and high-fiber foods. Follow-up care is a key part of your treatment and safety. Be sure to make and go to all appointments, and call your doctor if you are having problems. It's also a good idea to know your test results and keep a list of the medicines you take. Where can you learn more? Go to http://staci-zeinab.info/. Enter D161 in the search box to learn more about \"Learning About an Ischemic Stroke. \" Current as of: March 20, 2017 Content Version: 11.4 © 6690-6489 Healthwise, Incorporated.  Care instructions adapted under license by [address] (which disclaims liability or warranty for this information). If you have questions about a medical condition or this instruction, always ask your healthcare professional. [address] any warranty or liability for your use of this information. Learning About How to Prevent a Stroke What is a stroke? A stroke occurs when a blood vessel in the brain bursts or is blocked by a blood clot. Without blood and the oxygen it carries, part of the brain starts to die. The part of the body controlled by the damaged area of the brain can't work properly. But there are many things you can do to help lower your stroke risk. What increases your risk for stroke? A risk factor is anything that makes you more likely to have a particular health problem. Risk factors for stroke that you can treat or change include: 
· Health problems like high blood pressure, atrial fibrillation, diabetes, and high cholesterol. · Smoking. · Heavy use of alcohol. · Being overweight. · Not getting enough physical activity. Risk factors you can't change include: · Age. The risk of stroke goes up as you get older. · Race.  Americans, Native Americans, and [place] Natives have a higher risk than those of other races. · Being female. Women have a higher risk of stroke than men. · Family history of stroke. Your doctor can help you know your risk. Then you and your can doctor talk about whether you need to lower it. What can you do to prevent a stroke? · Treat any health problems you have that raise your risk. · Adopt a heart-healthy lifestyle: ¨ Don't smoke. If you need help quitting, talk to your doctor about stop-smoking programs and medicines. These can increase your chances of quitting for good. ¨ Limit alcohol to 2 drinks a day for men and 1 drink a day for women. ¨ Stay at a healthy weight. Lose weight if you need to. ¨ If your doctor recommends it, get more exercise. Walking is a good choice. Bit by bit, increase the amount you walk every day. Try for at least 30 minutes on most days of the week. ¨ Eat heart-healthy foods. These include fruits, vegetables, high-fiber foods, and fish. Choose foods that are low in sodium, saturated fat, and trans fat. · Decide with your doctor whether you will also take medicines to help lower your risk. For example, you and your doctor may decide you will take a medicine that prevents blood clots. What are the symptoms of a stroke? The brain damage from a stroke starts within minutes. That's why it's so important to know the symptoms of stroke and to act fast. Quick treatment can help limit damage to the brain so that you have fewer problems after the stroke. FAST is a simple way to remember the main symptoms of stroke. Recognizing these symptoms helps you know when to call for medical help. FAST stands for: 
· Face drooping. · Arm weakness. · Speech difficulty. · Time to call 911. Follow-up care is a key part of your treatment and safety. Be sure to make and go to all appointments, and call your doctor if you are having problems. It's also a good idea to know your test results and keep a list of the medicines you take. Where can you learn more? Go to http://staci-zeinab.info/. Enter G757 in the search box to learn more about \"Learning About How to Prevent a Stroke. \" Current as of: March 20, 2017 Content Version: 11.4 © 1032-7375 Healthwise, Incorporated. Care instructions adapted under license by [employer] (which disclaims liability or warranty for this information). If you have questions about a medical condition or this instruction, always ask your healthcare professional. [place] [address] any warranty or liability for your use of this information. Learning About How to Prevent Another Stroke What can you do to prevent another stroke? After a stroke, people feel lots of different emotions. Some people are worried that they could have another stroke. Or they may feel overwhelmed by how much there is to learn and do. Some people feel sad or depressed. No matter what emotions you are feeling, you can give yourself some control and peace of mind by making a plan to lower your risk of having another stroke. Take your medicines You'll need to take medicines to help prevent another stroke. Be sure to take your medicines exactly as prescribed. And don't stop taking them unless your doctor tells you to. If you stop taking your medicines, you can increase your risk of having another stroke. Some of the medicines your doctor may prescribe include: · Aspirin or some other blood thinner to prevent blood clots. · Statins to lower cholesterol. · Blood pressure medicines to lower blood pressure. Manage other health problems You can help lower your chance of having another stroke by managing certain other health problems. Problems that increase your risk of having another stroke include: · High blood pressure. · High cholesterol. · Atrial fibrillation. · Diabetes. If you have any of these health problems, you can manage them with healthy lifestyle changes along with medicine. Adopt a healthy lifestyle · Do not smoke or allow others to smoke around you. If you need help quitting, talk to your doctor about stop-smoking programs and medicines. These can increase your chances of quitting for good. Smoking makes a stroke more likely. · Limit alcohol to 2 drinks a day for men and 1 drink a day for women. · Lose weight if you need to. Controlling your weight will help you keep your heart and body healthy. · Be active. Ask your doctor what type and level of activity is safe for you. · Eat heart-healthy foods, like fruits, vegetables, and high-fiber foods. It's also important to: · Get a flu shot every year. · Ask for help if you think you are depressed. Do stroke rehab Taking part in a stroke rehabilitation (rehab) program will help you to regain skills you lost or make the most of your abilities after a stroke. It also helps you take steps to prevent another stroke. Your rehab team will give you education and support to help you build new, healthy habits. You'll learn how to manage any other health problems that you might have. Miguelangel Gottlieb also learn how to exercise safely, eat a healthy diet, and quit smoking if you smoke. Miguelangel Gottlieb work with your team to decide what lifestyle choices are best for you. If your doctor hasn't already suggested it, ask him or her if stroke rehab is right for you. Know stroke symptoms Make sure you know the symptoms of stroke. FAST is a simple way to remember. Recognizing these symptoms helps you know when to call for medical help. FAST stands for: 
· Face drooping. · Arm weakness. · Speech difficulty. · Time to call 911. Follow-up care is a key part of your treatment and safety. Be sure to make and go to all appointments, and call your doctor if you are having problems. It's also a good idea to know your test results and keep a list of the medicines you take. Where can you learn more? Go to http://staci-zeinab.info/. Enter M460 in the search box to learn more about \"Learning About How to Prevent Another Stroke. \" Current as of: March 20, 2017 Content Version: 11.4 © 9838-0725 Healthwise, Incorporated. Care instructions adapted under license by [employer] (which disclaims liability or warranty for this information). If you have questions about a medical condition or this instruction, always ask your healthcare professional. [address] any warranty or liability for your use of this information. Learning About Antiplatelet Medicines After a Stroke Introduction If you have had a stroke, you may have concerns about having another one. You want to do all you can do to avoid this. If your stroke was caused by a blood clot, one of the best things you can do is to take antiplatelet medicines. They can help prevent another stroke. In most cases, you don't take them if you had a stroke caused by a leak in an artery. These medicines are often called blood thinners. But they don't thin your blood. They work to keep platelets from sticking together and forming blood clots. (A platelet is a type of blood cell.) Blood clots can cause a stroke if they block a blood vessel in the brain. So by preventing blood clots, you are helping to prevent a stroke. Examples · Aspirin (Rajeev, Bufferin, Ecotrin) · Aspirin with dipyridamole (Aggrenox) · Clopidogrel (Plavix) Possible side effects These medicines make your blood take longer than normal to clot. This can cause bleeding, and you may bruise easily. In rare cases, they can cause you to bleed inside your body without an injury. If you have an injury, you might have bleeding that is hard to control. These medicines may have other side effects. Depending on which one you take, you may: 
· Have diarrhea. · Feel sick to your stomach. · Have a headache. · Have some mild belly pain. You may have other side effects or reactions not listed here. Check the information that comes with your medicine. What to know about taking this medicine · Be sure you get instructions about how to take your medicine safely. Blood thinners can cause serious bleeding problems. · Be safe with medicines. Take your medicines exactly as prescribed. Call your doctor if you think you are having a problem with your medicine. · Check with your doctor or pharmacist before you use any other medicines, including over-the-counter medicines. Make sure your doctor knows all of the medicines, vitamins, herbal products, and supplements you take.  Taking some medicines together can cause problems. Where can you learn more? Go to http://staci-zeinab.info/. Enter O858 in the search box to learn more about \"Learning About Antiplatelet Medicines After a Stroke. \" Current as of: September 21, 2016 Content Version: 11.4 © 6727-5746 [employer]. Care instructions adapted under license by [employer] (which disclaims liability or warranty for this information). If you have questions about a medical condition or this instruction, always ask your healthcare professional. [address] any warranty or liability for your use of this information. Learning About Coronary Artery Disease (CAD) What is coronary artery disease? Coronary artery disease (CAD) occurs when plaque builds up in the arteries that bring oxygen-rich blood to your heart. Plaque is a fatty substance made of cholesterol, calcium, and other substances in the blood. This process is called hardening of the arteries, or atherosclerosis. What happens when you have coronary artery disease? · Plaque may narrow the coronary arteries. Narrowed arteries cause poor blood flow. This can lead to angina symptoms such as chest pain or discomfort. If blood flow is completely blocked, you could have a heart attack. · You can slow CAD and reduce the risk of future problems by making changes in your lifestyle. These include quitting smoking and eating heart-healthy foods. · Treatments for CAD, along with changes in your lifestyle, can help you live a longer and healthier life. How can you prevent coronary artery disease? · Do not smoke. It may be the best thing you can do to prevent heart disease. If you need help quitting, talk to your doctor about stop-smoking programs and medicines. These can increase your chances of quitting for good. · Be active. Get at least 30 minutes of exercise on most days of the week. Walking is a good choice. You also may want to do other activities, such as running, swimming, cycling, or playing tennis or team sports. · Eat heart-healthy foods. Eat more fruits and vegetables and less foods that contain saturated and trans fats. Limit alcohol, sodium, and sweets. · Stay at a healthy weight. Lose weight if you need to. · Manage other health problems such as diabetes, high blood pressure, and high cholesterol. · Manage stress. Stress can hurt your heart. To keep stress low, talk about your problems and feelings. Don't keep your feelings hidden. · If you have talked about it with your doctor, take a low-dose aspirin every day. Aspirin can help certain people lower their risk of a heart attack or stroke. But taking aspirin isn't right for everyone, because it can cause serious bleeding. Do not start taking daily aspirin unless your doctor knows about it. How is coronary artery disease treated? · Your doctor will suggest that you make lifestyle changes. For example, your doctor may ask you to eat healthy foods, quit smoking, lose extra weight, and be more active. · You will have to take medicines. · Your doctor may suggest a procedure to open narrowed or blocked arteries. This is called angioplasty. Or your doctor may suggest using healthy blood vessels to create detours around narrowed or blocked arteries. This is called bypass surgery. Follow-up care is a key part of your treatment and safety. Be sure to make and go to all appointments, and call your doctor if you are having problems. It's also a good idea to know your test results and keep a list of the medicines you take. Where can you learn more? Go to http://staci-zeinab.info/. Enter ([phone] in the search box to learn more about \"Learning About Coronary Artery Disease (CAD). \" Current as of: September 21, 2016 Content Version: 11.4 © 9140-1102 Healthwise, Incorporated.  Care instructions adapted under license by [employer] (which disclaims liability or warranty for this information). If you have questions about a medical condition or this instruction, always ask your healthcare professional. [address] any warranty or liability for your use of this information. I have reviewed discharge instructions with the patient. The patient verbalized understanding. Patient armband removed and shredded [employer] Announcement We are excited to announce that we are making your provider's discharge notes available to you in TwentyFour6. You will see these notes when they are completed and signed by the physician that discharged you from your recent hospital stay. If you have any questions or concerns about any information you see in TwentyFour6, please call the Health Information Department where you were seen or reach out to your Primary Care Provider for more information about your plan of care. Introducing [facility]! Deedee Neumann Begin: 
Thank you for requesting a TwentyFour6 account. Our records indicate that you already have an active TwentyFour6 account. You can access your account anytime at [email or web address] Did you know that you can access your hospital and ER discharge instructions at any time in TwentyFour6? You can also review all of your test results from your hospital stay or ER visit. Additional Information If you have questions, please visit the Frequently Asked Questions section of the TwentyFour6 website at [email or web address] Remember, TwentyFour6 is NOT to be used for urgent needs. For medical emergencies, dial 911. Now available from your iPhone and Android! Unresulted Labs-Please follow up with your PCP about these lab tests Order Current Status CRP, HIGH SENSITIVITY In process HOMOCYSTEINE, PLASMA In process Providers Seen During Your Hospitalization Provider Specialty Primary office phone Carmen Mitchell MD Emergency Medicine [phone] Mel Reese MD Family Practice [phone] Yas Freitas MD Family Practice [phone] Jagdeep Wise, [address] [place] [address] Internal Medicine [phone] Your Primary Care Physician (PCP) Primary Care Physician Office Phone Office Fax [facility] [phone] You are allergic to the following Allergen Reactions Contrast Agent (Iodine) Rash Sneezing Recent Documentation Height Weight Breastfeeding? BMI OB Status Smoking Status 1.575 m 82.6 kg No 33.29 kg/m2 Hysterectomy Never Smoker Emergency Contacts Name Discharge Info Relation Home Work Mobile Clar[address]ommy [address] CAREGIVER [3] Child [2] [phone] Patient Belongings The following personal items are in your possession at time of discharge: 
  Dental Appliances: None  Visual Aid: Glasses, At home      Home Medications: None   Jewelry: None  Clothing: Socks, Hat, Footwear, Undergarments, Pants, Jacket/Coat    Other Valuables: Cell Phone (Bventsne 7 and Cirrascale 8+)  Personal Items Sent to Safe: none Please provide this summary of care documentation to your next provider. Signatures-by signing, you are acknowledging that this After Visit Summary has been reviewed with you and you have received a copy. Patient Signature:  ____________________________________________________________ Date:  ____________________________________________________________  
  
Anum Hernandez Provider Signature:  ____________________________________________________________ Date:  ____________________________________________________________

## 2017-12-18 NOTE — ED PROVIDER NOTES
EMERGENCY DEPARTMENT HISTORY AND PHYSICAL EXAM    12:49 PM      Date: 12/18/2017  Patient Name: Michelle Longoria    History of Presenting Illness     Chief Complaint   Patient presents with    Shortness of Breath    Headache         History Provided By: Patient    Chief Complaint: HA  Duration: few Hours PTA  Timing:  Acute and Gradual  Location: Frontal  Quality: pounding  Severity: 6 out of 10  Modifying Factors: none reported  Associated Symptoms: L facial numbness, tingling      Additional History (Context): Michelle Longoria is a 62 y.o. female with diabetes, hypertension and CAD, anxiety who presents with HA for a few hours PTA. HA is frontal, described as pounding, gradual, and rated 6/10. Pt has no hx of migraines or hx of prior. Pt saw Dr. Shanita Posey earlier today and returned to ED after this visit for generalized ill-feeling that reminded her of a past MI. Pt came in through triage for SOB, change in vision, and HA; while waiting for labs, she developed sudden L facial tingling, numbness which prompted stroke work up. Pt denies fall, syncope, head trauma. Pt does not use tobacco, etOH illicit drugs. PCP: Jeanne Multani,     Current Facility-Administered Medications   Medication Dose Route Frequency Provider Last Rate Last Dose    aspirin chewable tablet 81 mg  81 mg Oral NOW Juliocesar Crsitobal MD   Stopped at 12/18/17 0777     Current Outpatient Prescriptions   Medication Sig Dispense Refill    gabapentin (NEURONTIN) 300 mg capsule Take 1 Cap by mouth three (3) times daily. 90 Cap 2    metFORMIN (GLUCOPHAGE) 850 mg tablet Take 1 Tab by mouth two (2) times daily (with meals). 60 Tab 2    glucose blood VI test strips (BLOOD GLUCOSE TEST) strip Use one strip for each glucose check. Check glucose 2 times per day.  100 Strip 7    insulin aspart (NOVOLOG) 100 unit/mL inpn Take 8 units with each meal. 10 Pen 11    insulin degludec (TRESIBA FLEXTOUCH U-100) 100 unit/mL (3 mL) inpn 35 Units by SubCUTAneous route daily. 15 Pen 2    RABEprazole (ACIPHEX) 20 mg tablet TAKE 1 TABLET BY MOUTH ONCE DAILY 30 Tab 5    polyethylene glycol (MIRALAX) 17 gram packet Take 1 Packet by mouth two (2) times a day. 60 Packet 1    ondansetron hcl (ZOFRAN, AS HYDROCHLORIDE,) 4 mg tablet Take 2 Tabs by mouth every eight (8) hours as needed for Nausea. 12 Tab 0    atorvastatin (LIPITOR) 80 mg tablet TAKE 1 TABLET BY MOUTH DAILY 90 Tab 9    HYDROmorphone (DILAUDID) 2 mg tablet Take 1 Tab by mouth every four (4) hours as needed for Pain. Max Daily Amount: 12 mg. 60 Tab 0    nitrofurantoin, macrocrystal-monohydrate, (MACROBID) 100 mg capsule Take 1 Cap by mouth two (2) times a day. 14 Cap 0    sucralfate (CARAFATE) 100 mg/mL suspension Take 5 mL by mouth four (4) times daily. 414 mL 0    valACYclovir (VALTREX) 1 gram tablet Take 1 Tab by mouth three (3) times daily. 21 Tab 0    promethazine (PHENERGAN) 25 mg tablet Take 1 Tab by mouth every six (6) hours as needed. 12 Tab 0    acetaminophen (TYLENOL EXTRA STRENGTH) 500 mg tablet Take 2 Tabs by mouth every six (6) hours as needed for Pain. 40 Tab 0    ondansetron (ZOFRAN ODT) 4 mg disintegrating tablet Take 1 Tab by mouth every eight (8) hours as needed for Nausea. 10 Tab 0    amLODIPine (NORVASC) 10 mg tablet Take 1 Tab by mouth daily. 90 Tab 3    metoprolol succinate (TOPROL-XL) 200 mg XL tablet Take 1 Tab by mouth daily. 90 Tab 3    losartan (COZAAR) 100 mg tablet Take 1 Tab by mouth daily. 90 Tab 3    hydrALAZINE (APRESOLINE) 50 mg tablet Take 0.5 Tabs by mouth four (4) times daily. 180 Tab 3    hydroCHLOROthiazide (HYDRODIURIL) 25 mg tablet Take 1 Tab by mouth daily. 90 Tab 3    levothyroxine (SYNTHROID) 125 mcg tablet Take 1 Tab by mouth Daily (before breakfast). 90 Tab 3    clopidogrel (PLAVIX) 75 mg tab Take 1 Tab by mouth daily. 90 Tab 3    buPROPion (WELLBUTRIN) 100 mg tablet Take 1 Tab by mouth daily.  90 Tab 3    citalopram (CELEXA) 40 mg tablet Take 1 Tab by mouth daily. 90 Tab 3    Insulin Needles, Disposable, (DORA PEN NEEDLE) 32 gauge x 5/32\" ndle Use one needle to give insulin 4 times a day. 400 Pen Needle 15    aspirin delayed-release 81 mg tablet Take  by mouth daily. Past History     Past Medical History:  Past Medical History:   Diagnosis Date    Anxiety     CAD (coronary artery disease)     S/P Coronary stents ( LAD and Lcx)    Depression     Diabetes ([address]     Hepatitis C     Hypercholesterolemia     Hypertension        Past Surgical History:  Past Surgical History:   Procedure Laterality Date    HX BREAST BIOPSY      1971 2010 left breast lump removed excisional per patient     [address]    pre-eclampsia    HX CHOLECYSTECTOMY  2004    HX CORONARY STENT PLACEMENT  Nov 2013    4 stents    HX HEENT  2009    thyroidectomy due to nodules.  HX HYSTERECTOMY  2005    heavy periods    HX THYROIDECTOMY         Family History:  Family History   Problem Relation Age of Onset    Diabetes Mother     Hypertension Mother     Cancer Mother     Heart Disease Mother     Heart Attack Mother     Diabetes Father     Hypertension Father     Cancer Father        Social History:  Social History   Substance Use Topics    Smoking status: Never Smoker    Smokeless tobacco: Never Used    Alcohol use No       Allergies: Allergies   Allergen Reactions    Contrast Agent [Iodine] Rash and Sneezing         Review of Systems       Review of Systems   Constitutional: Negative for diaphoresis. Eyes: Positive for visual disturbance. Respiratory: Positive for shortness of breath. Negative for cough. Gastrointestinal: Negative for nausea and vomiting. Neurological: Positive for weakness (L sided arm), light-headedness, numbness (L facial tingling) and headaches. Negative for syncope. All other systems reviewed and are negative.         Physical Exam     Visit Vitals    BP (!) 195/95    Pulse 89    Temp 98.5 °F (36.9 °C)  Resp 18    Ht 5' 2\" (1.575 m)    Wt 82.6 kg (182 lb)    SpO2 97%    BMI 33.29 kg/m2         Physical Exam   Constitutional: She is oriented to person, place, and time. She appears well-developed and well-nourished. No distress. HENT:   Head: Normocephalic and atraumatic. Mouth/Throat: Oropharynx is clear and moist.   Eyes: Conjunctivae and EOM are normal. Pupils are equal, round, and reactive to light. No scleral icterus. Neck: Normal range of motion. Neck supple. Cardiovascular: Normal rate, regular rhythm and normal heart sounds. No murmur heard. Pulmonary/Chest: Effort normal and breath sounds normal. No respiratory distress. Abdominal: Soft. Bowel sounds are normal. She exhibits no distension. There is no tenderness. Musculoskeletal: She exhibits no edema. Lymphadenopathy:     She has no cervical adenopathy. Neurological: She is alert and oriented to person, place, and time. Coordination normal.   Skin: Skin is warm and dry. No rash noted. Psychiatric: She has a normal mood and affect. Her behavior is normal.   Nursing note and vitals reviewed.         Diagnostic Study Results     Labs -  Recent Results (from the past 12 hour(s))   HEMOGLOBIN A1C WITH EAG    Collection Time: 12/18/17  9:36 AM   Result Value Ref Range    Hemoglobin A1c 7.0 (H) 4.2 - 5.6 %    Est. average glucose 154 mg/dL   EKG, 12 LEAD, INITIAL    Collection Time: 12/18/17 11:58 AM   Result Value Ref Range    Ventricular Rate 91 BPM    Atrial Rate 91 BPM    P-R Interval 156 ms    QRS Duration 100 ms    Q-T Interval 378 ms    QTC Calculation (Bezet) 464 ms    Calculated P Axis 35 degrees    Calculated R Axis -37 degrees    Calculated T Axis 125 degrees    Diagnosis       Normal sinus rhythm  Left axis deviation  ST & T wave abnormality, consider lateral ischemia  Prolonged QT  Abnormal ECG  When compared with ECG of 09-APR-2017 11:59,  T wave inversion now evident in Lateral leads     CARDIAC PANEL,(CK, CKMB & TROPONIN)    Collection Time: 12/18/17 12:30 PM   Result Value Ref Range    CK 98 26 - 192 U/L    CK - MB 1.3 <3.6 ng/ml    CK-MB Index 1.3 0.0 - 4.0 %    Troponin-I, Qt. <0.02 0.0 - 0.847 NG/ML   METABOLIC PANEL, COMPREHENSIVE    Collection Time: 12/18/17 12:30 PM   Result Value Ref Range    Sodium 139 136 - 145 mmol/L    Potassium 3.9 3.5 - 5.5 mmol/L    Chloride 102 100 - 108 mmol/L    CO2 27 21 - 32 mmol/L    Anion gap 10 3.0 - 18 mmol/L    Glucose 150 (H) 74 - 99 mg/dL    BUN 22 (H) 7.0 - 18 MG/DL    Creatinine 0.93 0.6 - 1.3 MG/DL    BUN/Creatinine ratio 24 (H) 12 - 20      GFR est AA >60 >60 ml/min/1.73m2    GFR est non-AA >60 >60 ml/min/1.73m2    Calcium 8.3 (L) 8.5 - 10.1 MG/DL    Bilirubin, total 0.8 0.2 - 1.0 MG/DL    ALT (SGPT) 49 13 - 56 U/L    AST (SGOT) 55 (H) 15 - 37 U/L    Alk.  phosphatase 151 (H) 45 - 117 U/L    Protein, total 7.7 6.4 - 8.2 g/dL    Albumin 2.5 (L) 3.4 - 5.0 g/dL    Globulin 5.2 (H) 2.0 - 4.0 g/dL    A-G Ratio 0.5 (L) 0.8 - 1.7     GLUCOSE, POC    Collection Time: 12/18/17 12:59 PM   Result Value Ref Range    Glucose (POC) 139 (H) 70 - 110 mg/dL   PROTHROMBIN TIME + INR    Collection Time: 12/18/17  1:00 PM   Result Value Ref Range    Prothrombin time 11.8 11.5 - 15.2 sec    INR 0.9 0.8 - 1.2     THROMBIN TIME    Collection Time: 12/18/17  1:00 PM   Result Value Ref Range    Thrombin time 18.6 (H) 13.8 - 18.2 SECS   FIBRINOGEN    Collection Time: 12/18/17  1:00 PM   Result Value Ref Range    Fibrinogen 523 (H) 210 - 451 mg/dL   ASPIRIN TEST    Collection Time: 12/18/17  1:00 PM   Result Value Ref Range    Aspirin test 620 620 - 672 ARU   CBC W/O DIFF    Collection Time: 12/18/17  1:40 PM   Result Value Ref Range    WBC 7.2 4.6 - 13.2 K/uL    RBC 4.13 (L) 4.20 - 5.30 M/uL    HGB 12.7 12.0 - 16.0 g/dL    HCT 36.9 35.0 - 45.0 %    MCV 89.3 74.0 - 97.0 FL    MCH 30.8 24.0 - 34.0 PG    MCHC 34.4 31.0 - 37.0 g/dL    RDW 11.4 (L) 11.6 - 14.5 %    PLATELET 588 467 - 100 K/uL    MPV 10.3 9.2 - 97.8 FL   METABOLIC PANEL, COMPREHENSIVE    Collection Time: 12/18/17  1:40 PM   Result Value Ref Range    Sodium 140 136 - 145 mmol/L    Potassium 3.4 (L) 3.5 - 5.5 mmol/L    Chloride 102 100 - 108 mmol/L    CO2 29 21 - 32 mmol/L    Anion gap 9 3.0 - 18 mmol/L    Glucose 164 (H) 74 - 99 mg/dL    BUN 22 (H) 7.0 - 18 MG/DL    Creatinine 0.89 0.6 - 1.3 MG/DL    BUN/Creatinine ratio 25 (H) 12 - 20      GFR est AA >60 >60 ml/min/1.73m2    GFR est non-AA >60 >60 ml/min/1.73m2    Calcium 8.3 (L) 8.5 - 10.1 MG/DL    Bilirubin, total 0.7 0.2 - 1.0 MG/DL    ALT (SGPT) 48 13 - 56 U/L    AST (SGOT) 44 (H) 15 - 37 U/L    Alk. phosphatase 158 (H) 45 - 117 U/L    Protein, total 7.2 6.4 - 8.2 g/dL    Albumin 2.6 (L) 3.4 - 5.0 g/dL    Globulin 4.6 (H) 2.0 - 4.0 g/dL    A-G Ratio 0.6 (L) 0.8 - 1.7     TSH 3RD GENERATION    Collection Time: 12/18/17  1:40 PM   Result Value Ref Range    TSH 1.51 0.36 - 3.74 uIU/mL   URINALYSIS W/ RFLX MICROSCOPIC    Collection Time: 12/18/17  4:55 PM   Result Value Ref Range    Color YELLOW      Appearance CLEAR      Specific gravity 1.011 1.005 - 1.030      pH (UA) 6.5 5.0 - 8.0      Protein 300 (A) NEG mg/dL    Glucose NEGATIVE  NEG mg/dL    Ketone NEGATIVE  NEG mg/dL    Bilirubin NEGATIVE  NEG      Blood NEGATIVE  NEG      Urobilinogen 1.0 0.2 - 1.0 EU/dL    Nitrites NEGATIVE  NEG      Leukocyte Esterase TRACE (A) NEG     URINE MICROSCOPIC ONLY    Collection Time: 12/18/17  4:55 PM   Result Value Ref Range    WBC 1 to 3 0 - 4 /hpf    RBC 0 0 - 5 /hpf    Epithelial cells 1+ 0 - 5 /lpf    Bacteria NEGATIVE  NEG /hpf       Radiologic Studies -   CT HEAD WO CONT   Final Result   As read by RAD:    IMPRESSION:      There is a small focus of diminished attenuation noted within the right thalamus  which could indicate a small lacunar infarction which is of indeterminate age. This did not appear present on the prior CT or MR imaging.   Chronic right basal  ganglia and corona radiata lacunar infarctions are again noted. No acute  hemorrhage or mass effect is evident      Critical result called to Dr. Chhaya Ye at 1300 on 12-18-17     XR CHEST PORT        As read by RAD:    IMPRESSION:     No acute cardiopulmonary disease. Borderline cardiomegaly     MRI BRAIN WO CONT        As read by RAD:  Impression:           1. New small focal signal abnormality left posterior frontal white matter,  centrum semiovale, suggestive of new late subacute or chronic small vessel  infarct.     2. No other evidence of acute infarct or other new acute intracranial finding.     3. Chronic infarcts involving anterior superior right basal ganglia and superior  right occipital cortex.     4. Mostly stable mild bilateral deep white matter signal changes, nonspecific  but findings likely related to chronic microvascular ischemic disease.     5. Slight increased T2 hyperintense foci basal ganglia suggestive of  perivascular spaces, chronic ischemic changes, or chronic lacunar infarcts. Medical Decision Making   I am the first provider for this patient. I reviewed the vital signs, available nursing notes, past medical history, past surgical history, family history and social history. Vital Signs-Reviewed the patient's vital signs. Pulse Oximetry Analysis -  100% on room air (Interpretation) normal    Cardiac Monitor:  Rate:  91      EKG: Interpreted by the EP. Time Interpreted:    Rate: 91   Rhythm: Normal Sinus Rhythm    Interpretation:LVH no acute st changes    Comparison: none     Records Reviewed: Nursing Notes (Time of Review: 12:49 PM)    ED Course: Progress Notes, Reevaluation, and Consults:    12:48 Dr. Eric Johnson will see the pt.    13:01 Consult:  Discussed care with Dr. Eric Johnson (Teleneurology). Standard discussion; including history of patients chief complaint, available diagnostic results, and treatment course. He wants BP below 180s, administer ASA. MRI suggested too, no need for MRA.      17:20 MRI (TYLENOL EXTRA STRENGTH) 500 MG TABLET    Take 2 Tabs by mouth every six (6) hours as needed for Pain. AMLODIPINE (NORVASC) 10 MG TABLET    Take 1 Tab by mouth daily. ASPIRIN DELAYED-RELEASE 81 MG TABLET    Take  by mouth daily. ATORVASTATIN (LIPITOR) 80 MG TABLET    TAKE 1 TABLET BY MOUTH DAILY    BUPROPION (WELLBUTRIN) 100 MG TABLET    Take 1 Tab by mouth daily. CITALOPRAM (CELEXA) 40 MG TABLET    Take 1 Tab by mouth daily. CLOPIDOGREL (PLAVIX) 75 MG TAB    Take 1 Tab by mouth daily. GABAPENTIN (NEURONTIN) 300 MG CAPSULE    Take 1 Cap by mouth three (3) times daily. GLUCOSE BLOOD VI TEST STRIPS (BLOOD GLUCOSE TEST) STRIP    Use one strip for each glucose check. Check glucose 2 times per day. HYDRALAZINE (APRESOLINE) 50 MG TABLET    Take 0.5 Tabs by mouth four (4) times daily. HYDROCHLOROTHIAZIDE (HYDRODIURIL) 25 MG TABLET    Take 1 Tab by mouth daily. HYDROMORPHONE (DILAUDID) 2 MG TABLET    Take 1 Tab by mouth every four (4) hours as needed for Pain. Max Daily Amount: 12 mg. INSULIN ASPART (NOVOLOG) 100 UNIT/ML INPN    Take 8 units with each meal.    INSULIN DEGLUDEC (TRESIBA FLEXTOUCH U-100) 100 UNIT/ML (3 ML) INPN    35 Units by SubCUTAneous route daily. INSULIN NEEDLES, DISPOSABLE, (DORA PEN NEEDLE) 32 GAUGE X 5/32\" NDLE    Use one needle to give insulin 4 times a day. LEVOTHYROXINE (SYNTHROID) 125 MCG TABLET    Take 1 Tab by mouth Daily (before breakfast). LOSARTAN (COZAAR) 100 MG TABLET    Take 1 Tab by mouth daily. METFORMIN (GLUCOPHAGE) 850 MG TABLET    Take 1 Tab by mouth two (2) times daily (with meals). METOPROLOL SUCCINATE (TOPROL-XL) 200 MG XL TABLET    Take 1 Tab by mouth daily. NITROFURANTOIN, MACROCRYSTAL-MONOHYDRATE, (MACROBID) 100 MG CAPSULE    Take 1 Cap by mouth two (2) times a day. ONDANSETRON (ZOFRAN ODT) 4 MG DISINTEGRATING TABLET    Take 1 Tab by mouth every eight (8) hours as needed for Nausea.     ONDANSETRON HCL (ZOFRAN, AS HYDROCHLORIDE,) 4 MG TABLET    Take 2 Tabs by mouth every eight (8) hours as needed for Nausea. POLYETHYLENE GLYCOL (MIRALAX) 17 GRAM PACKET    Take 1 Packet by mouth two (2) times a day. PROMETHAZINE (PHENERGAN) 25 MG TABLET    Take 1 Tab by mouth every six (6) hours as needed. RABEPRAZOLE (ACIPHEX) 20 MG TABLET    TAKE 1 TABLET BY MOUTH ONCE DAILY    SUCRALFATE (CARAFATE) 100 MG/ML SUSPENSION    Take 5 mL by mouth four (4) times daily. VALACYCLOVIR (VALTREX) 1 GRAM TABLET    Take 1 Tab by mouth three (3) times daily. These Medications have changed    No medications on file   Stop Taking    No medications on file     _______________________________    Attestations:  [address] [place] [address] acting as a scribe for and in the presence of Lisa Staples MD      December 18, 2017 at 5:53 PM       Provider Attestation:      I personally performed the services described in the documentation, reviewed the documentation, as recorded by the scribe in my presence, and it accurately and completely records my words and actions.  December 18, 2017 at 5:53 PM - Lisa Staples MD    _______________________________

## 2017-12-18 NOTE — PROGRESS NOTES
Subjective:     HPI:  Lazaro Solis is a 62 y.o. female who presents with severe elevated blood pressure secondary to DM f/u and medication refill. Hypertension  The patient presents for follow up of hypertension. Pt's BP  Is severe elevated in office today (234/124). Pt reports not following a salt restricted diet. She reports eating ramen noodles about 3-4 pm the day before. She states she has been eating the noodles regularly. She also states she has not eaten breakfast this morning (12/18/17). Pt denies having any headaches. Pt  Is compliant with her blood pressure medication. She denies frequent urination but reports urinating longer than usual.   Cardiovascular ROS: No TIA's, no chest pain on exertion, no dyspnea on exertion, no swelling of ankles. Diabetes Mellitus:  She takes her medication regularly. No side effects. She is compliant with diabetes diet some of the time. Pt reports constant bilateral feet pain. She reports that her current dose of Neurontin (100 mg TID) is not alleviating her joint pain. She is also taking Tylenol with great benefit. Of note,   Pt reports swelling on insulin injection site (LLQ and RLQ) advised patient to move insulin injection sites often and make sure she is changing her needles. Current Outpatient Prescriptions   Medication Sig Dispense Refill    gabapentin (NEURONTIN) 300 mg capsule Take 1 Cap by mouth three (3) times daily. 90 Cap 2    metFORMIN (GLUCOPHAGE) 850 mg tablet Take 1 Tab by mouth two (2) times daily (with meals). 60 Tab 2    glucose blood VI test strips (BLOOD GLUCOSE TEST) strip Use one strip for each glucose check. Check glucose 2 times per day. 100 Strip 7    insulin aspart (NOVOLOG) 100 unit/mL inpn Take 8 units with each meal. 10 Pen 11    insulin degludec (TRESIBA FLEXTOUCH U-100) 100 unit/mL (3 mL) inpn 35 Units by SubCUTAneous route daily.  15 Pen 2    RABEprazole (ACIPHEX) 20 mg tablet TAKE 1 TABLET BY MOUTH ONCE DAILY 30 Tab 5    atorvastatin (LIPITOR) 80 mg tablet TAKE 1 TABLET BY MOUTH DAILY 90 Tab 9    acetaminophen (TYLENOL EXTRA STRENGTH) 500 mg tablet Take 2 Tabs by mouth every six (6) hours as needed for Pain. 40 Tab 0    amLODIPine (NORVASC) 10 mg tablet Take 1 Tab by mouth daily. 90 Tab 3    metoprolol succinate (TOPROL-XL) 200 mg XL tablet Take 1 Tab by mouth daily. 90 Tab 3    losartan (COZAAR) 100 mg tablet Take 1 Tab by mouth daily. 90 Tab 3    hydrALAZINE (APRESOLINE) 50 mg tablet Take 0.5 Tabs by mouth four (4) times daily. 180 Tab 3    hydroCHLOROthiazide (HYDRODIURIL) 25 mg tablet Take 1 Tab by mouth daily. 90 Tab 3    levothyroxine (SYNTHROID) 125 mcg tablet Take 1 Tab by mouth Daily (before breakfast). 90 Tab 3    clopidogrel (PLAVIX) 75 mg tab Take 1 Tab by mouth daily. 90 Tab 3    buPROPion (WELLBUTRIN) 100 mg tablet Take 1 Tab by mouth daily. 90 Tab 3    citalopram (CELEXA) 40 mg tablet Take 1 Tab by mouth daily. 90 Tab 3    Insulin Needles, Disposable, (DORA PEN NEEDLE) 32 gauge x 5/32\" ndle Use one needle to give insulin 4 times a day. 400 Pen Needle 15    polyethylene glycol (MIRALAX) 17 gram packet Take 1 Packet by mouth two (2) times a day. 60 Packet 1    ondansetron hcl (ZOFRAN, AS HYDROCHLORIDE,) 4 mg tablet Take 2 Tabs by mouth every eight (8) hours as needed for Nausea. 12 Tab 0    HYDROmorphone (DILAUDID) 2 mg tablet Take 1 Tab by mouth every four (4) hours as needed for Pain. Max Daily Amount: 12 mg. 60 Tab 0    nitrofurantoin, macrocrystal-monohydrate, (MACROBID) 100 mg capsule Take 1 Cap by mouth two (2) times a day. 14 Cap 0    sucralfate (CARAFATE) 100 mg/mL suspension Take 5 mL by mouth four (4) times daily. 414 mL 0    valACYclovir (VALTREX) 1 gram tablet Take 1 Tab by mouth three (3) times daily. 21 Tab 0    promethazine (PHENERGAN) 25 mg tablet Take 1 Tab by mouth every six (6) hours as needed.  12 Tab 0    ondansetron (ZOFRAN ODT) 4 mg disintegrating tablet Take 1 Tab by mouth every eight (8) hours as needed for Nausea. 10 Tab 0    aspirin delayed-release 81 mg tablet Take  by mouth daily. Allergies   Allergen Reactions    Contrast Agent [Iodine] Rash and Sneezing       Past Medical History:   Diagnosis Date    Anxiety     CAD (coronary artery disease)     S/P Coronary stents ( LAD and Lcx)    Depression     Diabetes ([facility] [address]     Hepatitis C     Hypercholesterolemia     Hypertension         Past Surgical History:   Procedure Laterality Date    HX BREAST BIOPSY      1971 2010 left breast lump removed excisional per patient     [address]    pre-eclampsia    HX CHOLECYSTECTOMY  2004    HX CORONARY STENT PLACEMENT  Nov 2013    4 stents    HX HEENT  2009    thyroidectomy due to nodules.  HX HYSTERECTOMY  2005    heavy periods    HX THYROIDECTOMY         Family History   Problem Relation Age of Onset    Diabetes Mother     Hypertension Mother     Cancer Mother     Heart Disease Mother     Heart Attack Mother     Diabetes Father     Hypertension Father     Cancer Father        Social History     Social History    Marital status:      Spouse name: N/A    Number of children: N/A    Years of education: N/A     Occupational History    Not on file. Social History Main Topics    Smoking status: Never Smoker    Smokeless tobacco: Never Used    Alcohol use No    Drug use: No    Sexual activity: Not Currently     Other Topics Concern    Not on file     Social History Narrative       REVIEW OF SYSTEM:  Review of Systems   Constitutional: Negative for chills and fever. Eyes: Negative for blurred vision. Respiratory: Negative for shortness of breath. Cardiovascular: Negative for chest pain, palpitations and leg swelling. Gastrointestinal: Negative for constipation, diarrhea, nausea and vomiting. Musculoskeletal: Positive for joint pain. Negative for myalgias. Neurological: Negative for headaches.      EKG: normal sinus rhythm, heart strain noted. Zeyad Multani,     Objective:     Visit Vitals    BP (!) 234/124    Pulse 79    Temp 98.6 °F (37 °C) (Oral)    Resp 16    Ht 5' 3\" (1.6 m)    Wt 182 lb 12.8 oz (82.9 kg)    SpO2 97%    BMI 32.38 kg/m2       PHYSICAL EXAM:  Physical Exam   Constitutional: She is oriented to person, place, and time and well-developed, well-nourished, and in no distress. HENT:   Right Ear: Tympanic membrane, external ear and ear canal normal.   Left Ear: Tympanic membrane, external ear and ear canal normal.   Nose: Nose normal.   Mouth/Throat: Oropharynx is clear and moist.   Eyes: Pupils are equal, round, and reactive to light. Neck: Normal range of motion. Neck supple. No thyromegaly present. Cardiovascular: Normal rate, regular rhythm, normal heart sounds and intact distal pulses. No murmur heard. Pulmonary/Chest: Effort normal and breath sounds normal. She has no wheezes. Abdominal:   Patient reports swelling on her RLQ and LLQ (which are her insuline injection sites)   Neurological: She is alert and oriented to person, place, and time. GCS score is 15. Skin: Skin is warm and dry. Vitals reviewed. Assessment/Plan:       ICD-10-CM ICD-9-CM    1. Uncontrolled type 2 diabetes mellitus with diabetic polyneuropathy, with long-term current use of insulin ([facility]) E11.42 250.62 gabapentin (NEURONTIN) 300 mg capsule    Z79.4 357.2 HEMOGLOBIN A1C WITH EAG    E11.65 V58.67    2. Hypertensive emergency A12.0 236.2 METABOLIC PANEL, COMPREHENSIVE      TSH 3RD GENERATION      AMB POC EKG ROUTINE W/ 12 LEADS, INTER & REP   3. Hypothyroidism, acquired E03.9 244.9      Patient given opportunity to ask questions. Questions answered. Patient understands plan of care. I will increased her Gabapentin to 300 mg TID. Labs drawn in office  I will check her blood pressure again before she leaves the office (162/96).    Pt is advised to go to the ER is any symptoms of chest pain, headache, blurry vision or numbness develop. EKG done in office today. Follow-up Disposition:  Return in about 1 week (around 12/25/2017). Written by Taj Archuleta, as dictated by Heather Adams DO.     I, Dr. Heather Adams, confirm that all documentation is accurate.

## 2017-12-18 NOTE — ED NOTES
Patient in [address] [place] [address] having blood work done. Patient stated that her fingers on her left hand were going numb. Patient then stated that the left side of her face was going numb. PIT provider notified. Patient  equal on both sides, patient has numbness on left side of face with no facial droop noted as well as loss of sensation to 2nd - 4th digit of left hand.

## 2017-12-18 NOTE — ED TRIAGE NOTES
\"I was at the DR earlier and they checked by BP and did an EKG and those were okay. But I was at the laundry mat when I started getting really short of breath and my head was hurting. \"

## 2017-12-18 NOTE — PROGRESS NOTES
1. Have you been to the ER, urgent care clinic since your last visit? Hospitalized since your last visit? No    2. Have you seen or consulted any other health care providers outside of the [address] [place] [address] since your last visit? Include any pap smears or colon screening.  No

## 2017-12-18 NOTE — Clinical Note
Status[de-identified] Inpatient [101] Type of Bed: Remote Telemetry [29] Inpatient Hospitalization Certified Necessary for the Following Reasons: 3. Patient receiving treatment that can only be provided in an inpatient setting (further clarification in H&P documentation) Admitting Diagnosis: CVA (cerebral vascular accident) [facility]) [[id number]] Admitting Physician: Akash Larose Attending Physician: Akash Larose Estimated Length of Stay: 2 Midnights Discharge Plan[de-identified] Home with Office Follow-up

## 2017-12-18 NOTE — ED NOTES
Pt previously in [address] [place] [address] as PIT patient, c/o headache and SOB. Reported left sided face numbness and tingling as well as left 2nd-4th fingers. Pt transported to CT with EDT and RN. Awaiting arrival back to department.

## 2017-12-18 NOTE — IP AVS SNAPSHOT
[address] [address] 
[phone] Patient: Cecilia Shelton MRN: [id number] KPR:[birth year] My Medications STOP taking these medications   
 aspirin delayed-release 81 mg tablet Replaced by:  aspirin 325 mg tablet  
   
  
 clopidogrel 75 mg Tab Commonly known as:  PLAVIX TAKE these medications as instructed Instructions Each Dose to Equal  
 Morning Noon Evening Bedtime  
 acetaminophen 500 mg tablet Commonly known as:  [address] Elvin Kohli Jr [facility] Your last dose was: Your next dose is: Take 2 Tabs by mouth every six (6) hours as needed for Pain. 1000 mg  
    
   
   
   
  
 amLODIPine 10 mg tablet Commonly known as:  Anette Hernandez Your last dose was: Your next dose is: Take 1 Tab by mouth daily. 10 mg  
    
   
   
   
  
 aspirin 325 mg tablet Commonly known as:  ASPIRIN Your last dose was: Your next dose is: Take 1 Tab by mouth daily. 325 mg  
    
   
   
   
  
 atorvastatin 80 mg tablet Commonly known as:  LIPITOR Your last dose was: Your next dose is: TAKE 1 TABLET BY MOUTH DAILY  
     
   
   
   
  
 buPROPion 100 mg tablet Commonly known as:  [facility] Your last dose was: Your next dose is: Take 1 Tab by mouth daily. 100 mg  
    
   
   
   
  
 citalopram 40 mg tablet Commonly known as:  Elva Mccracken Your last dose was: Your next dose is: Take 1 Tab by mouth daily. 40 mg  
    
   
   
   
  
 gabapentin 300 mg capsule Commonly known as:  NEURONTIN Your last dose was: Your next dose is: Take 1 Cap by mouth three (3) times daily. 300 mg  
    
   
   
   
  
 glucose blood VI test strips strip Commonly known as:  blood glucose test  
   
Your last dose was: Your next dose is: Use one strip for each glucose check. Check glucose 2 times per day. hydrALAZINE 50 mg tablet Commonly known as:  APRESOLINE Your last dose was: Your next dose is: Take 0.5 Tabs by mouth four (4) times daily. 25 mg  
    
   
   
   
  
 hydroCHLOROthiazide 25 mg tablet Commonly known as:  HYDRODIURIL Your last dose was: Your next dose is: Take 1 Tab by mouth daily. 25 mg HYDROmorphone 2 mg tablet Commonly known as:  DILAUDID Your last dose was: Your next dose is: Take 1 Tab by mouth every four (4) hours as needed for Pain. Max Daily Amount: 12 mg.  
 2 mg  
    
   
   
   
  
 insulin aspart 100 unit/mL Inpn Commonly known as:  Sheela Strunk Your last dose was: Your next dose is: Take 8 units with each meal.  
     
   
   
   
  
 insulin degludec 100 unit/mL (3 mL) Inpn Commonly known as:  TRESIBA FLEXTOUCH U-100 Your last dose was: Your next dose is:    
   
   
 35 Units by SubCUTAneous route daily. 35 Units Insulin Needles (Disposable) 32 gauge x 5/32\" Ndle Commonly known as:  Mona Pen Needle Your last dose was: Your next dose is:    
   
   
 Use one needle to give insulin 4 times a day. levothyroxine 125 mcg tablet Commonly known as:  SYNTHROID Your last dose was: Your next dose is: Take 1 Tab by mouth Daily (before breakfast). 125 mcg  
    
   
   
   
  
 losartan 100 mg tablet Commonly known as:  COZAAR Your last dose was: Your next dose is: Take 1 Tab by mouth daily. 100 mg  
    
   
   
   
  
 metFORMIN 850 mg tablet Commonly known as:  GLUCOPHAGE Your last dose was: Your next dose is: Take 1 Tab by mouth two (2) times daily (with meals).   
 850 mg  
    
   
   
 metoprolol succinate 200 mg XL tablet Commonly known as:  TOPROL-XL Your last dose was: Your next dose is: Take 1 Tab by mouth daily. 200 mg  
    
   
   
   
  
 nitrofurantoin (macrocrystal-monohydrate) 100 mg capsule Commonly known as:  MACROBID Your last dose was: Your next dose is: Take 1 Cap by mouth two (2) times a day. 100 mg  
    
   
   
   
  
 ondansetron 4 mg disintegrating tablet Commonly known as:  ZOFRAN ODT Your last dose was: Your next dose is: Take 1 Tab by mouth every eight (8) hours as needed for Nausea. 4 mg  
    
   
   
   
  
 ondansetron hcl 4 mg tablet Commonly known as:  ZOFRAN (AS HYDROCHLORIDE) Your last dose was: Your next dose is: Take 2 Tabs by mouth every eight (8) hours as needed for Nausea. 8 mg  
    
   
   
   
  
 polyethylene glycol 17 gram packet Commonly known as:  Reginia Cratapaner Your last dose was: Your next dose is: Take 1 Packet by mouth two (2) times a day. 17 g  
    
   
   
   
  
 prasugrel 10 mg tablet Commonly known as:  EFFIENT Your last dose was: Your next dose is: Take 1 Tab by mouth daily. Indications: Failed plavix. Recurrent CA. No P2Y12 response  
 10 mg  
    
   
   
   
  
 promethazine 25 mg tablet Commonly known as:  PHENERGAN Your last dose was: Your next dose is: Take 1 Tab by mouth every six (6) hours as needed. 25 mg  
    
   
   
   
  
 RABEprazole 20 mg tablet Commonly known as:  ACIPHEX Your last dose was: Your next dose is: TAKE 1 TABLET BY MOUTH ONCE DAILY  
     
   
   
   
  
 sucralfate 100 mg/mL suspension Commonly known as:  Chichi Mcmahon Your last dose was: Your next dose is: Take 5 mL by mouth four (4) times daily. 1 tsp valACYclovir 1 gram tablet Commonly known as:  VALTREX Your last dose was: Your next dose is: Take 1 Tab by mouth three (3) times daily. 1000 mg Where to Get Your Medications Information on where to get these meds will be given to you by the nurse or doctor. ! Ask your nurse or doctor about these medications  
  aspirin 325 mg tablet  
 prasugrel 10 mg tablet

## 2017-12-19 ENCOUNTER — HOME HEALTH ADMISSION (OUTPATIENT)
Dept: HOME HEALTH SERVICES | Facility: HOME HEALTH | Age: 57
End: 2017-12-19

## 2017-12-19 ENCOUNTER — APPOINTMENT (OUTPATIENT)
Dept: MRI IMAGING | Age: 57
DRG: 066 | End: 2017-12-19
Attending: FAMILY MEDICINE
Payer: OTHER GOVERNMENT

## 2017-12-19 VITALS
TEMPERATURE: 98.5 F | HEART RATE: 83 BPM | DIASTOLIC BLOOD PRESSURE: 93 MMHG | HEIGHT: 62 IN | SYSTOLIC BLOOD PRESSURE: 180 MMHG | OXYGEN SATURATION: 90 % | BODY MASS INDEX: 33.49 KG/M2 | RESPIRATION RATE: 16 BRPM | WEIGHT: 182 LBS

## 2017-12-19 LAB
ABO + RH BLD: NORMAL
ALBUMIN SERPL-MCNC: 2.7 G/DL (ref 3.4–5)
ALBUMIN/GLOB SERPL: 0.6 {RATIO} (ref 0.8–1.7)
ALP SERPL-CCNC: 171 U/L (ref 45–117)
ALT SERPL-CCNC: 48 U/L (ref 13–56)
AMMONIA PLAS-SCNC: 48 UMOL/L (ref 11–32)
ANION GAP SERPL CALC-SCNC: 10 MMOL/L (ref 3–18)
APTT PPP: 29.3 SEC (ref 23–36.4)
AST SERPL-CCNC: 44 U/L (ref 15–37)
ATRIAL RATE: 85 BPM
ATRIAL RATE: 91 BPM
BILIRUB DIRECT SERPL-MCNC: 0.4 MG/DL (ref 0–0.2)
BILIRUB SERPL-MCNC: 1.1 MG/DL (ref 0.2–1)
BLOOD GROUP ANTIBODIES SERPL: NORMAL
BUN SERPL-MCNC: 19 MG/DL (ref 7–18)
BUN/CREAT SERPL: 24 (ref 12–20)
CA-I SERPL-SCNC: 1.16 MMOL/L (ref 1.12–1.32)
CALCIUM SERPL-MCNC: 8.6 MG/DL (ref 8.5–10.1)
CALCULATED P AXIS, ECG09: 35 DEGREES
CALCULATED P AXIS, ECG09: 40 DEGREES
CALCULATED R AXIS, ECG10: -37 DEGREES
CALCULATED R AXIS, ECG10: -38 DEGREES
CALCULATED T AXIS, ECG11: 125 DEGREES
CALCULATED T AXIS, ECG11: 68 DEGREES
CHLORIDE SERPL-SCNC: 101 MMOL/L (ref 100–108)
CHOLEST SERPL-MCNC: 257 MG/DL
CK MB CFR SERPL CALC: NORMAL % (ref 0–4)
CK MB SERPL-MCNC: <1 NG/ML (ref 5–25)
CK SERPL-CCNC: 77 U/L (ref 26–192)
CO2 SERPL-SCNC: 28 MMOL/L (ref 21–32)
CORTIS SERPL-MCNC: 9.7 UG/DL (ref 3.09–22.4)
CREAT SERPL-MCNC: 0.79 MG/DL (ref 0.6–1.3)
DIAGNOSIS, 93000: NORMAL
DIAGNOSIS, 93000: NORMAL
ERYTHROCYTE [DISTWIDTH] IN BLOOD BY AUTOMATED COUNT: 11.6 % (ref 11.6–14.5)
ERYTHROCYTE [SEDIMENTATION RATE] IN BLOOD: 72 MM/HR (ref 0–30)
GLOBULIN SER CALC-MCNC: 4.9 G/DL (ref 2–4)
GLUCOSE BLD STRIP.AUTO-MCNC: 104 MG/DL (ref 70–110)
GLUCOSE BLD STRIP.AUTO-MCNC: 182 MG/DL (ref 70–110)
GLUCOSE BLD STRIP.AUTO-MCNC: 212 MG/DL (ref 70–110)
GLUCOSE SERPL-MCNC: 103 MG/DL (ref 74–99)
HCT VFR BLD AUTO: 38.4 % (ref 35–45)
HDLC SERPL-MCNC: 93 MG/DL (ref 40–60)
HDLC SERPL: 2.8 {RATIO} (ref 0–5)
HGB BLD-MCNC: 13.4 G/DL (ref 12–16)
INR PPP: 0.9 (ref 0.8–1.2)
LDLC SERPL CALC-MCNC: 136.6 MG/DL (ref 0–100)
LIPASE SERPL-CCNC: 96 U/L (ref 73–393)
LIPID PROFILE,FLP: ABNORMAL
MAGNESIUM SERPL-MCNC: 1.9 MG/DL (ref 1.6–2.6)
MCH RBC QN AUTO: 31.2 PG (ref 24–34)
MCHC RBC AUTO-ENTMCNC: 34.9 G/DL (ref 31–37)
MCV RBC AUTO: 89.5 FL (ref 74–97)
P-R INTERVAL, ECG05: 156 MS
P-R INTERVAL, ECG05: 164 MS
P2Y12 PLT RESPONSE,PPPR: 293 PRU (ref 194–418)
PHOSPHATE SERPL-MCNC: 3.6 MG/DL (ref 2.5–4.9)
PLATELET # BLD AUTO: 197 K/UL (ref 135–420)
PMV BLD AUTO: 10.4 FL (ref 9.2–11.8)
POTASSIUM SERPL-SCNC: 3.1 MMOL/L (ref 3.5–5.5)
PROT SERPL-MCNC: 7.6 G/DL (ref 6.4–8.2)
PROTHROMBIN TIME: 12.1 SEC (ref 11.5–15.2)
Q-T INTERVAL, ECG07: 378 MS
Q-T INTERVAL, ECG07: 400 MS
QRS DURATION, ECG06: 100 MS
QRS DURATION, ECG06: 96 MS
QTC CALCULATION (BEZET), ECG08: 464 MS
QTC CALCULATION (BEZET), ECG08: 476 MS
RBC # BLD AUTO: 4.29 M/UL (ref 4.2–5.3)
SODIUM SERPL-SCNC: 139 MMOL/L (ref 136–145)
SPECIMEN EXP DATE BLD: NORMAL
T3FREE SERPL-MCNC: 2.4 PG/ML (ref 2.3–4.2)
T4 FREE SERPL-MCNC: 1.3 NG/DL (ref 0.7–1.5)
TRIGL SERPL-MCNC: 137 MG/DL (ref ?–150)
TROPONIN I SERPL-MCNC: <0.02 NG/ML (ref 0–0.04)
TSH SERPL DL<=0.05 MIU/L-ACNC: 2.25 UIU/ML (ref 0.36–3.74)
VENTRICULAR RATE, ECG03: 85 BPM
VENTRICULAR RATE, ECG03: 91 BPM
VLDLC SERPL CALC-MCNC: 27.4 MG/DL
WBC # BLD AUTO: 7.1 K/UL (ref 4.6–13.2)

## 2017-12-19 PROCEDURE — 74011250637 HC RX REV CODE- 250/637: Performed by: FAMILY MEDICINE

## 2017-12-19 PROCEDURE — A9575 INJ GADOTERATE MEGLUMI 0.1ML: HCPCS | Performed by: INTERNAL MEDICINE

## 2017-12-19 PROCEDURE — 93306 TTE W/DOPPLER COMPLETE: CPT

## 2017-12-19 PROCEDURE — 74011636637 HC RX REV CODE- 636/637: Performed by: FAMILY MEDICINE

## 2017-12-19 PROCEDURE — 77030021566 MRA NECK W WO CONT

## 2017-12-19 PROCEDURE — 74011636320 HC RX REV CODE- 636/320: Performed by: INTERNAL MEDICINE

## 2017-12-19 PROCEDURE — 97165 OT EVAL LOW COMPLEX 30 MIN: CPT

## 2017-12-19 PROCEDURE — 74011636637 HC RX REV CODE- 636/637: Performed by: INTERNAL MEDICINE

## 2017-12-19 PROCEDURE — 97116 GAIT TRAINING THERAPY: CPT

## 2017-12-19 PROCEDURE — 92610 EVALUATE SWALLOWING FUNCTION: CPT

## 2017-12-19 PROCEDURE — 74011250637 HC RX REV CODE- 250/637: Performed by: INTERNAL MEDICINE

## 2017-12-19 PROCEDURE — 82140 ASSAY OF AMMONIA: CPT | Performed by: FAMILY MEDICINE

## 2017-12-19 PROCEDURE — 70544 MR ANGIOGRAPHY HEAD W/O DYE: CPT

## 2017-12-19 PROCEDURE — 82330 ASSAY OF CALCIUM: CPT | Performed by: FAMILY MEDICINE

## 2017-12-19 PROCEDURE — 74011250637 HC RX REV CODE- 250/637: Performed by: EMERGENCY MEDICINE

## 2017-12-19 PROCEDURE — 97162 PT EVAL MOD COMPLEX 30 MIN: CPT

## 2017-12-19 PROCEDURE — 93005 ELECTROCARDIOGRAM TRACING: CPT

## 2017-12-19 PROCEDURE — 77030032490 HC SLV COMPR SCD KNE COVD -B

## 2017-12-19 PROCEDURE — 85576 BLOOD PLATELET AGGREGATION: CPT | Performed by: INTERNAL MEDICINE

## 2017-12-19 PROCEDURE — 74011250636 HC RX REV CODE- 250/636: Performed by: FAMILY MEDICINE

## 2017-12-19 PROCEDURE — 74011250636 HC RX REV CODE- 250/636: Performed by: INTERNAL MEDICINE

## 2017-12-19 RX ORDER — SODIUM CHLORIDE AND POTASSIUM CHLORIDE .9; .15 G/100ML; G/100ML
SOLUTION INTRAVENOUS CONTINUOUS
Status: DISCONTINUED | OUTPATIENT
Start: 2017-12-19 | End: 2017-12-19 | Stop reason: HOSPADM

## 2017-12-19 RX ORDER — ASPIRIN 325 MG
325 TABLET ORAL DAILY
Qty: 90 TAB | Refills: 3 | Status: SHIPPED | OUTPATIENT
Start: 2017-12-19 | End: 2020-04-07

## 2017-12-19 RX ORDER — ASPIRIN 325 MG/1
100 TABLET, FILM COATED ORAL DAILY
Status: DISCONTINUED | OUTPATIENT
Start: 2017-12-19 | End: 2017-12-19 | Stop reason: HOSPADM

## 2017-12-19 RX ORDER — PRASUGREL 10 MG/1
10 TABLET, FILM COATED ORAL DAILY
Qty: 90 TAB | Refills: 3 | Status: SHIPPED | OUTPATIENT
Start: 2017-12-19 | End: 2018-07-31 | Stop reason: SDUPTHER

## 2017-12-19 RX ORDER — FOLIC ACID 1 MG/1
1 TABLET ORAL DAILY
Status: DISCONTINUED | OUTPATIENT
Start: 2017-12-19 | End: 2017-12-19 | Stop reason: HOSPADM

## 2017-12-19 RX ORDER — GADOTERATE MEGLUMINE 376.9 MG/ML
20 INJECTION INTRAVENOUS
Status: COMPLETED | OUTPATIENT
Start: 2017-12-19 | End: 2017-12-19

## 2017-12-19 RX ORDER — LORAZEPAM 2 MG/ML
INJECTION INTRAMUSCULAR
Status: DISCONTINUED
Start: 2017-12-19 | End: 2017-12-19 | Stop reason: HOSPADM

## 2017-12-19 RX ORDER — LORAZEPAM 2 MG/ML
1 INJECTION INTRAMUSCULAR ONCE
Status: COMPLETED | OUTPATIENT
Start: 2017-12-19 | End: 2017-12-19

## 2017-12-19 RX ORDER — INSULIN LISPRO 100 [IU]/ML
INJECTION, SOLUTION INTRAVENOUS; SUBCUTANEOUS
Status: DISCONTINUED | OUTPATIENT
Start: 2017-12-19 | End: 2017-12-19 | Stop reason: HOSPADM

## 2017-12-19 RX ADMIN — LORAZEPAM 1 MG: 2 INJECTION INTRAMUSCULAR; INTRAVENOUS at 08:48

## 2017-12-19 RX ADMIN — PANTOPRAZOLE SODIUM 40 MG: 40 TABLET, DELAYED RELEASE ORAL at 14:20

## 2017-12-19 RX ADMIN — INSULIN LISPRO 2 UNITS: 100 INJECTION, SOLUTION INTRAVENOUS; SUBCUTANEOUS at 08:26

## 2017-12-19 RX ADMIN — GABAPENTIN 300 MG: 300 CAPSULE ORAL at 14:19

## 2017-12-19 RX ADMIN — GADOTERATE MEGLUMINE 20 ML: 376.9 INJECTION INTRAVENOUS at 09:44

## 2017-12-19 RX ADMIN — POTASSIUM CHLORIDE AND SODIUM CHLORIDE: 900; 150 INJECTION, SOLUTION INTRAVENOUS at 02:20

## 2017-12-19 RX ADMIN — INSULIN LISPRO 4 UNITS: 100 INJECTION, SOLUTION INTRAVENOUS; SUBCUTANEOUS at 14:19

## 2017-12-19 RX ADMIN — CITALOPRAM HYDROBROMIDE 40 MG: 20 TABLET ORAL at 14:20

## 2017-12-19 RX ADMIN — FOLIC ACID 1 MG: 1 TABLET ORAL at 14:19

## 2017-12-19 RX ADMIN — LEVOTHYROXINE SODIUM 125 MCG: 100 TABLET ORAL at 14:19

## 2017-12-19 RX ADMIN — Medication 100 MG: at 14:20

## 2017-12-19 RX ADMIN — ATORVASTATIN CALCIUM 80 MG: 40 TABLET, FILM COATED ORAL at 01:00

## 2017-12-19 RX ADMIN — SODIUM CHLORIDE 100 ML/HR: 900 INJECTION, SOLUTION INTRAVENOUS at 00:13

## 2017-12-19 RX ADMIN — GABAPENTIN 300 MG: 300 CAPSULE ORAL at 01:00

## 2017-12-19 RX ADMIN — CLOPIDOGREL BISULFATE 75 MG: 75 TABLET ORAL at 14:20

## 2017-12-19 RX ADMIN — BUPROPION HYDROCHLORIDE 100 MG: 100 TABLET, FILM COATED ORAL at 14:20

## 2017-12-19 RX ADMIN — DOCUSATE SODIUM AND SENNOSIDES 2 TABLET: 8.6; 5 TABLET, FILM COATED ORAL at 01:00

## 2017-12-19 RX ADMIN — ENOXAPARIN SODIUM 40 MG: 40 INJECTION SUBCUTANEOUS at 01:01

## 2017-12-19 RX ADMIN — PANTOPRAZOLE SODIUM 40 MG: 40 TABLET, DELAYED RELEASE ORAL at 01:00

## 2017-12-19 RX ADMIN — ASPIRIN 81 MG 81 MG: 81 TABLET ORAL at 05:38

## 2017-12-19 NOTE — PROGRESS NOTES
Problem: Self Care Deficits Care Plan (Adult)  Goal: *Acute Goals and Plan of Care (Insert Text)  Outcome: Resolved/Met Date Met: 12/19/17  Occupational Therapy EVALUATION/discharge    Patient: Rocky Acosta (89 y.o. female)  Date: 12/19/2017  Primary Diagnosis: CVA (cerebral vascular accident) [facility])  CVA (cerebral vascular accident) [facility])        Precautions: Fall    ASSESSMENT AND RECOMMENDATIONS:  Based on the objective data described below, the patient presents at baseline with regard to ADLs and functional mobility. Pt sleeping in chair on arrival, no c/o pain. LUE strength slightly decreased compared to RUE; unsure if this is baseline. Pt did not report numbness, tingling, dizziness t/o session. Educated on role of OT, POC, home safety & stroke symptoms; pt verbalized understanding. Mod I for functional transfers & toileting task. Pt slightly unsteady returning to bed due to receiving ativan recently. Pt left sidelying with needs within reach; skilled therapy not indicated. Tigre Olivas RN aware of session. Skilled occupational therapy is not indicated at this time. Discharge Recommendations: HH vs. none  Further Equipment Recommendations for Discharge: Anticipate none     SUBJECTIVE:   Patient stated My daughter lives somewhat closeby.     OBJECTIVE DATA SUMMARY:     Past Medical History:   Diagnosis Date    Anxiety     CAD (coronary artery disease)     S/P Coronary stents ( LAD and Lcx)    Depression     Diabetes ([facility] [address]     Hepatitis C     Hypercholesterolemia     Hypertension      Past Surgical History:   Procedure Laterality Date    HX BREAST BIOPSY      1971 2010 left breast lump removed excisional per patient     [address]    pre-eclampsia    HX CHOLECYSTECTOMY  2004    HX CORONARY STENT PLACEMENT  Nov 2013    4 stents    HX HEENT  2009    thyroidectomy due to nodules.      HX HYSTERECTOMY  2005    heavy periods    HX THYROIDECTOMY       Barriers to Learning/Limitations: None  Compensate with: visual, verbal, tactile, kinesthetic cues/model    G CODES:  Self Care  Current  CI= 1-19%   Goal  CI= 1-19%   D/C  CI= 1-19%. The severity rating is based on the Other Functional Assessment, MMT, ROM    Eval Complexity: History: LOW Complexity : Brief history review ; Examination: LOW Complexity : 1-3 performance deficits relating to physical, cognitive , or psychosocial skils that result in activity limitations and / or participation restrictions ; Decision Making:LOW Complexity : No comorbidities that affect functional and no verbal or physical assistance needed to complete eval tasks      Prior Level of Function/Home Situation: Pt was independent with basic self care tasks and functional mobility PTA. Home Situation  Home Environment: Apartment  # Steps to Enter: 0  One/Two Story Residence: One story  Living Alone: Yes  Support Systems: Child(brianna)  Patient Expects to be Discharged to[de-identified] Apartment  Current DME Used/Available at Home: None  Tub or Shower Type: Tub/Shower combination  [x]     Right hand dominant   []     Left hand dominant    Cognitive/Behavioral Status:  Neurologic State: Alert  Orientation Level: Oriented X4  Cognition: Appropriate for age attention/concentration; Appropriate decision making; Follows commands  Safety/Judgement: Awareness of environment; Fall prevention     Skin: Intact (BUEs)  Edema: None noted (BUEs)    Vision/Perceptual:    Acuity: Within Defined Limits      Coordination:  Coordination: Within functional limits (BUEs)  Fine Motor Skills-Upper: Right Intact; Left Intact    Gross Motor Skills-Upper: Right Intact; Left Intact     Balance:  Sitting: Intact  Standing: Intact     Strength:  Strength: Generally decreased, functional (RUE 5/5; LUE: 4+/5)    Range of Motion:  AROM: Within functional limits (BUEs: full shoulder/elbow flexion)  PROM: Within functional limits (BUEs)    Functional Mobility and Transfers for ADLs:  Bed Mobility:  Supine to Sit:  (not assessed; pt up in chair on arrival)  Sit to Supine: Modified independent    Transfers:  Sit to Stand: Modified independent   Toilet Transfer : Modified independent     ADL Assessment:  Feeding: Independent  Oral Facial Hygiene/Grooming: Modified Independent  Bathing: Modified independent  Upper Body Dressing: Independent  Lower Body Dressing: Modified independent; Additional time (this is baseline)  Toileting: Modified independent    Cognitive Retraining  Safety/Judgement: Awareness of environment; Fall prevention    Pain:  Pre-treatment pain level: 0/10  Post treatment pain level: 0/10  Pain Scale 1: Numeric (0 - 10)  Pain Intensity 1: 0     Activity Tolerance:  Fair+  Please refer to the flowsheet for vital signs taken during this treatment. After treatment:   []  Patient left in no apparent distress sitting up in chair  [x]  Patient left in no apparent distress in bed  [x]  Call bell left within reach  [x]  Nursing notified  []  Caregiver present  []  Bed alarm activated    COMMUNICATION/EDUCATION: Pt educated on role of OT, POC, and home safety. She verbalized understanding. Communication/Collaboration:  [x]      Home safety education was provided and the patient/caregiver indicated understanding. [x]      Patient/family have participated as able and agree with findings and recommendations. []      Patient is unable to participate in plan of care at this time.     Libia Sanon MS OTR/L  Time Calculation: 10 mins

## 2017-12-19 NOTE — PROGRESS NOTES
NUTRITION  Patient/Family Education Record    FACTORS THAT MAY INFLUENCE PATIENTS ABILITY TO LEARN:   []   Language barrier    []   Cultural needs   []   Motivation    []   Cognitive limitation    []   Physical   []   Education   []   Physiological factors   []   Hearing/vision/speaking impairment   []   Confucianist beliefs    []   Financial limitations    []  Other:   [x]   No barriers limiting ability to learn     Person Instructed:   [x]   Patient   []   Family   []  Other     Preference for Learning:   [x]   Verbal   [x]   Written   []  Demonstration     Patient educated on:   [x] Cardiac/heart healthy diet/DM  [] 2gm Sodium diet  [] Vitamin K regulated diet (coumadin)  [] Weight loss/portion control  [] High protein  [] Other:        Outcome:   [x]  Patient verbalized understanding of education and willing to comply with recommendations.   []  Patient declined education  []  Patient needs follow up education; scheduled date for follow up:  [x]  Written information provided  []  RD contact information provided    Ortiz Adame

## 2017-12-19 NOTE — INTERDISCIPLINARY ROUNDS
IDR Summary      Patient: Gerhardt Exon MRN: [id number]    Age: 62 y.o.  : 1960     Admit Diagnosis: CVA (cerebral vascular accident) [facility])  CVA (cerebral vascular accident) [facility])      Problems pertinent to hospital stay:     Consults: P. T and Case Management     Testing due for patient today?  YES    Nutrition plan:Yes     Mobility needs: No      Lines/Tubes:   IV: YES  Needed: YES  Chavez: NO  Needed:NO  Central Line: NO Needed: NO      VTE Prophylaxis: Chemical            Care Management:  Discharge plan: Home with [facility] as needed    PCP: DO Avani Son Coach: NO  Financial concerns:No   Interventions:       LOS: 1 days     Expected days until discharge: pending MRI results- possibly today        Signed:     CITLALY Ruiz  [address]  Hospitalist Division  Pager:  [phone]  Office:  [phone]

## 2017-12-19 NOTE — PROGRESS NOTES
Patient has designated ___Daughter_____________________ to participate in his/her discharge plan and to receive any needed information.      Name: Cheo Faria   Address:  Phone number: [phone]

## 2017-12-19 NOTE — PROGRESS NOTES
Patient  Received awake,alert and oriented. Moving all extremities. Walked to bathroom,tolerated well.  0100 EKG done. 56 MD paged,called back given lab results. Patient passed dysphagia screen and can eat. Meal given. Patient given stroke education book. 055 Bedside shift change report given to [address] (oncoming nurse) by Harriet Goldmann (offgoing nurse). Report included the following information SBAR, Kardex and MAR. no

## 2017-12-19 NOTE — PROGRESS NOTES
This nurse acting Charge nurse. During IDR spoke with Kunal Bonds NP about telemetry and V.O. received to reorder telemetry for 72 hours; okay for patient to go for test without nurse and without telemetry (RBV).

## 2017-12-19 NOTE — PROGRESS NOTES
conducted an initial consultation and Spiritual Assessment for Rina Ross, who is a 62 y.o.,female. Patients Primary Language is: [place]. According to the patients EMR Faith Affiliation is: Brian Leiva.     The reason the Patient came to the hospital is:   Patient Active Problem List    Diagnosis Date Noted    Type 2 diabetes mellitus with nephropathy ([facility] 12/18/2017    CVA (cerebral vascular accident) ([facility] 12/18/2017    Hypokalemia 12/18/2017    Seizure disorder, focal motor ([facility] 04/05/2017    Noncompliance with medication regimen 04/05/2017    Nausea 03/09/2015    Family history of colon cancer 03/09/2015    Chest pain 02/03/2015    Hypertension 06/24/2014    Uncontrolled type 2 diabetes mellitus with diabetic polyneuropathy ([facility] 06/24/2014    CAD (coronary artery disease) 06/24/2014    Hypothyroidism, acquired 06/24/2014    Hepatitis C 06/24/2014    Hyperlipidemia LDL goal < 100 06/24/2014        The  provided the following Interventions:  Initiated a relationship of care and support with patient in room 2109 this morning. Found her setting in a chair at bedside waiting for her breakfast.  Listened to her sharing of her story and how she hopes that she will be home before juan alberto. Provided information about Spiritual Care Services. Offered prayer and assurance of continued prayers on patients behalf. The following outcomes were achieved:  Patient shared limited information about her medical narrative and spiritual journey/beliefs. Patient processed feeling about current hospitalization. Patient expressed gratitude for pastoral care visit. Assessment:  Patient does not have any Rastafari/cultural needs that will affect patients preferences in health care. There are no further spiritual or Rastafari issues which require Spiritual Care Services interventions at this time.        Plan:  Chaplains will continue to follow and will provide pastoral care on an as needed/requested basis    . Fe Petty   Spiritual Care   [phone]

## 2017-12-19 NOTE — PROGRESS NOTES
Pt discharged home, accompanied by CNA via wheelchair. Pt has all discharge instructions, along with 2 prescriptions and belongings. Voiced understanding of all discharge instructions.

## 2017-12-19 NOTE — PROGRESS NOTES
[facility]: This patient meets P & T approved criteria for conversion from IV to oral therapy for the following medication:     Folic acid, thiamine IV q24h was discontinued and Folic acid 1mg and thiamine 100mg PO daily was started. If the patient no longer meets all criteria for oral therapy, the pharmacist will switch back to IV therapy. Thanks.

## 2017-12-19 NOTE — PROGRESS NOTES
2671: PT evaluation performed. Discharge rec to home. Formal note to follow.      Thank you,     Teresa Barber, PT, DPT

## 2017-12-19 NOTE — ED NOTES
Patient taken to 2109. Alert, ambulatory, and oriented. Oncoming nurse present. Vitals stable. NIH 0. No acute distress noted. EKG scanned with copy provided to oncoming.

## 2017-12-19 NOTE — PROGRESS NOTES
Problem: Mobility Impaired (Adult and Pediatric)  Goal: *Acute Goals and Plan of Care (Insert Text)  Outcome: Resolved/Met Date Met: 12/19/17  physical Therapy EVALUATION and Discharge    Patient: Yves Cervantes (2 y.o. female)  Date: 12/19/2017  Primary Diagnosis: CVA (cerebral vascular accident) [facility])  CVA (cerebral vascular accident) [facility])  Precautions:Fall    ASSESSMENT AND RECOMMENDATIONS:  Based on the objective data described below, the patient presents with mobility level appropriate for discharge home. Denies mobility concerns about return home. Mod I for supine to sit transfer. BLE strength WFL. Mod I for sit to stand. Amb 200ft with no AD safely. Completed 4 stairs with one hand rail supervision. Limited by IV line. Returned to seated in recliner at end of session. All needs in reach. Educated on signs and symptoms of stroke; verbalized understanding. Rn John Burks aware. BP pre mobility 122/66, BP post mobility 127/81. Skilled physical therapy is not indicated at this time. Discharge Recommendations: None  Further Equipment Recommendations for Discharge: N/A      SUBJECTIVE:   Patient stated I'm feeling better now.     OBJECTIVE DATA SUMMARY:     Past Medical History:   Diagnosis Date    Anxiety     CAD (coronary artery disease)     S/P Coronary stents ( LAD and Lcx)    Depression     Diabetes ([facility]     Hepatitis C     Hypercholesterolemia     Hypertension      Past Surgical History:   Procedure Laterality Date    HX BREAST BIOPSY      1971 2010 left breast lump removed excisional per patient     [address]    pre-eclampsia    HX CHOLECYSTECTOMY  2004    HX CORONARY STENT PLACEMENT  Nov 2013    4 stents    HX HEENT  2009    thyroidectomy due to nodules.      HX HYSTERECTOMY  2005    heavy periods    HX THYROIDECTOMY       Barriers to Learning/Limitations: None  Compensate with: visual, verbal, tactile, kinesthetic cues/model    G CODE:  Mobility [id number] Current  CJ= 20-39%  D/C  CJ= 20-39%. The severity rating is based on the Other [employer] Balance Scale 4/5    [address] Standing Balance Scale 4/5  0: Pt performs 25% or less of standing activity (Max assist) CN, 100% impaired. 1: Pt supports self with upper extremities but requires therapist assistance. Pt performs 25-50% of effort (Mod assist) CM, 80% to <100% impaired. 1+: Pt supports self with upper extremities but requires therapist assistance. Pt performs >50% effort. (Min assist). CL, 60% to <80% impaired. 2: Pt supports self independently with both upper extremities (walker, crutches, parallel bars). CL, 60% to <80% impaired. 2+: Pt support self independently with 1 upper extremity (cane, crutch, 1 parallel bar). CK, 40% to <60% impaired. 3: Pt stands without upper extremity support for up to 30 seconds. CK, 40% to <60% impaired. 3+: Pt stands without upper extremity support for 30 seconds or greater. CJ, 20% to <40% impaired. 4: Pt independently moves and returns center of gravity 1-2 inches in one plane. CJ, 20% to <40% impaired. 4+: Pt independently moves and returns center of gravity 1-2 inches in multiple planes. CI, 1% to <20% impaired. 5: Pt independently moves and returns center of gravity in all planes greater than 2 inches. CH, 0% impaired.     Eval Complexity: History: MEDIUM  Complexity : 1-2 comorbidities / personal factors will impact the outcome/ POC Exam:MEDIUM Complexity : 3 Standardized tests and measures addressing body structure, function, activity limitation and / or participation in recreation  Presentation: MEDIUM Complexity : Evolving with changing characteristics  Clinical Decision Making:Medium Complexity [facility] Standing Balance Scale 4/5 Overall Complexity:MEDIUM    Prior Level of Function/Home Situation:   Home Situation  Home Environment: Apartment  # Steps to Enter: 0  One/Two Story Residence: One story  Living Alone: Yes  Support Systems: Child(brianna)  Patient Expects to be Discharged to[de-identified] Apartment  Current DME Used/Available at Home: None  Tub or Shower Type: Tub/Shower combination  Critical Behavior:  Neurologic State: Alert  Orientation Level: Oriented X4  Cognition: Appropriate for age attention/concentration; Appropriate decision making; Follows commands  Safety/Judgement: Awareness of environment; Fall prevention  Psychosocial  Patient Behaviors: Calm; Cooperative  Strength:    Strength: Within functional limits  Range Of Motion:  AROM: Within functional limits  PROM: Within functional limits (BUEs)  Functional Mobility:  Bed Mobility:  Supine to Sit: Modified independent  Sit to Supine:  (seated in recliner)  Transfers:  Sit to Stand: Modified independent  Stand to Sit: Modified independent  Balance:   Sitting: Intact  Standing: Impaired  Standing - Static: Good  Standing - Dynamic : Good  Ambulation/Gait Training:  Distance (ft): 200 Feet (ft)  Assistive Device:  (none)  Ambulation - Level of Assistance: Independent  Gait Description (WDL): Exceptions to WDL  Therapeutic Exercises:   Sit to stand x2  4 stairs with one hand rail supervision  Pain:  Pain Scale 1: Numeric (0 - 10)  Pain Intensity 1: 0  Activity Tolerance:   Fair  Please refer to the flowsheet for vital signs taken during this treatment. After treatment:   [x]         Patient left in no apparent distress sitting up in chair  []         Patient left in no apparent distress in bed  [x]         Call bell left within reach  [x]         Nursing notified  []         Caregiver present  []         Bed alarm activated    COMMUNICATION/EDUCATION:   [x]         Fall prevention education was provided and the patient/caregiver indicated understanding. [x]         Patient/family have participated as able in goal setting and plan of care. [x]         Patient/family agree to work toward stated goals and plan of care.   []         Patient understands intent and goals of therapy, but is neutral about his/her participation. []         Patient is unable to participate in goal setting and plan of care.     Thank you for this referral.  Kiet Anderson, PT   Time Calculation: 17 mins

## 2017-12-19 NOTE — PROGRESS NOTES
Karissa   Discharge Planning/ Assessment    Reasons for Intervention: Spoke with patient in room, she stated that she lives alone and is independent with ADL's. She verified her address and phone # as correct on the facesheet. She has a CPAP at home and uses no other DME at this time. She confirmed her PCP as Cherrie Ramírez She plans to return home upon discharge and transportation will be provided by family. Plan home with [place].   High Risk Criteria  [x] Yes  []No   Physician Referral  [] Yes  []No        Date    Nursing Referral  [] Yes  []No        Date    Patient/Family Request  [] Yes  []No        Date       Resources:    Medicare  [] Yes  []No   Medicaid  [] Yes  []No   No Resources  [] Yes  []No   Private Insurance  [x] Yes  []No    Name/Phone Number    Other  [] Yes  []No        (i.e. Workman's Comp)         Prior Services:    Prior Services  [] Yes  [x]No   Home Health  [] Yes  []No   [address]  [] Yes  []No        Number of [address]  [] Yes  []No       Meals on Wheels  [] Yes  []No   Office on Aging  [] Yes  []No   Transportation Services  [] Yes  []No   Nursing Home  [] Yes  []No        Nursing Home Name    [address]  [] Yes  []No        [address] Name    Other       Information Source:      Information obtained from  [x] Patient  [] Parent   [] Lakeshia Rodriguez  [] Child  [] Spouse   [] Significant Other/Partner   [] Friend      [] EMS    [] Nursing Home Chart          [] Other:   Chart Review  [x] Yes  []No     Family/Support System:    Patient lives with  [x] Alone    [] Spouse   [] Significant Other  [] Children  [] Caretaker   [] Parent  [] Sibling     [] Other       Other Support System:    Is the patient responsible for care of others  [] Yes  [x]No   Information of person caring for patient on  discharge    Managers financial affairs independently  [x] Yes  []No   If no, explain:      Status Prior to Admission:    Mental Status  [] Awake  [x] Alert  [x] Oriented  [] Quiet/Calm [] Lethargic/Sedated   [] Disoriented  [] Restless/Anxious  [] Combative   Personal Care  [] Dependent  [x] Independent Personal Care  [] Requires Assistance   Meal Preparation Ability  [x] Independent   [] Standby Assistance   [] Minimal Assistance   [] Moderate Assistance  [] Maximum Assistance     [] Total Assistance   Chores  [x] Independent with Chores   [] N/A Nursing Home Resident   [] Requires Assistance   Bowel/Bladder  [x] Continent  [] Catheter  [] Incontinent  [] Ostomy Self-Care    [] Urine Diversion Self-Care  [] Maximum Assistance     [] Total Assistance   Number of Persons needed for assistance    DME at home  [] aRdha Vanegas  [] Chon Vanegas   [] Commode    [] Bathroom/Grab Bars  [] Hospital Bed  [] Nebulizer  [] Oxygen           [] Raised Toilet Seat  [] Shower Chair  [] Side Rails for Bed   [] Tub Transfer Bench   [] Forrest Carranza  [] Laura Gonzales      [] Other:   Vendor      Treatment Presently Receiving:    Current Treatments  [] Chemotherapy  [] Dialysis  [] Insulin  [] IVAB [] IVF   [] O2  [] PCA   [] PT   [] RT   [] Tube Feedings   [] Wound Care     Psychosocial Evaluation:    Verbalized Knowledge of Disease Process  [x] Patient  []Family   Coping with Disease Process  [] Patient  []Family   Requires Further Counseling Coping with Disease Process  [] Patient  []Family     Identified Projected Needs:    Home Health Aid  [] Yes  []No   Transportation  [] Yes  []No   Education  [] Yes  []No        Specific Education     Financial Counseling  [] Yes  []No   Inability to Care for Self/Will Require 24 hour care  [] Yes  []No   Pain Management  [] Yes  []No   Home Infusion Therapy  [] Yes  []No   Oxygen Therapy  [] Yes  []No   DME  [] Yes  []No   Long Term Care Placement  [] Yes  []No   Rehab  [] Yes  []No   Physical Therapy  [] Yes  []No   Needs Anticipated At This Time  [] Yes  []No     Intra-Hospital Referral:    [facility]  [] Yes  []No     [] Yes  []No   Patient Representative  [] Yes  []No   Staff for Teaching Needs  [] Yes  []No   Specialty Teaching Needs     Diabetic Educator  [] Yes  []No   Referral for Diabetic Educator Needed  [] Yes  []No  If Yes, place order for Nutritionist or Diabetic Consult     Tentative Discharge Plan:    Home with No Services  [] Yes  []No   Home with [address]  [x] Yes  []No        If Yes, specify type    [address]  [] Yes  []No        If Yes, specify type    Meals on Wheels  [] Yes  []No   Office of Aging  [] Yes  []No   NHP  [] Yes  []No   Return to the [address]  [] Yes  []No   Rehab Therapy  [] Yes  []No   Acute Rehab  [] Yes  []No   Subacute Rehab  [] Yes  []No   Private Care  [] Yes  []No   Substance Abuse Referral  [] Yes  []No   Transportation  [] Yes  []No   Chore Service  [] Yes  []No   Inpatient Hospice  [] Yes  []No   OP RT  [] Yes  [] No   OP Hemo  [] Yes  [] No   OP PT  [] Yes  []No   Support Group  [] Yes  []No   Reach to Recovery  [] Yes  []No   OP Oncology Clinic  [] Yes  []No   Clinic Appointment  [] Yes  []No   DME  [] Yes  []No   Comments    Name of D/C Planner or  Given to Patient or [address]   Phone Number [phone]        Extension    Date 12/19/17   Time    If you are discharged home, whom do you designate to participate in your discharge plan and receive any information needed?      Enter name of designee         Phone # of designee         Address of designee         Updated         Patient refused to designate any           individual

## 2017-12-19 NOTE — ED NOTES
Assumed care of patient chart reviewed. Assessment done. NIH performed (0). Patient is alert and oriented. No acute distress noted. Daughter at bedside.

## 2017-12-19 NOTE — DIABETES MGMT
GLYCEMIC CONTROL AND NUTRITION    Assessment/Recommendations:  Blood glucose this am 182 mg/dl  Levemir 4 units ordered every evening. Continue corrective insulin coverage as needed  Will continue inpatient monitoring. Most recent blood glucose values:    Current A1C of 7.0 % is equivalent to average blood glucose of 154 mg/dl over the past 2-3 months. Current hospital diabetes medications:   Corrective insulin coverage AC&HS    Home diabetes medications:  Tresiba 35 units daily  Novolog 8 units 3 times daily with meals  Metformin 850 mg twice a day  Diet:    Diabetic consistent carb  Education:  __x_Refer to Diabetes Education Record             ____Education not indicated at this time    Patient was last educated here at [facility] in April 2017. Since then, her A1C went from 9.7% to 7%.     Myra Rothman RN

## 2017-12-19 NOTE — ED NOTES
Bedside shift report completed with PO, RN  Safety measures were reviewed  Lines traced/IV pump and IV site double checked  Activity level, PO status and vital sign trends reviewed   Monitor alarm limits on and set per order/protocol   Patient and or family participated in handoff   Clinical quality measures reviewed    Dual [facility] performed

## 2017-12-19 NOTE — PROGRESS NOTES
Problem: Dysphagia (Adult)  Goal: *Acute Goals and Plan of Care (Insert Text)  Outcome: Progressing Towards Goal  Speech LAnguage Pathology bedside swallow evaluation AND DISCHARGE    Patient: Alanna Chung (58 y.o. female)  Date: 12/19/2017  Primary Diagnosis: CVA (cerebral vascular accident) [facility])  CVA (cerebral vascular accident) ([facility] [address]        Precautions:   Fall    ASSESSMENT :  Clinical beside swallow eval completed per MD orders. Pt A&Ox4. Functional communication. Intelligibility >90%. Cognitive-linguistic function appears intact. OM examination revealed oral motor structures functional for mastication and deglutition. Presented with thin liquid, puree, and solid trials. Exhibited (+) bolus cohesion, manipulation and A-P transit. Further exhibited adequate swallow timing/reflex and hyolaryngeal excursion. Pt able to manipulate and clear with 0 clinical s/s aspiration and/or oropharyngeal dysphagia. Pt safe for regular solid, thin liquid diet. 0 formal ST needs for dysphagia indicated at this time. SLP educated pt on role of speech therapist in current setting with re: speech/swallow; verbalized comprehension. SLP available for re-evaluation if indicated by MD. Results d/w RN, Ana Dodd. PLAN :  Recommendations and Planned Interventions:  No formal ST needs ID'd for dysphagia. Eval only. Discharge Recommendations: None     SUBJECTIVE:   Patient stated I am doing much better.     OBJECTIVE:     Past Medical History:   Diagnosis Date    Anxiety     CAD (coronary artery disease)     S/P Coronary stents ( LAD and Lcx)    Depression     Diabetes ([facility]     Hepatitis C     Hypercholesterolemia     Hypertension      Past Surgical History:   Procedure Laterality Date    HX BREAST BIOPSY      1971 2010 left breast lump removed excisional per patient     [address]    pre-eclampsia    HX CHOLECYSTECTOMY  2004    HX CORONARY STENT PLACEMENT  Nov 2013    4 stents    HX HEENT  2009 thyroidectomy due to nodules.  HX HYSTERECTOMY  2005    heavy periods    HX THYROIDECTOMY       Prior Level of Function/Home Situation:   Home Situation  Home Environment: Apartment  # Steps to Enter: 0  One/Two Story Residence: One story  Living Alone: Yes  Support Systems: Child(brianna)  Patient Expects to be Discharged to[de-identified] Apartment  Current DME Used/Available at Home: None  Tub or Shower Type: Tub/Shower combination     Diet prior to admission: regular solids with thin liquids  Current Diet:  Regular solids with thin liquids     Cognitive and Communication Status:  Neurologic State: Alert, Appropriate for age  Orientation Level: Oriented X4  Cognition: Appropriate decision making, Follows commands  Perception: Appears intact  Perseveration: No perseveration noted  Safety/Judgement: Awareness of environment     Oral Assessment:  Oral Assessment  Labial: No impairment  Dentition: Natural;Intact  Oral Hygiene: adequate  Lingual: No impairment  Velum: No impairment  Mandible: No impairment     P.O. Trials:  Patient Position: HOB 60  Vocal quality prior to P.O.: No impairment  Consistency Presented: Thin liquid; Solid  How Presented: Self-fed/presented;Straw;Successive swallows     Bolus Acceptance: No impairment  Bolus Formation/Control: No impairment     Propulsion: No impairment  Oral Residue: None  Initiation of Swallow: No impairment  Laryngeal Elevation: Functional  Aspiration Signs/Symptoms: None  Pharyngeal Phase Characteristics: No impairment, issues, or problems   Effective Modifications: None  Cues for Modifications: None    Oral Phase Severity: No impairment  Pharyngeal Phase Severity : No impairment    GCODESwallowing:  Swallow Current Status CH= 0%   Swallow Goal Status CH= 0%   Swallow D/C Status CH= 0%    The severity rating is based on the following outcomes:  CASEY Noms Swallow Level 7    Clinical Judgement      PAIN:  Start of Eval: 0  End of Eval: 0     After evaluation:   [] Patient left in no apparent distress sitting up in chair  [x]            Patient left in no apparent distress in bed  [x]            Call bell left within reach  [x]            Nursing notified  []            Family present  []            Caregiver present  []            Bed alarm activated      COMMUNICATION/EDUCATION:   [x]            Aspiration precautions; swallow safety; compensatory techniques. []            Patient/family have participated as able in goal setting and plan of care. []            Patient/family agree to work toward stated goals and plan of care. []            Patient understands intent and goals of therapy; neutral about participation. []            Patient unable to participate in goal setting/plan of care; educ ongoing with interdisciplinary staff  []         Posted safety precautions in patient's room. Thank you for this referral.    Summer HAWTHORNE  CCC-SLP  Speech-Language Pathologist  Time Calculation: 10 mins

## 2017-12-19 NOTE — H&P
History and Physical    Patient: Ramon Delong               Sex: female          DOA: 12/18/2017       YOB: 1960      Age:  62 y.o.        LOS:  LOS: 0 days        Chief Complaint   Patient presents with    Shortness of Breath    Headache         HPI:     Ramon Delong is a 62 y.o. female who presents with headache and sob. She saw her PCP today for routine visit and was told her BP was high sbp 170's. Later around 10 am she developed blurry vision and headache. She decided to come to the ED for further evaluation. In the ED she had left side numbness involving her face and left arm. She was seen by tele neurology. CT head indicates a lacunar infarct with age indeterminate. She had MRI brain which was positive for a subacute CVA. Patient was given Aspirin 162 mg. Currently her blurry vision and left sided numbness have resolved. However she continues to have persistent headache and elevated BP. Past Medical History:   Diagnosis Date    Anxiety     CAD (coronary artery disease)     S/P Coronary stents ( LAD and Lcx)    Depression     Diabetes ([facility]     Hepatitis C     Hypercholesterolemia     Hypertension    . Past Surgical History:   Procedure Laterality Date    HX BREAST BIOPSY      1971 2010 left breast lump removed excisional per patient     Portalarium    pre-eclampsia    HX CHOLECYSTECTOMY  2004    HX CORONARY STENT PLACEMENT  Nov 2013    4 stents    HX HEENT  2009    thyroidectomy due to nodules.  HX HYSTERECTOMY  2005    heavy periods    HX THYROIDECTOMY         No current facility-administered medications on file prior to encounter. Current Outpatient Prescriptions on File Prior to Encounter   Medication Sig Dispense Refill    gabapentin (NEURONTIN) 300 mg capsule Take 1 Cap by mouth three (3) times daily. 90 Cap 2    metFORMIN (GLUCOPHAGE) 850 mg tablet Take 1 Tab by mouth two (2) times daily (with meals).  60 Tab 2    glucose blood VI test strips (BLOOD GLUCOSE TEST) strip Use one strip for each glucose check. Check glucose 2 times per day. 100 Strip 7    insulin aspart (NOVOLOG) 100 unit/mL inpn Take 8 units with each meal. 10 Pen 11    insulin degludec (TRESIBA FLEXTOUCH U-100) 100 unit/mL (3 mL) inpn 35 Units by SubCUTAneous route daily. 15 Pen 2    atorvastatin (LIPITOR) 80 mg tablet TAKE 1 TABLET BY MOUTH DAILY 90 Tab 9    acetaminophen (TYLENOL EXTRA STRENGTH) 500 mg tablet Take 2 Tabs by mouth every six (6) hours as needed for Pain. 40 Tab 0    amLODIPine (NORVASC) 10 mg tablet Take 1 Tab by mouth daily. 90 Tab 3    metoprolol succinate (TOPROL-XL) 200 mg XL tablet Take 1 Tab by mouth daily. 90 Tab 3    losartan (COZAAR) 100 mg tablet Take 1 Tab by mouth daily. 90 Tab 3    hydrALAZINE (APRESOLINE) 50 mg tablet Take 0.5 Tabs by mouth four (4) times daily. 180 Tab 3    hydroCHLOROthiazide (HYDRODIURIL) 25 mg tablet Take 1 Tab by mouth daily. 90 Tab 3    levothyroxine (SYNTHROID) 125 mcg tablet Take 1 Tab by mouth Daily (before breakfast). 90 Tab 3    clopidogrel (PLAVIX) 75 mg tab Take 1 Tab by mouth daily. 90 Tab 3    buPROPion (WELLBUTRIN) 100 mg tablet Take 1 Tab by mouth daily. 90 Tab 3    citalopram (CELEXA) 40 mg tablet Take 1 Tab by mouth daily. 90 Tab 3    Insulin Needles, Disposable, (DORA PEN NEEDLE) 32 gauge x 5/32\" ndle Use one needle to give insulin 4 times a day. 400 Pen Needle 15    aspirin delayed-release 81 mg tablet Take  by mouth daily.  RABEprazole (ACIPHEX) 20 mg tablet TAKE 1 TABLET BY MOUTH ONCE DAILY 30 Tab 5    polyethylene glycol (MIRALAX) 17 gram packet Take 1 Packet by mouth two (2) times a day. 60 Packet 1    ondansetron hcl (ZOFRAN, AS HYDROCHLORIDE,) 4 mg tablet Take 2 Tabs by mouth every eight (8) hours as needed for Nausea. 12 Tab 0    HYDROmorphone (DILAUDID) 2 mg tablet Take 1 Tab by mouth every four (4) hours as needed for Pain.  Max Daily Amount: 12 mg. 60 Tab 0    nitrofurantoin, macrocrystal-monohydrate, (MACROBID) 100 mg capsule Take 1 Cap by mouth two (2) times a day. 14 Cap 0    sucralfate (CARAFATE) 100 mg/mL suspension Take 5 mL by mouth four (4) times daily. 414 mL 0    valACYclovir (VALTREX) 1 gram tablet Take 1 Tab by mouth three (3) times daily. 21 Tab 0    promethazine (PHENERGAN) 25 mg tablet Take 1 Tab by mouth every six (6) hours as needed. 12 Tab 0    ondansetron (ZOFRAN ODT) 4 mg disintegrating tablet Take 1 Tab by mouth every eight (8) hours as needed for Nausea. 10 Tab 0       Social History     Social History    Marital status:      Spouse name: N/A    Number of children: N/A    Years of education: N/A     Occupational History    Not on file. Social History Main Topics    Smoking status: Never Smoker    Smokeless tobacco: Never Used    Alcohol use No    Drug use: No    Sexual activity: Not Currently     Other Topics Concern    Not on file     Social History Narrative       Prior to Admission Medications   Prescriptions Last Dose Informant Patient Reported? Taking? HYDROmorphone (DILAUDID) 2 mg tablet   No No   Sig: Take 1 Tab by mouth every four (4) hours as needed for Pain. Max Daily Amount: 12 mg. Insulin Needles, Disposable, (DORA PEN NEEDLE) 32 gauge x 5/32\" ndle   No No   Sig: Use one needle to give insulin 4 times a day. RABEprazole (ACIPHEX) 20 mg tablet   No No   Sig: TAKE 1 TABLET BY MOUTH ONCE DAILY   acetaminophen (TYLENOL EXTRA STRENGTH) 500 mg tablet   No No   Sig: Take 2 Tabs by mouth every six (6) hours as needed for Pain. amLODIPine (NORVASC) 10 mg tablet   No No   Sig: Take 1 Tab by mouth daily. aspirin delayed-release 81 mg tablet   Yes No   Sig: Take  by mouth daily. atorvastatin (LIPITOR) 80 mg tablet   No No   Sig: TAKE 1 TABLET BY MOUTH DAILY   buPROPion (WELLBUTRIN) 100 mg tablet   No No   Sig: Take 1 Tab by mouth daily. citalopram (CELEXA) 40 mg tablet   No No   Sig: Take 1 Tab by mouth daily.    clopidogrel (PLAVIX) 75 mg tab   No No   Sig: Take 1 Tab by mouth daily. gabapentin (NEURONTIN) 300 mg capsule   No No   Sig: Take 1 Cap by mouth three (3) times daily. glucose blood VI test strips (BLOOD GLUCOSE TEST) strip   No No   Sig: Use one strip for each glucose check. Check glucose 2 times per day. hydrALAZINE (APRESOLINE) 50 mg tablet   No No   Sig: Take 0.5 Tabs by mouth four (4) times daily. hydroCHLOROthiazide (HYDRODIURIL) 25 mg tablet   No No   Sig: Take 1 Tab by mouth daily. insulin aspart (NOVOLOG) 100 unit/mL inpn   No No   Sig: Take 8 units with each meal.   insulin degludec (TRESIBA FLEXTOUCH U-100) 100 unit/mL (3 mL) inpn   No No   Si Units by SubCUTAneous route daily. levothyroxine (SYNTHROID) 125 mcg tablet   No No   Sig: Take 1 Tab by mouth Daily (before breakfast). losartan (COZAAR) 100 mg tablet   No No   Sig: Take 1 Tab by mouth daily. metFORMIN (GLUCOPHAGE) 850 mg tablet   No No   Sig: Take 1 Tab by mouth two (2) times daily (with meals). metoprolol succinate (TOPROL-XL) 200 mg XL tablet   No No   Sig: Take 1 Tab by mouth daily. nitrofurantoin, macrocrystal-monohydrate, (MACROBID) 100 mg capsule   No No   Sig: Take 1 Cap by mouth two (2) times a day. ondansetron (ZOFRAN ODT) 4 mg disintegrating tablet   No No   Sig: Take 1 Tab by mouth every eight (8) hours as needed for Nausea. ondansetron hcl (ZOFRAN, AS HYDROCHLORIDE,) 4 mg tablet   No No   Sig: Take 2 Tabs by mouth every eight (8) hours as needed for Nausea. polyethylene glycol (MIRALAX) 17 gram packet   No No   Sig: Take 1 Packet by mouth two (2) times a day. promethazine (PHENERGAN) 25 mg tablet   No No   Sig: Take 1 Tab by mouth every six (6) hours as needed. sucralfate (CARAFATE) 100 mg/mL suspension   No No   Sig: Take 5 mL by mouth four (4) times daily. valACYclovir (VALTREX) 1 gram tablet   No No   Sig: Take 1 Tab by mouth three (3) times daily.       Facility-Administered Medications: None       Family History   Problem Relation Age of Onset    Diabetes Mother     Hypertension Mother     Cancer Mother     Heart Disease Mother     Heart Attack Mother     Diabetes Father     Hypertension Father     Cancer Father        Allergies   Allergen Reactions    Contrast Agent [Iodine] Rash and Sneezing       Review of Systems   HENT: Negative. Eyes: Positive for blurred vision. Respiratory: Negative. Cardiovascular: Negative. Gastrointestinal: Negative. Genitourinary: Negative. Musculoskeletal: Negative. Skin: Negative. Neurological: Positive for focal weakness, weakness and headaches. Psychiatric/Behavioral: Negative. Physical Exam:       Visit Vitals    BP (!) 191/101    Pulse 93    Temp 98.5 °F (36.9 °C)    Resp 20    Ht 5' 2\" (1.575 m)    Wt 82.6 kg (182 lb)    SpO2 96%    BMI 33.29 kg/m2       Physical Exam   Constitutional: She is oriented to person, place, and time. She appears well-developed and well-nourished. No distress. HENT:   Head: Normocephalic and atraumatic. Mouth/Throat: No oropharyngeal exudate. Eyes: Conjunctivae are normal. No scleral icterus. Neck: Neck supple. Cardiovascular: Normal rate, regular rhythm and normal heart sounds. No murmur heard. Pulmonary/Chest: Effort normal and breath sounds normal. No respiratory distress. She has no wheezes. She has no rales. Abdominal: Soft. Bowel sounds are normal. She exhibits no distension. Musculoskeletal: She exhibits no edema. Neurological: She is alert and oriented to person, place, and time. She has normal strength. No cranial nerve deficit or sensory deficit. GCS eye subscore is 4. GCS verbal subscore is 5. GCS motor subscore is 6. Skin: Skin is warm. No rash noted. She is not diaphoretic. No erythema. No pallor. Psychiatric: She has a normal mood and affect. Ancillary Studies: All lab and imaging reviewed for the past 24 hours.     Recent Results (from the past 24 hour(s)) HEMOGLOBIN A1C WITH EAG    Collection Time: 12/18/17  9:36 AM   Result Value Ref Range    Hemoglobin A1c 7.0 (H) 4.2 - 5.6 %    Est. average glucose 154 mg/dL   EKG, 12 LEAD, INITIAL    Collection Time: 12/18/17 11:58 AM   Result Value Ref Range    Ventricular Rate 91 BPM    Atrial Rate 91 BPM    P-R Interval 156 ms    QRS Duration 100 ms    Q-T Interval 378 ms    QTC Calculation (Bezet) 464 ms    Calculated P Axis 35 degrees    Calculated R Axis -37 degrees    Calculated T Axis 125 degrees    Diagnosis       Normal sinus rhythm  Left axis deviation  ST & T wave abnormality, consider lateral ischemia  Prolonged QT  Abnormal ECG  When compared with ECG of 09-APR-2017 11:59,  T wave inversion now evident in Lateral leads     CARDIAC PANEL,(CK, CKMB & TROPONIN)    Collection Time: 12/18/17 12:30 PM   Result Value Ref Range    CK 98 26 - 192 U/L    CK - MB 1.3 <3.6 ng/ml    CK-MB Index 1.3 0.0 - 4.0 %    Troponin-I, Qt. <0.02 0.0 - 2.136 NG/ML   METABOLIC PANEL, COMPREHENSIVE    Collection Time: 12/18/17 12:30 PM   Result Value Ref Range    Sodium 139 136 - 145 mmol/L    Potassium 3.9 3.5 - 5.5 mmol/L    Chloride 102 100 - 108 mmol/L    CO2 27 21 - 32 mmol/L    Anion gap 10 3.0 - 18 mmol/L    Glucose 150 (H) 74 - 99 mg/dL    BUN 22 (H) 7.0 - 18 MG/DL    Creatinine 0.93 0.6 - 1.3 MG/DL    BUN/Creatinine ratio 24 (H) 12 - 20      GFR est AA >60 >60 ml/min/1.73m2    GFR est non-AA >60 >60 ml/min/1.73m2    Calcium 8.3 (L) 8.5 - 10.1 MG/DL    Bilirubin, total 0.8 0.2 - 1.0 MG/DL    ALT (SGPT) 49 13 - 56 U/L    AST (SGOT) 55 (H) 15 - 37 U/L    Alk.  phosphatase 151 (H) 45 - 117 U/L    Protein, total 7.7 6.4 - 8.2 g/dL    Albumin 2.5 (L) 3.4 - 5.0 g/dL    Globulin 5.2 (H) 2.0 - 4.0 g/dL    A-G Ratio 0.5 (L) 0.8 - 1.7     GLUCOSE, POC    Collection Time: 12/18/17 12:59 PM   Result Value Ref Range    Glucose (POC) 139 (H) 70 - 110 mg/dL   PROTHROMBIN TIME + INR    Collection Time: 12/18/17  1:00 PM   Result Value Ref Range Prothrombin time 11.8 11.5 - 15.2 sec    INR 0.9 0.8 - 1.2     THROMBIN TIME    Collection Time: 12/18/17  1:00 PM   Result Value Ref Range    Thrombin time 18.6 (H) 13.8 - 18.2 SECS   FIBRINOGEN    Collection Time: 12/18/17  1:00 PM   Result Value Ref Range    Fibrinogen 523 (H) 210 - 451 mg/dL   ASPIRIN TEST    Collection Time: 12/18/17  1:00 PM   Result Value Ref Range    Aspirin test 620 620 - 672 ARU   CBC W/O DIFF    Collection Time: 12/18/17  1:40 PM   Result Value Ref Range    WBC 7.2 4.6 - 13.2 K/uL    RBC 4.13 (L) 4.20 - 5.30 M/uL    HGB 12.7 12.0 - 16.0 g/dL    HCT 36.9 35.0 - 45.0 %    MCV 89.3 74.0 - 97.0 FL    MCH 30.8 24.0 - 34.0 PG    MCHC 34.4 31.0 - 37.0 g/dL    RDW 11.4 (L) 11.6 - 14.5 %    PLATELET 064 831 - 828 K/uL    MPV 10.3 9.2 - 81.2 FL   METABOLIC PANEL, COMPREHENSIVE    Collection Time: 12/18/17  1:40 PM   Result Value Ref Range    Sodium 140 136 - 145 mmol/L    Potassium 3.4 (L) 3.5 - 5.5 mmol/L    Chloride 102 100 - 108 mmol/L    CO2 29 21 - 32 mmol/L    Anion gap 9 3.0 - 18 mmol/L    Glucose 164 (H) 74 - 99 mg/dL    BUN 22 (H) 7.0 - 18 MG/DL    Creatinine 0.89 0.6 - 1.3 MG/DL    BUN/Creatinine ratio 25 (H) 12 - 20      GFR est AA >60 >60 ml/min/1.73m2    GFR est non-AA >60 >60 ml/min/1.73m2    Calcium 8.3 (L) 8.5 - 10.1 MG/DL    Bilirubin, total 0.7 0.2 - 1.0 MG/DL    ALT (SGPT) 48 13 - 56 U/L    AST (SGOT) 44 (H) 15 - 37 U/L    Alk.  phosphatase 158 (H) 45 - 117 U/L    Protein, total 7.2 6.4 - 8.2 g/dL    Albumin 2.6 (L) 3.4 - 5.0 g/dL    Globulin 4.6 (H) 2.0 - 4.0 g/dL    A-G Ratio 0.6 (L) 0.8 - 1.7     TSH 3RD GENERATION    Collection Time: 12/18/17  1:40 PM   Result Value Ref Range    TSH 1.51 0.36 - 3.74 uIU/mL   URINALYSIS W/ RFLX MICROSCOPIC    Collection Time: 12/18/17  4:55 PM   Result Value Ref Range    Color YELLOW      Appearance CLEAR      Specific gravity 1.011 1.005 - 1.030      pH (UA) 6.5 5.0 - 8.0      Protein 300 (A) NEG mg/dL    Glucose NEGATIVE  NEG mg/dL    Ketone NEGATIVE  NEG mg/dL    Bilirubin NEGATIVE  NEG      Blood NEGATIVE  NEG      Urobilinogen 1.0 0.2 - 1.0 EU/dL    Nitrites NEGATIVE  NEG      Leukocyte Esterase TRACE (A) NEG     DRUG SCREEN, URINE    Collection Time: 12/18/17  4:55 PM   Result Value Ref Range    BENZODIAZEPINES NEGATIVE  NEG      BARBITURATES NEGATIVE  NEG      THC (TH-CANNABINOL) NEGATIVE  NEG      OPIATES NEGATIVE  NEG      PCP(PHENCYCLIDINE) NEGATIVE  NEG      COCAINE NEGATIVE  NEG      AMPHETAMINES NEGATIVE  NEG      METHADONE NEGATIVE       HDSCOM (NOTE)    URINE MICROSCOPIC ONLY    Collection Time: 12/18/17  4:55 PM   Result Value Ref Range    WBC 1 to 3 0 - 4 /hpf    RBC 0 0 - 5 /hpf    Epithelial cells 1+ 0 - 5 /lpf    Bacteria NEGATIVE  NEG /hpf       Assessment/Plan     Principal Problem:    [facility] (12/18/2017)    Active Problems:    Hypertension (6/24/2014)      CAD (coronary artery disease) (6/24/2014)      Hypothyroidism, acquired (6/24/2014)      Overview: Thyroid removed due to nodules      Hepatitis C (6/24/2014)      Type 2 diabetes mellitus with nephropathy ([facility] (12/18/2017)      Hypokalemia (12/18/2017)        PLAN:    CVA -MRI reviewed. Subacute. MRA BRAIN and Neck, ECHO ordered. Permissible hypertension. Continue Aspirin and Lipitor. Neuro and vital sign check per protocol. Neurology consult , PT/OT/Speech evaluation    Hypertension - permissible HTN per stroke protocol.  Labetalol as needed if sbp /dbp > 220/120    Resume chronic medications as appropriate     Hep C - recommend out patient follow up with ID    Hypokalemia - replace     DVT Prophylaxis    GI Prophylaxis     Full code       Anil Rivero MD  12/18/2017  7:10 PM

## 2017-12-19 NOTE — PROGRESS NOTES
Daily Progress Note: 12/19/2017 8:46 AM   Admit Date: 12/18/2017    Patient seen in follow up for multiple medical problems as listed below:  Patient Active Problem List   Diagnosis Code    Hypertension I10    Uncontrolled type 2 diabetes mellitus with diabetic polyneuropathy ([facility] E11.42, E11.65    CAD (coronary artery disease) I25.10    Hypothyroidism, acquired E03.9    Hepatitis C B19.20    Hyperlipidemia LDL goal < 100 E78.5    Chest pain R07.9    Nausea R11.0    Family history of colon cancer Z80.0    Seizure disorder, focal motor ([facility] G40.109    Noncompliance with medication regimen Z91.14    Type 2 diabetes mellitus with nephropathy (HCC) E11.21    [facility] I63.9    Hypokalemia E87.6       Assesment     CVA -Subacute. MRA BRAIN and Neck, TTE pending. Permissible hypertension overnight ok for home meds as this is subacute. Continue DAPT and Lipitor, unclear why patient would have a CVA on DAPT. ASA and plavix tests - ARU >550 suggesting [id number] is non-theraputic. [id number]    Neuro and vital sign check per protocol. PT/OT/Speech evaluation  Neuro consult     Hypertension -restart hctz, hydralazine, losartan, toprol, norvasc. Labetalol as needed if sbp /dbp > 220/120  DM-SSI+A1c   CAD s/p PCI on plavix-reports compliance. HLP-Lipitor 80  Hep C - recommend out patient follow up with ID  Hypokalemia - replace   SEVERO - CPAP  GERD-PPI  Hypothyroid-synthroid  Anxiety/depression - celexa    DVT Protocol Active: yes  Code Status:  Full Code     Disposition: home tonight/sergio pending workup    Subjective:     CC: Shortness of Breath and Headache    Interval History: patient states she is back to normal. Her paresthesias and visual changes have resolved. She was recently admitted 4/2017 for TIA vs seizure.     ROS: 11 point ROS negative except for    Objective:     Visit Vitals    /83 (BP 1 Location: Right leg, BP Patient Position: At rest)    Pulse 86  Temp 97.5 °F (36.4 °C)    Resp 16    Ht 5' 2\" (1.575 m)    Wt 82.6 kg (182 lb)    SpO2 94%    Breastfeeding No    BMI 33.29 kg/m2       Temp (24hrs), Av.5 °F (36.9 °C), Min:97.5 °F (36.4 °C), Max:99.3 °F (37.4 °C)        Intake/Output Summary (Last 24 hours) at 17 0846  Last data filed at 17 0630   Gross per 24 hour   Intake           956.66 ml   Output              400 ml   Net           556.66 ml       Gen: AOx3, NAD  HEENT:  DAVONTE, EOMI. Neck: No Bruits/JVD   Lungs:   CTAB. Good respiratory effort  Heart:   RR S1 S2 without M/R/G  Abdomen: ND,NT, BSX4,   Extremities:   No LE edema. No cyanosis. Skin:  no jaundice/lesions  Neuro: CN2-12 intact. Strength 5/5 UE/LE sensation 5/5 UE/LE no pronator drift. Normal FNF      Data Review:     Meds/Labs/Tests reviewed    Current Shift:     Last three shifts:   1901 -  0700  In: 956.7 [P.O.:240;  I.V.:716.7]  Out: 400 [Urine:400]  Recent Labs      17   2355  17   1340   WBC  7.1  7.2   RBC  4.29  4.13*   HGB  13.4  12.7   HCT  38.4  36.9   PLT  197  182       Recent Labs      17   2355  17   1340  17   1230   BUN  19*  22*  22*   CREA  0.79  0.89  0.93   CA  8.6  8.3*  8.3*   ALB  2.7*  2.6*  2.5*   K  3.1*  3.4*  3.9   NA  139  140  139   CL  101  102  102   CO2  28  29  27   PHOS  3.6   --    --    GLU  103*  164*  150*        Lab Results   Component Value Date/Time    Glucose 103 2017 11:55 PM    Glucose 164 2017 01:40 PM    Glucose 150 2017 12:30 PM    Glucose 374 2017 11:39 AM    Glucose 661 2017 01:25 AM          Care coordination with Nursing/Consultants/staff: 15  Prior history, labs, and charting reviewed: 15    Procedures/Imaging:  CT head  MRI head  MRA head/neck  TTE    Total time spent with chart review, patient examination/education, discussion with staff on case,documentation and medication management / adjustment  :  39 Minutes      Dr Eugene Do DO  2 [facility]  Hospitalist Division  [phone]

## 2017-12-19 NOTE — PROGRESS NOTES
Problem: Falls - Risk of  Goal: *Absence of Falls  Document Manda Fall Risk and appropriate interventions in the flowsheet. Outcome: Progressing Towards Goal  Fall Risk Interventions:            Medication Interventions: Evaluate medications/consider consulting pharmacy                  Comments: Patient will be free of fall while stay in hospital and discharge.

## 2017-12-19 NOTE — PROGRESS NOTES
Home care referral sent to [facility] via [address] and called to the [facility] for f/u. Care Management Interventions  PCP Verified by CM:  Yes  Palliative Care Criteria Met (RRAT>21 & CHF Dx)?: No  Mode of Transport at Discharge:  (family)  Transition of Care Consult (CM Consult): Home Health [facility])  [address] Yes  Discharge Durable Medical Equipment: No  Physical Therapy Consult: Yes  Occupational Therapy Consult: Yes  Speech Therapy Consult: Yes  Current Support Network: Lives Alone  Confirm Follow Up Transport: Family  Plan discussed with Pt/Family/Caregiver: Yes  Discharge Location  Discharge Placement: Home with home health

## 2017-12-19 NOTE — DISCHARGE INSTRUCTIONS
DISCHARGE SUMMARY from Nurse    PATIENT INSTRUCTIONS:    After general anesthesia or intravenous sedation, for 24 hours or while taking prescription Narcotics:  · Limit your activities  · Do not drive and operate hazardous machinery  · Do not make important personal or business decisions  · Do  not drink alcoholic beverages  · If you have not urinated within 8 hours after discharge, please contact your surgeon on call. Report the following to your surgeon:  · Excessive pain, swelling, redness or odor of or around the surgical area  · Temperature over 100.5  · Nausea and vomiting lasting longer than 4 hours or if unable to take medications  · Any signs of decreased circulation or nerve impairment to extremity: change in color, persistent  numbness, tingling, coldness or increase pain  · Any questions    What to do at Home:  Recommended activity: Activity as tolerated    If you experience any of the following symptoms as listed in the discharge teaching as \"When to Call for Help\", please follow up with your primary care physician and/or call 911. *  Please give a list of your current medications to your Primary Care Provider. *  Please update this list whenever your medications are discontinued, doses are      changed, or new medications (including over-the-counter products) are added. *  Please carry medication information at all times in case of emergency situations. These are general instructions for a healthy lifestyle:    No smoking/ No tobacco products/ Avoid exposure to second hand smoke  Surgeon General's Warning:  Quitting smoking now greatly reduces serious risk to your health.     Obesity, smoking, and sedentary lifestyle greatly increases your risk for illness    A healthy diet, regular physical exercise & weight monitoring are important for maintaining a healthy lifestyle    You may be retaining fluid if you have a history of heart failure or if you experience any of the following symptoms:  Weight gain of 3 pounds or more overnight or 5 pounds in a week, increased swelling in our hands or feet or shortness of breath while lying flat in bed. Please call your doctor as soon as you notice any of these symptoms; do not wait until your next office visit. Recognize signs and symptoms of STROKE:    F-face looks uneven    A-arms unable to move or move unevenly    S-speech slurred or non-existent    T-time-call 911 as soon as signs and symptoms begin-DO NOT go       Back to bed or wait to see if you get better-TIME IS BRAIN. Warning Signs of HEART ATTACK     Call 911 if you have these symptoms:   Chest discomfort. Most heart attacks involve discomfort in the center of the chest that lasts more than a few minutes, or that goes away and comes back. It can feel like uncomfortable pressure, squeezing, fullness, or pain.  Discomfort in other areas of the upper body. Symptoms can include pain or discomfort in one or both arms, the back, neck, jaw, or stomach.  Shortness of breath with or without chest discomfort.  Other signs may include breaking out in a cold sweat, nausea, or lightheadedness. Don't wait more than five minutes to call 911 - MINUTES MATTER! Fast action can save your life. Calling 911 is almost always the fastest way to get lifesaving treatment. Emergency Medical Services staff can begin treatment when they arrive -- up to an hour sooner than if someone gets to the hospital by car. The discharge information has been reviewed with the patient. The patient verbalized understanding. Discharge medications reviewed with the patient and appropriate educational materials and side effects teaching were provided. ___________________________________________________________________________________________________________________________________           Learning About an Ischemic Stroke  What is an ischemic stroke?     An ischemic (say \"hsa-KWB-[id number]\") stroke occurs when a blood clot blocks a blood vessel in the brain. This means that blood cannot flow to some part of the brain. Without blood and the oxygen it carries, this part of the brain starts to die. The part of the body controlled by the damaged area of the brain cannot work properly. This is different from a hemorrhagic (say \"dlx-spq-CF-lucas\") stroke, which happens when a blood vessel in the brain has burst open or has started to leak. The brain damage from a stroke starts within minutes. Quick treatment can help limit damage to the brain and make recovery more likely. People who have had a stroke may have a hard time talking, understanding things, and making decisions. They may have to relearn daily activities, such as how to eat, bathe, and dress. How well someone recovers from a stroke depends on how quickly the person gets to the hospital, where in the brain the stroke happened, and how severe it was. Training and therapy also make a difference in how well people recover. What are the symptoms? If you have any of these symptoms, call 911 or other emergency services right away:  · You have symptoms of a stroke. These may include:  ¨ Sudden numbness, tingling, weakness, or loss of movement in your face, arm, or leg, especially on only one side of your body. ¨ Sudden vision changes. ¨ Sudden trouble speaking. ¨ Sudden confusion or trouble understanding simple statements. ¨ Sudden problems with walking or balance. ¨ A sudden, severe headache that is different from past headaches. See your doctor if you have symptoms that seem like a stroke, even if they go away quickly. You may have had a transient ischemic attack (TIA), sometimes called a mini-stroke. A TIA is a warning that a stroke may happen soon. Getting early treatment for a TIA can help prevent a stroke. What causes an ischemic stroke? An ischemic stroke is caused by a blood clot that blocks blood flow in the brain.  The most common causes of blood clots include:  · Hardening of the arteries (atherosclerosis). This is caused by high blood pressure, diabetes, high cholesterol, or smoking. · Atrial fibrillation. How is ischemic stroke treated? You may have to take several medicines, depending on what caused your stroke. Ask your doctor if a stroke rehab program is right for you. Rehab increases your chances of getting back some of the abilities you lost.  How can you prevent another stroke? · Work with your doctor to treat any health problems you have. High blood pressure, high cholesterol, atrial fibrillation, and diabetes all raise your chances of having a stroke. · Be safe with medicines. Take your medicine exactly as prescribed. Call your doctor if you think you are having a problem with your medicine. · Have a healthy lifestyle. ¨ Do not smoke or allow others to smoke around you. If you need help quitting, talk to your doctor about stop-smoking programs and medicines. These can increase your chances of quitting for good. Smoking makes a stroke more likely. ¨ Limit alcohol to 2 drinks a day for men and 1 drink a day for women. ¨ Lose weight if you need to. A healthy weight will help you keep your heart and body healthy. ¨ Be active. Ask your doctor what type and level of activity is safe for you. ¨ Eat heart-healthy foods, like fruits, vegetables, and high-fiber foods. Follow-up care is a key part of your treatment and safety. Be sure to make and go to all appointments, and call your doctor if you are having problems. It's also a good idea to know your test results and keep a list of the medicines you take. Where can you learn more? Go to http://satci-zeinab.info/. Enter D161 in the search box to learn more about \"Learning About an Ischemic Stroke. \"  Current as of: March 20, 2017  Content Version: 11.4  © 0587-5104 Healthwise, [employer].  Care instructions adapted under license by [employer] (which disclaims liability or warranty for this information). If you have questions about a medical condition or this instruction, always ask your healthcare professional. [address] any warranty or liability for your use of this information. Learning About How to Prevent a Stroke  What is a stroke? A stroke occurs when a blood vessel in the brain bursts or is blocked by a blood clot. Without blood and the oxygen it carries, part of the brain starts to die. The part of the body controlled by the damaged area of the brain can't work properly. But there are many things you can do to help lower your stroke risk. What increases your risk for stroke? A risk factor is anything that makes you more likely to have a particular health problem. Risk factors for stroke that you can treat or change include:  · Health problems like high blood pressure, atrial fibrillation, diabetes, and high cholesterol. · Smoking. · Heavy use of alcohol. · Being overweight. · Not getting enough physical activity. Risk factors you can't change include:  · Age. The risk of stroke goes up as you get older. · Race.  Americans, Native Americans, and [place] Natives have a higher risk than those of other races. · Being female. Women have a higher risk of stroke than men. · Family history of stroke. Your doctor can help you know your risk. Then you and your can doctor talk about whether you need to lower it. What can you do to prevent a stroke? · Treat any health problems you have that raise your risk. · Adopt a heart-healthy lifestyle:  ¨ Don't smoke. If you need help quitting, talk to your doctor about stop-smoking programs and medicines. These can increase your chances of quitting for good. ¨ Limit alcohol to 2 drinks a day for men and 1 drink a day for women. ¨ Stay at a healthy weight. Lose weight if you need to. ¨ If your doctor recommends it, get more exercise. Walking is a good choice.  Bit by bit, increase the amount you walk every day. Try for at least 30 minutes on most days of the week. ¨ Eat heart-healthy foods. These include fruits, vegetables, high-fiber foods, and fish. Choose foods that are low in sodium, saturated fat, and trans fat. · Decide with your doctor whether you will also take medicines to help lower your risk. For example, you and your doctor may decide you will take a medicine that prevents blood clots. What are the symptoms of a stroke? The brain damage from a stroke starts within minutes. That's why it's so important to know the symptoms of stroke and to act fast. Quick treatment can help limit damage to the brain so that you have fewer problems after the stroke. FAST is a simple way to remember the main symptoms of stroke. Recognizing these symptoms helps you know when to call for medical help. FAST stands for:  · Face drooping. · Arm weakness. · Speech difficulty. · Time to call 911. Follow-up care is a key part of your treatment and safety. Be sure to make and go to all appointments, and call your doctor if you are having problems. It's also a good idea to know your test results and keep a list of the medicines you take. Where can you learn more? Go to http://staci-zeinab.info/. Enter G757 in the search box to learn more about \"Learning About How to Prevent a Stroke. \"  Current as of: March 20, 2017  Content Version: 11.4  © 2158-0326 [employer] Care instructions adapted under license by [employer] (which disclaims liability or warranty for this information). If you have questions about a medical condition or this instruction, always ask your healthcare professional. [address] any warranty or liability for your use of this information. Learning About How to Prevent Another Stroke  What can you do to prevent another stroke? After a stroke, people feel lots of different emotions.  Some people are worried that they could have another stroke. Or they may feel overwhelmed by how much there is to learn and do. Some people feel sad or depressed. No matter what emotions you are feeling, you can give yourself some control and peace of mind by making a plan to lower your risk of having another stroke. Take your medicines  You'll need to take medicines to help prevent another stroke. Be sure to take your medicines exactly as prescribed. And don't stop taking them unless your doctor tells you to. If you stop taking your medicines, you can increase your risk of having another stroke. Some of the medicines your doctor may prescribe include:  · Aspirin or some other blood thinner to prevent blood clots. · Statins to lower cholesterol. · Blood pressure medicines to lower blood pressure. Manage other health problems  You can help lower your chance of having another stroke by managing certain other health problems. Problems that increase your risk of having another stroke include:  · High blood pressure. · High cholesterol. · Atrial fibrillation. · Diabetes. If you have any of these health problems, you can manage them with healthy lifestyle changes along with medicine. Adopt a healthy lifestyle  · Do not smoke or allow others to smoke around you. If you need help quitting, talk to your doctor about stop-smoking programs and medicines. These can increase your chances of quitting for good. Smoking makes a stroke more likely. · Limit alcohol to 2 drinks a day for men and 1 drink a day for women. · Lose weight if you need to. Controlling your weight will help you keep your heart and body healthy. · Be active. Ask your doctor what type and level of activity is safe for you. · Eat heart-healthy foods, like fruits, vegetables, and high-fiber foods. It's also important to:  · Get a flu shot every year. · Ask for help if you think you are depressed.   Do stroke rehab  Taking part in a stroke rehabilitation (rehab) program will help you to regain skills you lost or make the most of your abilities after a stroke. It also helps you take steps to prevent another stroke. Your rehab team will give you education and support to help you build new, healthy habits. You'll learn how to manage any other health problems that you might have. Denys Castanon also learn how to exercise safely, eat a healthy diet, and quit smoking if you smoke. Denys Castanon work with your team to decide what lifestyle choices are best for you. If your doctor hasn't already suggested it, ask him or her if stroke rehab is right for you. Know stroke symptoms  Make sure you know the symptoms of stroke. FAST is a simple way to remember. Recognizing these symptoms helps you know when to call for medical help. FAST stands for:  · Face drooping. · Arm weakness. · Speech difficulty. · Time to call 911. Follow-up care is a key part of your treatment and safety. Be sure to make and go to all appointments, and call your doctor if you are having problems. It's also a good idea to know your test results and keep a list of the medicines you take. Where can you learn more? Go to http://staci-zeinab.info/. Enter D975 in the search box to learn more about \"Learning About How to Prevent Another Stroke. \"  Current as of: March 20, 2017  Content Version: 11.4  © 4582-5788 Healthwise[employer]. Care instructions adapted under license by [employer] (which disclaims liability or warranty for this information). If you have questions about a medical condition or this instruction, always ask your healthcare professional. [place] [address] any warranty or liability for your use of this information. Learning About Antiplatelet Medicines After a Stroke  Introduction    If you have had a stroke, you may have concerns about having another one. You want to do all you can do to avoid this.  If your stroke was caused by a blood clot, one of the best things you can do is to take antiplatelet medicines. They can help prevent another stroke. In most cases, you don't take them if you had a stroke caused by a leak in an artery. These medicines are often called blood thinners. But they don't thin your blood. They work to keep platelets from sticking together and forming blood clots. (A platelet is a type of blood cell.) Blood clots can cause a stroke if they block a blood vessel in the brain. So by preventing blood clots, you are helping to prevent a stroke. Examples  · Aspirin (Rajeev, Bufferin, Ecotrin)  · Aspirin with dipyridamole (Aggrenox)  · Clopidogrel (Plavix)  Possible side effects  These medicines make your blood take longer than normal to clot. This can cause bleeding, and you may bruise easily. In rare cases, they can cause you to bleed inside your body without an injury. If you have an injury, you might have bleeding that is hard to control. These medicines may have other side effects. Depending on which one you take, you may:  · Have diarrhea. · Feel sick to your stomach. · Have a headache. · Have some mild belly pain. You may have other side effects or reactions not listed here. Check the information that comes with your medicine. What to know about taking this medicine  · Be sure you get instructions about how to take your medicine safely. Blood thinners can cause serious bleeding problems. · Be safe with medicines. Take your medicines exactly as prescribed. Call your doctor if you think you are having a problem with your medicine. · Check with your doctor or pharmacist before you use any other medicines, including over-the-counter medicines. Make sure your doctor knows all of the medicines, vitamins, herbal products, and supplements you take. Taking some medicines together can cause problems. Where can you learn more? Go to http://staci-zeinab.info/.   Enter M485 in the search box to learn more about \"Learning About Antiplatelet Medicines After a Stroke. \"  Current as of: September 21, 2016  Content Version: 11.4  © 9837-1854 [employer]. Care instructions adapted under license by [employer] (which disclaims liability or warranty for this information). If you have questions about a medical condition or this instruction, always ask your healthcare professional. [address] any warranty or liability for your use of this information. Learning About Coronary Artery Disease (CAD)  What is coronary artery disease? Coronary artery disease (CAD) occurs when plaque builds up in the arteries that bring oxygen-rich blood to your heart. Plaque is a fatty substance made of cholesterol, calcium, and other substances in the blood. This process is called hardening of the arteries, or atherosclerosis. What happens when you have coronary artery disease? · Plaque may narrow the coronary arteries. Narrowed arteries cause poor blood flow. This can lead to angina symptoms such as chest pain or discomfort. If blood flow is completely blocked, you could have a heart attack. · You can slow CAD and reduce the risk of future problems by making changes in your lifestyle. These include quitting smoking and eating heart-healthy foods. · Treatments for CAD, along with changes in your lifestyle, can help you live a longer and healthier life. How can you prevent coronary artery disease? · Do not smoke. It may be the best thing you can do to prevent heart disease. If you need help quitting, talk to your doctor about stop-smoking programs and medicines. These can increase your chances of quitting for good. · Be active. Get at least 30 minutes of exercise on most days of the week. Walking is a good choice. You also may want to do other activities, such as running, swimming, cycling, or playing tennis or team sports. · Eat heart-healthy foods.  Eat more fruits and vegetables and less foods that contain saturated and trans fats. Limit alcohol, sodium, and sweets. · Stay at a healthy weight. Lose weight if you need to. · Manage other health problems such as diabetes, high blood pressure, and high cholesterol. · Manage stress. Stress can hurt your heart. To keep stress low, talk about your problems and feelings. Don't keep your feelings hidden. · If you have talked about it with your doctor, take a low-dose aspirin every day. Aspirin can help certain people lower their risk of a heart attack or stroke. But taking aspirin isn't right for everyone, because it can cause serious bleeding. Do not start taking daily aspirin unless your doctor knows about it. How is coronary artery disease treated? · Your doctor will suggest that you make lifestyle changes. For example, your doctor may ask you to eat healthy foods, quit smoking, lose extra weight, and be more active. · You will have to take medicines. · Your doctor may suggest a procedure to open narrowed or blocked arteries. This is called angioplasty. Or your doctor may suggest using healthy blood vessels to create detours around narrowed or blocked arteries. This is called bypass surgery. Follow-up care is a key part of your treatment and safety. Be sure to make and go to all appointments, and call your doctor if you are having problems. It's also a good idea to know your test results and keep a list of the medicines you take. Where can you learn more? Go to http://staci-zeinab.info/. Enter [phone] in the search box to learn more about \"Learning About Coronary Artery Disease (CAD). \"  Current as of: September 21, 2016  Content Version: 11.4  © 1230-2803 [employer]. Care instructions adapted under license by [employer] (which disclaims liability or warranty for this information).  If you have questions about a medical condition or this instruction, always ask your healthcare professional. Drew Brody disclaims any warranty or liability for your use of this information. I have reviewed discharge instructions with the patient. The patient verbalized understanding.     Patient armband removed and shredded

## 2017-12-19 NOTE — PROGRESS NOTES
Nutrition initial assessment/Plan of care      RECOMMENDATIONS:   1. Consistent CHO  2. Monitor weight, labs and PO intake  3. RD to follow     GOALS:   1. PO intake meets >75% of protein/calorie needs by 12/26  2. Weight Maintenance/gradual loss (1-2 lb/week) by 12/26    ASSESSMENT:   Wt status classified as obese per BMI of 33.3. Variable PO intake. Labs noted. Pt w/ hypokalemia and hypercholesterolemia; A1C 7.0. Nutrition recommendations listed. RD to follow. Nutrition Diagnoses:   Obesity related to excessive energy intake as evidenced by a BMI 33.3 and 165% IBW. Nutrition Risk:  [] High  [] Moderate [x]  Low    SUBJECTIVE/OBJECTIVE:    Pt admitted for CVA. PMHx of DM, HTN, CAD and hypercholesterolemia. Pt has received prior DM education. Pt reports no food allergies, no problems chewing or swallowing, or wt fluctuation.  lb for past 7 years. Stated appetite is variable and consumes 2 meals/day, but depends on mood. Pt does not currently follow a therapeutic diet at home. Stated, \"l know I should be following a DM diet, but I don't. \" Discussed nutrition recommendations s/p stroke and DM, provided handouts and answered questions. Aware she needs to make lifestyle changes. Information Obtained from:    [x] Chart Review   [x] Patient   [] Family/Caregiver   [] Nurse/Physician   [] Interdisciplinary Meeting/Rounds      Diet: Consistent CHO  Medications: [x] Reviewed    Allergies: [x] Reviewed   Encounter Diagnoses     ICD-10-CM ICD-9-CM   1.  [facility] I63.9 434.91     Past Medical History:   Diagnosis Date    Anxiety     CAD (coronary artery disease)     S/P Coronary stents ( LAD and Lcx)    Depression     Diabetes ([facility]     Hepatitis C     Hypercholesterolemia     Hypertension       Labs:    Lab Results   Component Value Date/Time    Sodium 139 12/18/2017 11:55 PM    Potassium 3.1 12/18/2017 11:55 PM    Chloride 101 12/18/2017 11:55 PM    CO2 28 12/18/2017 11:55 PM    Anion gap 10 12/18/2017 11:55 PM    Glucose 103 12/18/2017 11:55 PM    BUN 19 12/18/2017 11:55 PM    Creatinine 0.79 12/18/2017 11:55 PM    Calcium 8.6 12/18/2017 11:55 PM    Magnesium 1.9 12/18/2017 11:55 PM    Phosphorus 3.6 12/18/2017 11:55 PM    Albumin 2.7 12/18/2017 11:55 PM     Anthropometrics: BMI (calculated): 33.3  Last 3 Recorded Weights in this Encounter    12/18/17 1154 12/19/17 0004   Weight: 82.6 kg (182 lb) 82.6 kg (182 lb)      Ht Readings from Last 1 Encounters:   12/19/17 5' 2\" (1.575 m)     No data found. IBW: 110 lb %IBW: 165% UBW: 182 lb %UBW: 100%   [] Weight Loss [] Weight Gain [x] Weight Stable    Estimated Nutrition Needs: [x] MSJ  [] Other:  Calories: 3343-6787 kcal Based on:   [x] Actual BW    Protein:   65-85 g Based on:   [x] Actual BW    Fluid:       6382-8846 ml Based on:   [x] Actual BW      [x] No Cultural, Christian or ethnic dietary need identified.     [] Cultural, Christian and ethnic food preferences identified and addressed     Wt Status:  [] Normal (18.6 - 24.9) [] Underweight (<18.5) [] Overweight (25 - 29.9) [x] Mild Obesity (30 - 34.9)  [] Moderate Obesity (35 - 39.9) [] Morbid Obesity (40+)       Nutrition Problems Identified:   [] Suboptimal PO intake   [] Food Allergies  [] Difficulty chewing/swallowing/poor dentition  [] Constipation/Diarrhea   [] Nausea/Vomiting   [x] None  [] Other:     Plan:   [x] Therapeutic Diet  []  Obtained/adjusted food preferences/tolerances and/or snacks options   []  Supplements added   [] Occupational therapy following for feeding techniques  []  HS snack added   []  Modify diet texture   []  Modify diet for food allergies   [x]  Educate patient   []  Assist with menu selection   [x]  Monitor PO intake on meal rounds   [x]  Continue inpatient monitoring and intervention   []  Participated in discharge planning/Interdisciplinary rounds/Team meetings   []  Other:     Education Needs:   [] Not appropriate for teaching at this time due to:   [x] Identified and addressed    Nutrition Monitoring and Evaluation:  [x] Continue ongoing monitoring and intervention  [] Other    [place]

## 2017-12-19 NOTE — PROGRESS NOTES
Patient received in bed awake. Patient alert and oriented X4, denies pain and discomfort. Patient resting quietly. Frequent use items within reach. Bed locked in low position. Call bell within reach and patient verbalized understanding of use for assistance and needs. Dual bedside NIH conducted. Stroke Binder at bedside.

## 2017-12-19 NOTE — PROGRESS NOTES
Problem: Falls - Risk of  Goal: *Absence of Falls  Document Manda Fall Risk and appropriate interventions in the flowsheet.    Outcome: Progressing Towards Goal  Fall Risk Interventions:            Medication Interventions: Evaluate medications/consider consulting pharmacy

## 2017-12-20 LAB
CRP SERPL HS-MCNC: 1.81 MG/L (ref 0–3)
HCYS SERPL-SCNC: 14.8 UMOL/L (ref 0–15)

## 2017-12-21 ENCOUNTER — HOSPITAL ENCOUNTER (EMERGENCY)
Age: 57
Discharge: HOME | End: 2017-12-21
Attending: EMERGENCY MEDICINE | Admitting: EMERGENCY MEDICINE
Payer: OTHER GOVERNMENT

## 2017-12-21 ENCOUNTER — APPOINTMENT (OUTPATIENT)
Dept: CT IMAGING | Age: 57
End: 2017-12-21
Attending: NURSE PRACTITIONER
Payer: OTHER GOVERNMENT

## 2017-12-21 VITALS
RESPIRATION RATE: 17 BRPM | BODY MASS INDEX: 29.89 KG/M2 | DIASTOLIC BLOOD PRESSURE: 88 MMHG | TEMPERATURE: 98 F | SYSTOLIC BLOOD PRESSURE: 182 MMHG | HEART RATE: 77 BPM | HEIGHT: 66 IN | OXYGEN SATURATION: 97 % | WEIGHT: 186 LBS

## 2017-12-21 DIAGNOSIS — R20.2 NUMBNESS AND TINGLING IN BOTH HANDS: Primary | ICD-10-CM

## 2017-12-21 DIAGNOSIS — R03.0 ELEVATED BLOOD PRESSURE READING: ICD-10-CM

## 2017-12-21 DIAGNOSIS — R20.0 NUMBNESS AND TINGLING IN BOTH HANDS: Primary | ICD-10-CM

## 2017-12-21 LAB
ANION GAP SERPL CALC-SCNC: 8 MMOL/L (ref 3–18)
ATRIAL RATE: 79 BPM
BASOPHILS # BLD: 0 K/UL (ref 0–0.06)
BASOPHILS NFR BLD: 0 % (ref 0–2)
BUN SERPL-MCNC: 25 MG/DL (ref 7–18)
BUN/CREAT SERPL: 28 (ref 12–20)
CALCIUM SERPL-MCNC: 8.3 MG/DL (ref 8.5–10.1)
CALCULATED P AXIS, ECG09: 36 DEGREES
CALCULATED R AXIS, ECG10: -30 DEGREES
CALCULATED T AXIS, ECG11: 131 DEGREES
CHLORIDE SERPL-SCNC: 104 MMOL/L (ref 100–108)
CK MB CFR SERPL CALC: NORMAL % (ref 0–4)
CK MB SERPL-MCNC: <1 NG/ML (ref 5–25)
CK SERPL-CCNC: 76 U/L (ref 26–192)
CO2 SERPL-SCNC: 29 MMOL/L (ref 21–32)
CREAT SERPL-MCNC: 0.89 MG/DL (ref 0.6–1.3)
DIAGNOSIS, 93000: NORMAL
DIFFERENTIAL METHOD BLD: ABNORMAL
EOSINOPHIL # BLD: 0 K/UL (ref 0–0.4)
EOSINOPHIL NFR BLD: 0 % (ref 0–5)
ERYTHROCYTE [DISTWIDTH] IN BLOOD BY AUTOMATED COUNT: 11.4 % (ref 11.6–14.5)
GLUCOSE SERPL-MCNC: 103 MG/DL (ref 74–99)
HCT VFR BLD AUTO: 35.5 % (ref 35–45)
HGB BLD-MCNC: 12.2 G/DL (ref 12–16)
INR PPP: 0.9 (ref 0.8–1.2)
LYMPHOCYTES # BLD: 1.4 K/UL (ref 0.9–3.6)
LYMPHOCYTES NFR BLD: 19 % (ref 21–52)
MCH RBC QN AUTO: 31 PG (ref 24–34)
MCHC RBC AUTO-ENTMCNC: 34.4 G/DL (ref 31–37)
MCV RBC AUTO: 90.1 FL (ref 74–97)
MONOCYTES # BLD: 0.8 K/UL (ref 0.05–1.2)
MONOCYTES NFR BLD: 10 % (ref 3–10)
NEUTS SEG # BLD: 5.5 K/UL (ref 1.8–8)
NEUTS SEG NFR BLD: 71 % (ref 40–73)
P-R INTERVAL, ECG05: 142 MS
PLATELET # BLD AUTO: 199 K/UL (ref 135–420)
PMV BLD AUTO: 10.1 FL (ref 9.2–11.8)
POTASSIUM SERPL-SCNC: 3.6 MMOL/L (ref 3.5–5.5)
PROTHROMBIN TIME: 12 SEC (ref 11.5–15.2)
Q-T INTERVAL, ECG07: 420 MS
QRS DURATION, ECG06: 102 MS
QTC CALCULATION (BEZET), ECG08: 481 MS
RBC # BLD AUTO: 3.94 M/UL (ref 4.2–5.3)
SODIUM SERPL-SCNC: 141 MMOL/L (ref 136–145)
TROPONIN I SERPL-MCNC: <0.02 NG/ML (ref 0–0.04)
VENTRICULAR RATE, ECG03: 79 BPM
WBC # BLD AUTO: 7.7 K/UL (ref 4.6–13.2)

## 2017-12-21 PROCEDURE — 85025 COMPLETE CBC W/AUTO DIFF WBC: CPT | Performed by: NURSE PRACTITIONER

## 2017-12-21 PROCEDURE — 93005 ELECTROCARDIOGRAM TRACING: CPT

## 2017-12-21 PROCEDURE — 80048 BASIC METABOLIC PNL TOTAL CA: CPT | Performed by: NURSE PRACTITIONER

## 2017-12-21 PROCEDURE — 85610 PROTHROMBIN TIME: CPT | Performed by: NURSE PRACTITIONER

## 2017-12-21 PROCEDURE — 70450 CT HEAD/BRAIN W/O DYE: CPT

## 2017-12-21 PROCEDURE — 82550 ASSAY OF CK (CPK): CPT | Performed by: NURSE PRACTITIONER

## 2017-12-21 PROCEDURE — 99285 EMERGENCY DEPT VISIT HI MDM: CPT

## 2017-12-21 PROCEDURE — 74011250637 HC RX REV CODE- 250/637: Performed by: NURSE PRACTITIONER

## 2017-12-21 RX ORDER — HYDRALAZINE HYDROCHLORIDE 25 MG/1
25 TABLET, FILM COATED ORAL
Status: COMPLETED | OUTPATIENT
Start: 2017-12-21 | End: 2017-12-21

## 2017-12-21 RX ADMIN — HYDRALAZINE HYDROCHLORIDE 25 MG: 25 TABLET, FILM COATED ORAL at 09:13

## 2017-12-21 NOTE — ED TRIAGE NOTES
Patient was seen on Monday and discharged Tuesday. Patient was seen for similar complaint. Patient states she has bilateral hand numbness and heaviness around the face.

## 2017-12-21 NOTE — DISCHARGE INSTRUCTIONS
Elevated Blood Pressure: Care Instructions  Your Care Instructions    Blood pressure is a measure of how hard the blood pushes against the walls of your arteries. It's normal for blood pressure to go up and down throughout the day. But if it stays up over time, you have high blood pressure. Two numbers tell you your blood pressure. The first number is the systolic pressure. It shows how hard the blood pushes when your heart is pumping. The second number is the diastolic pressure. It shows how hard the blood pushes between heartbeats, when your heart is relaxed and filling with blood. An ideal blood pressure in adults is less than 120/80 (say \"120 over 80\"). High blood pressure is 140/90 or higher. You have high blood pressure if your top number is 140 or higher or your bottom number is 90 or higher, or both. The main test for high blood pressure is simple, fast, and painless. To diagnose high blood pressure, your doctor will test your blood pressure at different times. After testing your blood pressure, your doctor may ask you to test it again when you are home. If you are diagnosed with high blood pressure, you can work with your doctor to make a long-term plan to manage it. Follow-up care is a key part of your treatment and safety. Be sure to make and go to all appointments, and call your doctor if you are having problems. It's also a good idea to know your test results and keep a list of the medicines you take. How can you care for yourself at home? · Do not smoke. Smoking increases your risk for heart attack and stroke. If you need help quitting, talk to your doctor about stop-smoking programs and medicines. These can increase your chances of quitting for good. · Stay at a healthy weight. · Try to limit how much sodium you eat to less than 2,300 milligrams (mg) a day. Your doctor may ask you to try to eat less than 1,500 mg a day. · Be physically active.  Get at least 30 minutes of exercise on most days of the week. Walking is a good choice. You also may want to do other activities, such as running, swimming, cycling, or playing tennis or team sports. · Avoid or limit alcohol. Talk to your doctor about whether you can drink any alcohol. · Eat plenty of fruits, vegetables, and low-fat dairy products. Eat less saturated and total fats. · Learn how to check your blood pressure at home. When should you call for help? Call your doctor now or seek immediate medical care if:  ? · Your blood pressure is much higher than normal (such as 180/110 or higher). ? · You think high blood pressure is causing symptoms such as:  ¨ Severe headache. ¨ Blurry vision. ? Watch closely for changes in your health, and be sure to contact your doctor if:  ? · You do not get better as expected. Where can you learn more? Go to http://staciJoslin Diabetes Centerzeinab.info/. Enter J354 in the search box to learn more about \"Elevated Blood Pressure: Care Instructions. \"  Current as of: September 21, 2016  Content Version: 11.4  © 6406-9087 [employer]. Care instructions adapted under license by [employer] (which disclaims liability or warranty for this information). If you have questions about a medical condition or this instruction, always ask your healthcare professional. [address] any warranty or liability for your use of this information. Numbness and Tingling: Care Instructions  Your Care Instructions    Many things can cause numbness or tingling. Swelling may put pressure on a nerve. This could cause you to lose feeling or have a pins-and-needles sensation on part of your body. Nerves may be damaged from trauma, toxins, or diseases, such as diabetes or multiple sclerosis (MS). Sometimes, though, the cause is not clear.   If there is no clear reason for your symptoms, and you are not having any other symptoms, your doctor may suggest watching and waiting for a while to see if the numbness or tingling goes away on its own. Your doctor may want you to have blood or nerve tests to find the cause of your symptoms. Follow-up care is a key part of your treatment and safety. Be sure to make and go to all appointments, and call your doctor if you are having problems. It's also a good idea to know your test results and keep a list of the medicines you take. How can you care for yourself at home? · If your doctor prescribes medicine, take it exactly as directed. Call your doctor if you think you are having a problem with your medicine. · If you have any swelling, put ice or a cold pack on the area for 10 to 20 minutes at a time. Put a thin cloth between the ice and your skin. When should you call for help? Call 911 anytime you think you may need emergency care. For example, call if:  ? · You have weakness, numbness, or tingling in both legs. ? · You lose bowel or bladder control. ? · You have symptoms of a stroke. These may include:  ¨ Sudden numbness, tingling, weakness, or loss of movement in your face, arm, or leg, especially on only one side of your body. ¨ Sudden vision changes. ¨ Sudden trouble speaking. ¨ Sudden confusion or trouble understanding simple statements. ¨ Sudden problems with walking or balance. ¨ A sudden, severe headache that is different from past headaches. ? Watch closely for changes in your health, and be sure to contact your doctor if you have any problems, or if:  ? · You do not get better as expected. Where can you learn more? Go to http://staci-zeinab.info/. Enter N536 in the search box to learn more about \"Numbness and Tingling: Care Instructions. \"  Current as of: October 14, 2016  Content Version: 11.4  © 5458-0033 [employer]. Care instructions adapted under license by [employer] (which disclaims liability or warranty for this information).  If you have questions about a medical condition or this instruction, always ask your healthcare professional. Health  T[employer] Activation    Thank you for requesting access to [address] Please follow the instructions below to securely access and download your online medical record. [employer]oCoo allows you to send messages to your doctor, view your test results, renew your prescriptions, schedule appointments, and more. How Do I Sign Up? 1. In your internet browser, go to [email or web address]  2. Click on the First Time User? Click Here link in the Sign In box. You will be redirect to the New Member Sign Up page. 3. Enter your CooCoo Access Code exactly as it appears below. You will not need to use this code after youve completed the sign-up process. If you do not sign up before the expiration date, you must request a new code. CooCoo Access Code: Activation code not generated  Current CooCoo Status: Active (This is the date your CooCoo access code will )    4. Enter the last four digits of your Social Security Number (xxxx) and Date of Birth (mm/dd/yyyy) as indicated and click Submit. You will be taken to the next sign-up page. 5. Create a CooCoo ID. This will be your CooCoo login ID and cannot be changed, so think of one that is secure and easy to remember. 6. Create a CooCoo password. You can change your password at any time. 7. Enter your Password Reset Question and Answer. This can be used at a later time if you forget your password. 8. Enter your e-mail address. You will receive e-mail notification when new information is available in [address] 9. Click Sign Up. You can now view and download portions of your medical record. 10. Click the Download Summary menu link to download a portable copy of your medical information. Additional Information    If you have questions, please visit the Frequently Asked Questions section of the [employer]oCoo website at [email or web address] Remember, CooCoo is NOT to be used for urgent needs. For medical emergencies, dial 911. [employer] disclaims any warranty or liability for your use of this information. Be sure to follow up with Dr Robert Leggett for further evaluation and treatment. Take you blood pressure medication as prescribed.

## 2017-12-21 NOTE — ED PROVIDER NOTES
HPI Comments: Jimena Ring is a 62year old female who presents to the ED with a c/o numbness in the bilateral hands and throbbing in her head. States that neither the hand or head symptoms are painful, but these are the same symptoms she had when she was \"diagnosed with a TIA on Dec 18, 2017. \"    Pt states she was here on 12-18-17 and was admitted. She was discharged the following day on 12-19-17. The symptoms awoke her out of sleep this morning. She was uncomfortable and called [facility] to transfer her to the ED here for evaluation. Patient is a 62 y.o. female presenting with hand problem. The history is provided by the patient. History limited by: no communication barrier. Hand Problem    This is a new problem. The current episode started 1 to 2 hours ago. The problem occurs constantly. The problem has not changed since onset. Pain location: Bialteral hands. Quality: Numbness. Associated symptoms include numbness (Intermittent - Bilateral hands). She has tried nothing for the symptoms. There has been no history of extremity trauma. Past Medical History:   Diagnosis Date    Anxiety     CAD (coronary artery disease)     S/P Coronary stents ( LAD and Lcx)    Depression     Diabetes ([facility] [address] [address]     Hepatitis C     Hypercholesterolemia     Hypertension        Past Surgical History:   Procedure Laterality Date    HX BREAST BIOPSY      1971 2010 left breast lump removed excisional per patient     [address]    pre-eclampsia    HX CHOLECYSTECTOMY  2004    HX CORONARY STENT PLACEMENT  Nov 2013    4 stents    HX HEENT  2009    thyroidectomy due to nodules.      HX HYSTERECTOMY  2005    heavy periods    HX THYROIDECTOMY           Family History:   Problem Relation Age of Onset    Diabetes Mother     Hypertension Mother     Cancer Mother     Heart Disease Mother     Heart Attack Mother     Diabetes Father     Hypertension Father     Cancer Father        Social History Social History    Marital status:      Spouse name: N/A    Number of children: N/A    Years of education: N/A     Occupational History    Not on file. Social History Main Topics    Smoking status: Never Smoker    Smokeless tobacco: Never Used    Alcohol use No    Drug use: No    Sexual activity: Not Currently     Other Topics Concern    Not on file     Social History Narrative         ALLERGIES: Contrast agent [iodine]    Review of Systems   Constitutional: Negative. HENT: Negative. Eyes: Negative. Respiratory: Negative. Cardiovascular: Negative. Gastrointestinal: Negative. Endocrine: Negative. Genitourinary: Negative. Musculoskeletal: Negative. Skin: Negative. Allergic/Immunologic: Negative. Neurological: Positive for numbness (Intermittent - Bilateral hands). Hematological: Negative. Psychiatric/Behavioral: Negative. Vitals:    12/21/17 0645   BP: 167/82   Pulse: 80   Temp: 98 °F (36.7 °C)   Weight: 84.4 kg (186 lb)   Height: 5' 6\" (1.676 m)            Physical Exam   Constitutional: She is oriented to person, place, and time. She appears well-developed and well-nourished. No distress. HENT:   Head: Normocephalic and atraumatic. Eyes: EOM are normal. Pupils are equal, round, and reactive to light. Neck: Normal range of motion. Neck supple. Cardiovascular: Normal rate, regular rhythm, normal heart sounds and intact distal pulses. Pulmonary/Chest: Effort normal and breath sounds normal. No respiratory distress. She has no wheezes. She has no rales. Abdominal: Soft. Bowel sounds are normal. There is no tenderness. There is no rebound and no guarding. Genitourinary:   Genitourinary Comments: NE   Musculoskeletal: Normal range of motion. Neurological: She is alert and oriented to person, place, and time. No cranial nerve deficit. She exhibits normal muscle tone.  Coordination normal.   There is no facial drooping, no slurring of speech. Pt has bilaterally appropriate upper and lower extremity strength and movement. There are no focal deficits. Pt retains sensation in all fingers of bilateral hands. Nlo neurological abnormality noted. Skin: Skin is warm and dry. Psychiatric: She has a normal mood and affect. Her behavior is normal.   Nursing note and vitals reviewed. MDM  Number of Diagnoses or Management Options  Elevated blood pressure reading:   Numbness and tingling in both hands:   Diagnosis management comments: CONSULT:  Discussed the Pt with Dr Radames Mcnulty. He found no neurological impairment. Recommended follow up with PCP for Pt to address blood pressure issues. Jairo Salas NP  9:24 AM    PROGRESS NOTE:   EKG REVIEWED. tHERE ARE NO ACUTE FINDINGS. Jairo Salas NP  9:26 AM           Amount and/or Complexity of Data Reviewed  Clinical lab tests: ordered and reviewed  Tests in the radiology section of CPT®: ordered and reviewed  Discuss the patient with other providers: yes (Dr Radames Mcnulty  )    Risk of Complications, Morbidity, and/or Mortality  Presenting problems: low  Diagnostic procedures: low  Management options: low    Patient Progress  Patient progress: stable    ED Course       Procedures    Diagnosis:   1. Numbness and tingling in both hands    2. Elevated blood pressure reading          Disposition:   Discharged to Home. Follow-up Information     Follow up With Details Comments [address] [facility] [address], [place] Call today to arrange follow up. [facility]  [address] [place] [address]  [phone]            Patient's Medications   Start Taking    No medications on file   Continue Taking    ACETAMINOPHEN (TYLENOL EXTRA STRENGTH) 500 MG TABLET    Take 2 Tabs by mouth every six (6) hours as needed for Pain. AMLODIPINE (NORVASC) 10 MG TABLET    Take 1 Tab by mouth daily. ASPIRIN (ASPIRIN) 325 MG TABLET    Take 1 Tab by mouth daily.     ATORVASTATIN (LIPITOR) 80 MG TABLET    TAKE 1 TABLET BY MOUTH DAILY    BUPROPION (WELLBUTRIN) 100 MG TABLET    Take 1 Tab by mouth daily. CITALOPRAM (CELEXA) 40 MG TABLET    Take 1 Tab by mouth daily. GABAPENTIN (NEURONTIN) 300 MG CAPSULE    Take 1 Cap by mouth three (3) times daily. GLUCOSE BLOOD VI TEST STRIPS (BLOOD GLUCOSE TEST) STRIP    Use one strip for each glucose check. Check glucose 2 times per day. HYDRALAZINE (APRESOLINE) 50 MG TABLET    Take 0.5 Tabs by mouth four (4) times daily. HYDROCHLOROTHIAZIDE (HYDRODIURIL) 25 MG TABLET    Take 1 Tab by mouth daily. HYDROMORPHONE (DILAUDID) 2 MG TABLET    Take 1 Tab by mouth every four (4) hours as needed for Pain. Max Daily Amount: 12 mg. INSULIN ASPART (NOVOLOG) 100 UNIT/ML INPN    Take 8 units with each meal.    INSULIN DEGLUDEC (TRESIBA FLEXTOUCH U-100) 100 UNIT/ML (3 ML) INPN    35 Units by SubCUTAneous route daily. INSULIN NEEDLES, DISPOSABLE, (DORA PEN NEEDLE) 32 GAUGE X 5/32\" NDLE    Use one needle to give insulin 4 times a day. LEVOTHYROXINE (SYNTHROID) 125 MCG TABLET    Take 1 Tab by mouth Daily (before breakfast). LOSARTAN (COZAAR) 100 MG TABLET    Take 1 Tab by mouth daily. METFORMIN (GLUCOPHAGE) 850 MG TABLET    Take 1 Tab by mouth two (2) times daily (with meals). METOPROLOL SUCCINATE (TOPROL-XL) 200 MG XL TABLET    Take 1 Tab by mouth daily. NITROFURANTOIN, MACROCRYSTAL-MONOHYDRATE, (MACROBID) 100 MG CAPSULE    Take 1 Cap by mouth two (2) times a day. ONDANSETRON (ZOFRAN ODT) 4 MG DISINTEGRATING TABLET    Take 1 Tab by mouth every eight (8) hours as needed for Nausea. ONDANSETRON HCL (ZOFRAN, AS HYDROCHLORIDE,) 4 MG TABLET    Take 2 Tabs by mouth every eight (8) hours as needed for Nausea. POLYETHYLENE GLYCOL (MIRALAX) 17 GRAM PACKET    Take 1 Packet by mouth two (2) times a day. PRASUGREL (EFFIENT) 10 MG TABLET    Take 1 Tab by mouth daily. Indications: Failed plavix. Recurrent CA.  No P2Y12 response    PROMETHAZINE (PHENERGAN) 25 MG TABLET    Take 1 Tab by mouth every six (6) hours as needed. RABEPRAZOLE (ACIPHEX) 20 MG TABLET    TAKE 1 TABLET BY MOUTH ONCE DAILY    SUCRALFATE (CARAFATE) 100 MG/ML SUSPENSION    Take 5 mL by mouth four (4) times daily. VALACYCLOVIR (VALTREX) 1 GRAM TABLET    Take 1 Tab by mouth three (3) times daily.    These Medications have changed    No medications on file   Stop Taking    No medications on file

## 2017-12-26 PROBLEM — F33.9 RECURRENT DEPRESSION (HCC): Status: ACTIVE | Noted: 2017-12-26

## 2017-12-28 ENCOUNTER — HOME CARE VISIT (OUTPATIENT)
Dept: SCHEDULING | Facility: HOME HEALTH | Age: 57
End: 2017-12-28

## 2017-12-28 ENCOUNTER — OFFICE VISIT (OUTPATIENT)
Dept: FAMILY MEDICINE CLINIC | Age: 57
End: 2017-12-28

## 2017-12-28 VITALS
OXYGEN SATURATION: 98 % | RESPIRATION RATE: 16 BRPM | WEIGHT: 179 LBS | HEIGHT: 66 IN | DIASTOLIC BLOOD PRESSURE: 90 MMHG | TEMPERATURE: 98 F | BODY MASS INDEX: 28.77 KG/M2 | SYSTOLIC BLOOD PRESSURE: 167 MMHG | HEART RATE: 78 BPM

## 2017-12-28 DIAGNOSIS — I25.118 CORONARY ARTERY DISEASE OF NATIVE ARTERY OF NATIVE HEART WITH STABLE ANGINA PECTORIS (HCC): ICD-10-CM

## 2017-12-28 DIAGNOSIS — I63.89 CEREBROVASCULAR ACCIDENT (CVA) DUE TO OTHER MECHANISM (HCC): Primary | ICD-10-CM

## 2017-12-28 DIAGNOSIS — Z09 HOSPITAL DISCHARGE FOLLOW-UP: ICD-10-CM

## 2017-12-28 PROCEDURE — G0299 HHS/HOSPICE OF RN EA 15 MIN: HCPCS

## 2017-12-28 NOTE — PATIENT INSTRUCTIONS
Learning About Coronary Artery Disease (CAD)  What is coronary artery disease? Coronary artery disease (CAD) occurs when plaque builds up in the arteries that bring oxygen-rich blood to your heart. Plaque is a fatty substance made of cholesterol, calcium, and other substances in the blood. This process is called hardening of the arteries, or atherosclerosis. What happens when you have coronary artery disease? · Plaque may narrow the coronary arteries. Narrowed arteries cause poor blood flow. This can lead to angina symptoms such as chest pain or discomfort. If blood flow is completely blocked, you could have a heart attack. · You can slow CAD and reduce the risk of future problems by making changes in your lifestyle. These include quitting smoking and eating heart-healthy foods. · Treatments for CAD, along with changes in your lifestyle, can help you live a longer and healthier life. How can you prevent coronary artery disease? · Do not smoke. It may be the best thing you can do to prevent heart disease. If you need help quitting, talk to your doctor about stop-smoking programs and medicines. These can increase your chances of quitting for good. · Be active. Get at least 30 minutes of exercise on most days of the week. Walking is a good choice. You also may want to do other activities, such as running, swimming, cycling, or playing tennis or team sports. · Eat heart-healthy foods. Eat more fruits and vegetables and less foods that contain saturated and trans fats. Limit alcohol, sodium, and sweets. · Stay at a healthy weight. Lose weight if you need to. · Manage other health problems such as diabetes, high blood pressure, and high cholesterol. · Manage stress. Stress can hurt your heart. To keep stress low, talk about your problems and feelings. Don't keep your feelings hidden. · If you have talked about it with your doctor, take a low-dose aspirin every day.  Aspirin can help certain people lower their risk of a heart attack or stroke. But taking aspirin isn't right for everyone, because it can cause serious bleeding. Do not start taking daily aspirin unless your doctor knows about it. How is coronary artery disease treated? · Your doctor will suggest that you make lifestyle changes. For example, your doctor may ask you to eat healthy foods, quit smoking, lose extra weight, and be more active. · You will have to take medicines. · Your doctor may suggest a procedure to open narrowed or blocked arteries. This is called angioplasty. Or your doctor may suggest using healthy blood vessels to create detours around narrowed or blocked arteries. This is called bypass surgery. Follow-up care is a key part of your treatment and safety. Be sure to make and go to all appointments, and call your doctor if you are having problems. It's also a good idea to know your test results and keep a list of the medicines you take. Where can you learn more? Go to http://staci-zeinab.info/. Enter ([phone] [phone] in the search box to learn more about \"Learning About Coronary Artery Disease (CAD). \"  Current as of: September 21, 2016  Content Version: 11.4  © 0547-3979 [employer]. Care instructions adapted under license by [employer] (which disclaims liability or warranty for this information). If you have questions about a medical condition or this instruction, always ask your healthcare professional. [place] [address] any warranty or liability for your use of this information.

## 2017-12-28 NOTE — PROGRESS NOTES
1. Have you been to the ER, urgent care clinic since your last visit? Hospitalized since your last visit? Yes, Susan, 12/21/17, stroke    2. Have you seen or consulted any other health care providers outside of the [address] [place] [address] since your last visit? Include any pap smears or colon screening.  No

## 2017-12-28 NOTE — MR AVS SNAPSHOT
Visit Information Date & Time Provider Department Dept. Phone Encounter #  
 12/28/2017 10:40 AM [address]  [id number] Follow-up Instructions Return in about 1 month (around 1/28/2018). Upcoming Health Maintenance Date Due  
 EYE EXAM RETINAL OR DILATED Q1 8/24/1970 DTaP/Tdap/Td series (1 - Tdap) 8/24/1981 FOBT Q 1 YEAR AGE 50-75 8/24/2010 FOOT EXAM Q1 10/25/2017 MICROALBUMIN Q1 1/25/2018 HEMOGLOBIN A1C Q6M 6/18/2018 LIPID PANEL Q1 12/18/2018 Allergies as of 12/28/2017  Review Complete On: 12/28/2017 By: [facility],  Severity Noted Reaction Type Reactions Contrast Agent [Iodine]  06/24/2014    Rash, Sneezing Current Immunizations  Reviewed on 12/19/2017 Name Date Influenza Vaccine 9/1/2017 Influenza Vaccine (Madin Divya Canine Kidney) PF 2/5/2015  3:56 PM  
 Influenza Vaccine (Quad) PF 11/30/2015 Pneumococcal Polysaccharide (PPSV-23) 10/25/2016 Not reviewed this visit You Were Diagnosed With   
  
 Codes Comments Hospital discharge follow-up    -  Primary ICD-10-CM: [address] [facility] ICD-9-CM: V67.59 Cerebrovascular accident (CVA) due to other mechanism [facility])     ICD-10-CM: I59.8 ICD-9-CM: 434.91 Coronary artery disease of native artery of native heart with stable angina pectoris ([facility]     ICD-10-CM: I25.118 
ICD-9-CM: 414.01, 413.9 Vitals BP Pulse Temp Resp Height(growth percentile) Weight(growth percentile) 167/90 (BP 1 Location: Right arm, BP Patient Position: Sitting) 78 98 °F (36.7 °C) (Oral) 16 5' 6\" (1.676 m) 179 lb (81.2 kg) LMP SpO2 BMI OB Status Smoking Status (Exact Date) 98% 28.89 kg/m2 Hysterectomy Never Smoker BMI and BSA Data Body Mass Index Body Surface Area  
 28.89 kg/m 2 1.94 m 2 Preferred Pharmacy Pharmacy Name Phone [address] [facility] [phone] Your Updated Medication List  
  
   
This list is accurate as of: 12/28/17 11:20 AM.  Always use your most recent med list.  
  
  
  
  
 acetaminophen 500 mg tablet Commonly known as:  Sherin Elvin Jr Seun Connie Do Take 2 Tabs by mouth every six (6) hours as needed for Pain. amLODIPine 10 mg tablet Commonly known as:  Meche Blade Take 1 Tab by mouth daily. aspirin 325 mg tablet Commonly known as:  ASPIRIN Take 1 Tab by mouth daily. atorvastatin 80 mg tablet Commonly known as:  LIPITOR  
TAKE 1 TABLET BY MOUTH DAILY  
  
 buPROPion 100 mg tablet Commonly known as:  [facility] Take 1 Tab by mouth daily. citalopram 40 mg tablet Commonly known as:  [place] Take 1 Tab by mouth daily. gabapentin 300 mg capsule Commonly known as:  NEURONTIN Take 1 Cap by mouth three (3) times daily. glucose blood VI test strips strip Commonly known as:  blood glucose test  
Use one strip for each glucose check. Check glucose 2 times per day. hydrALAZINE 50 mg tablet Commonly known as:  APRESOLINE Take 0.5 Tabs by mouth four (4) times daily. hydroCHLOROthiazide 25 mg tablet Commonly known as:  HYDRODIURIL Take 1 Tab by mouth daily. insulin aspart 100 unit/mL Inpn Commonly known as:  Mejia Harp Take 8 units with each meal.  
  
 insulin degludec 100 unit/mL (3 mL) Inpn Commonly known as:  TRESIBA FLEXTOUCH U-100  
35 Units by SubCUTAneous route daily. Insulin Needles (Disposable) 32 gauge x 5/32\" Ndle Commonly known as:  Mona Pen Needle Use one needle to give insulin 4 times a day. levothyroxine 125 mcg tablet Commonly known as:  SYNTHROID Take 1 Tab by mouth Daily (before breakfast). losartan 100 mg tablet Commonly known as:  COZAAR Take 1 Tab by mouth daily. metFORMIN 850 mg tablet Commonly known as:  GLUCOPHAGE Take 1 Tab by mouth two (2) times daily (with meals). metoprolol succinate 200 mg XL tablet Commonly known as:  TOPROL-XL Take 1 Tab by mouth daily. nitrofurantoin (macrocrystal-monohydrate) 100 mg capsule Commonly known as:  MACROBID Take 1 Cap by mouth two (2) times a day. polyethylene glycol 17 gram packet Commonly known as:  Waymond Javid Take 1 Packet by mouth two (2) times a day. prasugrel 10 mg tablet Commonly known as:  EFFIENT Take 1 Tab by mouth daily. Indications: Failed plavix. Recurrent CA. No P2Y12 response RABEprazole 20 mg tablet Commonly known as:  ACIPHEX  
TAKE 1 TABLET BY MOUTH ONCE DAILY  
  
 sucralfate 100 mg/mL suspension Commonly known as:  Jacqualyn Nestle Take 5 mL by mouth four (4) times daily. We Performed the Following REFERRAL TO CARDIOLOGY [TNG45 Custom] Follow-up Instructions Return in about 1 month (around 1/28/2018). Referral Information Referral ID Referred By Referred To  
  
 [id number] [place], [address] Mikayla Sahu MD   
   [facility] Phone: [phone] Fax: [phone] Visits Status Start Date End Date 1 New Request 12/28/17 12/28/18 If your referral has a status of pending review or denied, additional information will be sent to support the outcome of this decision. Patient Instructions Learning About Coronary Artery Disease (CAD) What is coronary artery disease? Coronary artery disease (CAD) occurs when plaque builds up in the arteries that bring oxygen-rich blood to your heart. Plaque is a fatty substance made of cholesterol, calcium, and other substances in the blood. This process is called hardening of the arteries, or atherosclerosis. What happens when you have coronary artery disease? · Plaque may narrow the coronary arteries. Narrowed arteries cause poor blood flow.  This can lead to angina symptoms such as chest pain or discomfort. If blood flow is completely blocked, you could have a heart attack. · You can slow CAD and reduce the risk of future problems by making changes in your lifestyle. These include quitting smoking and eating heart-healthy foods. · Treatments for CAD, along with changes in your lifestyle, can help you live a longer and healthier life. How can you prevent coronary artery disease? · Do not smoke. It may be the best thing you can do to prevent heart disease. If you need help quitting, talk to your doctor about stop-smoking programs and medicines. These can increase your chances of quitting for good. · Be active. Get at least 30 minutes of exercise on most days of the week. Walking is a good choice. You also may want to do other activities, such as running, swimming, cycling, or playing tennis or team sports. · Eat heart-healthy foods. Eat more fruits and vegetables and less foods that contain saturated and trans fats. Limit alcohol, sodium, and sweets. · Stay at a healthy weight. Lose weight if you need to. · Manage other health problems such as diabetes, high blood pressure, and high cholesterol. · Manage stress. Stress can hurt your heart. To keep stress low, talk about your problems and feelings. Don't keep your feelings hidden. · If you have talked about it with your doctor, take a low-dose aspirin every day. Aspirin can help certain people lower their risk of a heart attack or stroke. But taking aspirin isn't right for everyone, because it can cause serious bleeding. Do not start taking daily aspirin unless your doctor knows about it. How is coronary artery disease treated? · Your doctor will suggest that you make lifestyle changes. For example, your doctor may ask you to eat healthy foods, quit smoking, lose extra weight, and be more active. · You will have to take medicines.  
· Your doctor may suggest a procedure to open narrowed or blocked arteries. This is called angioplasty. Or your doctor may suggest using healthy blood vessels to create detours around narrowed or blocked arteries. This is called bypass surgery. Follow-up care is a key part of your treatment and safety. Be sure to make and go to all appointments, and call your doctor if you are having problems. It's also a good idea to know your test results and keep a list of the medicines you take. Where can you learn more? Go to http://staci-zeinab.info/. Enter ([phone] 4584 6779 in the search box to learn more about \"Learning About Coronary Artery Disease (CAD). \" Current as of: September 21, 2016 Content Version: 11.4 © 6631-0424 [employer]. Care instructions adapted under license by [employer] (which disclaims liability or warranty for this information). If you have questions about a medical condition or this instruction, always ask your healthcare professional. [place] [address] any warranty or liability for your use of this information. Introducing [facility]! Dear oWlf Pearson: 
Thank you for requesting a [facility] account. Our records indicate that you already have an active [facility] account. You can access your account anytime at [email or web address] Did you know that you can access your hospital and ER discharge instructions at any time in [facility]? You can also review all of your test results from your hospital stay or ER visit. Additional Information If you have questions, please visit the Frequently Asked Questions section of the [facility] website at [email or web address] Remember, [facility] is NOT to be used for urgent needs. For medical emergencies, dial 911. Now available from your iPhone and Android! Please provide this summary of care documentation to your next provider. Your primary care clinician is listed as Thomas Villegas.  If you have any questions after today's visit, please call [phone].

## 2017-12-28 NOTE — PROGRESS NOTES
Subjective:     HPI:  Attila Mejía is a 62 y.o. female who presents to the office to f/u hospital discharged. Follow up ER discharge/Hypertensive emergency: She was admitted on 12/18/2017 and discharged 12/19/2017 Patient states she reported to the ER after her last office visit on 12/18/17. She was previously seen by me on 12/18/17 with an elevated blood pressure of 234/124, with a second reading of 162/96. Patient was advised to report to the ER if she had symptoms of chest pain, headache, headache, blurry vision or numbness develop. Per patient, after she left our office she went home first, then to the [place]. Afterwards she state she started \"feeling weird\"  She decided to go the ED where she presented with nausea, blurry vision, SOB, and numbness on the left side of her body. She reported to the ER where she was diagnosed with a CVA. She denies any numbness on her extremities, chest pain, or SOB today. Patient blood pressure today is 167/90. She reports she is watching the salt in her diet and avoiding Ramen noodles. Of note,   She has not taken nitroglycerin for the last 4 years. Current Outpatient Prescriptions   Medication Sig Dispense Refill    prasugrel (EFFIENT) 10 mg tablet Take 1 Tab by mouth daily. Indications: Failed plavix. Recurrent CA. No P2Y12 response 90 Tab 3    aspirin (ASPIRIN) 325 mg tablet Take 1 Tab by mouth daily. 90 Tab 3    gabapentin (NEURONTIN) 300 mg capsule Take 1 Cap by mouth three (3) times daily. 90 Cap 2    metFORMIN (GLUCOPHAGE) 850 mg tablet Take 1 Tab by mouth two (2) times daily (with meals). 60 Tab 2    glucose blood VI test strips (BLOOD GLUCOSE TEST) strip Use one strip for each glucose check. Check glucose 2 times per day. 100 Strip 7    insulin aspart (NOVOLOG) 100 unit/mL inpn Take 8 units with each meal. 10 Pen 11    insulin degludec (TRESIBA FLEXTOUCH U-100) 100 unit/mL (3 mL) inpn 35 Units by SubCUTAneous route daily.  15 Pen 2  RABEprazole (ACIPHEX) 20 mg tablet TAKE 1 TABLET BY MOUTH ONCE DAILY 30 Tab 5    polyethylene glycol (MIRALAX) 17 gram packet Take 1 Packet by mouth two (2) times a day. 60 Packet 1    atorvastatin (LIPITOR) 80 mg tablet TAKE 1 TABLET BY MOUTH DAILY 90 Tab 9    nitrofurantoin, macrocrystal-monohydrate, (MACROBID) 100 mg capsule Take 1 Cap by mouth two (2) times a day. 14 Cap 0    sucralfate (CARAFATE) 100 mg/mL suspension Take 5 mL by mouth four (4) times daily. 414 mL 0    acetaminophen (TYLENOL EXTRA STRENGTH) 500 mg tablet Take 2 Tabs by mouth every six (6) hours as needed for Pain. 40 Tab 0    metoprolol succinate (TOPROL-XL) 200 mg XL tablet Take 1 Tab by mouth daily. 90 Tab 3    losartan (COZAAR) 100 mg tablet Take 1 Tab by mouth daily. 90 Tab 3    hydrALAZINE (APRESOLINE) 50 mg tablet Take 0.5 Tabs by mouth four (4) times daily. 180 Tab 3    hydroCHLOROthiazide (HYDRODIURIL) 25 mg tablet Take 1 Tab by mouth daily. 90 Tab 3    levothyroxine (SYNTHROID) 125 mcg tablet Take 1 Tab by mouth Daily (before breakfast). 90 Tab 3    buPROPion (WELLBUTRIN) 100 mg tablet Take 1 Tab by mouth daily. 90 Tab 3    citalopram (CELEXA) 40 mg tablet Take 1 Tab by mouth daily. 90 Tab 3    Insulin Needles, Disposable, (DORA PEN NEEDLE) 32 gauge x 5/32\" ndle Use one needle to give insulin 4 times a day. 400 Pen Needle 15    amLODIPine (NORVASC) 10 mg tablet Take 1 Tab by mouth daily.  80 Tab 3        Allergies   Allergen Reactions    Contrast Agent [Iodine] Rash and Sneezing       Past Medical History:   Diagnosis Date    Anxiety     CAD (coronary artery disease)     S/P Coronary stents ( LAD and Lcx)    Depression     [facility]     Hepatitis C     Hypercholesterolemia     Hypertension     Stroke ([facility] 12/18/2017        Past Surgical History:   Procedure Laterality Date    HX BREAST BIOPSY      1971 2010 left breast lump removed excisional per patient     [address]    pre-eclampsia  HX CHOLECYSTECTOMY  2004    HX CORONARY STENT PLACEMENT  Nov 2013    4 stents    HX HEENT  2009    thyroidectomy due to nodules.  HX HYSTERECTOMY  2005    heavy periods    HX THYROIDECTOMY         Family History   Problem Relation Age of Onset    Diabetes Mother     Hypertension Mother     Cancer Mother     Heart Disease Mother     Heart Attack Mother     Diabetes Father     Hypertension Father     Cancer Father        Social History     Social History    Marital status:      Spouse name: N/A    Number of children: N/A    Years of education: N/A     Occupational History    Not on file. Social History Main Topics    Smoking status: Never Smoker    Smokeless tobacco: Never Used    Alcohol use No    Drug use: No    Sexual activity: Not Currently     Other Topics Concern    Not on file     Social History Narrative       REVIEW OF SYSTEM:  Review of Systems   Constitutional: Negative for chills and fever. HENT: Positive for nosebleeds ( left). Eyes: Negative for blurred vision. Respiratory: Negative for shortness of breath. Cardiovascular: Negative for chest pain, palpitations and leg swelling. Gastrointestinal: Negative for constipation, diarrhea, nausea and vomiting. Musculoskeletal: Negative for joint pain. Neurological: Negative for headaches. Objective:     Visit Vitals    /90 (BP 1 Location: Right arm, BP Patient Position: Sitting)    Pulse 78    Temp 98 °F (36.7 °C) (Oral)    Resp 16    Ht 5' 6\" (1.676 m)    Wt 179 lb (81.2 kg)    LMP  (Exact Date)    SpO2 98%    BMI 28.89 kg/m2       PHYSICAL EXAM:  Physical Exam   Constitutional: She is oriented to person, place, and time and well-developed, well-nourished, and in no distress.    HENT:   Right Ear: Tympanic membrane, external ear and ear canal normal.   Left Ear: Tympanic membrane, external ear and ear canal normal.   Nose: Nose normal.       Mouth/Throat: Oropharynx is clear and moist. Eyes: Pupils are equal, round, and reactive to light. Neck: Normal range of motion. Neck supple. No thyromegaly present. Cardiovascular: Normal rate, regular rhythm, normal heart sounds and intact distal pulses. No murmur heard. Pulmonary/Chest: Effort normal and breath sounds normal. She has no wheezes. Neurological: She is alert and oriented to person, place, and time. GCS score is 15. Skin: Skin is warm and dry. Vitals reviewed. Assessment/Plan:       ICD-10-CM ICD-9-CM    1. Cerebrovascular accident (CVA) due to other mechanism ([facility] I63.8 434.91    2. Hospital discharge follow-up Z09 V67.59 REFERRAL TO CARDIOLOGY   3. Coronary artery disease of native artery of native heart with stable angina pectoris ([facility] I25.118 414.01 REFERRAL TO CARDIOLOGY     413.9         I will refer patient to cardiology for further evaluation. I will consider changing her blood pressure medication to carvedilol (Coreg) after consulting cardiologist.   Patient given opportunity to ask questions. Questions answered. Patient understands plan of care. Follow-up Disposition:  Return in about 1 month (around 1/28/2018). Written by Maurilio Chi, as dictated by Kitty Sotelo DO.     I, Dr. Kitty Sotelo, confirm that all documentation is accurate.

## 2017-12-31 VITALS
OXYGEN SATURATION: 98 % | RESPIRATION RATE: 16 BRPM | DIASTOLIC BLOOD PRESSURE: 80 MMHG | TEMPERATURE: 98.8 F | HEART RATE: 68 BPM | SYSTOLIC BLOOD PRESSURE: 142 MMHG

## 2018-01-16 ENCOUNTER — TELEPHONE (OUTPATIENT)
Dept: CARDIOLOGY CLINIC | Age: 58
End: 2018-01-16

## 2018-02-06 ENCOUNTER — DOCUMENTATION ONLY (OUTPATIENT)
Dept: FAMILY MEDICINE CLINIC | Age: 58
End: 2018-02-06

## 2018-02-06 NOTE — LETTER
2/6/2018 [address] [place] [phone] Dear Ms. Werner Spears, We had an appointment reserved for you 1/30/2018 and were concerned when you did not show or call within 24 hours to cancel the appointment. Our policy is to call patients two days prior to their appointment to remind them of the date and time. We perform these calls as a courtesy to our patients and to allow us the opportunity to rebook the time slot should the appointment not be necessary. Recognizing that everyones time is valuable and that appointment time is limited, we ask that you provide 24 hours notice if you are unable to keep your appointment. Please call us at your earliest convenience to reschedule your appointment as your provider felt it was important to see you. Thank you for your anticipated cooperation. The scheduling staff: 
 
[address] [place] [address],[address] [place] [id number] [phone]

## 2018-03-30 ENCOUNTER — OFFICE VISIT (OUTPATIENT)
Dept: FAMILY MEDICINE CLINIC | Age: 58
End: 2018-03-30

## 2018-03-30 VITALS
HEART RATE: 88 BPM | WEIGHT: 183 LBS | RESPIRATION RATE: 16 BRPM | SYSTOLIC BLOOD PRESSURE: 131 MMHG | BODY MASS INDEX: 29.41 KG/M2 | HEIGHT: 66 IN | OXYGEN SATURATION: 96 % | TEMPERATURE: 98.6 F | DIASTOLIC BLOOD PRESSURE: 71 MMHG

## 2018-03-30 DIAGNOSIS — E11.9 TYPE 2 DIABETES MELLITUS WITHOUT COMPLICATION, WITHOUT LONG-TERM CURRENT USE OF INSULIN (HCC): ICD-10-CM

## 2018-03-30 RX ORDER — METFORMIN HYDROCHLORIDE 850 MG/1
850 TABLET ORAL 2 TIMES DAILY WITH MEALS
Qty: 60 TAB | Refills: 2 | Status: SHIPPED | OUTPATIENT
Start: 2018-03-30 | End: 2018-03-30 | Stop reason: SDUPTHER

## 2018-03-30 RX ORDER — GABAPENTIN 300 MG/1
300 CAPSULE ORAL 3 TIMES DAILY
Qty: 90 CAP | Refills: 2 | Status: SHIPPED | OUTPATIENT
Start: 2018-03-30 | End: 2019-03-25 | Stop reason: SDUPTHER

## 2018-03-30 RX ORDER — INSULIN DEGLUDEC 100 U/ML
35 INJECTION, SOLUTION SUBCUTANEOUS DAILY
Qty: 15 PEN | Refills: 2 | Status: SHIPPED | OUTPATIENT
Start: 2018-03-30 | End: 2019-12-20 | Stop reason: SDUPTHER

## 2018-03-30 RX ORDER — GABAPENTIN 300 MG/1
300 CAPSULE ORAL 3 TIMES DAILY
Qty: 90 CAP | Refills: 2 | Status: SHIPPED | OUTPATIENT
Start: 2018-03-30 | End: 2018-03-30 | Stop reason: SDUPTHER

## 2018-03-30 RX ORDER — INSULIN ASPART 100 [IU]/ML
INJECTION, SOLUTION INTRAVENOUS; SUBCUTANEOUS
Qty: 10 PEN | Refills: 11 | Status: SHIPPED | OUTPATIENT
Start: 2018-03-30 | End: 2019-03-07 | Stop reason: SDUPTHER

## 2018-03-30 NOTE — PROGRESS NOTES
Subjective:     HPI:  Cherie Mayers is a 62 y.o. female who presents to the office to follow-up on anxiety and diabetes. Anxiety: Pt reports having multiple anxiety attacks. She reports that she automatically goes to panic mode. She gets flushed feeling, warm sensation prior to feeling anxious. She hears blowing noises in her ears when she has anxiety attacks. She is taking Celexa and Wellbutrin. Stable on current therapy. No medication side effects noted. Diabetes Mellitus:  female has diabetes mellitus. Diabetic ROS - medication compliance: compliant most of the time, diabetic diet compliance: compliant most of the time, home glucose monitoring: is performed regularly, fasting values range 160's, nonfasting values range 230's when she drinks sweet tea. She reports that she has cut down on her sweet tea consumption, further diabetic ROS: no polyuria or polydipsia, no chest pain, no medication side effects noted. Lab Results   Component Value Date/Time    Hemoglobin A1c 7.0 (H) 12/18/2017 09:36 AM    Hemoglobin A1c 8.7 (H) 08/28/2017 11:39 AM    Hemoglobin A1c 9.8 (H) 06/09/2017 01:25 AM       Neurology: Patient medication changed from Plavix to Effient. No P2Y12 response. She had CVA-subacute in December, 2017. She had visited ED complaining of SOB, left-sided numbness, and light headedness. Pt complains of numbness and tingling in hands. This is worse in the morning right after she wakes up. Current Outpatient Prescriptions   Medication Sig Dispense Refill    gabapentin (NEURONTIN) 300 mg capsule Take 1 Cap by mouth three (3) times daily. 90 Cap 2    metFORMIN (GLUCOPHAGE) 850 mg tablet Take 1 Tab by mouth two (2) times daily (with meals). 60 Tab 2    glucose blood VI test strips (BLOOD GLUCOSE TEST) strip Use one strip (freestyle lite) for each glucose check. Check glucose 2 times per day.  100 Strip 7    insulin aspart U-100 (NOVOLOG) 100 unit/mL inpn Take 8 units with each meal. 10 Pen 11    insulin degludec (TRESIBA FLEXTOUCH U-100) 100 unit/mL (3 mL) inpn 35 Units by SubCUTAneous route daily. 15 Pen 2    prasugrel (EFFIENT) 10 mg tablet Take 1 Tab by mouth daily. Indications: Failed plavix. Recurrent CA. No P2Y12 response 90 Tab 3    aspirin (ASPIRIN) 325 mg tablet Take 1 Tab by mouth daily. 90 Tab 3    RABEprazole (ACIPHEX) 20 mg tablet TAKE 1 TABLET BY MOUTH ONCE DAILY 30 Tab 5    polyethylene glycol (MIRALAX) 17 gram packet Take 1 Packet by mouth two (2) times a day. 60 Packet 1    atorvastatin (LIPITOR) 80 mg tablet TAKE 1 TABLET BY MOUTH DAILY 90 Tab 9    nitrofurantoin, macrocrystal-monohydrate, (MACROBID) 100 mg capsule Take 1 Cap by mouth two (2) times a day. 14 Cap 0    sucralfate (CARAFATE) 100 mg/mL suspension Take 5 mL by mouth four (4) times daily. 414 mL 0    acetaminophen (TYLENOL EXTRA STRENGTH) 500 mg tablet Take 2 Tabs by mouth every six (6) hours as needed for Pain. 40 Tab 0    amLODIPine (NORVASC) 10 mg tablet Take 1 Tab by mouth daily. 90 Tab 3    metoprolol succinate (TOPROL-XL) 200 mg XL tablet Take 1 Tab by mouth daily. 90 Tab 3    losartan (COZAAR) 100 mg tablet Take 1 Tab by mouth daily. 90 Tab 3    hydrALAZINE (APRESOLINE) 50 mg tablet Take 0.5 Tabs by mouth four (4) times daily. 180 Tab 3    hydroCHLOROthiazide (HYDRODIURIL) 25 mg tablet Take 1 Tab by mouth daily. 90 Tab 3    levothyroxine (SYNTHROID) 125 mcg tablet Take 1 Tab by mouth Daily (before breakfast). 90 Tab 3    buPROPion (WELLBUTRIN) 100 mg tablet Take 1 Tab by mouth daily. 90 Tab 3    citalopram (CELEXA) 40 mg tablet Take 1 Tab by mouth daily. 90 Tab 3    Insulin Needles, Disposable, (DORA PEN NEEDLE) 32 gauge x 5/32\" ndle Use one needle to give insulin 4 times a day.  400 Pen Needle 15        Allergies   Allergen Reactions    Contrast Agent [Iodine] Rash and Sneezing       Past Medical History:   Diagnosis Date    Anxiety     CAD (coronary artery disease)     S/P Coronary stents ( LAD and Lcx)    Depression     Diabetes ([facility] [address] [facility]     Hepatitis C     Hypercholesterolemia     Hypertension     Stroke ([facility] [address] 12/18/2017        Past Surgical History:   Procedure Laterality Date    HX BREAST BIOPSY      1971 2010 left breast lump removed excisional per patient     [address]    pre-eclampsia    HX CHOLECYSTECTOMY  2004    HX CORONARY STENT PLACEMENT  Nov 2013    4 stents    HX HEENT  2009    thyroidectomy due to nodules.  HX HYSTERECTOMY  2005    heavy periods    HX THYROIDECTOMY         Family History   Problem Relation Age of Onset    Diabetes Mother     Hypertension Mother     Cancer Mother     Heart Disease Mother     Heart Attack Mother     Diabetes Father     Hypertension Father     Cancer Father        Social History     Social History    Marital status:      Spouse name: N/A    Number of children: N/A    Years of education: N/A     Occupational History    Not on file. Social History Main Topics    Smoking status: Never Smoker    Smokeless tobacco: Never Used    Alcohol use No    Drug use: No    Sexual activity: Not Currently     Other Topics Concern    Not on file     Social History Narrative       REVIEW OF SYSTEM:  Review of Systems   Constitutional: Negative for chills and fever. Eyes: Negative for blurred vision. Respiratory: Negative for shortness of breath. Cardiovascular: Negative for chest pain, palpitations and leg swelling. Gastrointestinal: Negative for constipation, diarrhea, nausea and vomiting. Musculoskeletal: Negative for joint pain. Neurological: Positive for tingling. Negative for headaches. Psychiatric/Behavioral: The patient is nervous/anxious.         Objective:     Visit Vitals    /71 (BP 1 Location: Right arm, BP Patient Position: Sitting)    Pulse 88    Temp 98.6 °F (37 °C) (Oral)    Resp 16    Ht 5' 6\" (1.676 m)    Wt 183 lb (83 kg)    SpO2 96%    BMI 29.54 kg/m2       PHYSICAL EXAM:  Physical Exam   Constitutional: She is oriented to person, place, and time and well-developed, well-nourished, and in no distress. HENT:   Right Ear: Tympanic membrane, external ear and ear canal normal.   Left Ear: Tympanic membrane, external ear and ear canal normal.   Nose: Nose normal.   Mouth/Throat: Oropharynx is clear and moist.   Eyes: Pupils are equal, round, and reactive to light. Neck: Normal range of motion. Neck supple. No thyromegaly present. Cardiovascular: Normal rate, regular rhythm, normal heart sounds and intact distal pulses. No murmur heard. Pulmonary/Chest: Effort normal and breath sounds normal. She has no wheezes. Neurological: She is alert and oriented to person, place, and time. GCS score is 15. Skin: Skin is warm and dry. Vitals reviewed. Assessment/Plan:       ICD-10-CM ICD-9-CM    1. Uncontrolled type 2 diabetes mellitus with diabetic polyneuropathy, with long-term current use of insulin ([facility]) E11.42 250.62 gabapentin (NEURONTIN) 300 mg capsule    Z79.4 357.2 metFORMIN (GLUCOPHAGE) 850 mg tablet    E11.65 V58.67 glucose blood VI test strips (BLOOD GLUCOSE TEST) strip      REFERRAL TO ENDOCRINOLOGY      LIPID PANEL      METABOLIC PANEL, COMPREHENSIVE      HEMOGLOBIN A1C WITH EAG      DISCONTINUED: gabapentin (NEURONTIN) 300 mg capsule   2. Type 2 diabetes mellitus without complication, without long-term current use of insulin ([facility]) E11.9 250.00 insulin aspart U-100 (NOVOLOG) 100 unit/mL inpn      insulin degludec (TRESIBA FLEXTOUCH U-100) 100 unit/mL (3 mL) inpn   3. Uncontrolled type 2 diabetes mellitus with diabetic polyneuropathy, without long-term current use of insulin ([facility]) E11.42 250.62 insulin aspart U-100 (NOVOLOG) 100 unit/mL inpn    E11.65 357.2 insulin degludec (TRESIBA FLEXTOUCH U-100) 100 unit/mL (3 mL) inpn     Patient given opportunity to ask questions. Questions answered. Patient understands plan of care.    Follow-up Disposition:  Return in about 3 months (around 6/30/2018), or if symptoms worsen or fail to improve. Written by Luke Hester, as dictated by DO. AMOS Nielsen Caro, Dr. Gia Kong, confirm that all documentation is accurate.

## 2018-03-30 NOTE — PROGRESS NOTES
1. Have you been to the ER, urgent care clinic since your last visit? Hospitalized since your last visit? No    2. Have you seen or consulted any other health care providers outside of the [address] [place] [address] since your last visit? Include any pap smears or colon screening. No     Patient presents in office today for routine care.   Patient concerns: med refills, anxiety

## 2018-03-30 NOTE — MR AVS SNAPSHOT
[address] [address] 
[phone] Patient: Eliceo Hardy MRN: [id number] ZNF:[id number][birth year]01 Visit Information Date & Time Provider Department Dept. Phone Encounter #  
 3/30/2018  4:00 PM Tamar Lizama[address]  [id number] Follow-up Instructions Return in about 3 months (around 6/30/2018), or if symptoms worsen or fail to improve. Upcoming Health Maintenance Date Due  
 EYE EXAM RETINAL OR DILATED Q1 8/24/1970 DTaP/Tdap/Td series (1 - Tdap) 8/24/1981 FOBT Q 1 YEAR AGE 50-75 8/24/2010 FOOT EXAM Q1 10/25/2017 MICROALBUMIN Q1 1/25/2018 HEMOGLOBIN A1C Q6M 6/18/2018 BREAST CANCER SCRN MAMMOGRAM 11/16/2018 LIPID PANEL Q1 12/18/2018 Allergies as of 3/30/2018  Review Complete On: 3/30/2018 By: Tamar Lizama DO Severity Noted Reaction Type Reactions Contrast Agent [Iodine]  06/24/2014    Rash, Sneezing Current Immunizations  Reviewed on 12/19/2017 Name Date Influenza Vaccine 9/1/2017 Influenza Vaccine (Madin [place] Canine Kidney) PF 2/5/2015  3:56 PM  
 Influenza Vaccine (Quad) PF 11/30/2015 Pneumococcal Polysaccharide (PPSV-23) 10/25/2016 Not reviewed this visit You Were Diagnosed With   
  
 Codes Comments Uncontrolled type 2 diabetes mellitus with diabetic polyneuropathy, with long-term current use of insulin ([facility])     ICD-10-CM: E11.42, Z79.4, E11.65 ICD-9-CM: 250.62, 357.2, V58.67 Type 2 diabetes mellitus without complication, without long-term current use of insulin ([facility])     ICD-10-CM: E11.9 ICD-9-CM: 250.00 Uncontrolled type 2 diabetes mellitus with diabetic polyneuropathy, without long-term current use of insulin ([facility])     ICD-10-CM: E11.42, E11.65 ICD-9-CM: 250.62, 357.2 Vitals BP Pulse Temp Resp Height(growth percentile) Weight(growth percentile) 131/71 (BP 1 Location: Right arm, BP Patient Position: Sitting) 88 98.6 °F (37 °C) (Oral) 16 5' 6\" (1.676 m) 183 lb (83 kg) SpO2 BMI OB Status Smoking Status 96% 29.54 kg/m2 Hysterectomy Never Smoker BMI and BSA Data Body Mass Index Body Surface Area  
 29.54 kg/m 2 1.97 m 2 Preferred Pharmacy Pharmacy Name Phone [address] [facility] [phone] Your Updated Medication List  
  
   
This list is accurate as of 3/30/18  4:55 PM.  Always use your most recent med list.  
  
  
  
  
 acetaminophen 500 mg tablet Commonly known as:  [address] Take 2 Tabs by mouth every six (6) hours as needed for Pain. amLODIPine 10 mg tablet Commonly known as:  Alturasjas Cordero Take 1 Tab by mouth daily. aspirin 325 mg tablet Commonly known as:  ASPIRIN Take 1 Tab by mouth daily. atorvastatin 80 mg tablet Commonly known as:  LIPITOR  
TAKE 1 TABLET BY MOUTH DAILY  
  
 buPROPion 100 mg tablet Commonly known as:  [facility] Take 1 Tab by mouth daily. citalopram 40 mg tablet Commonly known as:  Erroll Bonier Take 1 Tab by mouth daily. gabapentin 300 mg capsule Commonly known as:  NEURONTIN Take 1 Cap by mouth three (3) times daily. glucose blood VI test strips strip Commonly known as:  blood glucose test  
Use one strip (freestyle lite) for each glucose check. Check glucose 2 times per day. hydrALAZINE 50 mg tablet Commonly known as:  APRESOLINE Take 0.5 Tabs by mouth four (4) times daily. hydroCHLOROthiazide 25 mg tablet Commonly known as:  HYDRODIURIL Take 1 Tab by mouth daily. insulin aspart U-100 100 unit/mL Inpn Commonly known as:  Jerlean Teofilo Take 8 units with each meal.  
  
 insulin degludec 100 unit/mL (3 mL) Inpn Commonly known as:  TRESIBA FLEXTOUCH U-100  
35 Units by SubCUTAneous route daily. Insulin Needles (Disposable) 32 gauge x 5/32\" Ndle Commonly known as:  Mona Pen Needle Use one needle to give insulin 4 times a day. levothyroxine 125 mcg tablet Commonly known as:  SYNTHROID Take 1 Tab by mouth Daily (before breakfast). losartan 100 mg tablet Commonly known as:  COZAAR Take 1 Tab by mouth daily. metFORMIN 850 mg tablet Commonly known as:  GLUCOPHAGE Take 1 Tab by mouth two (2) times daily (with meals). metoprolol succinate 200 mg XL tablet Commonly known as:  TOPROL-XL Take 1 Tab by mouth daily. nitrofurantoin (macrocrystal-monohydrate) 100 mg capsule Commonly known as:  MACROBID Take 1 Cap by mouth two (2) times a day. polyethylene glycol 17 gram packet Commonly known as:  Katlin Rudder Take 1 Packet by mouth two (2) times a day. prasugrel 10 mg tablet Commonly known as:  EFFIENT Take 1 Tab by mouth daily. Indications: Failed plavix. Recurrent CA. No P2Y12 response RABEprazole 20 mg tablet Commonly known as:  ACIPHEX  
TAKE 1 TABLET BY MOUTH ONCE DAILY  
  
 sucralfate 100 mg/mL suspension Commonly known as:  German Chase Take 5 mL by mouth four (4) times daily. Prescriptions Sent to Pharmacy Refills  
 gabapentin (NEURONTIN) 300 mg capsule 2 Sig: Take 1 Cap by mouth three (3) times daily. Class: Normal  
 Pharmacy: [facility] [address] [place] [address] Ph #: [phone] Route: Oral  
 metFORMIN (GLUCOPHAGE) 850 mg tablet 2 Sig: Take 1 Tab by mouth two (2) times daily (with meals). Class: Normal  
 Pharmacy: [facility] [address] [place] [address] Ph #: [phone] Route: Oral  
 glucose blood VI test strips (BLOOD GLUCOSE TEST) strip 7 Sig: Use one strip (freestyle lite) for each glucose check. Check glucose 2 times per day.   
 Class: Normal  
 Pharmacy: [facility] [address] [facility], [address] Ph #: [phone]  
 insulin aspart U-100 (NOVOLOG) 100 unit/mL inpn 11 Sig: Take 8 units with each meal.  
 Class: Normal  
 Pharmacy: [address], [address] [place] [address] Ph #: [phone]  
 insulin degludec (TRESIBA FLEXTOUCH U-100) 100 unit/mL (3 mL) inpn 2 Si Units by SubCUTAneous route daily. Class: Normal  
 Pharmacy: [address], [address] Ph #: [phone] Route: SubCUTAneous We Performed the Following REFERRAL TO ENDOCRINOLOGY [AZT79 Custom] Follow-up Instructions Return in about 3 months (around 2018), or if symptoms worsen or fail to improve. To-Do List   
 2018 Lab:  HEMOGLOBIN A1C WITH EAG   
  
 2018 Lab:  LIPID PANEL   
  
 2018 Lab:  METABOLIC PANEL, COMPREHENSIVE Referral Information Referral ID Referred By Referred To  
  
 [id number] Jc BOLAÑOS Not Available Visits Status Start Date End Date 1 Open 3/30/18 3/30/19 If your referral has a status of pending review or denied, additional information will be sent to support the outcome of this decision. Introducing [facility]! Dear Dilia Hernandez: 
Thank you for requesting a [employer] account. Our records indicate that you already have an active [employer] account. You can access your account anytime at [email or web address] Did you know that you can access your hospital and ER discharge instructions at any time in [employer]? You can also review all of your test results from your hospital stay or ER visit. Additional Information If you have questions, please visit the Frequently Asked Questions section of the [employer] website at [email or web address] Remember, [employer] is NOT to be used for urgent needs.  For medical emergencies, dial 911. Now available from your iPhone and Android! Please provide this summary of care documentation to your next provider. Your primary care clinician is listed as Betsy Jhaveri. If you have any questions after today's visit, please call [phone].

## 2018-04-06 RX ORDER — METFORMIN HYDROCHLORIDE 850 MG/1
TABLET ORAL
Qty: 180 TAB | Refills: 2 | Status: SHIPPED | OUTPATIENT
Start: 2018-04-06 | End: 2019-10-23

## 2018-04-09 ENCOUNTER — HOSPITAL ENCOUNTER (OUTPATIENT)
Dept: LAB | Age: 58
Discharge: HOME | End: 2018-04-09
Payer: OTHER GOVERNMENT

## 2018-04-09 LAB
ALBUMIN SERPL-MCNC: 3.2 G/DL (ref 3.4–5)
ALBUMIN/GLOB SERPL: 0.7 {RATIO} (ref 0.8–1.7)
ALP SERPL-CCNC: 164 U/L (ref 45–117)
ALT SERPL-CCNC: 54 U/L (ref 13–56)
ANION GAP SERPL CALC-SCNC: 4 MMOL/L (ref 3–18)
AST SERPL-CCNC: 39 U/L (ref 15–37)
BILIRUB SERPL-MCNC: 0.5 MG/DL (ref 0.2–1)
BUN SERPL-MCNC: 28 MG/DL (ref 7–18)
BUN/CREAT SERPL: 29 (ref 12–20)
CALCIUM SERPL-MCNC: 9.1 MG/DL (ref 8.5–10.1)
CHLORIDE SERPL-SCNC: 104 MMOL/L (ref 100–108)
CHOLEST SERPL-MCNC: 217 MG/DL
CO2 SERPL-SCNC: 32 MMOL/L (ref 21–32)
CREAT SERPL-MCNC: 0.95 MG/DL (ref 0.6–1.3)
EST. AVERAGE GLUCOSE BLD GHB EST-MCNC: 146 MG/DL
GLOBULIN SER CALC-MCNC: 4.8 G/DL (ref 2–4)
GLUCOSE SERPL-MCNC: 216 MG/DL (ref 74–99)
HBA1C MFR BLD: 6.7 % (ref 4.2–5.6)
HDLC SERPL-MCNC: 72 MG/DL (ref 40–60)
HDLC SERPL: 3 {RATIO} (ref 0–5)
LDLC SERPL CALC-MCNC: 121.6 MG/DL (ref 0–100)
LIPID PROFILE,FLP: ABNORMAL
POTASSIUM SERPL-SCNC: 4.3 MMOL/L (ref 3.5–5.5)
PROT SERPL-MCNC: 8 G/DL (ref 6.4–8.2)
SODIUM SERPL-SCNC: 140 MMOL/L (ref 136–145)
TRIGL SERPL-MCNC: 117 MG/DL (ref ?–150)
VLDLC SERPL CALC-MCNC: 23.4 MG/DL

## 2018-04-09 PROCEDURE — 80053 COMPREHEN METABOLIC PANEL: CPT | Performed by: FAMILY MEDICINE

## 2018-04-09 PROCEDURE — 80061 LIPID PANEL: CPT | Performed by: FAMILY MEDICINE

## 2018-04-09 PROCEDURE — 83036 HEMOGLOBIN GLYCOSYLATED A1C: CPT | Performed by: FAMILY MEDICINE

## 2018-04-09 PROCEDURE — 36415 COLL VENOUS BLD VENIPUNCTURE: CPT | Performed by: FAMILY MEDICINE

## 2018-05-05 DIAGNOSIS — I10 ESSENTIAL HYPERTENSION: ICD-10-CM

## 2018-05-05 DIAGNOSIS — I10 MALIGNANT HYPERTENSION: ICD-10-CM

## 2018-05-07 ENCOUNTER — OFFICE VISIT (OUTPATIENT)
Dept: FAMILY MEDICINE CLINIC | Age: 58
End: 2018-05-07

## 2018-05-07 VITALS
SYSTOLIC BLOOD PRESSURE: 144 MMHG | BODY MASS INDEX: 30.22 KG/M2 | HEIGHT: 66 IN | OXYGEN SATURATION: 96 % | HEART RATE: 92 BPM | DIASTOLIC BLOOD PRESSURE: 83 MMHG | RESPIRATION RATE: 16 BRPM | WEIGHT: 188 LBS | TEMPERATURE: 98.7 F

## 2018-05-07 DIAGNOSIS — J01.00 ACUTE NON-RECURRENT MAXILLARY SINUSITIS: Primary | ICD-10-CM

## 2018-05-07 RX ORDER — IBUPROFEN 800 MG/1
800 TABLET ORAL
Qty: 30 TAB | Refills: 0 | Status: SHIPPED | OUTPATIENT
Start: 2018-05-07 | End: 2020-04-07

## 2018-05-07 RX ORDER — CEFDINIR 300 MG/1
300 CAPSULE ORAL 2 TIMES DAILY
Qty: 20 CAP | Refills: 0 | Status: SHIPPED | OUTPATIENT
Start: 2018-05-07 | End: 2018-05-17

## 2018-05-07 NOTE — MR AVS SNAPSHOT
[address] [facility] 
 
 
 [address] [facility] [address] [facility] 83 [id number] 
[phone] Patient: Ryan Goldstein MRN: [id number] MONICO:[birth year] Visit Information Date & Time Provider Department Dept. Phone Encounter #  
 5/7/2018  2:20 PM Caleb Wang [place] [phone] [id number] Follow-up Instructions Return if symptoms worsen or fail to improve. Your Appointments 6/29/2018  9:00 AM  
[place] [address] [address]) Appt Note: Return in about 3 months (around 6/30/2018), or if symptoms worsen or fail to improve. [id number] [facility] [address] [facility] 83 222 [facility]  
  
   
 [address] [facility] [address] Upcoming Health Maintenance Date Due  
 EYE EXAM RETINAL OR DILATED Q1 8/24/1970 DTaP/Tdap/Td series (1 - Tdap) 8/24/1981 FOBT Q 1 YEAR AGE 50-75 8/24/2010 FOOT EXAM Q1 10/25/2017 MICROALBUMIN Q1 1/25/2018 Influenza Age 5 to Adult 8/1/2018 HEMOGLOBIN A1C Q6M 10/9/2018 BREAST CANCER SCRN MAMMOGRAM 11/16/2018 LIPID PANEL Q1 4/9/2019 Allergies as of 5/7/2018  Review Complete On: 5/7/2018 By: Caleb Wang DO Severity Noted Reaction Type Reactions Contrast Agent [Iodine]  06/24/2014    Rash, Sneezing Current Immunizations  Reviewed on 12/19/2017 Name Date Influenza Vaccine 9/1/2017 Influenza Vaccine ([place] Canine Kidney) PF 2/5/2015  3:56 PM  
 Influenza Vaccine (Quad) PF 11/30/2015 Pneumococcal Polysaccharide (PPSV-23) 10/25/2016 Not reviewed this visit You Were Diagnosed With   
  
 Codes Comments Acute non-recurrent maxillary sinusitis    -  Primary ICD-10-CM: J01.00 ICD-9-CM: 461.0 Vitals BP Pulse Temp Resp Height(growth percentile) Weight(growth percentile)  144/83 (BP 1 Location: Right arm, BP Patient Position: Sitting) 92 98.7 °F (37.1 °C) (Oral) 16 5' 6\" (1.676 m) 188 lb (85.3 kg) SpO2 BMI OB Status Smoking Status 96% 30.34 kg/m2 Hysterectomy Never Smoker BMI and BSA Data Body Mass Index Body Surface Area  
 30.34 kg/m 2 1.99 m 2 Preferred Pharmacy Pharmacy Name Phone [facility], [address] [phone] Your Updated Medication List  
  
   
This list is accurate as of 5/7/18  3:29 PM.  Always use your most recent med list.  
  
  
  
  
 acetaminophen 500 mg tablet Commonly known as:  80 Elvin Kohli Jr Connie Do Take 2 Tabs by mouth every six (6) hours as needed for Pain. amLODIPine 10 mg tablet Commonly known as:  Amy Grebe Take 1 Tab by mouth daily. aspirin 325 mg tablet Commonly known as:  ASPIRIN Take 1 Tab by mouth daily. atorvastatin 80 mg tablet Commonly known as:  LIPITOR  
TAKE 1 TABLET BY MOUTH DAILY  
  
 buPROPion 100 mg tablet Commonly known as:  [facility] Take 1 Tab by mouth daily. cefdinir 300 mg capsule Commonly known as:  OMNICEF Take 1 Cap by mouth two (2) times a day for 10 days. citalopram 40 mg tablet Commonly known as:  Margreta Jada Take 1 Tab by mouth daily. gabapentin 300 mg capsule Commonly known as:  NEURONTIN Take 1 Cap by mouth three (3) times daily. glucose blood VI test strips strip Commonly known as:  blood glucose test  
Use one strip (freestyle lite) for each glucose check. Check glucose 2 times per day. hydrALAZINE 50 mg tablet Commonly known as:  APRESOLINE Take 0.5 Tabs by mouth four (4) times daily. hydroCHLOROthiazide 25 mg tablet Commonly known as:  HYDRODIURIL Take 1 Tab by mouth daily. ibuprofen 800 mg tablet Commonly known as:  MOTRIN Take 1 Tab by mouth every eight (8) hours as needed for Pain. insulin aspart U-100 100 unit/mL Inpn Commonly known as:  Tate Orellana Take 8 units with each meal.  
  
 insulin degludec 100 unit/mL (3 mL) Inpn Commonly known as:  TRESIBA FLEXTOUCH U-100  
35 Units by SubCUTAneous route daily. Insulin Needles (Disposable) 32 gauge x 5/32\" Ndle Commonly known as:  Mona Pen Needle Use one needle to give insulin 4 times a day. levothyroxine 125 mcg tablet Commonly known as:  SYNTHROID Take 1 Tab by mouth Daily (before breakfast). losartan 100 mg tablet Commonly known as:  COZAAR Take 1 Tab by mouth daily. metFORMIN 850 mg tablet Commonly known as:  GLUCOPHAGE  
TAKE 1 TABLET BY MOUTH TWICE DAILY WITH MEALS  
  
 metoprolol succinate 200 mg XL tablet Commonly known as:  TOPROL-XL Take 1 Tab by mouth daily. nitrofurantoin (macrocrystal-monohydrate) 100 mg capsule Commonly known as:  MACROBID Take 1 Cap by mouth two (2) times a day. polyethylene glycol 17 gram packet Commonly known as:  Hal Coma Take 1 Packet by mouth two (2) times a day. prasugrel 10 mg tablet Commonly known as:  EFFIENT Take 1 Tab by mouth daily. Indications: Failed plavix. Recurrent CA. No P2Y12 response RABEprazole 20 mg tablet Commonly known as:  ACIPHEX  
TAKE 1 TABLET BY MOUTH ONCE DAILY  
  
 sucralfate 100 mg/mL suspension Commonly known as:  Vinod Doctor Take 5 mL by mouth four (4) times daily. Prescriptions Sent to Pharmacy Refills  
 cefdinir (OMNICEF) 300 mg capsule 0 Sig: Take 1 Cap by mouth two (2) times a day for 10 days. Class: Normal  
 Pharmacy: [facility] [address] [place] [address] Ph #: [phone] Route: Oral  
 ibuprofen (MOTRIN) 800 mg tablet 0 Sig: Take 1 Tab by mouth every eight (8) hours as needed for Pain. Class: Normal  
 Pharmacy: [facility] [address] [place] [address] Ph #: [phone]  Route: Oral  
  
 Follow-up Instructions Return if symptoms worsen or fail to improve. Patient Instructions Sinusitis: Care Instructions Your Care Instructions Sinusitis is an infection of the lining of the sinus cavities in your head. Sinusitis often follows a cold. It causes pain and pressure in your head and face. In most cases, sinusitis gets better on its own in 1 to 2 weeks. But some mild symptoms may last for several weeks. Sometimes antibiotics are needed. Follow-up care is a key part of your treatment and safety. Be sure to make and go to all appointments, and call your doctor if you are having problems. It's also a good idea to know your test results and keep a list of the medicines you take. How can you care for yourself at home? · Take an over-the-counter pain medicine, such as acetaminophen (Tylenol), ibuprofen (Advil, Motrin), or naproxen (Aleve). Read and follow all instructions on the label. · If the doctor prescribed antibiotics, take them as directed. Do not stop taking them just because you feel better. You need to take the full course of antibiotics. · Be careful when taking over-the-counter cold or flu medicines and Tylenol at the same time. Many of these medicines have acetaminophen, which is Tylenol. Read the labels to make sure that you are not taking more than the recommended dose. Too much acetaminophen (Tylenol) can be harmful. · Breathe warm, moist air from a steamy shower, a hot bath, or a sink filled with hot water. Avoid cold, dry air. Using a humidifier in your home may help. Follow the directions for cleaning the machine. · Use saline (saltwater) nasal washes to help keep your nasal passages open and wash out mucus and bacteria. You can buy saline nose drops at a grocery store or drugstore.  Or you can make your own at home by adding 1 teaspoon of salt and 1 teaspoon of baking soda to 2 cups of distilled water. If you make your own, fill a bulb syringe with the solution, insert the tip into your nostril, and squeeze gently. Dayanna Rummage your nose. · Put a hot, wet towel or a warm gel pack on your face 3 or 4 times a day for 5 to 10 minutes each time. · Try a decongestant nasal spray like oxymetazoline (Afrin). Do not use it for more than 3 days in a row. Using it for more than 3 days can make your congestion worse. When should you call for help? Call your doctor now or seek immediate medical care if: 
? · You have new or worse swelling or redness in your face or around your eyes. ? · You have a new or higher fever. ? Watch closely for changes in your health, and be sure to contact your doctor if: 
? · You have new or worse facial pain. ? · The mucus from your nose becomes thicker (like pus) or has new blood in it. ? · You are not getting better as expected. Where can you learn more? Go to http://staci-zeinab.info/. Enter D084 in the search box to learn more about \"Sinusitis: Care Instructions. \" Current as of: May 12, 2017 Content Version: 11.4 © 1881-2109 [employer]. Care instructions adapted under license by [employer] (which disclaims liability or warranty for this information). If you have questions about a medical condition or this instruction, always ask your healthcare professional. [place] [address] any warranty or liability for your use of this information. Introducing [facility] & HEALTH SERVICES! Dear Donaldo Scott: 
Thank you for requesting a [employer] account. Our records indicate that you already have an active [employer] account. You can access your account anytime at [email or web address] Did you know that you can access your hospital and ER discharge instructions at any time in [employer]? You can also review all of your test results from your hospital stay or ER visit. Additional Information If you have questions, please visit the Frequently Asked Questions section of the [email or web address] website at [email or web address] Remember, [employer] is NOT to be used for urgent needs. For medical emergencies, dial 911. Now available from your iPhone and Android! Please provide this summary of care documentation to your next provider. Your primary care clinician is listed as Ramon Padilla. If you have any questions after today's visit, please call [phone].

## 2018-05-07 NOTE — PROGRESS NOTES
1. Have you been to the ER, urgent care clinic since your last visit? Hospitalized since your last visit? No    2. Have you seen or consulted any other health care providers outside of the [address] [place] [address] since your last visit? Include any pap smears or colon screening. No     Patient presents in office today for routine care. Patient concerns: sinus pressure and pain for a week.

## 2018-05-07 NOTE — PATIENT INSTRUCTIONS
Sinusitis: Care Instructions  Your Care Instructions    Sinusitis is an infection of the lining of the sinus cavities in your head. Sinusitis often follows a cold. It causes pain and pressure in your head and face. In most cases, sinusitis gets better on its own in 1 to 2 weeks. But some mild symptoms may last for several weeks. Sometimes antibiotics are needed. Follow-up care is a key part of your treatment and safety. Be sure to make and go to all appointments, and call your doctor if you are having problems. It's also a good idea to know your test results and keep a list of the medicines you take. How can you care for yourself at home? · Take an over-the-counter pain medicine, such as acetaminophen (Tylenol), ibuprofen (Advil, Motrin), or naproxen (Aleve). Read and follow all instructions on the label. · If the doctor prescribed antibiotics, take them as directed. Do not stop taking them just because you feel better. You need to take the full course of antibiotics. · Be careful when taking over-the-counter cold or flu medicines and Tylenol at the same time. Many of these medicines have acetaminophen, which is Tylenol. Read the labels to make sure that you are not taking more than the recommended dose. Too much acetaminophen (Tylenol) can be harmful. · Breathe warm, moist air from a steamy shower, a hot bath, or a sink filled with hot water. Avoid cold, dry air. Using a humidifier in your home may help. Follow the directions for cleaning the machine. · Use saline (saltwater) nasal washes to help keep your nasal passages open and wash out mucus and bacteria. You can buy saline nose drops at a grocery store or drugstore. Or you can make your own at home by adding 1 teaspoon of salt and 1 teaspoon of baking soda to 2 cups of distilled water. If you make your own, fill a bulb syringe with the solution, insert the tip into your nostril, and squeeze gently. Marilee Shames your nose.   · Put a hot, wet towel or a warm gel pack on your face 3 or 4 times a day for 5 to 10 minutes each time. · Try a decongestant nasal spray like oxymetazoline (Afrin). Do not use it for more than 3 days in a row. Using it for more than 3 days can make your congestion worse. When should you call for help? Call your doctor now or seek immediate medical care if:  ? · You have new or worse swelling or redness in your face or around your eyes. ? · You have a new or higher fever. ? Watch closely for changes in your health, and be sure to contact your doctor if:  ? · You have new or worse facial pain. ? · The mucus from your nose becomes thicker (like pus) or has new blood in it. ? · You are not getting better as expected. Where can you learn more? Go to http://staci-zeinab.info/. Enter B727 in the search box to learn more about \"Sinusitis: Care Instructions. \"  Current as of: May 12, 2017  Content Version: 11.4  © 2155-0222 [employer]. Care instructions adapted under license by [employer] (which disclaims liability or warranty for this information). If you have questions about a medical condition or this instruction, always ask your healthcare professional. [address] any warranty or liability for your use of this information.

## 2018-05-07 NOTE — PROGRESS NOTES
Subjective:     HPI:  Orvil Peabody is a 62 y.o. female who presents to the office complaining of sinus pain. Sinus Pain: Pt complains of sinus pain and post-nasal drip. She reports having a low grade fever in addition to chills. Pt has been taking Allegra for allergy symptoms with minimal relief. Current Outpatient Prescriptions   Medication Sig Dispense Refill    cefdinir (OMNICEF) 300 mg capsule Take 1 Cap by mouth two (2) times a day for 10 days. 20 Cap 0    ibuprofen (MOTRIN) 800 mg tablet Take 1 Tab by mouth every eight (8) hours as needed for Pain. 30 Tab 0    metFORMIN (GLUCOPHAGE) 850 mg tablet TAKE 1 TABLET BY MOUTH TWICE DAILY WITH MEALS 180 Tab 2    gabapentin (NEURONTIN) 300 mg capsule Take 1 Cap by mouth three (3) times daily. 90 Cap 2    glucose blood VI test strips (BLOOD GLUCOSE TEST) strip Use one strip (freestyle lite) for each glucose check. Check glucose 2 times per day. 100 Strip 7    insulin aspart U-100 (NOVOLOG) 100 unit/mL inpn Take 8 units with each meal. 10 Pen 11    insulin degludec (TRESIBA FLEXTOUCH U-100) 100 unit/mL (3 mL) inpn 35 Units by SubCUTAneous route daily. 15 Pen 2    prasugrel (EFFIENT) 10 mg tablet Take 1 Tab by mouth daily. Indications: Failed plavix. Recurrent CA. No P2Y12 response 90 Tab 3    aspirin (ASPIRIN) 325 mg tablet Take 1 Tab by mouth daily. 90 Tab 3    RABEprazole (ACIPHEX) 20 mg tablet TAKE 1 TABLET BY MOUTH ONCE DAILY 30 Tab 5    polyethylene glycol (MIRALAX) 17 gram packet Take 1 Packet by mouth two (2) times a day. 60 Packet 1    atorvastatin (LIPITOR) 80 mg tablet TAKE 1 TABLET BY MOUTH DAILY 90 Tab 9    nitrofurantoin, macrocrystal-monohydrate, (MACROBID) 100 mg capsule Take 1 Cap by mouth two (2) times a day. 14 Cap 0    sucralfate (CARAFATE) 100 mg/mL suspension Take 5 mL by mouth four (4) times daily.  414 mL 0    acetaminophen (TYLENOL EXTRA STRENGTH) 500 mg tablet Take 2 Tabs by mouth every six (6) hours as needed for Pain. 40 Tab 0    amLODIPine (NORVASC) 10 mg tablet Take 1 Tab by mouth daily. 90 Tab 3    metoprolol succinate (TOPROL-XL) 200 mg XL tablet Take 1 Tab by mouth daily. 90 Tab 3    losartan (COZAAR) 100 mg tablet Take 1 Tab by mouth daily. 90 Tab 3    hydrALAZINE (APRESOLINE) 50 mg tablet Take 0.5 Tabs by mouth four (4) times daily. 180 Tab 3    hydroCHLOROthiazide (HYDRODIURIL) 25 mg tablet Take 1 Tab by mouth daily. 90 Tab 3    levothyroxine (SYNTHROID) 125 mcg tablet Take 1 Tab by mouth Daily (before breakfast). 90 Tab 3    buPROPion (WELLBUTRIN) 100 mg tablet Take 1 Tab by mouth daily. 90 Tab 3    citalopram (CELEXA) 40 mg tablet Take 1 Tab by mouth daily. 90 Tab 3    Insulin Needles, Disposable, (DORA PEN NEEDLE) 32 gauge x 5/32\" ndle Use one needle to give insulin 4 times a day. 400 Pen Needle 15        Allergies   Allergen Reactions    Contrast Agent [Iodine] Rash and Sneezing       Past Medical History:   Diagnosis Date    Anxiety     CAD (coronary artery disease)     S/P Coronary stents ( LAD and Lcx)    Depression     Diabetes ([facility]     Hepatitis C     Hypercholesterolemia     Hypertension     Stroke ([facility] 12/18/2017        Past Surgical History:   Procedure Laterality Date    HX BREAST BIOPSY      1971 2010 left breast lump removed excisional per patient     [address]    pre-eclampsia    HX CHOLECYSTECTOMY  2004    HX CORONARY STENT PLACEMENT  Nov 2013    4 stents    HX HEENT  2009    thyroidectomy due to nodules.      HX HYSTERECTOMY  2005    heavy periods    HX THYROIDECTOMY         Family History   Problem Relation Age of Onset    Diabetes Mother     Hypertension Mother     Cancer Mother     Heart Disease Mother     Heart Attack Mother     Diabetes Father     Hypertension Father     Cancer Father        Social History     Social History    Marital status:      Spouse name: N/A    Number of children: N/A    Years of education: N/A     Occupational History    Not on file. Social History Main Topics    Smoking status: Never Smoker    Smokeless tobacco: Never Used    Alcohol use No    Drug use: No    Sexual activity: Not Currently     Other Topics Concern    Not on file     Social History Narrative       REVIEW OF SYSTEM:  Review of Systems   Constitutional: Positive for chills and fever. HENT: Positive for congestion and sinus pain. Eyes: Negative for blurred vision. Respiratory: Negative for shortness of breath. Cardiovascular: Negative for chest pain, palpitations and leg swelling. Gastrointestinal: Negative for constipation, diarrhea, nausea and vomiting. Musculoskeletal: Negative for joint pain. Neurological: Negative for headaches. Objective:     Visit Vitals    /83 (BP 1 Location: Right arm, BP Patient Position: Sitting)    Pulse 92    Temp 98.7 °F (37.1 °C) (Oral)    Resp 16    Ht 5' 6\" (1.676 m)    Wt 188 lb (85.3 kg)    SpO2 96%    BMI 30.34 kg/m2       PHYSICAL EXAM:  Physical Exam   Constitutional: She is oriented to person, place, and time and well-developed, well-nourished, and in no distress. HENT:   Right Ear: Tympanic membrane, external ear and ear canal normal.   Left Ear: Tympanic membrane, external ear and ear canal normal.   Nose: Mucosal edema (bilaterally ) present. Right sinus exhibits maxillary sinus tenderness and frontal sinus tenderness. Left sinus exhibits maxillary sinus tenderness and frontal sinus tenderness. Mouth/Throat: Oropharynx is clear and moist.   Blood on mucus membrane in R nostril    Eyes: Pupils are equal, round, and reactive to light. Neck: Normal range of motion. Neck supple. No thyromegaly present. Cardiovascular: Regular rhythm, normal heart sounds and intact distal pulses. Tachycardia present. No murmur heard. Pulmonary/Chest: Effort normal and breath sounds normal. She has no wheezes. Neurological: She is alert and oriented to person, place, and time. GCS score is 15. Skin: Skin is warm and dry. Vitals reviewed. Assessment/Plan:       ICD-10-CM ICD-9-CM    1. Acute non-recurrent maxillary sinusitis J01.00 461.0 cefdinir (OMNICEF) 300 mg capsule      ibuprofen (MOTRIN) 800 mg tablet     Patient given opportunity to ask questions. Questions answered. Will treat sinus infection with Omnicef (300). Will treat pain with Ibuprofen (800mg) and Tylenol. Patient understands plan of care. Follow-up Disposition:  Return if symptoms worsen or fail to improve. Written by Lexis Perez, as dictated by DO. AMOS Dash, Dr. Marielos Alvarez, confirm that all documentation is accurate.

## 2018-05-14 DIAGNOSIS — J01.00 ACUTE NON-RECURRENT MAXILLARY SINUSITIS: ICD-10-CM

## 2018-05-14 NOTE — TELEPHONE ENCOUNTER
Patient requesting the following medication refill     Medication requesting metoprolol ER succinate 200 mg    Date last prescribed 04/25/2017  QTY 90 Refills 3    Date patient last seen 05/07/2018    Follow up appt scheduled for 06/29/2018    Please advise

## 2018-05-18 RX ORDER — METOPROLOL SUCCINATE 200 MG/1
TABLET, EXTENDED RELEASE ORAL
Qty: 90 TAB | Refills: 1 | Status: SHIPPED | OUTPATIENT
Start: 2018-05-18 | End: 2018-07-31 | Stop reason: SDUPTHER

## 2018-05-21 DIAGNOSIS — J01.00 ACUTE NON-RECURRENT MAXILLARY SINUSITIS: ICD-10-CM

## 2018-05-21 DIAGNOSIS — J01.00 ACUTE MAXILLARY SINUSITIS, RECURRENCE NOT SPECIFIED: Primary | ICD-10-CM

## 2018-05-22 RX ORDER — IBUPROFEN 800 MG/1
TABLET ORAL
Qty: 90 TAB | Refills: 0 | OUTPATIENT
Start: 2018-05-22

## 2018-05-22 RX ORDER — IBUPROFEN 800 MG/1
TABLET ORAL
Qty: 30 TAB | Refills: 0 | OUTPATIENT
Start: 2018-05-22

## 2018-05-22 NOTE — TELEPHONE ENCOUNTER
Patient requesting the following medication refill     Medication requesting motrin 800 mg tab  Date last prescribed 5/7/18   QTY 30 Refills 0     Date patient last seen 05/07/2018    Follow up appt scheduled for 06/29/2018    Please advise

## 2018-07-06 ENCOUNTER — DOCUMENTATION ONLY (OUTPATIENT)
Dept: FAMILY MEDICINE CLINIC | Age: 58
End: 2018-07-06

## 2018-07-06 NOTE — LETTER
7/6/2018 Mellissa Singer [address] [facility] [phone] Dear MsZen Mellissa Viverosrijas, We had an appointment reserved for you 6/29/18 and were concerned when you did not show or call within 24 hours to cancel the appointment. As mentioned in a previous letter to you, appointment time is limited and no-showed appointments may prevent another sick individual who needs to be seen from getting a preferred appointment time. Please note that a continued pattern of not showing for appointments may result in your inability to pre-book future appointments as well as possible discharge from the practice. Please call us at your earliest convenience to reschedule your appointment as your provider felt it was important to see you. Thank you for your anticipated cooperation. Sincerely, The scheduling staff [address] [place] [id number] 
[phone]

## 2018-07-12 ENCOUNTER — TELEPHONE (OUTPATIENT)
Dept: CARDIOLOGY CLINIC | Age: 58
End: 2018-07-12

## 2018-07-12 NOTE — TELEPHONE ENCOUNTER
Unable to reach to schedule overdue f/u per tickler. Mailing letter. Second attempt on this tickler.

## 2018-07-31 ENCOUNTER — HOSPITAL ENCOUNTER (OUTPATIENT)
Dept: LAB | Age: 58
Discharge: HOME | End: 2018-07-31
Payer: OTHER GOVERNMENT

## 2018-07-31 ENCOUNTER — OFFICE VISIT (OUTPATIENT)
Dept: FAMILY MEDICINE CLINIC | Age: 58
End: 2018-07-31

## 2018-07-31 VITALS
HEIGHT: 62 IN | SYSTOLIC BLOOD PRESSURE: 136 MMHG | TEMPERATURE: 97.4 F | RESPIRATION RATE: 16 BRPM | HEART RATE: 83 BPM | WEIGHT: 192 LBS | BODY MASS INDEX: 35.33 KG/M2 | OXYGEN SATURATION: 96 % | DIASTOLIC BLOOD PRESSURE: 84 MMHG

## 2018-07-31 DIAGNOSIS — I10 ESSENTIAL HYPERTENSION: ICD-10-CM

## 2018-07-31 DIAGNOSIS — E66.9 OBESITY, CLASS II, BMI 35-39.9: ICD-10-CM

## 2018-07-31 DIAGNOSIS — G40.109 SEIZURE DISORDER, FOCAL MOTOR (HCC): ICD-10-CM

## 2018-07-31 DIAGNOSIS — E03.9 HYPOTHYROIDISM, ACQUIRED: ICD-10-CM

## 2018-07-31 DIAGNOSIS — K21.9 GASTRIC REFLUX: ICD-10-CM

## 2018-07-31 DIAGNOSIS — F33.9 RECURRENT DEPRESSION (HCC): ICD-10-CM

## 2018-07-31 DIAGNOSIS — E11.21 TYPE 2 DIABETES MELLITUS WITH NEPHROPATHY (HCC): ICD-10-CM

## 2018-07-31 DIAGNOSIS — I25.10 CORONARY ARTERY DISEASE INVOLVING NATIVE CORONARY ARTERY OF NATIVE HEART WITHOUT ANGINA PECTORIS: ICD-10-CM

## 2018-07-31 DIAGNOSIS — E11.21 TYPE 2 DIABETES MELLITUS WITH NEPHROPATHY (HCC): Primary | ICD-10-CM

## 2018-07-31 DIAGNOSIS — F32.89 OTHER DEPRESSION: ICD-10-CM

## 2018-07-31 PROBLEM — E66.01 SEVERE OBESITY (BMI 35.0-39.9): Status: ACTIVE | Noted: 2018-07-31

## 2018-07-31 LAB
ALBUMIN SERPL-MCNC: 2.9 G/DL (ref 3.4–5)
ALBUMIN/GLOB SERPL: 0.6 {RATIO} (ref 0.8–1.7)
ALP SERPL-CCNC: 181 U/L (ref 45–117)
ALT SERPL-CCNC: 36 U/L (ref 13–56)
ANION GAP SERPL CALC-SCNC: 8 MMOL/L (ref 3–18)
AST SERPL-CCNC: 31 U/L (ref 15–37)
BILIRUB SERPL-MCNC: 0.4 MG/DL (ref 0.2–1)
BUN SERPL-MCNC: 34 MG/DL (ref 7–18)
BUN/CREAT SERPL: 30 (ref 12–20)
CALCIUM SERPL-MCNC: 9 MG/DL (ref 8.5–10.1)
CHLORIDE SERPL-SCNC: 103 MMOL/L (ref 100–108)
CHOLEST SERPL-MCNC: 197 MG/DL
CO2 SERPL-SCNC: 30 MMOL/L (ref 21–32)
CREAT SERPL-MCNC: 1.14 MG/DL (ref 0.6–1.3)
EST. AVERAGE GLUCOSE BLD GHB EST-MCNC: 186 MG/DL
GLOBULIN SER CALC-MCNC: 4.8 G/DL (ref 2–4)
GLUCOSE SERPL-MCNC: 312 MG/DL (ref 74–99)
HBA1C MFR BLD: 8.1 % (ref 4.2–5.6)
HDLC SERPL-MCNC: 51 MG/DL (ref 40–60)
HDLC SERPL: 3.9 {RATIO} (ref 0–5)
LDLC SERPL CALC-MCNC: 109.6 MG/DL (ref 0–100)
LIPID PROFILE,FLP: ABNORMAL
POTASSIUM SERPL-SCNC: 3.7 MMOL/L (ref 3.5–5.5)
PROT SERPL-MCNC: 7.7 G/DL (ref 6.4–8.2)
SODIUM SERPL-SCNC: 141 MMOL/L (ref 136–145)
TRIGL SERPL-MCNC: 182 MG/DL (ref ?–150)
VLDLC SERPL CALC-MCNC: 36.4 MG/DL

## 2018-07-31 PROCEDURE — 80061 LIPID PANEL: CPT | Performed by: FAMILY MEDICINE

## 2018-07-31 PROCEDURE — 36415 COLL VENOUS BLD VENIPUNCTURE: CPT | Performed by: FAMILY MEDICINE

## 2018-07-31 PROCEDURE — 83036 HEMOGLOBIN GLYCOSYLATED A1C: CPT | Performed by: FAMILY MEDICINE

## 2018-07-31 PROCEDURE — 80053 COMPREHEN METABOLIC PANEL: CPT | Performed by: FAMILY MEDICINE

## 2018-07-31 RX ORDER — HYDRALAZINE HYDROCHLORIDE 50 MG/1
25 TABLET, FILM COATED ORAL 4 TIMES DAILY
Qty: 180 TAB | Refills: 3 | Status: SHIPPED | OUTPATIENT
Start: 2018-07-31 | End: 2019-03-25 | Stop reason: SDUPTHER

## 2018-07-31 RX ORDER — LEVOTHYROXINE SODIUM 125 UG/1
125 TABLET ORAL
Qty: 90 TAB | Refills: 3 | Status: SHIPPED | OUTPATIENT
Start: 2018-07-31 | End: 2019-03-25 | Stop reason: SDUPTHER

## 2018-07-31 RX ORDER — BUPROPION HYDROCHLORIDE 100 MG/1
100 TABLET ORAL DAILY
Qty: 90 TAB | Refills: 3 | Status: SHIPPED | OUTPATIENT
Start: 2018-07-31 | End: 2019-03-25 | Stop reason: SDUPTHER

## 2018-07-31 RX ORDER — RABEPRAZOLE SODIUM 20 MG/1
TABLET, DELAYED RELEASE ORAL
Qty: 30 TAB | Refills: 5 | Status: SHIPPED | OUTPATIENT
Start: 2018-07-31 | End: 2021-04-15 | Stop reason: ALTCHOICE

## 2018-07-31 RX ORDER — AMLODIPINE BESYLATE 10 MG/1
10 TABLET ORAL DAILY
Qty: 90 TAB | Refills: 3 | Status: SHIPPED | OUTPATIENT
Start: 2018-07-31 | End: 2019-03-25 | Stop reason: SDUPTHER

## 2018-07-31 RX ORDER — LOSARTAN POTASSIUM 100 MG/1
100 TABLET ORAL DAILY
Qty: 90 TAB | Refills: 3 | Status: SHIPPED | OUTPATIENT
Start: 2018-07-31 | End: 2019-03-25 | Stop reason: SDUPTHER

## 2018-07-31 RX ORDER — METOPROLOL SUCCINATE 200 MG/1
TABLET, EXTENDED RELEASE ORAL
Qty: 90 TAB | Refills: 1 | Status: SHIPPED | OUTPATIENT
Start: 2018-07-31 | End: 2019-03-25 | Stop reason: SDUPTHER

## 2018-07-31 RX ORDER — ATORVASTATIN CALCIUM 80 MG/1
TABLET, FILM COATED ORAL
Qty: 90 TAB | Refills: 9 | Status: SHIPPED | OUTPATIENT
Start: 2018-07-31 | End: 2019-03-25 | Stop reason: SDUPTHER

## 2018-07-31 RX ORDER — HYDROCHLOROTHIAZIDE 25 MG/1
25 TABLET ORAL DAILY
Qty: 90 TAB | Refills: 3 | Status: SHIPPED | OUTPATIENT
Start: 2018-07-31 | End: 2019-03-25 | Stop reason: SDUPTHER

## 2018-07-31 RX ORDER — CITALOPRAM 40 MG/1
40 TABLET, FILM COATED ORAL DAILY
Qty: 90 TAB | Refills: 3 | Status: SHIPPED | OUTPATIENT
Start: 2018-07-31 | End: 2019-03-25 | Stop reason: SDUPTHER

## 2018-07-31 RX ORDER — PRASUGREL 10 MG/1
10 TABLET, FILM COATED ORAL DAILY
Qty: 90 TAB | Refills: 3 | Status: SHIPPED | OUTPATIENT
Start: 2018-07-31 | End: 2019-11-22

## 2018-07-31 NOTE — PROGRESS NOTES
1. Have you been to the ER, urgent care clinic since your last visit? Hospitalized since your last visit? No 
 
2. Have you seen or consulted any other health care providers outside of the [address] [place] [address] since your last visit? Include any pap smears or colon screening. No  
 
Patient presents in office today for routine care. Patient concerns: med refills, patient complaining of fever only at night for a week.

## 2018-07-31 NOTE — PROGRESS NOTES
Subjective: HPI: 
Mina Morris is a 62 y.o. female who presents to the office for routine care and medication refills. Sleeplessness: Pt complains of alterations to her sleep pattern. She reports she is unable to fall asleep without taking some aid. She states she has taken Tylenol PM, Advil PM, Melatonin, she states melatonin does not allow her to sleep. Pt reports she has started taking two Z-quil pills in an attempt to sleep. Pt states if she can not fall asleep she tries to read books. Constipation: Pt denies further complications with constipation. Hyperlipidemia: Pt is taking Lipitor for hyperlipidemia. No medication side effects noted. Most recent lipid panel completed on 04/09/2018. Hypertension The patient presents for follow up of hypertension. Pt's BP is 136/84 in the office today. Cardiovascular ROS: taking medications as instructed, no medication side effects noted, no TIA's, no chest pain on exertion, no dyspnea on exertion, no swelling of ankles. Diabetes Mellitus: 
female has diabetes mellitus, and  hypertension and hyperlipidemia. Diabetic ROS - medication compliance: compliant most of the time, diabetic diet compliance: noncompliant some of the time, home glucose monitoring: is performed regularly, further diabetic ROS: no polyuria or polydipsia, no chest pain or dyspnea, no chest pain, dyspnea or TIA's, no numbness, tingling or pain in extremities. Weight:  
Wt Readings from Last 3 Encounters:  
07/31/18 192 lb (87.1 kg) 05/07/18 188 lb (85.3 kg) 03/30/18 183 lb (83 kg) Seizures: Pt reports she had two seizures last year. She states she believes her sugar levels were too high which caused her to seize. She reports both seizures occurred after episodes of high sugar levels. Pt was evaluated by neurology in patient, EEG performed. Advised to follow-up with Dr. Luis Alberto Treviño, no medication given at that time. If recurrent the medication will be given.   
 
EEG PROCEDURE NOTE: 04/04/2017 INTERPRETATION: This is a normal awake and asleep EEG with no 
evidence of epileptiform activity. Depression: Pt reports her depression is stable on Celexa and Wellbutrin. Pt states she has not attended any counseling. She denies any suicidal or homicidal ideations. Medication list reviewed. Of note, all diabetes medication to go to mail order pharmacy. Labs reviewed:  
Lab Results Component Value Date/Time Hemoglobin A1c 6.7 (H) 04/09/2018 09:42 AM  
 Hemoglobin A1c 7.0 (H) 12/18/2017 09:36 AM  
 Hemoglobin A1c 8.7 (H) 08/28/2017 11:39 AM  
 
Lab Results Component Value Date/Time Sodium 140 04/09/2018 09:42 AM  
 Potassium 4.3 04/09/2018 09:42 AM  
 Chloride 104 04/09/2018 09:42 AM  
 CO2 32 04/09/2018 09:42 AM  
 Anion gap 4 04/09/2018 09:42 AM  
 Glucose 216 (H) 04/09/2018 09:42 AM  
 BUN 28 (H) 04/09/2018 09:42 AM  
 Creatinine 0.95 04/09/2018 09:42 AM  
 BUN/Creatinine ratio 29 (H) 04/09/2018 09:42 AM  
 GFR est AA >60 04/09/2018 09:42 AM  
 GFR est non-AA >60 04/09/2018 09:42 AM  
 Calcium 9.1 04/09/2018 09:42 AM  
 Bilirubin, total 0.5 04/09/2018 09:42 AM  
 AST (SGOT) 39 (H) 04/09/2018 09:42 AM  
 Alk. phosphatase 164 (H) 04/09/2018 09:42 AM  
 Protein, total 8.0 04/09/2018 09:42 AM  
 Albumin 3.2 (L) 04/09/2018 09:42 AM  
 Globulin 4.8 (H) 04/09/2018 09:42 AM  
 A-G Ratio 0.7 (L) 04/09/2018 09:42 AM  
 ALT (SGPT) 54 04/09/2018 09:42 AM  
 
Lab Results Component Value Date/Time Cholesterol, total 217 (H) 04/09/2018 09:42 AM  
 HDL Cholesterol 72 (H) 04/09/2018 09:42 AM  
 LDL, calculated 121.6 (H) 04/09/2018 09:42 AM  
 VLDL, calculated 23.4 04/09/2018 09:42 AM  
 Triglyceride 117 04/09/2018 09:42 AM  
 CHOL/HDL Ratio 3.0 04/09/2018 09:42 AM  
 
Current Outpatient Prescriptions Medication Sig Dispense Refill  metoprolol succinate (TOPROL-XL) 200 mg XL tablet TAKE 1 TABLET BY MOUTH DAILY 90 Tab 1  prasugrel (EFFIENT) 10 mg tablet Take 1 Tab by mouth daily. Indications: Failed plavix. Recurrent CA. No P2Y12 response 90 Tab 3  
 RABEprazole (ACIPHEX) 20 mg tablet TAKE 1 TABLET BY MOUTH ONCE DAILY 30 Tab 5  
 atorvastatin (LIPITOR) 80 mg tablet TAKE 1 TABLET BY MOUTH DAILY 90 Tab 9  
 amLODIPine (NORVASC) 10 mg tablet Take 1 Tab by mouth daily. 90 Tab 3  
 losartan (COZAAR) 100 mg tablet Take 1 Tab by mouth daily. 90 Tab 3  
 hydrALAZINE (APRESOLINE) 50 mg tablet Take 0.5 Tabs by mouth four (4) times daily. 180 Tab 3  
 hydroCHLOROthiazide (HYDRODIURIL) 25 mg tablet Take 1 Tab by mouth daily. 90 Tab 3  
 levothyroxine (SYNTHROID) 125 mcg tablet Take 1 Tab by mouth Daily (before breakfast). 90 Tab 3  
 buPROPion (WELLBUTRIN) 100 mg tablet Take 1 Tab by mouth daily. 90 Tab 3  
 citalopram (CELEXA) 40 mg tablet Take 1 Tab by mouth daily. 90 Tab 3  
 metFORMIN (GLUCOPHAGE) 850 mg tablet TAKE 1 TABLET BY MOUTH TWICE DAILY WITH MEALS 180 Tab 2  
 gabapentin (NEURONTIN) 300 mg capsule Take 1 Cap by mouth three (3) times daily. 90 Cap 2  
 glucose blood VI test strips (BLOOD GLUCOSE TEST) strip Use one strip (freestyle lite) for each glucose check. Check glucose 2 times per day. 100 Strip 7  
 insulin aspart U-100 (NOVOLOG) 100 unit/mL inpn Take 8 units with each meal. 10 Pen 11  
 insulin degludec (TRESIBA FLEXTOUCH U-100) 100 unit/mL (3 mL) inpn 35 Units by SubCUTAneous route daily. 15 Pen 2  
 aspirin (ASPIRIN) 325 mg tablet Take 1 Tab by mouth daily. 90 Tab 3  polyethylene glycol (MIRALAX) 17 gram packet Take 1 Packet by mouth two (2) times a day. 60 Packet 1  
 acetaminophen (TYLENOL EXTRA STRENGTH) 500 mg tablet Take 2 Tabs by mouth every six (6) hours as needed for Pain. 40 Tab 0  
 Insulin Needles, Disposable, (DORA PEN NEEDLE) 32 gauge x 5/32\" ndle Use one needle to give insulin 4 times a day. 400 Pen Needle 15  ibuprofen (MOTRIN) 800 mg tablet Take 1 Tab by mouth every eight (8) hours as needed for Pain. 30 Tab 0  nitrofurantoin, macrocrystal-monohydrate, (MACROBID) 100 mg capsule Take 1 Cap by mouth two (2) times a day. 14 Cap 0 Allergies Allergen Reactions  Contrast Agent [Iodine] Rash and Sneezing Past Medical History:  
Diagnosis Date  Anxiety  CAD (coronary artery disease) S/P Coronary stents ( LAD and Lcx)  Depression  Diabetes ([facility]  Hepatitis C   
 Hypercholesterolemia  Hypertension  Stroke ([facility] 2017 Past Surgical History:  
Procedure Laterality Date  HX BREAST BIOPSY    
 1971 left breast lump removed excisional per patient  HX  SECTION    
 pre-eclampsia  HX CHOLECYSTECTOMY    HX CORONARY STENT PLACEMENT  2013  
 4 stents  HX HEENT   thyroidectomy due to nodules.  HX HYSTERECTOMY  2005  
 heavy periods  HX THYROIDECTOMY Family History Problem Relation Age of Onset  Diabetes Mother  Hypertension Mother  Cancer Mother  Heart Disease Mother  Heart Attack Mother  Diabetes Father  Hypertension Father  Cancer Father Social History Social History  Marital status:  Spouse name: N/A  
 Number of children: N/A  
 Years of education: N/A Occupational History  Not on file. Social History Main Topics  Smoking status: Never Smoker  Smokeless tobacco: Never Used  Alcohol use No  
 Drug use: No  
 Sexual activity: Not Currently Other Topics Concern  Not on file Social History Narrative REVIEW OF SYSTEM: 
Review of Systems Constitutional: Negative for chills and fever. Eyes: Negative for blurred vision. Respiratory: Negative for shortness of breath. Cardiovascular: Negative for chest pain, palpitations and leg swelling. Gastrointestinal: Negative for constipation, diarrhea, nausea and vomiting. Musculoskeletal: Negative for joint pain. Neurological: Negative for headaches.   
 
 
Objective: Visit Vitals  /84 (BP 1 Location: Right arm, BP Patient Position: Sitting)  Pulse 83  Temp 97.4 °F (36.3 °C) (Oral)  Resp 16  
 Ht 5' 2\" (1.575 m)  Wt 192 lb (87.1 kg)  SpO2 96%  BMI 35.12 kg/m2 PHYSICAL EXAM: 
Physical Exam  
Constitutional: She is oriented to person, place, and time and well-developed, well-nourished, and in no distress. HENT:  
Right Ear: Tympanic membrane, external ear and ear canal normal.  
Left Ear: Tympanic membrane, external ear and ear canal normal.  
Nose: Nose normal.  
Mouth/Throat: Oropharynx is clear and moist.  
Eyes: Pupils are equal, round, and reactive to light. Neck: Normal range of motion. Neck supple. No thyromegaly present. Cardiovascular: Normal rate, regular rhythm, normal heart sounds and intact distal pulses. No murmur heard. Pulmonary/Chest: Effort normal and breath sounds normal. She has no wheezes. Neurological: She is alert and oriented to person, place, and time. GCS score is 15. Skin: Skin is warm and dry. Vitals reviewed. Assessment/Plan: ICD-10-CM ICD-9-CM 1. Type 2 diabetes mellitus with nephropathy (HCC) E11.21 250.40 HEMOGLOBIN A1C WITH EAG  
  105.07 METABOLIC PANEL, COMPREHENSIVE 2. Essential hypertension B12 795.2 METABOLIC PANEL, COMPREHENSIVE  
   LIPID PANEL  
   metoprolol succinate (TOPROL-XL) 200 mg XL tablet  
   amLODIPine (NORVASC) 10 mg tablet  
   losartan (COZAAR) 100 mg tablet  
   hydrALAZINE (APRESOLINE) 50 mg tablet  
   hydroCHLOROthiazide (HYDRODIURIL) 25 mg tablet 3. Seizure disorder, focal motor ([address] G40.109 345.50 4. Recurrent depression (HCC) F33.9 296.30   
5. Gastric reflux K21.9 530.81 RABEprazole (ACIPHEX) 20 mg tablet 6. Coronary artery disease involving native coronary artery of native heart without angina pectoris I25.10 414.01 prasugrel (EFFIENT) 10 mg tablet  
   atorvastatin (LIPITOR) 80 mg tablet 7.  Hypothyroidism, acquired E03.9 244.9 levothyroxine (SYNTHROID) 125 mcg tablet 8. Other depression F32.89 311 buPROPion (WELLBUTRIN) 100 mg tablet  
   citalopram (CELEXA) 40 mg tablet 9. Obesity, Class II, BMI 35-39.9 E66.9 278.00 Patient given opportunity to ask questions. Questions answered. Patient ate two scrambled eggs with water for breakfast this morning. Advised patient to discontinue using ibuprofen due to daily use of ASA and current script for Effient. She can use Tylenol. Patient understands plan of care. Follow-up Disposition: 
Return in about 3 months (around 10/31/2018). Written by Elvia Shields, as dictated by Haley Sharpe DO. 
 
I, Dr. Haley Sharpe, confirm that all documentation is accurate.

## 2018-07-31 NOTE — MR AVS SNAPSHOT
[address] [place] [address] 
[phone] Patient: Verna Bhandari MRN: [id number] Visit Information Date & Time Provider Department Dept. Phone Encounter #  
 7/31/2018  8:20 AM Nicolle Palmer [address] [place] [address]  [id number] Follow-up Instructions Return in about 3 months (around 10/31/2018). Upcoming Health Maintenance Date Due  
 EYE EXAM RETINAL OR DILATED Q1 8/24/1970 DTaP/Tdap/Td series (1 - Tdap) 8/24/1981 FOBT Q 1 YEAR AGE 50-75 8/24/2010 FOOT EXAM Q1 10/25/2017 MICROALBUMIN Q1 1/25/2018 BREAST CANCER SCRN MAMMOGRAM 11/16/2018 Influenza Age 5 to Adult 8/1/2018 HEMOGLOBIN A1C Q6M 10/9/2018 LIPID PANEL Q1 4/9/2019 Allergies as of 7/31/2018  Review Complete On: 7/31/2018 By: Nicolle Palmer DO Severity Noted Reaction Type Reactions Contrast Agent [Iodine]  06/24/2014    Rash, Sneezing Current Immunizations  Reviewed on 12/19/2017 Name Date Influenza Vaccine 9/1/2017 Influenza Vaccine (Madin Divya Canine Kidney) PF 2/5/2015  3:56 PM  
 Influenza Vaccine (Quad) PF 11/30/2015 Pneumococcal Polysaccharide (PPSV-23) 10/25/2016 Not reviewed this visit You Were Diagnosed With   
  
 Codes Comments Type 2 diabetes mellitus with nephropathy ([facility]    -  Primary ICD-10-CM: E11.21 
ICD-9-CM: 250.40, 583.81 Essential hypertension     ICD-10-CM: I10 
ICD-9-CM: 401.9 Seizure disorder, focal motor ([facility]     ICD-10-CM: G40.109 ICD-9-CM: 345.50 Recurrent depression ([facility]     ICD-10-CM: F33.9 ICD-9-CM: 296.30 Gastric reflux     ICD-10-CM: K21.9 ICD-9-CM: 530.81 Coronary artery disease involving native coronary artery of native heart without angina pectoris     ICD-10-CM: I25.10 ICD-9-CM: 414.01 Hypothyroidism, acquired     ICD-10-CM: E03.9 ICD-9-CM: 244.9  Other depression     ICD-10-CM: F32.89 
 ICD-9-CM: 242 Vitals BP Pulse Temp Resp Height(growth percentile) Weight(growth percentile) 136/84 (BP 1 Location: Right arm, BP Patient Position: Sitting) 83 97.4 °F (36.3 °C) (Oral) 16 5' 2\" (1.575 m) 192 lb (87.1 kg) SpO2 BMI OB Status Smoking Status 96% 35.12 kg/m2 Hysterectomy Never Smoker Vitals History BMI and BSA Data Body Mass Index Body Surface Area  
 35.12 kg/m 2 1.95 m 2 Preferred Pharmacy Pharmacy Name Phone [facility], [address] [phone] Your Updated Medication List  
  
   
This list is accurate as of 7/31/18  9:07 AM.  Always use your most recent med list.  
  
  
  
  
 acetaminophen 500 mg tablet Commonly known as:  Jr Connie Ibrahim Se Take 2 Tabs by mouth every six (6) hours as needed for Pain. amLODIPine 10 mg tablet Commonly known as:  Elyn Coffer Take 1 Tab by mouth daily. aspirin 325 mg tablet Commonly known as:  ASPIRIN Take 1 Tab by mouth daily. atorvastatin 80 mg tablet Commonly known as:  LIPITOR  
TAKE 1 TABLET BY MOUTH DAILY  
  
 buPROPion 100 mg tablet Commonly known as:  [facility] Take 1 Tab by mouth daily. citalopram 40 mg tablet Commonly known as:  Nathan Flatter Take 1 Tab by mouth daily. gabapentin 300 mg capsule Commonly known as:  NEURONTIN Take 1 Cap by mouth three (3) times daily. glucose blood VI test strips strip Commonly known as:  blood glucose test  
Use one strip (freestyle lite) for each glucose check. Check glucose 2 times per day. hydrALAZINE 50 mg tablet Commonly known as:  APRESOLINE Take 0.5 Tabs by mouth four (4) times daily. hydroCHLOROthiazide 25 mg tablet Commonly known as:  HYDRODIURIL Take 1 Tab by mouth daily. ibuprofen 800 mg tablet Commonly known as:  MOTRIN Take 1 Tab by mouth every eight (8) hours as needed for Pain. insulin aspart U-100 100 unit/mL Inpn Commonly known as:  Macy Carbajal Take 8 units with each meal.  
  
 insulin degludec 100 unit/mL (3 mL) Inpn Commonly known as:  TRESIBA FLEXTOUCH U-100  
35 Units by SubCUTAneous route daily. Insulin Needles (Disposable) 32 gauge x 5/32\" Ndle Commonly known as:  Mona Pen Needle Use one needle to give insulin 4 times a day. levothyroxine 125 mcg tablet Commonly known as:  SYNTHROID Take 1 Tab by mouth Daily (before breakfast). losartan 100 mg tablet Commonly known as:  COZAAR Take 1 Tab by mouth daily. metFORMIN 850 mg tablet Commonly known as:  GLUCOPHAGE  
TAKE 1 TABLET BY MOUTH TWICE DAILY WITH MEALS  
  
 metoprolol succinate 200 mg XL tablet Commonly known as:  TOPROL-XL  
TAKE 1 TABLET BY MOUTH DAILY  
  
 nitrofurantoin (macrocrystal-monohydrate) 100 mg capsule Commonly known as:  MACROBID Take 1 Cap by mouth two (2) times a day. polyethylene glycol 17 gram packet Commonly known as:  Zenia Tiffani Take 1 Packet by mouth two (2) times a day. prasugrel 10 mg tablet Commonly known as:  EFFIENT Take 1 Tab by mouth daily. Indications: Failed plavix. Recurrent CA. No P2Y12 response RABEprazole 20 mg tablet Commonly known as:  ACIPHEX  
TAKE 1 TABLET BY MOUTH ONCE DAILY Prescriptions Sent to Pharmacy Refills  
 metoprolol succinate (TOPROL-XL) 200 mg XL tablet 1 Sig: TAKE 1 TABLET BY MOUTH DAILY Class: Normal  
 Pharmacy: "[facility]" [address] [place] [address], [address] [place] [address] Ph #: [phone]  
 prasugrel (EFFIENT) 10 mg tablet 3 Sig: Take 1 Tab by mouth daily. Indications: Failed plavix. Recurrent CA. No P2Y12 response Class: Normal  
 Pharmacy: "[facility]" [address] [place] [address], [address] Ph #: [phone]  Route: Oral  
 RABEprazole (ACIPHEX) 20 mg tablet 5  
 Sig: TAKE 1 TABLET BY MOUTH ONCE DAILY Class: Normal  
 Pharmacy: [address], [address] AT [address] [place] [address] Ph #: [phone]  
 atorvastatin (LIPITOR) 80 mg tablet 9 Sig: TAKE 1 TABLET BY MOUTH DAILY Class: Normal  
 Pharmacy: [address], [address] AT [address] [place] [address] Ph #: [phone]  
 amLODIPine (NORVASC) 10 mg tablet 3 Sig: Take 1 Tab by mouth daily. Class: Normal  
 Pharmacy: [address], [address] [place] [address] Ph #: [phone] Route: Oral  
 losartan (COZAAR) 100 mg tablet 3 Sig: Take 1 Tab by mouth daily. Class: Normal  
 Pharmacy: [address], [address] [place] [address] Ph #: [phone] Route: Oral  
 hydrALAZINE (APRESOLINE) 50 mg tablet 3 Sig: Take 0.5 Tabs by mouth four (4) times daily. Class: Normal  
 Pharmacy: [facility] [address] [place] [address], [address] [place] [address] Ph #: [phone] Route: Oral  
 hydroCHLOROthiazide (HYDRODIURIL) 25 mg tablet 3 Sig: Take 1 Tab by mouth daily. Class: Normal  
 Pharmacy: [address], [address] [place] [address] Ph #: [phone] Route: Oral  
 levothyroxine (SYNTHROID) 125 mcg tablet 3 Sig: Take 1 Tab by mouth Daily (before breakfast). Class: Normal  
 Pharmacy: [facility] [address] [place] [address], [address] [place] [address] Ph #: [phone] Route: Oral  
 buPROPion (WELLBUTRIN) 100 mg tablet 3 Sig: Take 1 Tab by mouth daily. Class: Normal  
 Pharmacy: [facility] [address] [place] [address], [address] [place] [address] Ph #: [phone]  Route: Oral  
 citalopram (CELEXA) 40 mg tablet 3  
 Sig: Take 1 Tab by mouth daily. Class: Normal  
 Pharmacy: [email or web address] [address] [place], [place] [address]e , [address] #: [phone] Route: Oral  
  
Follow-up Instructions Return in about 3 months (around 10/31/2018). To-Do List   
 07/31/2018 Lab:  HEMOGLOBIN A1C WITH EAG   
  
 07/31/2018 Lab:  LIPID PANEL   
  
 07/31/2018 Lab:  METABOLIC PANEL, COMPREHENSIVE Introducing [facility]! Dear Kandy Avendano: 
Thank you for requesting a [employer] account. Our records indicate that you already have an active [employer] account. You can access your account anytime at [email or web address] Did you know that you can access your hospital and ER discharge instructions at any time in [employer]? You can also review all of your test results from your hospital stay or ER visit. Additional Information If you have questions, please visit the Frequently Asked Questions section of the [employer] website at [email or web address] Remember, [employer] is NOT to be used for urgent needs. For medical emergencies, dial 911. Now available from your iPhone and Android! Please provide this summary of care documentation to your next provider. Your primary care clinician is listed as Michael Meyer. If you have any questions after today's visit, please call [phone].

## 2018-08-31 NOTE — PROGRESS NOTES
Problem: Dysphagia (Adult)  Goal: *Acute Goals and Plan of Care (Insert Text)  Dysphagia Present: no  Aspiration: at risk     Recommendations:  Diet: regular/thin liquid diet  Meds: per pt perference  Aspiration Precautions  Oral Care TID      Goals: Patient will:  1. Tolerate PO trials with 0 s/s overt distress in 4/5 trials - goal met  2. Utilize compensatory swallow strategies/maneuvers (decrease bite/sip, size/rate, alt. liq/sol) with min cues in 4/5 trials - goal met  Outcome: Resolved/Met Date Met:  04/05/17  SPEECH LANGUAGE PATHOLOGY BEDSIDE SWALLOW EVALUATION/DISCHARGE  Patient: Beronica Vigil (60 y.o. female)  Date: 4/5/2017  Primary Diagnosis: TIA        Precautions: aspiration         ASSESSMENT :  Based on the objective data described below, the patient presents with normal swallow function. Oral motor exam revealed all structures grossly intact for mastication and deglutition. Pt accepted regular solid/thin liquid + straw with 0 s/sx aspiration. Adequate laryngeal elevation to palpation across all presentations. Recommend regular/thin liquid diet. Patient educated on importance of safe swallowing guidelines (small bites/sips, decreased rate, alt solids/liquids, HOB >60 for all PO intake); verbalized and demonstrated comprehension. Maximum therapeutic gains met; safest, least restrictive diet achieved in current in-patient/acute setting. Accordingly, SLP to d/c intervention at this time. Patient will benefit from skilled intervention to address the above impairments.   Patients rehabilitation potential is considered to be Good  Factors which may influence rehabilitation potential include:   [ ]            None noted  [X]            Mental ability/status  [X]            Medical condition  [ ]            Home/family situation and support systems  [ ]            Safety awareness  [ ]            Pain tolerance/management  [ ]            Other:        PLAN :  Recommendations and Planned Interventions:  Regular/thin liquid diet  Frequency/Duration:  SLP to d/c intervention at this time  Discharge Recommendations: None       SUBJECTIVE:   Patient stated Isabela Silvestre. OBJECTIVE:       Past Medical History:   Diagnosis Date    Anxiety      CAD (coronary artery disease)       S/P Coronary stents ( LAD and Lcx)    Depression      Diabetes ([address]      Hepatitis C      Hypercholesterolemia      Hypertension       Past Surgical History:   Procedure Laterality Date    HX BREAST BIOPSY         1971 2010 left breast lump removed excisional per patient     [address]     pre-eclampsia    HX CHOLECYSTECTOMY   2004    HX CORONARY STENT PLACEMENT   Nov 2013     4 stents    HX HEENT   2009     thyroidectomy due to nodules.  HX HYSTERECTOMY   2005     heavy periods    HX THYROIDECTOMY         Prior Level of Function/Home Situation: apartment  Home Situation  Home Environment: Apartment  # Steps to Enter: 0  One/Two Story Residence: One story  Living Alone: Yes  Support Systems: Family member(s)  Patient Expects to be Discharged to[de-identified] Apartment  Current DME Used/Available at Home: None  Diet prior to admission: regular  Current Diet:  regular   Cognitive and Communication Status:  Neurologic State: Alert  Orientation Level: Oriented X4  Cognition: Follows commands  Perception: Appears intact  Perseveration: No perseveration noted  Safety/Judgement: Fall prevention, Awareness of environment  Oral Assessment:  Oral Assessment  Labial: No impairment  Dentition: Intact  Oral Hygiene: fair  Lingual: No impairment  Velum: No impairment  Mandible: No impairment  P.O. Trials:  Patient Position: EOB90  Vocal quality prior to P.O.: No impairment  Consistency Presented: Solid; Thin liquid  How Presented: Self-fed/presented;Straw     Bolus Acceptance: No impairment  Bolus Formation/Control: No impairment     Propulsion: No impairment  Oral Residue: None  Initiation of Swallow: No impairment  Laryngeal Elevation: Functional  Aspiration Signs/Symptoms: None  Pharyngeal Phase Characteristics: No impairment, issues, or problems   Effective Modifications: Small sips and bites  Cues for Modifications: None        Oral Phase Severity: No impairment  Pharyngeal Phase Severity : No impairment     GCODESwallowing:  Swallow Current Status CH= 0%   Swallow Goal Status CH= 0%   Swallow D/C Status CH= 0%     The severity rating is based on the following outcomes:  CASEY Noms Swallow Level 7              Clinical Judgement     PAIN:  Start of Eval: 0  End of Eval: 0      After treatment:   [ ]            Patient left in no apparent distress sitting up in chair  [X]            Patient left in no apparent distress in bed  [X]            Call bell left within reach  [ ]            Nursing notified  [ ]            Family present  [ ]            Caregiver present  [ ]            Bed alarm activated      COMMUNICATION/EDUCATION:   [X]            Aspiration precautions; swallow safety; compensatory techniques. [X]            Patient/family have participated as able in goal setting and plan of care. [X]            Patient/family agree to work toward stated goals and plan of care. [ ]            Patient understands intent and goals of therapy; neutral about participation. [ ]            Patient unable to participate in goal setting/plan of care; educ ongoing with interdisciplinary staff  [ ]             Posted safety precautions in patient's room.      Thank you for this referral.  Janay Todd  [facility] SLP  Time Calculation: 9 mins Improved

## 2018-09-18 ENCOUNTER — HOSPITAL ENCOUNTER (EMERGENCY)
Age: 58
Discharge: HOME | End: 2018-09-19
Attending: EMERGENCY MEDICINE
Payer: OTHER GOVERNMENT

## 2018-09-18 ENCOUNTER — APPOINTMENT (OUTPATIENT)
Dept: CT IMAGING | Age: 58
End: 2018-09-18
Attending: EMERGENCY MEDICINE
Payer: OTHER GOVERNMENT

## 2018-09-18 ENCOUNTER — APPOINTMENT (OUTPATIENT)
Dept: GENERAL RADIOLOGY | Age: 58
End: 2018-09-18
Attending: EMERGENCY MEDICINE
Payer: OTHER GOVERNMENT

## 2018-09-18 DIAGNOSIS — M25.512 ACUTE PAIN OF LEFT SHOULDER: ICD-10-CM

## 2018-09-18 DIAGNOSIS — R42 DIZZINESS: Primary | ICD-10-CM

## 2018-09-18 LAB
ALBUMIN SERPL-MCNC: 3.1 G/DL (ref 3.4–5)
ALBUMIN/GLOB SERPL: 0.6 {RATIO} (ref 0.8–1.7)
ALP SERPL-CCNC: 171 U/L (ref 45–117)
ALT SERPL-CCNC: 42 U/L (ref 13–56)
ANION GAP SERPL CALC-SCNC: 10 MMOL/L (ref 3–18)
AST SERPL-CCNC: 45 U/L (ref 15–37)
BASOPHILS # BLD: 0 K/UL (ref 0–0.1)
BASOPHILS NFR BLD: 0 % (ref 0–2)
BILIRUB SERPL-MCNC: 0.5 MG/DL (ref 0.2–1)
BUN SERPL-MCNC: 21 MG/DL (ref 7–18)
BUN/CREAT SERPL: 15 (ref 12–20)
CALCIUM SERPL-MCNC: 8.7 MG/DL (ref 8.5–10.1)
CHLORIDE SERPL-SCNC: 103 MMOL/L (ref 100–108)
CK MB CFR SERPL CALC: NORMAL % (ref 0–4)
CK MB SERPL-MCNC: <1 NG/ML (ref 5–25)
CK SERPL-CCNC: 91 U/L (ref 26–192)
CO2 SERPL-SCNC: 26 MMOL/L (ref 21–32)
CREAT SERPL-MCNC: 1.36 MG/DL (ref 0.6–1.3)
DIFFERENTIAL METHOD BLD: ABNORMAL
EOSINOPHIL # BLD: 0 K/UL (ref 0–0.4)
EOSINOPHIL NFR BLD: 0 % (ref 0–5)
ERYTHROCYTE [DISTWIDTH] IN BLOOD BY AUTOMATED COUNT: 11.7 % (ref 11.6–14.5)
GLOBULIN SER CALC-MCNC: 5.2 G/DL (ref 2–4)
GLUCOSE SERPL-MCNC: 153 MG/DL (ref 74–99)
HCT VFR BLD AUTO: 38.1 % (ref 35–45)
HGB BLD-MCNC: 13.2 G/DL (ref 12–16)
INR PPP: 0.9 (ref 0.8–1.2)
LYMPHOCYTES # BLD: 1.4 K/UL (ref 0.9–3.6)
LYMPHOCYTES NFR BLD: 13 % (ref 21–52)
MCH RBC QN AUTO: 30.7 PG (ref 24–34)
MCHC RBC AUTO-ENTMCNC: 34.6 G/DL (ref 31–37)
MCV RBC AUTO: 88.6 FL (ref 74–97)
MONOCYTES # BLD: 1 K/UL (ref 0.05–1.2)
MONOCYTES NFR BLD: 9 % (ref 3–10)
NEUTS SEG # BLD: 8.4 K/UL (ref 1.8–8)
NEUTS SEG NFR BLD: 78 % (ref 40–73)
PLATELET # BLD AUTO: 231 K/UL (ref 135–420)
PMV BLD AUTO: 10.6 FL (ref 9.2–11.8)
POTASSIUM SERPL-SCNC: 3.6 MMOL/L (ref 3.5–5.5)
PROT SERPL-MCNC: 8.3 G/DL (ref 6.4–8.2)
PROTHROMBIN TIME: 11.9 SEC (ref 11.5–15.2)
RBC # BLD AUTO: 4.3 M/UL (ref 4.2–5.3)
SODIUM SERPL-SCNC: 139 MMOL/L (ref 136–145)
TROPONIN I SERPL-MCNC: <0.02 NG/ML (ref 0–0.04)
TROPONIN I SERPL-MCNC: <0.02 NG/ML (ref 0–0.04)
WBC # BLD AUTO: 10.9 K/UL (ref 4.6–13.2)

## 2018-09-18 PROCEDURE — 96374 THER/PROPH/DIAG INJ IV PUSH: CPT

## 2018-09-18 PROCEDURE — 99285 EMERGENCY DEPT VISIT HI MDM: CPT

## 2018-09-18 PROCEDURE — 84484 ASSAY OF TROPONIN QUANT: CPT | Performed by: EMERGENCY MEDICINE

## 2018-09-18 PROCEDURE — 85025 COMPLETE CBC W/AUTO DIFF WBC: CPT | Performed by: EMERGENCY MEDICINE

## 2018-09-18 PROCEDURE — 93005 ELECTROCARDIOGRAM TRACING: CPT

## 2018-09-18 PROCEDURE — 82550 ASSAY OF CK (CPK): CPT | Performed by: EMERGENCY MEDICINE

## 2018-09-18 PROCEDURE — 96375 TX/PRO/DX INJ NEW DRUG ADDON: CPT

## 2018-09-18 PROCEDURE — 80053 COMPREHEN METABOLIC PANEL: CPT | Performed by: EMERGENCY MEDICINE

## 2018-09-18 PROCEDURE — 70450 CT HEAD/BRAIN W/O DYE: CPT

## 2018-09-18 PROCEDURE — 72125 CT NECK SPINE W/O DYE: CPT

## 2018-09-18 PROCEDURE — 74011250636 HC RX REV CODE- 250/636: Performed by: EMERGENCY MEDICINE

## 2018-09-18 PROCEDURE — 96361 HYDRATE IV INFUSION ADD-ON: CPT

## 2018-09-18 PROCEDURE — 71045 X-RAY EXAM CHEST 1 VIEW: CPT

## 2018-09-18 PROCEDURE — 85610 PROTHROMBIN TIME: CPT | Performed by: EMERGENCY MEDICINE

## 2018-09-18 RX ORDER — ONDANSETRON 2 MG/ML
4 INJECTION INTRAMUSCULAR; INTRAVENOUS
Status: COMPLETED | OUTPATIENT
Start: 2018-09-18 | End: 2018-09-18

## 2018-09-18 RX ORDER — FENTANYL CITRATE 50 UG/ML
25 INJECTION, SOLUTION INTRAMUSCULAR; INTRAVENOUS
Status: COMPLETED | OUTPATIENT
Start: 2018-09-18 | End: 2018-09-18

## 2018-09-18 RX ORDER — HYDRALAZINE HYDROCHLORIDE 20 MG/ML
20 INJECTION INTRAMUSCULAR; INTRAVENOUS ONCE
Status: COMPLETED | OUTPATIENT
Start: 2018-09-18 | End: 2018-09-18

## 2018-09-18 RX ORDER — ONDANSETRON 2 MG/ML
INJECTION INTRAMUSCULAR; INTRAVENOUS
Status: DISCONTINUED
Start: 2018-09-18 | End: 2018-09-19 | Stop reason: HOSPADM

## 2018-09-18 RX ADMIN — SODIUM CHLORIDE 1000 ML: 900 INJECTION, SOLUTION INTRAVENOUS at 21:37

## 2018-09-18 RX ADMIN — HYDRALAZINE HYDROCHLORIDE 20 MG: 20 INJECTION INTRAMUSCULAR; INTRAVENOUS at 20:33

## 2018-09-18 RX ADMIN — FENTANYL CITRATE 25 MCG: 50 INJECTION, SOLUTION INTRAMUSCULAR; INTRAVENOUS at 19:41

## 2018-09-18 RX ADMIN — ONDANSETRON 4 MG: 2 INJECTION INTRAMUSCULAR; INTRAVENOUS at 19:53

## 2018-09-19 VITALS
RESPIRATION RATE: 13 BRPM | OXYGEN SATURATION: 95 % | SYSTOLIC BLOOD PRESSURE: 126 MMHG | WEIGHT: 192 LBS | HEART RATE: 77 BPM | TEMPERATURE: 98.4 F | DIASTOLIC BLOOD PRESSURE: 71 MMHG | HEIGHT: 62 IN | BODY MASS INDEX: 35.33 KG/M2

## 2018-09-19 LAB
ATRIAL RATE: 88 BPM
CALCULATED P AXIS, ECG09: 50 DEGREES
CALCULATED R AXIS, ECG10: -38 DEGREES
CALCULATED T AXIS, ECG11: 79 DEGREES
DIAGNOSIS, 93000: NORMAL
P-R INTERVAL, ECG05: 172 MS
Q-T INTERVAL, ECG07: 408 MS
QRS DURATION, ECG06: 110 MS
QTC CALCULATION (BEZET), ECG08: 493 MS
VENTRICULAR RATE, ECG03: 88 BPM

## 2018-09-19 NOTE — ED PROVIDER NOTES
EMERGENCY DEPARTMENT HISTORY AND PHYSICAL EXAM 
 
Date: 9/18/2018 Patient Name: Juan Jsoe Prasad History of Presenting Illness Chief Complaint Patient presents with  Shoulder Pain  Numbness History Provided By: Patient Chief Complaint: dizzy, left shoulder pain Duration: 4 Hours Timing:  Acute Location: left shoulder Quality: Aching Severity: Moderate Modifying Factors: h/o 5 stents, CAD, on effient Associated Symptoms: denies any other associated signs or symptoms Additional History (Context): Juan Jose Prasad is a 62 y.o. female with hypertension and myocardial infarction who presents with left posterior shoulder pain, shooting pain to her left arm and tingling today starting at 4p along w/dizziness, lightheadedness. Denies CP, SOB, weakness. On effient; denies n/v/d. Onset of symptoms non-exertional. 
 
PCP: Joby Multani DO 
 
Current Facility-Administered Medications Medication Dose Route Frequency Provider Last Rate Last Dose  ondansetron (ZOFRAN) 4 mg/2 mL injection Current Outpatient Prescriptions Medication Sig Dispense Refill  metoprolol succinate (TOPROL-XL) 200 mg XL tablet TAKE 1 TABLET BY MOUTH DAILY 90 Tab 1  prasugrel (EFFIENT) 10 mg tablet Take 1 Tab by mouth daily. Indications: Failed plavix. Recurrent CA. No P2Y12 response 90 Tab 3  
 RABEprazole (ACIPHEX) 20 mg tablet TAKE 1 TABLET BY MOUTH ONCE DAILY 30 Tab 5  
 atorvastatin (LIPITOR) 80 mg tablet TAKE 1 TABLET BY MOUTH DAILY 90 Tab 9  
 amLODIPine (NORVASC) 10 mg tablet Take 1 Tab by mouth daily. 90 Tab 3  
 losartan (COZAAR) 100 mg tablet Take 1 Tab by mouth daily. 90 Tab 3  
 hydrALAZINE (APRESOLINE) 50 mg tablet Take 0.5 Tabs by mouth four (4) times daily. 180 Tab 3  
 hydroCHLOROthiazide (HYDRODIURIL) 25 mg tablet Take 1 Tab by mouth daily. 90 Tab 3  
 levothyroxine (SYNTHROID) 125 mcg tablet Take 1 Tab by mouth Daily (before breakfast).  90 Tab 3  
  buPROPion (WELLBUTRIN) 100 mg tablet Take 1 Tab by mouth daily. 90 Tab 3  
 citalopram (CELEXA) 40 mg tablet Take 1 Tab by mouth daily. 90 Tab 3  ibuprofen (MOTRIN) 800 mg tablet Take 1 Tab by mouth every eight (8) hours as needed for Pain. 30 Tab 0  
 metFORMIN (GLUCOPHAGE) 850 mg tablet TAKE 1 TABLET BY MOUTH TWICE DAILY WITH MEALS 180 Tab 2  
 gabapentin (NEURONTIN) 300 mg capsule Take 1 Cap by mouth three (3) times daily. 90 Cap 2  
 glucose blood VI test strips (BLOOD GLUCOSE TEST) strip Use one strip (freestyle lite) for each glucose check. Check glucose 2 times per day. 100 Strip 7  
 insulin aspart U-100 (NOVOLOG) 100 unit/mL inpn Take 8 units with each meal. 10 Pen 11  
 insulin degludec (TRESIBA FLEXTOUCH U-100) 100 unit/mL (3 mL) inpn 35 Units by SubCUTAneous route daily. 15 Pen 2  
 aspirin (ASPIRIN) 325 mg tablet Take 1 Tab by mouth daily. 90 Tab 3  polyethylene glycol (MIRALAX) 17 gram packet Take 1 Packet by mouth two (2) times a day. 60 Packet 1  
 nitrofurantoin, macrocrystal-monohydrate, (MACROBID) 100 mg capsule Take 1 Cap by mouth two (2) times a day. 14 Cap 0  
 acetaminophen (TYLENOL EXTRA STRENGTH) 500 mg tablet Take 2 Tabs by mouth every six (6) hours as needed for Pain. 40 Tab 0  
 Insulin Needles, Disposable, (DORA PEN NEEDLE) 32 gauge x 5/32\" ndle Use one needle to give insulin 4 times a day. 400 Pen Needle 15 Past History Past Medical History: 
Past Medical History:  
Diagnosis Date  Anxiety  CAD (coronary artery disease) S/P Coronary stents ( LAD and Lcx)  Depression  Diabetes ([facility] [address]  Hepatitis C   
 Hypercholesterolemia  Hypertension  Stroke ([facility] [address] 2017 Past Surgical History: 
Past Surgical History:  
Procedure Laterality Date  HX BREAST BIOPSY    
 1971 left breast lump removed excisional per patient  HX  SECTION  1982  
 pre-eclampsia  HX CHOLECYSTECTOMY    HX CORONARY STENT PLACEMENT  Nov 2013  
 4 stents  HX HEENT  2009 thyroidectomy due to nodules.  HX HYSTERECTOMY  2005  
 heavy periods  HX THYROIDECTOMY Family History: 
Family History Problem Relation Age of Onset  Diabetes Mother  Hypertension Mother  Cancer Mother  Heart Disease Mother  Heart Attack Mother  Diabetes Father  Hypertension Father  Cancer Father Social History: 
Social History Substance Use Topics  Smoking status: Never Smoker  Smokeless tobacco: Never Used  Alcohol use No  
 
 
Allergies: Allergies Allergen Reactions  Contrast Agent [Iodine] Rash and Sneezing Review of Systems Review of Systems Constitutional: Negative. Negative for fever. HENT: Negative. Eyes: Negative. Negative for visual disturbance. Respiratory: Negative for cough and shortness of breath. Cardiovascular: Negative for chest pain. Gastrointestinal: Negative for abdominal pain, diarrhea, nausea and vomiting. Endocrine: Negative. Musculoskeletal: Positive for arthralgias. Skin: Negative for rash and wound. Allergic/Immunologic: Negative for immunocompromised state. Neurological: Positive for dizziness and light-headedness. Negative for tremors, seizures, syncope, facial asymmetry, speech difficulty, weakness, numbness and headaches. Hematological: Bruises/bleeds easily. Psychiatric/Behavioral: Negative. Negative for confusion. All other systems reviewed and are negative. All Other Systems Negative Physical Exam  
 
Vitals:  
 09/18/18 2215 09/18/18 2230 09/18/18 2300 09/18/18 2315 BP: 152/76 148/75 158/73 141/77 Pulse: 78 78 78 77 Resp: 13 15 12 12 Temp:      
SpO2: 95% 93% 95% 95% Weight:      
Height:      
 
Physical Exam  
Constitutional: She is oriented to person, place, and time. She appears well-developed. HENT:  
Head: Normocephalic and atraumatic. Eyes: EOM are normal. Pupils are equal, round, and reactive to light. No nystagmus Neck: No JVD present. No tracheal deviation present. No thyromegaly present. Cardiovascular: Normal rate, regular rhythm and normal heart sounds. Exam reveals no gallop and no friction rub. No murmur heard. Pulmonary/Chest: Effort normal and breath sounds normal. No stridor. No respiratory distress. She has no wheezes. She has no rales. She exhibits no tenderness. Abdominal: Soft. She exhibits no distension and no mass. There is no tenderness. There is no rebound and no guarding. Musculoskeletal: She exhibits no edema or tenderness. Lymphadenopathy:  
  She has no cervical adenopathy. Neurological: She is alert and oriented to person, place, and time. Negative Romberg. No dysdiadochokinesis, past pointing, tremor. Normal heel-shin. Skin: Skin is warm and dry. No rash noted. No erythema. No pallor. Psychiatric: She has a normal mood and affect. Her behavior is normal. Thought content normal.  
Nursing note and vitals reviewed. Diagnostic Study Results Labs - Recent Results (from the past 12 hour(s)) CBC WITH AUTOMATED DIFF Collection Time: 09/18/18  7:31 PM  
Result Value Ref Range WBC 10.9 4.6 - 13.2 K/uL  
 RBC 4.30 4.20 - 5.30 M/uL  
 HGB 13.2 12.0 - 16.0 g/dL HCT 38.1 35.0 - 45.0 % MCV 88.6 74.0 - 97.0 FL  
 MCH 30.7 24.0 - 34.0 PG  
 MCHC 34.6 31.0 - 37.0 g/dL  
 RDW 11.7 11.6 - 14.5 % PLATELET 683 480 - 525 K/uL MPV 10.6 9.2 - 11.8 FL  
 NEUTROPHILS 78 (H) 40 - 73 % LYMPHOCYTES 13 (L) 21 - 52 % MONOCYTES 9 3 - 10 % EOSINOPHILS 0 0 - 5 % BASOPHILS 0 0 - 2 %  
 ABS. NEUTROPHILS 8.4 (H) 1.8 - 8.0 K/UL  
 ABS. LYMPHOCYTES 1.4 0.9 - 3.6 K/UL  
 ABS. MONOCYTES 1.0 0.05 - 1.2 K/UL  
 ABS. EOSINOPHILS 0.0 0.0 - 0.4 K/UL  
 ABS. BASOPHILS 0.0 0.0 - 0.1 K/UL  
 DF AUTOMATED PROTHROMBIN TIME + INR  Collection Time: 09/18/18  7:31 PM  
 Result Value Ref Range Prothrombin time 11.9 11.5 - 15.2 sec INR 0.9 0.8 - 1.2 METABOLIC PANEL, COMPREHENSIVE Collection Time: 09/18/18  7:31 PM  
Result Value Ref Range Sodium 139 136 - 145 mmol/L Potassium 3.6 3.5 - 5.5 mmol/L Chloride 103 100 - 108 mmol/L  
 CO2 26 21 - 32 mmol/L Anion gap 10 3.0 - 18 mmol/L Glucose 153 (H) 74 - 99 mg/dL BUN 21 (H) 7.0 - 18 MG/DL Creatinine 1.36 (H) 0.6 - 1.3 MG/DL  
 BUN/Creatinine ratio 15 12 - 20 GFR est AA 48 (L) >60 ml/min/1.73m2 GFR est non-AA 40 (L) >60 ml/min/1.73m2 Calcium 8.7 8.5 - 10.1 MG/DL Bilirubin, total 0.5 0.2 - 1.0 MG/DL  
 ALT (SGPT) 42 13 - 56 U/L  
 AST (SGOT) 45 (H) 15 - 37 U/L Alk. phosphatase 171 (H) 45 - 117 U/L Protein, total 8.3 (H) 6.4 - 8.2 g/dL Albumin 3.1 (L) 3.4 - 5.0 g/dL Globulin 5.2 (H) 2.0 - 4.0 g/dL A-G Ratio 0.6 (L) 0.8 - 1.7    
TROPONIN I Collection Time: 09/18/18  7:31 PM  
Result Value Ref Range Troponin-I, Qt. <0.02 0.0 - 0.045 NG/ML  
EKG, 12 LEAD, INITIAL Collection Time: 09/18/18  7:35 PM  
Result Value Ref Range Ventricular Rate 88 BPM  
 Atrial Rate 88 BPM  
 P-R Interval 172 ms QRS Duration 110 ms  
 Q-T Interval 408 ms QTC Calculation (Bezet) 493 ms Calculated P Axis 50 degrees Calculated R Axis -38 degrees Calculated T Axis 79 degrees Diagnosis Normal sinus rhythm Left axis deviation Incomplete right bundle branch block Nonspecific T wave abnormality Prolonged QT Abnormal ECG When compared with ECG of 21-DEC-2017 07:13, No significant change was found CARDIAC PANEL,(CK, CKMB & TROPONIN) Collection Time: 09/18/18 10:51 PM  
Result Value Ref Range CK 91 26 - 192 U/L  
 CK - MB <1.0 <3.6 ng/ml CK-MB Index  0.0 - 4.0 % CALCULATION NOT PERFORMED WHEN RESULT IS BELOW LINEAR LIMIT Troponin-I, Qt. <0.02 0.0 - 0.045 NG/ML Radiologic Studies -  
XR CHEST PORT    (Results Pending) CT HEAD WO CONT    (Results Pending) CT SPINE CERV WO CONT    (Results Pending) CT Results  (Last 48 hours) None CXR Results  (Last 48 hours) None Medical Decision Making I am the first provider for this patient. I reviewed the vital signs, available nursing notes, past medical history, past surgical history, family history and social history. Vital Signs-Reviewed the patient's vital signs. Records Reviewed: Nursing Notes Procedures: 
EKG Date/Time: 9/18/2018 10:01 PM 
Performed by: Naresh Schmitt Authorized by: Naresh Schmitt Interpretation: Interpretation: abnormal   
Rate:  
  ECG rate:  88 ECG rate assessment: normal   
Rhythm:  
  Rhythm: sinus rhythm Ectopy:  
  Ectopy: none QRS:  
  QRS axis:  Left QRS intervals:  Normal 
Conduction:  
  Conduction: abnormal   
  Abnormal conduction: incomplete LBBB   
ST segments: ST segments:  Normal 
T waves:  
  T waves: non-specific Other findings:  
  Other findings: prolonged qTc interval   
  Other findings comment:  493 ms Provider Notes (Medical Decision Making): CT head and cervical spine and CXR show no acute abnormalities. Will repeat CE. Second CE negative. Pt has been asymptomatic since arrival.  No acute EKG changes. No specific complaints of CP, SOB, only vague dizziness. D/c home. MED RECONCILIATION: 
Current Facility-Administered Medications Medication Dose Route Frequency  ondansetron (ZOFRAN) 4 mg/2 mL injection Current Outpatient Prescriptions Medication Sig  
 metoprolol succinate (TOPROL-XL) 200 mg XL tablet TAKE 1 TABLET BY MOUTH DAILY  prasugrel (EFFIENT) 10 mg tablet Take 1 Tab by mouth daily. Indications: Failed plavix. Recurrent CA. No P2Y12 response  RABEprazole (ACIPHEX) 20 mg tablet TAKE 1 TABLET BY MOUTH ONCE DAILY  atorvastatin (LIPITOR) 80 mg tablet TAKE 1 TABLET BY MOUTH DAILY  amLODIPine (NORVASC) 10 mg tablet Take 1 Tab by mouth daily.  losartan (COZAAR) 100 mg tablet Take 1 Tab by mouth daily.  hydrALAZINE (APRESOLINE) 50 mg tablet Take 0.5 Tabs by mouth four (4) times daily.  hydroCHLOROthiazide (HYDRODIURIL) 25 mg tablet Take 1 Tab by mouth daily.  levothyroxine (SYNTHROID) 125 mcg tablet Take 1 Tab by mouth Daily (before breakfast).  buPROPion (WELLBUTRIN) 100 mg tablet Take 1 Tab by mouth daily.  citalopram (CELEXA) 40 mg tablet Take 1 Tab by mouth daily.  ibuprofen (MOTRIN) 800 mg tablet Take 1 Tab by mouth every eight (8) hours as needed for Pain.  metFORMIN (GLUCOPHAGE) 850 mg tablet TAKE 1 TABLET BY MOUTH TWICE DAILY WITH MEALS  gabapentin (NEURONTIN) 300 mg capsule Take 1 Cap by mouth three (3) times daily.  glucose blood VI test strips (BLOOD GLUCOSE TEST) strip Use one strip (freestyle lite) for each glucose check. Check glucose 2 times per day.  insulin aspart U-100 (NOVOLOG) 100 unit/mL inpn Take 8 units with each meal.  
 insulin degludec (TRESIBA FLEXTOUCH U-100) 100 unit/mL (3 mL) inpn 35 Units by SubCUTAneous route daily.  aspirin (ASPIRIN) 325 mg tablet Take 1 Tab by mouth daily.  polyethylene glycol (MIRALAX) 17 gram packet Take 1 Packet by mouth two (2) times a day.  nitrofurantoin, macrocrystal-monohydrate, (MACROBID) 100 mg capsule Take 1 Cap by mouth two (2) times a day.  acetaminophen (TYLENOL EXTRA STRENGTH) 500 mg tablet Take 2 Tabs by mouth every six (6) hours as needed for Pain.  Insulin Needles, Disposable, (DORA PEN NEEDLE) 32 gauge x 5/32\" ndle Use one needle to give insulin 4 times a day. Disposition: 
home DISCHARGE NOTE:  
11:50 PM 
 
Pt has been reexamined. Patient has no new complaints, changes, or physical findings. Care plan outlined and precautions discussed.   Results of head and cervical spine CT, CXR, labs, EKG were reviewed with the patient. All medications were reviewed with the patient; will d/c home with reassurance. All of pt's questions and concerns were addressed. Patient was instructed and agrees to follow up with PCP, as well as to return to the ED upon further deterioration. Patient is ready to go home. Follow-up Information Follow up With Details Comments Contact Info Zeyad Multani, DO Schedule an appointment as soon as possible for a visit in 1 day  [address] [address] [place] [address] [phone] [facility] EMERGENCY DEPT  If symptoms worsen return immediately [address] 
[phone] Current Discharge Medication List  
  
 
 
Diagnosis Clinical Impression: 1. Dizziness 2. Acute pain of left shoulder

## 2018-09-19 NOTE — DISCHARGE INSTRUCTIONS
Dizziness: Care Instructions  Your Care Instructions  Dizziness is the feeling of unsteadiness or fuzziness in your head. It is different than having vertigo, which is a feeling that the room is spinning or that you are moving or falling. It is also different from lightheadedness, which is the feeling that you are about to faint. It can be hard to know what causes dizziness. Some people feel dizzy when they have migraine headaches. Sometimes bouts of flu can make you feel dizzy. Some medical conditions, such as heart problems or high blood pressure, can make you feel dizzy. Many medicines can cause dizziness, including medicines for high blood pressure, pain, or anxiety. If a medicine causes your symptoms, your doctor may recommend that you stop or change the medicine. If it is a problem with your heart, you may need medicine to help your heart work better. If there is no clear reason for your symptoms, your doctor may suggest watching and waiting for a while to see if the dizziness goes away on its own. Follow-up care is a key part of your treatment and safety. Be sure to make and go to all appointments, and call your doctor if you are having problems. It's also a good idea to know your test results and keep a list of the medicines you take. How can you care for yourself at home? · If your doctor recommends or prescribes medicine, take it exactly as directed. Call your doctor if you think you are having a problem with your medicine. · Do not drive while you feel dizzy. · Try to prevent falls. Steps you can take include:  ¨ Using nonskid mats, adding grab bars near the tub, and using night-lights. ¨ Clearing your home so that walkways are free of anything you might trip on. ¨ Letting family and friends know that you have been feeling dizzy. This will help them know how to help you. When should you call for help? Call 911 anytime you think you may need emergency care.  For example, call if:    · You passed out (lost consciousness).     · You have dizziness along with symptoms of a heart attack. These may include:  ¨ Chest pain or pressure, or a strange feeling in the chest.  ¨ Sweating. ¨ Shortness of breath. ¨ Nausea or vomiting. ¨ Pain, pressure, or a strange feeling in the back, neck, jaw, or upper belly or in one or both shoulders or arms. ¨ Lightheadedness or sudden weakness. ¨ A fast or irregular heartbeat.     · You have symptoms of a stroke. These may include:  ¨ Sudden numbness, tingling, weakness, or loss of movement in your face, arm, or leg, especially on only one side of your body. ¨ Sudden vision changes. ¨ Sudden trouble speaking. ¨ Sudden confusion or trouble understanding simple statements. ¨ Sudden problems with walking or balance. ¨ A sudden, severe headache that is different from past headaches.    Call your doctor now or seek immediate medical care if:    · You feel dizzy and have a fever, headache, or ringing in your ears.     · You have new or increased nausea and vomiting.     · Your dizziness does not go away or comes back.    Watch closely for changes in your health, and be sure to contact your doctor if:    · You do not get better as expected. Where can you learn more? Go to http://staci-zeinab.info/. Enter L173 in the search box to learn more about \"Dizziness: Care Instructions. \"  Current as of: November 20, 2017  Content Version: 11.7  © 2216-8646 [employer]. Care instructions adapted under license by [employer] (which disclaims liability or warranty for this information). If you have questions about a medical condition or this instruction, always ask your healthcare professional. [place] [address] any warranty or liability for your use of this information.          Musculoskeletal Pain: Care Instructions  Your Care Instructions    Different problems with the bones, muscles, nerves, ligaments, and tendons in the body can cause pain. One or more areas of your body may ache or burn. Or they may feel tired, stiff, or sore. The medical term for this type of pain is musculoskeletal pain. It can have many different causes. Sometimes the pain is caused by an injury such as a strain or sprain. Or you might have pain from using one part of your body in the same way over and over again. This is called overuse. In some cases, the cause of the pain is another health problem such as arthritis or fibromyalgia. The doctor will examine you and ask you questions about your health to help find the cause of your pain. Blood tests or imaging tests like an X-ray may also be helpful. But sometimes doctors can't find a cause of the pain. Treatment depends on your symptoms and the cause of the pain, if known. The doctor has checked you carefully, but problems can develop later. If you notice any problems or new symptoms, get medical treatment right away. Follow-up care is a key part of your treatment and safety. Be sure to make and go to all appointments, and call your doctor if you are having problems. It's also a good idea to know your test results and keep a list of the medicines you take. How can you care for yourself at home? · Rest until you feel better. · Do not do anything that makes the pain worse. Return to exercise gradually if you feel better and your doctor says it's okay. · Be safe with medicines. Read and follow all instructions on the label. ¨ If the doctor gave you a prescription medicine for pain, take it as prescribed. ¨ If you are not taking a prescription pain medicine, ask your doctor if you can take an over-the-counter medicine. · Put ice or a cold pack on the area for 10 to 20 minutes at a time to ease pain. Put a thin cloth between the ice and your skin. When should you call for help?   Call your doctor now or seek immediate medical care if:    · You have new pain, or your pain gets worse.     · You have new symptoms such as a fever, a rash, or chills.    Watch closely for changes in your health, and be sure to contact your doctor if:    · You do not get better as expected. Where can you learn more? Go to http://staci-zeinab.info/. Enter Y236 in the search box to learn more about \"Musculoskeletal Pain: Care Instructions. \"  Current as of: October 9, 2017  Content Version: 11.7  © 0497-2348 [employer]. Care instructions adapted under license by [employer] (which disclaims liability or warranty for this information). If you have questions about a medical condition or this instruction, always ask your healthcare professional. [place] [address] any warranty or liability for your use of this information.

## 2018-10-09 ENCOUNTER — HOSPITAL ENCOUNTER (OUTPATIENT)
Dept: LAB | Age: 58
Discharge: HOME | End: 2018-10-09
Payer: OTHER GOVERNMENT

## 2018-10-09 ENCOUNTER — OFFICE VISIT (OUTPATIENT)
Dept: FAMILY MEDICINE CLINIC | Age: 58
End: 2018-10-09

## 2018-10-09 VITALS
SYSTOLIC BLOOD PRESSURE: 146 MMHG | RESPIRATION RATE: 16 BRPM | OXYGEN SATURATION: 97 % | TEMPERATURE: 97.7 F | DIASTOLIC BLOOD PRESSURE: 87 MMHG | HEART RATE: 80 BPM | WEIGHT: 192 LBS | BODY MASS INDEX: 35.33 KG/M2 | HEIGHT: 62 IN

## 2018-10-09 DIAGNOSIS — Z63.4 GRIEF AT LOSS OF CHILD: ICD-10-CM

## 2018-10-09 DIAGNOSIS — Z12.39 SCREENING FOR BREAST CANCER: ICD-10-CM

## 2018-10-09 DIAGNOSIS — M25.512 ACUTE PAIN OF LEFT SHOULDER: ICD-10-CM

## 2018-10-09 DIAGNOSIS — E11.21 TYPE 2 DIABETES MELLITUS WITH NEPHROPATHY (HCC): ICD-10-CM

## 2018-10-09 DIAGNOSIS — F43.21 GRIEF AT LOSS OF CHILD: ICD-10-CM

## 2018-10-09 DIAGNOSIS — K59.01 SLOW TRANSIT CONSTIPATION: ICD-10-CM

## 2018-10-09 DIAGNOSIS — E78.2 MIXED HYPERLIPIDEMIA: ICD-10-CM

## 2018-10-09 DIAGNOSIS — Z23 ENCOUNTER FOR IMMUNIZATION: ICD-10-CM

## 2018-10-09 DIAGNOSIS — F33.9 RECURRENT DEPRESSION (HCC): ICD-10-CM

## 2018-10-09 DIAGNOSIS — E11.21 TYPE 2 DIABETES MELLITUS WITH NEPHROPATHY (HCC): Primary | ICD-10-CM

## 2018-10-09 PROCEDURE — 82043 UR ALBUMIN QUANTITATIVE: CPT | Performed by: FAMILY MEDICINE

## 2018-10-09 RX ORDER — POLYETHYLENE GLYCOL 3350 17 G/17G
17 POWDER, FOR SOLUTION ORAL 2 TIMES DAILY
Qty: 60 PACKET | Refills: 1 | Status: SHIPPED | OUTPATIENT
Start: 2018-10-09 | End: 2019-09-18

## 2018-10-09 RX ORDER — ZOLPIDEM TARTRATE 5 MG/1
5 TABLET ORAL
Qty: 10 TAB | Refills: 0 | Status: SHIPPED | OUTPATIENT
Start: 2018-10-09 | End: 2019-09-18

## 2018-10-09 SDOH — SOCIAL STABILITY - SOCIAL INSECURITY: DISSAPEARANCE AND DEATH OF FAMILY MEMBER: Z63.4

## 2018-10-09 NOTE — PROGRESS NOTES
Subjective:     HPI:  Gómez Cordova is a 62 y.o. female who presents to the office today for grief and left shoulder pain. Grief: Pt's lost her son 08/06/2018. She states that he was found in his apartment. She reports that he had diabetes and passed away from a heart attack. She reports that he has a 15year old son who is not doing well. Pt states she has not been sleeping well. She says she is stressed and does not know what to do. She states she has a lot of emotions and does not know what to do with them and so she just cries all of the time. She states she cries at work and then does not want to do anything when she gets home. She reports that she takes Wellbutrin and Celexa. She has also been taking advil pm, zzzquil, and melatonin. Pt is eating enough to maintain her weight. She reports that she is arranging grief counseling with her [facility].  reviewed. Ambien last filled 11/22/2016 #2. Wt Readings from Last 3 Encounters:   10/09/18 192 lb (87.1 kg)   09/18/18 192 lb (87.1 kg)   07/31/18 192 lb (87.1 kg)     Post ER Visit: Pt was reported to the ER 09/18/2018 for left shoulder pain. She states that her shoulder is swollen, and that her pain radiates from her arm up to her shoulder. She states that she has previously completed PT which helped to relieve her pain. Pt would consider trying PT again. Constipation: Pt complains of constipation. She states that her last bowel movement was 10/08/18. She describes her bowel movement as \"hard and pebbly. \"  She states she has been taking stool softer/laxitive correctol  to help with bowel movements. Hypertension  The patient presents for follow up of hypertension. Pt's BP is 146/87 in the office today. Cardiovascular ROS: taking medications as instructed, no medication side effects noted, no TIA's, no chest pain on exertion, no dyspnea on exertion, no swelling of ankles.      Diabetes Mellitus:  female has diabetes mellitus, and  diabetes and hypertension. Diabetic ROS - medication compliance: compliant all of the time, home glucose monitoring: is performed regularly, Pt reports the month that her son passed away, her diabetes was not well controlled. She states that now, her BS have been more normal. further diabetic ROS: no polyuria or polydipsia, no chest pain, dyspnea or TIA's, no numbness, tingling or pain in extremities. Lab review: orders written for new lab studies as appropriate; see orders. Lab Results   Component Value Date/Time    Hemoglobin A1c 8.1 (H) 07/31/2018 09:30 AM    Hemoglobin A1c (POC) 10.8 05/19/2016 12:56 PM     Lab Results   Component Value Date/Time    Cholesterol, total 197 07/31/2018 09:30 AM    HDL Cholesterol 51 07/31/2018 09:30 AM    LDL, calculated 109.6 (H) 07/31/2018 09:30 AM    VLDL, calculated 36.4 07/31/2018 09:30 AM    Triglyceride 182 (H) 07/31/2018 09:30 AM    CHOL/HDL Ratio 3.9 07/31/2018 09:30 AM     Lab Results   Component Value Date/Time    Sodium 139 09/18/2018 07:31 PM    Potassium 3.6 09/18/2018 07:31 PM    Chloride 103 09/18/2018 07:31 PM    CO2 26 09/18/2018 07:31 PM    Anion gap 10 09/18/2018 07:31 PM    Glucose 153 (H) 09/18/2018 07:31 PM    BUN 21 (H) 09/18/2018 07:31 PM    Creatinine 1.36 (H) 09/18/2018 07:31 PM    BUN/Creatinine ratio 15 09/18/2018 07:31 PM    GFR est AA 48 (L) 09/18/2018 07:31 PM    GFR est non-AA 40 (L) 09/18/2018 07:31 PM    Calcium 8.7 09/18/2018 07:31 PM    Bilirubin, total 0.5 09/18/2018 07:31 PM    AST (SGOT) 45 (H) 09/18/2018 07:31 PM    Alk.  phosphatase 171 (H) 09/18/2018 07:31 PM    Protein, total 8.3 (H) 09/18/2018 07:31 PM    Albumin 3.1 (L) 09/18/2018 07:31 PM    Globulin 5.2 (H) 09/18/2018 07:31 PM    A-G Ratio 0.6 (L) 09/18/2018 07:31 PM    ALT (SGPT) 42 09/18/2018 07:31 PM     Lab Results   Component Value Date/Time    WBC 10.9 09/18/2018 07:31 PM    Hemoglobin, POC 13.3 04/04/2017 03:49 PM    HGB 13.2 09/18/2018 07:31 PM    Hematocrit, POC 39 04/04/2017 03:49 PM HCT 38.1 09/18/2018 07:31 PM    PLATELET 303 32/88/0178 07:31 PM    MCV 88.6 09/18/2018 07:31 PM     Current Outpatient Prescriptions   Medication Sig Dispense Refill    zolpidem (AMBIEN) 5 mg tablet Take 1 Tab by mouth nightly as needed for Sleep. Max Daily Amount: 5 mg. 10 Tab 0    polyethylene glycol (MIRALAX) 17 gram packet Take 1 Packet by mouth two (2) times a day. 60 Packet 1    metoprolol succinate (TOPROL-XL) 200 mg XL tablet TAKE 1 TABLET BY MOUTH DAILY 90 Tab 1    RABEprazole (ACIPHEX) 20 mg tablet TAKE 1 TABLET BY MOUTH ONCE DAILY 30 Tab 5    atorvastatin (LIPITOR) 80 mg tablet TAKE 1 TABLET BY MOUTH DAILY 90 Tab 9    amLODIPine (NORVASC) 10 mg tablet Take 1 Tab by mouth daily. 90 Tab 3    losartan (COZAAR) 100 mg tablet Take 1 Tab by mouth daily. 90 Tab 3    hydrALAZINE (APRESOLINE) 50 mg tablet Take 0.5 Tabs by mouth four (4) times daily. 180 Tab 3    hydroCHLOROthiazide (HYDRODIURIL) 25 mg tablet Take 1 Tab by mouth daily. 90 Tab 3    levothyroxine (SYNTHROID) 125 mcg tablet Take 1 Tab by mouth Daily (before breakfast). 90 Tab 3    buPROPion (WELLBUTRIN) 100 mg tablet Take 1 Tab by mouth daily. 90 Tab 3    citalopram (CELEXA) 40 mg tablet Take 1 Tab by mouth daily. 90 Tab 3    ibuprofen (MOTRIN) 800 mg tablet Take 1 Tab by mouth every eight (8) hours as needed for Pain. 30 Tab 0    metFORMIN (GLUCOPHAGE) 850 mg tablet TAKE 1 TABLET BY MOUTH TWICE DAILY WITH MEALS 180 Tab 2    gabapentin (NEURONTIN) 300 mg capsule Take 1 Cap by mouth three (3) times daily. 90 Cap 2    glucose blood VI test strips (BLOOD GLUCOSE TEST) strip Use one strip (freestyle lite) for each glucose check. Check glucose 2 times per day. 100 Strip 7    insulin aspart U-100 (NOVOLOG) 100 unit/mL inpn Take 8 units with each meal. 10 Pen 11    insulin degludec (TRESIBA FLEXTOUCH U-100) 100 unit/mL (3 mL) inpn 35 Units by SubCUTAneous route daily.  15 Pen 2    aspirin (ASPIRIN) 325 mg tablet Take 1 Tab by mouth daily. 90 Tab 3    nitrofurantoin, macrocrystal-monohydrate, (MACROBID) 100 mg capsule Take 1 Cap by mouth two (2) times a day. 14 Cap 0    acetaminophen (TYLENOL EXTRA STRENGTH) 500 mg tablet Take 2 Tabs by mouth every six (6) hours as needed for Pain. 40 Tab 0    Insulin Needles, Disposable, (DORA PEN NEEDLE) 32 gauge x 5/32\" ndle Use one needle to give insulin 4 times a day. 400 Pen Needle 15    prasugrel (EFFIENT) 10 mg tablet Take 1 Tab by mouth daily. Indications: Failed plavix. Recurrent CA. No P2Y12 response 90 Tab 3        Allergies   Allergen Reactions    Contrast Agent [Iodine] Rash and Sneezing       Past Medical History:   Diagnosis Date    Anxiety     CAD (coronary artery disease)     S/P Coronary stents ( LAD and Lcx)    Depression     Diabetes ([facility] [address]     Hepatitis C     Hypercholesterolemia     Hypertension     Stroke ([facility] [address] 12/18/2017        Past Surgical History:   Procedure Laterality Date    HX BREAST BIOPSY      1971 2010 left breast lump removed excisional per patient     [address]    pre-eclampsia    HX CHOLECYSTECTOMY  2004    HX CORONARY STENT PLACEMENT  Nov 2013    4 stents    HX HEENT  2009    thyroidectomy due to nodules.  HX HYSTERECTOMY  2005    heavy periods    HX THYROIDECTOMY         Family History   Problem Relation Age of Onset    Diabetes Mother     Hypertension Mother     Cancer Mother     Heart Disease Mother     Heart Attack Mother     Diabetes Father     Hypertension Father     Cancer Father        Social History     Social History    Marital status:      Spouse name: N/A    Number of children: N/A    Years of education: N/A     Occupational History    Not on file.      Social History Main Topics    Smoking status: Never Smoker    Smokeless tobacco: Never Used    Alcohol use No    Drug use: No    Sexual activity: Not Currently     Other Topics Concern    Not on file     Social History Narrative       REVIEW OF SYSTEM:  Review of Systems   Constitutional: Negative for chills and fever. Eyes: Negative for blurred vision. Respiratory: Negative for shortness of breath. Cardiovascular: Negative for chest pain, palpitations and leg swelling. Gastrointestinal: Positive for constipation. Negative for diarrhea, nausea and vomiting. Musculoskeletal: Positive for joint pain. Neurological: Negative for headaches. Psychiatric/Behavioral: The patient has insomnia. Objective:     Visit Vitals    /87 (BP 1 Location: Right arm, BP Patient Position: Sitting)    Pulse 80    Temp 97.7 °F (36.5 °C) (Oral)    Resp 16    Ht 5' 2\" (1.575 m)    Wt 192 lb (87.1 kg)    SpO2 97%    BMI 35.12 kg/m2       PHYSICAL EXAM:  Physical Exam   Constitutional: She is oriented to person, place, and time and well-developed, well-nourished, and in no distress. HENT:   Right Ear: Tympanic membrane, external ear and ear canal normal.   Left Ear: Tympanic membrane, external ear and ear canal normal.   Nose: Nose normal.   Mouth/Throat: Oropharynx is clear and moist.   Eyes: Pupils are equal, round, and reactive to light. Neck: Normal range of motion. Neck supple. No thyromegaly present. Cardiovascular: Normal rate, regular rhythm, normal heart sounds and intact distal pulses. No murmur heard. Pulmonary/Chest: Effort normal and breath sounds normal. She has no wheezes. Neurological: She is alert and oriented to person, place, and time. GCS score is 15. Skin: Skin is warm and dry. Vitals reviewed. Assessment/Plan:       ICD-10-CM ICD-9-CM    1. Type 2 diabetes mellitus with nephropathy (HCC) E11.21 250.40 zolpidem (AMBIEN) 5 mg tablet     583.81 DAVID MAMMO BI SCREENING INCL CAD      MICROALBUMIN, UR, RAND W/ MICROALB/CREAT RATIO   2. Grief at loss of child F43.21 309.0 zolpidem (AMBIEN) 5 mg tablet    Z63.4  DAVID MAMMO BI SCREENING INCL CAD   3.  Recurrent depression (HCC) F33.9 296.30 zolpidem (AMBIEN) 5 mg tablet      DAVID MAMMO BI SCREENING INCL CAD   4. Mixed hyperlipidemia E78.2 272.2 zolpidem (AMBIEN) 5 mg tablet      DAVID MAMMO BI SCREENING INCL CAD   5. Screening for breast cancer Z12.31 V76.10 zolpidem (AMBIEN) 5 mg tablet      DAVID MAMMO BI SCREENING INCL CAD   6. Acute pain of left shoulder M25.512 719.41 REFERRAL TO PHYSICAL THERAPY   7. Encounter for immunization Z23 V03.89 INFLUENZA VIRUS VAC QUAD,SPLIT,PRESV FREE SYRINGE IM      HI IMMUNIZ ADMIN,1 SINGLE/COMB VAC/TOXOID   8. Slow transit constipation K59.01 564.01 polyethylene glycol (MIRALAX) 17 gram packet     Patient given opportunity to ask questions. Questions answered. Patient understands plan of care. Pt will receive a flu shot in the office today. Follow-up Disposition:  Return in about 3 months (around 1/9/2019). Written by Alfornia Sandifer, as dictated by Zainab Ibrahim DO.    I, Dr. Zainab Ibrahim, confirm that all documentation is accurate.

## 2018-10-09 NOTE — MR AVS SNAPSHOT
[address] [id number] 
[phone] Patient: Chastity Mathews MRN: [id number] PBE:9[birth year] Visit Information Date & Time Provider Department Dept. Phone Encounter #  
 10/9/2018  8:40 AM Anamika Vidales[address]  [id number] Follow-up Instructions Return in about 3 months (around 1/9/2019). Upcoming Health Maintenance Date Due  
 EYE EXAM RETINAL OR DILATED Q1 8/24/1970 DTaP/Tdap/Td series (1 - Tdap) 8/24/1981 Shingrix Vaccine Age 50> (1 of 2) 8/24/2010 FOBT Q 1 YEAR AGE 50-75 8/24/2010 FOOT EXAM Q1 10/25/2017 MICROALBUMIN Q1 1/25/2018 Influenza Age 5 to Adult 8/1/2018 BREAST CANCER SCRN MAMMOGRAM 11/16/2018 HEMOGLOBIN A1C Q6M 1/31/2019 LIPID PANEL Q1 7/31/2019 Allergies as of 10/9/2018  Review Complete On: 10/9/2018 By: Kerline Cross LPN Severity Noted Reaction Type Reactions Contrast Agent [Iodine]  06/24/2014    Rash, Sneezing Current Immunizations  Reviewed on 12/19/2017 Name Date Influenza Vaccine 9/1/2017 Influenza Vaccine (Madin Divya Canine Kidney) PF 2/5/2015  3:56 PM  
 Influenza Vaccine (Quad) PF  Incomplete, 11/30/2015 Pneumococcal Polysaccharide (PPSV-23) 10/25/2016 Not reviewed this visit You Were Diagnosed With   
  
 Codes Comments Type 2 diabetes mellitus with nephropathy ([facility]    -  Primary ICD-10-CM: E11.21 
ICD-9-CM: 250.40, 583.81 Grief at loss of child     ICD-10-CM: F43.21, Z63.4 ICD-9-CM: 309.0 Recurrent depression ([facility]     ICD-10-CM: F33.9 ICD-9-CM: 296.30 Mixed hyperlipidemia     ICD-10-CM: E78.2 ICD-9-CM: 272.2 Screening for breast cancer     ICD-10-CM: Z12.31 
ICD-9-CM: V76.10 Acute pain of left shoulder     ICD-10-CM: M25.512 ICD-9-CM: 719.41 Encounter for immunization     ICD-10-CM: O11 ICD-9-CM: V03.89  Slow transit constipation     ICD-10-CM: K59.01 
 ICD-9-CM: 564.01 Vitals BP Pulse Temp Resp Height(growth percentile) Weight(growth percentile) 146/87 (BP 1 Location: Right arm, BP Patient Position: Sitting) 80 97.7 °F (36.5 °C) (Oral) 16 5' 2\" (1.575 m) 192 lb (87.1 kg) SpO2 BMI OB Status Smoking Status 97% 35.12 kg/m2 Hysterectomy Never Smoker Vitals History BMI and BSA Data Body Mass Index Body Surface Area  
 35.12 kg/m 2 1.95 m 2 Preferred Pharmacy Pharmacy Name Phone [facility], [address] [phone] Your Updated Medication List  
  
   
This list is accurate as of 10/9/18  9:44 AM.  Always use your most recent med list.  
  
  
  
  
 acetaminophen 500 mg tablet Commonly known as:  [address] Take 2 Tabs by mouth every six (6) hours as needed for Pain. amLODIPine 10 mg tablet Commonly known as:  Tsesa Mate Take 1 Tab by mouth daily. aspirin 325 mg tablet Commonly known as:  ASPIRIN Take 1 Tab by mouth daily. atorvastatin 80 mg tablet Commonly known as:  LIPITOR  
TAKE 1 TABLET BY MOUTH DAILY  
  
 buPROPion 100 mg tablet Commonly known as:  [facility] Take 1 Tab by mouth daily. citalopram 40 mg tablet Commonly known as:  [place] Take 1 Tab by mouth daily. gabapentin 300 mg capsule Commonly known as:  NEURONTIN Take 1 Cap by mouth three (3) times daily. glucose blood VI test strips strip Commonly known as:  blood glucose test  
Use one strip (freestyle lite) for each glucose check. Check glucose 2 times per day. hydrALAZINE 50 mg tablet Commonly known as:  APRESOLINE Take 0.5 Tabs by mouth four (4) times daily. hydroCHLOROthiazide 25 mg tablet Commonly known as:  HYDRODIURIL Take 1 Tab by mouth daily. ibuprofen 800 mg tablet Commonly known as:  MOTRIN Take 1 Tab by mouth every eight (8) hours as needed for Pain. insulin aspart U-100 100 unit/mL Inpn Commonly known as:  John Galeana Take 8 units with each meal.  
  
 insulin degludec 100 unit/mL (3 mL) Inpn Commonly known as:  TRESIBA FLEXTOUCH U-100  
35 Units by SubCUTAneous route daily. Insulin Needles (Disposable) 32 gauge x 5/32\" Ndle Commonly known as:  Mona Pen Needle Use one needle to give insulin 4 times a day. levothyroxine 125 mcg tablet Commonly known as:  SYNTHROID Take 1 Tab by mouth Daily (before breakfast). losartan 100 mg tablet Commonly known as:  COZAAR Take 1 Tab by mouth daily. metFORMIN 850 mg tablet Commonly known as:  GLUCOPHAGE  
TAKE 1 TABLET BY MOUTH TWICE DAILY WITH MEALS  
  
 metoprolol succinate 200 mg XL tablet Commonly known as:  TOPROL-XL  
TAKE 1 TABLET BY MOUTH DAILY  
  
 nitrofurantoin (macrocrystal-monohydrate) 100 mg capsule Commonly known as:  MACROBID Take 1 Cap by mouth two (2) times a day. polyethylene glycol 17 gram packet Commonly known as:  Friddie Victoria Take 1 Packet by mouth two (2) times a day. prasugrel 10 mg tablet Commonly known as:  EFFIENT Take 1 Tab by mouth daily. Indications: Failed plavix. Recurrent CA. No P2Y12 response RABEprazole 20 mg tablet Commonly known as:  ACIPHEX  
TAKE 1 TABLET BY MOUTH ONCE DAILY  
  
 zolpidem 5 mg tablet Commonly known as:  AMBIEN Take 1 Tab by mouth nightly as needed for Sleep. Max Daily Amount: 5 mg. Prescriptions Printed Refills  
 zolpidem (AMBIEN) 5 mg tablet 0 Sig: Take 1 Tab by mouth nightly as needed for Sleep. Max Daily Amount: 5 mg. Class: Print Route: Oral  
  
Prescriptions Sent to Pharmacy Refills  
 polyethylene glycol (MIRALAX) 17 gram packet 1 Sig: Take 1 Packet by mouth two (2) times a day. Class: Normal  
 Pharmacy: [facility] [address] [place] [address], [address] [place] [address] #: [phone]  Route: Oral  
  
 We Performed the Following INFLUENZA VIRUS VAC QUAD,SPLIT,PRESV FREE SYRINGE IM [id number] CPT(R)] OR IMMUNIZ ADMIN,1 SINGLE/COMB VAC/TOXOID [id number] CPT(R)] REFERRAL TO PHYSICAL THERAPY [DUE10 Custom] Follow-up Instructions Return in about 3 months (around 1/9/2019). To-Do List   
 10/09/2018 Imaging:  [facility] BI SCREENING INCL CAD   
  
 10/09/2018 Lab:  MICROALBUMIN, UR, RAND W/ MICROALB/CREAT RATIO Referral Information Referral ID Referred By Referred To  
  
 [id number] [place], [address] [facility]   
   [address] [facility] [address] [facility], [address] [place] [address] Phone: [phone] Fax: [phone] Visits Status Start Date End Date 1 New Request 10/9/18 10/9/19 If your referral has a status of pending review or denied, additional information will be sent to support the outcome of this decision. Introducing [facility]! Dear Andrew Hong: 
Thank you for requesting a [employer] account. Our records indicate that you already have an active [employer] account. You can access your account anytime at [email or web address] Did you know that you can access your hospital and ER discharge instructions at any time in FluGen? You can also review all of your test results from your hospital stay or ER visit. Additional Information If you have questions, please visit the Frequently Asked Questions section of the [employer] website at [email or web address] Remember, FluGen is NOT to be used for urgent needs. For medical emergencies, dial 911. Now available from your iPhone and Android! Please provide this summary of care documentation to your next provider. Your primary care clinician is listed as Marisel Harper. If you have any questions after today's visit, please call [phone].

## 2018-10-09 NOTE — PROGRESS NOTES
1. Have you been to the ER, urgent care clinic since your last visit? Hospitalized since your last visit? Yes, 9/18/18, [facility], left shoulder pain    2. Have you seen or consulted any other health care providers outside of the [address] [place] [address] since your last visit? Include any pap smears or colon screening. No     Patient presents in office today for routine care. Patient concerns: left shoulder pain, son passed 8/6/18 grieving.

## 2018-10-10 LAB
CREAT UR-MCNC: 169.17 MG/DL (ref 30–125)
MICROALBUMIN UR-MCNC: 134 MG/DL (ref 0–3)
MICROALBUMIN/CREAT UR-RTO: 792 MG/G (ref 0–30)

## 2018-10-22 ENCOUNTER — HOSPITAL ENCOUNTER (OUTPATIENT)
Dept: MAMMOGRAPHY | Age: 58
Discharge: HOME | End: 2018-10-22
Attending: FAMILY MEDICINE
Payer: OTHER GOVERNMENT

## 2018-10-22 DIAGNOSIS — E11.21 TYPE 2 DIABETES MELLITUS WITH NEPHROPATHY (HCC): ICD-10-CM

## 2018-10-22 DIAGNOSIS — Z12.39 SCREENING FOR BREAST CANCER: ICD-10-CM

## 2018-10-22 DIAGNOSIS — E78.2 MIXED HYPERLIPIDEMIA: ICD-10-CM

## 2018-10-22 DIAGNOSIS — Z63.4 GRIEF AT LOSS OF CHILD: ICD-10-CM

## 2018-10-22 DIAGNOSIS — F33.9 RECURRENT DEPRESSION (HCC): ICD-10-CM

## 2018-10-22 DIAGNOSIS — F43.21 GRIEF AT LOSS OF CHILD: ICD-10-CM

## 2018-10-22 PROCEDURE — 77067 SCR MAMMO BI INCL CAD: CPT

## 2018-10-22 SDOH — SOCIAL STABILITY - SOCIAL INSECURITY: DISSAPEARANCE AND DEATH OF FAMILY MEMBER: Z63.4

## 2019-01-15 ENCOUNTER — OFFICE VISIT (OUTPATIENT)
Dept: FAMILY MEDICINE CLINIC | Age: 59
End: 2019-01-15

## 2019-01-15 VITALS
WEIGHT: 189 LBS | RESPIRATION RATE: 16 BRPM | OXYGEN SATURATION: 95 % | SYSTOLIC BLOOD PRESSURE: 141 MMHG | TEMPERATURE: 97.3 F | BODY MASS INDEX: 34.78 KG/M2 | HEIGHT: 62 IN | DIASTOLIC BLOOD PRESSURE: 81 MMHG | HEART RATE: 87 BPM

## 2019-01-15 DIAGNOSIS — E11.21 TYPE 2 DIABETES MELLITUS WITH NEPHROPATHY (HCC): ICD-10-CM

## 2019-01-15 DIAGNOSIS — I10 ESSENTIAL HYPERTENSION: ICD-10-CM

## 2019-01-15 DIAGNOSIS — J01.00 ACUTE NON-RECURRENT MAXILLARY SINUSITIS: Primary | ICD-10-CM

## 2019-01-15 RX ORDER — AZITHROMYCIN 250 MG/1
TABLET, FILM COATED ORAL
Qty: 6 TAB | Refills: 0 | Status: SHIPPED | OUTPATIENT
Start: 2019-01-15 | End: 2019-01-20

## 2019-01-15 NOTE — PROGRESS NOTES
Subjective:     HPI:  Aura Drake is a 62 y.o. female who presents to the office today for routine care. Sinus pain and congestion: Pt complains of sinus pain and nasal congestion starting 2-3 weeks ago. She also complains of congested ears. She reports that she has taken Zyrtec and Allegra without relief. She also reports the using her C-PAP machine makes the pain worse. She reports that her temperature goes up to around 100 before bed. She reports that she feels sputum stuck in her throat. Mass on chest: Pt reports that she feels a mass on her chest. She reports that her last mammogram was normal. 10/2018    Of note, pt reports that she is feeling grief around the holidays and birthday. She states her son   2018 and this is her first holiday without him. She did spend some time with his son. Current Outpatient Medications   Medication Sig Dispense Refill    azithromycin (ZITHROMAX) 250 mg tablet Take 2 tablets today, then take 1 tablet daily 6 Tab 0    zolpidem (AMBIEN) 5 mg tablet Take 1 Tab by mouth nightly as needed for Sleep. Max Daily Amount: 5 mg. 10 Tab 0    polyethylene glycol (MIRALAX) 17 gram packet Take 1 Packet by mouth two (2) times a day. 60 Packet 1    metoprolol succinate (TOPROL-XL) 200 mg XL tablet TAKE 1 TABLET BY MOUTH DAILY 90 Tab 1    prasugrel (EFFIENT) 10 mg tablet Take 1 Tab by mouth daily. Indications: Failed plavix. Recurrent CA. No P2Y12 response 90 Tab 3    RABEprazole (ACIPHEX) 20 mg tablet TAKE 1 TABLET BY MOUTH ONCE DAILY 30 Tab 5    atorvastatin (LIPITOR) 80 mg tablet TAKE 1 TABLET BY MOUTH DAILY 90 Tab 9    amLODIPine (NORVASC) 10 mg tablet Take 1 Tab by mouth daily. 90 Tab 3    losartan (COZAAR) 100 mg tablet Take 1 Tab by mouth daily. 90 Tab 3    hydrALAZINE (APRESOLINE) 50 mg tablet Take 0.5 Tabs by mouth four (4) times daily. 180 Tab 3    hydroCHLOROthiazide (HYDRODIURIL) 25 mg tablet Take 1 Tab by mouth daily.  90 Tab 3    levothyroxine (SYNTHROID) 125 mcg tablet Take 1 Tab by mouth Daily (before breakfast). 90 Tab 3    buPROPion (WELLBUTRIN) 100 mg tablet Take 1 Tab by mouth daily. 90 Tab 3    citalopram (CELEXA) 40 mg tablet Take 1 Tab by mouth daily. 90 Tab 3    ibuprofen (MOTRIN) 800 mg tablet Take 1 Tab by mouth every eight (8) hours as needed for Pain. 30 Tab 0    metFORMIN (GLUCOPHAGE) 850 mg tablet TAKE 1 TABLET BY MOUTH TWICE DAILY WITH MEALS 180 Tab 2    gabapentin (NEURONTIN) 300 mg capsule Take 1 Cap by mouth three (3) times daily. 90 Cap 2    glucose blood VI test strips (BLOOD GLUCOSE TEST) strip Use one strip (freestyle lite) for each glucose check. Check glucose 2 times per day. 100 Strip 7    insulin aspart U-100 (NOVOLOG) 100 unit/mL inpn Take 8 units with each meal. 10 Pen 11    insulin degludec (TRESIBA FLEXTOUCH U-100) 100 unit/mL (3 mL) inpn 35 Units by SubCUTAneous route daily. 15 Pen 2    aspirin (ASPIRIN) 325 mg tablet Take 1 Tab by mouth daily. 90 Tab 3    nitrofurantoin, macrocrystal-monohydrate, (MACROBID) 100 mg capsule Take 1 Cap by mouth two (2) times a day. 14 Cap 0    acetaminophen (TYLENOL EXTRA STRENGTH) 500 mg tablet Take 2 Tabs by mouth every six (6) hours as needed for Pain. 40 Tab 0    Insulin Needles, Disposable, (DORA PEN NEEDLE) 32 gauge x 5/32\" ndle Use one needle to give insulin 4 times a day.  400 Pen Needle 15        Allergies   Allergen Reactions    Contrast Agent [Iodine] Rash and Sneezing       Past Medical History:   Diagnosis Date    Anxiety     CAD (coronary artery disease)     S/P Coronary stents ( LAD and Lcx)    Depression     Diabetes ([address]     Hepatitis C     Hypercholesterolemia     Hypertension     Menopause     Stroke ([facility] 2017        Past Surgical History:   Procedure Laterality Date    HX BREAST BIOPSY Left     1971 left breast lump removed excisional per patient, cysts    HX  SECTION      pre-eclampsia    HX CHOLECYSTECTOMY 2004    HX CORONARY STENT PLACEMENT  Nov 2013    4 stents    HX HEENT  2009    thyroidectomy due to nodules.  HX HYSTERECTOMY  2005    heavy periods    HX OOPHORECTOMY      HX THYROIDECTOMY         Family History   Problem Relation Age of Onset    Diabetes Mother     Hypertension Mother     Cancer Mother     Heart Disease Mother     Heart Attack Mother     Diabetes Father     Hypertension Father     Cancer Father     Ovarian Cancer Paternal Grandmother        Social History     Socioeconomic History    Marital status:      Spouse name: Not on file    Number of children: Not on file    Years of education: Not on file    Highest education level: Not on file   Social Needs    Financial resource strain: Not on file    Food insecurity - worry: Not on file    Food insecurity - inability: Not on file   [employer] needs - medical: Not on file   [employer] needs - non-medical: Not on file   Occupational History    Not on file   Tobacco Use    Smoking status: Never Smoker    Smokeless tobacco: Never Used   Substance and Sexual Activity    Alcohol use: No     Alcohol/week: 0.0 oz    Drug use: No    Sexual activity: Not Currently   Other Topics Concern    Not on file   Social History Narrative    Not on file       REVIEW OF SYSTEM:  Review of Systems   Constitutional: Negative for chills and fever. HENT: Positive for congestion and sinus pain. Eyes: Negative for blurred vision. Respiratory: Positive for sputum production. Negative for shortness of breath. Cardiovascular: Negative for chest pain, palpitations and leg swelling. Gastrointestinal: Negative for constipation, diarrhea, nausea and vomiting. Musculoskeletal: Negative for joint pain. Neurological: Negative for headaches.        Objective:     Visit Vitals  /81 (BP 1 Location: Right arm, BP Patient Position: Sitting)   Pulse 87   Temp 97.3 °F (36.3 °C) (Oral)   Resp 16   Ht 5' 2\" (1.575 m)   Wt 189 lb (85.7 kg)   SpO2 95%   BMI 34.57 kg/m²       PHYSICAL EXAM:  Physical Exam   Constitutional: She is oriented to person, place, and time and well-developed, well-nourished, and in no distress. HENT:   Right Ear: Tympanic membrane, external ear and ear canal normal.   Left Ear: Tympanic membrane, external ear and ear canal normal.   Nose: Right sinus exhibits maxillary sinus tenderness. Left sinus exhibits maxillary sinus tenderness. Mouth/Throat: Posterior oropharyngeal erythema present. Nasal passage swollen. Eyes: Pupils are equal, round, and reactive to light. Neck: Normal range of motion. Neck supple. No thyromegaly present. Cardiovascular: Normal rate, regular rhythm, normal heart sounds and intact distal pulses. No murmur heard. Pulmonary/Chest: Effort normal and breath sounds normal. She has no wheezes. She exhibits tenderness. Neurological: She is alert and oriented to person, place, and time. Skin: Skin is warm and dry. Vitals reviewed. Assessment/Plan:       ICD-10-CM ICD-9-CM    1. Acute non-recurrent maxillary sinusitis J01.00 461.0 azithromycin (ZITHROMAX) 250 mg tablet   2. Type 2 diabetes mellitus with nephropathy (HCC) E11.21 250.40 LIPID PANEL     023.33 METABOLIC PANEL, COMPREHENSIVE      HEMOGLOBIN A1C W/O EAG   3. Essential hypertension I10 401.9 LIPID PANEL      METABOLIC PANEL, COMPREHENSIVE     Patient given opportunity to ask questions. Questions answered. Patient understands plan of care. Follow-up Disposition:  Return in about 2 weeks (around 1/29/2019). Written by Kelly Tidwell, as dictated by Rox Barthel, DO.    I, Dr. Rox Barthel, confirm that all documentation is accurate.

## 2019-01-24 ENCOUNTER — APPOINTMENT (OUTPATIENT)
Dept: GENERAL RADIOLOGY | Age: 59
End: 2019-01-24
Attending: EMERGENCY MEDICINE
Payer: OTHER GOVERNMENT

## 2019-01-24 ENCOUNTER — HOSPITAL ENCOUNTER (EMERGENCY)
Age: 59
Discharge: HOME | End: 2019-01-24
Attending: EMERGENCY MEDICINE
Payer: OTHER GOVERNMENT

## 2019-01-24 VITALS
SYSTOLIC BLOOD PRESSURE: 161 MMHG | HEART RATE: 84 BPM | DIASTOLIC BLOOD PRESSURE: 79 MMHG | OXYGEN SATURATION: 96 % | RESPIRATION RATE: 16 BRPM | TEMPERATURE: 98.9 F

## 2019-01-24 DIAGNOSIS — R11.2 NON-INTRACTABLE VOMITING WITH NAUSEA, UNSPECIFIED VOMITING TYPE: Primary | ICD-10-CM

## 2019-01-24 LAB
ALBUMIN SERPL-MCNC: 3.3 G/DL (ref 3.4–5)
ALBUMIN/GLOB SERPL: 0.7 {RATIO} (ref 0.8–1.7)
ALP SERPL-CCNC: 143 U/L (ref 45–117)
ALT SERPL-CCNC: 44 U/L (ref 13–56)
ANION GAP SERPL CALC-SCNC: 9 MMOL/L (ref 3–18)
AST SERPL-CCNC: 36 U/L (ref 15–37)
BASOPHILS # BLD: 0 K/UL (ref 0–0.1)
BASOPHILS NFR BLD: 0 % (ref 0–2)
BILIRUB SERPL-MCNC: 0.6 MG/DL (ref 0.2–1)
BUN SERPL-MCNC: 30 MG/DL (ref 7–18)
BUN/CREAT SERPL: 33 (ref 12–20)
CALCIUM SERPL-MCNC: 8.9 MG/DL (ref 8.5–10.1)
CHLORIDE SERPL-SCNC: 105 MMOL/L (ref 100–108)
CO2 SERPL-SCNC: 29 MMOL/L (ref 21–32)
CREAT SERPL-MCNC: 0.92 MG/DL (ref 0.6–1.3)
DIFFERENTIAL METHOD BLD: ABNORMAL
EOSINOPHIL # BLD: 0 K/UL (ref 0–0.4)
EOSINOPHIL NFR BLD: 1 % (ref 0–5)
ERYTHROCYTE [DISTWIDTH] IN BLOOD BY AUTOMATED COUNT: 11.7 % (ref 11.6–14.5)
GLOBULIN SER CALC-MCNC: 4.6 G/DL (ref 2–4)
GLUCOSE SERPL-MCNC: 116 MG/DL (ref 74–99)
HCT VFR BLD AUTO: 36.5 % (ref 35–45)
HGB BLD-MCNC: 12.1 G/DL (ref 12–16)
LYMPHOCYTES # BLD: 2.6 K/UL (ref 0.9–3.6)
LYMPHOCYTES NFR BLD: 37 % (ref 21–52)
MCH RBC QN AUTO: 30.6 PG (ref 24–34)
MCHC RBC AUTO-ENTMCNC: 33.2 G/DL (ref 31–37)
MCV RBC AUTO: 92.2 FL (ref 74–97)
MONOCYTES # BLD: 0.5 K/UL (ref 0.05–1.2)
MONOCYTES NFR BLD: 8 % (ref 3–10)
NEUTS SEG # BLD: 3.9 K/UL (ref 1.8–8)
NEUTS SEG NFR BLD: 54 % (ref 40–73)
PLATELET # BLD AUTO: 273 K/UL (ref 135–420)
PMV BLD AUTO: 9.9 FL (ref 9.2–11.8)
POTASSIUM SERPL-SCNC: 3.8 MMOL/L (ref 3.5–5.5)
PROT SERPL-MCNC: 7.9 G/DL (ref 6.4–8.2)
RBC # BLD AUTO: 3.96 M/UL (ref 4.2–5.3)
SODIUM SERPL-SCNC: 143 MMOL/L (ref 136–145)
TROPONIN I SERPL-MCNC: <0.02 NG/ML (ref 0–0.04)
WBC # BLD AUTO: 7.1 K/UL (ref 4.6–13.2)

## 2019-01-24 PROCEDURE — 80053 COMPREHEN METABOLIC PANEL: CPT

## 2019-01-24 PROCEDURE — 96374 THER/PROPH/DIAG INJ IV PUSH: CPT

## 2019-01-24 PROCEDURE — 93005 ELECTROCARDIOGRAM TRACING: CPT

## 2019-01-24 PROCEDURE — 85025 COMPLETE CBC W/AUTO DIFF WBC: CPT

## 2019-01-24 PROCEDURE — 71045 X-RAY EXAM CHEST 1 VIEW: CPT

## 2019-01-24 PROCEDURE — 74011250636 HC RX REV CODE- 250/636: Performed by: EMERGENCY MEDICINE

## 2019-01-24 PROCEDURE — 99283 EMERGENCY DEPT VISIT LOW MDM: CPT

## 2019-01-24 PROCEDURE — 84484 ASSAY OF TROPONIN QUANT: CPT

## 2019-01-24 RX ORDER — ONDANSETRON 2 MG/ML
4 INJECTION INTRAMUSCULAR; INTRAVENOUS
Status: COMPLETED | OUTPATIENT
Start: 2019-01-24 | End: 2019-01-24

## 2019-01-24 RX ORDER — ONDANSETRON 4 MG/1
TABLET, ORALLY DISINTEGRATING ORAL
Qty: 10 TAB | Refills: 0 | Status: SHIPPED | OUTPATIENT
Start: 2019-01-24 | End: 2019-09-18

## 2019-01-24 RX ADMIN — ONDANSETRON 4 MG: 2 INJECTION INTRAMUSCULAR; INTRAVENOUS at 17:36

## 2019-01-24 NOTE — DISCHARGE INSTRUCTIONS
Patient Education        Nausea and Vomiting: Care Instructions  Your Care Instructions    When you are nauseated, you may feel weak and sweaty and notice a lot of saliva in your mouth. Nausea often leads to vomiting. Most of the time you do not need to worry about nausea and vomiting, but they can be signs of other illnesses. Two common causes of nausea and vomiting are stomach flu and food poisoning. Nausea and vomiting from viral stomach flu will usually start to improve within 24 hours. Nausea and vomiting from food poisoning may last from 12 to 48 hours. The doctor has checked you carefully, but problems can develop later. If you notice any problems or new symptoms, get medical treatment right away. Follow-up care is a key part of your treatment and safety. Be sure to make and go to all appointments, and call your doctor if you are having problems. It's also a good idea to know your test results and keep a list of the medicines you take. How can you care for yourself at home? · To prevent dehydration, drink plenty of fluids, enough so that your urine is light yellow or clear like water. Choose water and other caffeine-free clear liquids until you feel better. If you have kidney, heart, or liver disease and have to limit fluids, talk with your doctor before you increase the amount of fluids you drink. · Rest in bed until you feel better. · When you are able to eat, try clear soups, mild foods, and liquids until all symptoms are gone for 12 to 48 hours. Other good choices include dry toast, crackers, cooked cereal, and gelatin dessert, such as Jell-O. When should you call for help? Call 911 anytime you think you may need emergency care. For example, call if:    · You passed out (lost consciousness).    Call your doctor now or seek immediate medical care if:    · You have symptoms of dehydration, such as:  ? Dry eyes and a dry mouth. ? Passing only a little dark urine. ?  Feeling thirstier than usual.   · You have new or worsening belly pain.     · You have a new or higher fever.     · You vomit blood or what looks like coffee grounds.    Watch closely for changes in your health, and be sure to contact your doctor if:    · You have ongoing nausea and vomiting.     · Your vomiting is getting worse.     · Your vomiting lasts longer than 2 days.     · You are not getting better as expected. Where can you learn more? Go to http://staci-zeinab.info/. Enter [phone] [id number] in the search box to learn more about \"Nausea and Vomiting: Care Instructions. \"  Current as of: September 23, 2018  Content Version: 11.9  © 3802-3820 [employer]. Care instructions adapted under license by [employer] (which disclaims liability or warranty for this information). If you have questions about a medical condition or this instruction, always ask your healthcare professional. [address] any warranty or liability for your use of this information.

## 2019-01-24 NOTE — ED PROVIDER NOTES
EMERGENCY DEPARTMENT HISTORY AND PHYSICAL EXAM 
 
5:19 PM 
 
 
Date: 1/24/2019 Patient Name: Cassidy Santa History of Presenting Illness Chief Complaint Patient presents with  Vomiting  Lethargy History Provided By: Patient Chief Complaint: Generalized weakness Duration:  1 day ago last night Timing:  Progressive Location: Generalized Quality: Weakness Severity: Patient denies pain. Modifying Factors: Patient reports she was concerned for an Anxiety attack due to her hx of Anxiety. Patient states this was atypical of her prior anxiety attacks in that she is normally able to relieve her anxiety attacks, but was unable to last night. Associated Symptoms: Reports associated symptoms of nausea, vomiting, mild intermittent shortness of breath, and dizziness. Denies other associated symptoms, such as cough, fever, diarrhea, ENT concerns, and myalgias. Also notes neck edema. Additional History (Context): Cassidy Santa is a 62 y.o. female presents with generalized weakness onset 1 day ago last night. Patient reports she initially felt generalized weakness, associated with nausea, mild intermittent shortness of breath, and dizziness. Denies other associated symptoms, such as cough, fever, diarrhea, ENT concerns, and myalgias. Patient reports she was concerned for an Anxiety attack due to her hx of Anxiety. Patient states this was atypical of her prior anxiety attacks in that she is normally able to relieve her anxiety attacks, but was unable to last night. States her presentation continued into today, along with 2 episodes of vomiting prior to arrival. Notes she only ate applesauce today this morning. Patient also notes neck edema, which also triggered her anxiety last night. Notes hx of CAD, MI x 5, DM, and Hepatitic C. Denies Kidney and Lung hx. No other concerns were expressed at this time. PCP: Zeyad Multani, DO 
 
Current Outpatient Medications Medication Sig Dispense Refill  ondansetron (ZOFRAN ODT) 4 mg disintegrating tablet Take 1-2 tablets every 6-8 hours as needed for nausea and vomiting. 10 Tab 0  
 zolpidem (AMBIEN) 5 mg tablet Take 1 Tab by mouth nightly as needed for Sleep. Max Daily Amount: 5 mg. 10 Tab 0  
 polyethylene glycol (MIRALAX) 17 gram packet Take 1 Packet by mouth two (2) times a day. 60 Packet 1  
 metoprolol succinate (TOPROL-XL) 200 mg XL tablet TAKE 1 TABLET BY MOUTH DAILY 90 Tab 1  prasugrel (EFFIENT) 10 mg tablet Take 1 Tab by mouth daily. Indications: Failed plavix. Recurrent CA. No P2Y12 response 90 Tab 3  
 RABEprazole (ACIPHEX) 20 mg tablet TAKE 1 TABLET BY MOUTH ONCE DAILY 30 Tab 5  
 atorvastatin (LIPITOR) 80 mg tablet TAKE 1 TABLET BY MOUTH DAILY 90 Tab 9  
 amLODIPine (NORVASC) 10 mg tablet Take 1 Tab by mouth daily. 90 Tab 3  
 losartan (COZAAR) 100 mg tablet Take 1 Tab by mouth daily. 90 Tab 3  
 hydrALAZINE (APRESOLINE) 50 mg tablet Take 0.5 Tabs by mouth four (4) times daily. 180 Tab 3  
 hydroCHLOROthiazide (HYDRODIURIL) 25 mg tablet Take 1 Tab by mouth daily. 90 Tab 3  
 levothyroxine (SYNTHROID) 125 mcg tablet Take 1 Tab by mouth Daily (before breakfast). 90 Tab 3  
 buPROPion (WELLBUTRIN) 100 mg tablet Take 1 Tab by mouth daily. 90 Tab 3  
 citalopram (CELEXA) 40 mg tablet Take 1 Tab by mouth daily. 90 Tab 3  ibuprofen (MOTRIN) 800 mg tablet Take 1 Tab by mouth every eight (8) hours as needed for Pain. 30 Tab 0  
 metFORMIN (GLUCOPHAGE) 850 mg tablet TAKE 1 TABLET BY MOUTH TWICE DAILY WITH MEALS 180 Tab 2  
 gabapentin (NEURONTIN) 300 mg capsule Take 1 Cap by mouth three (3) times daily. 90 Cap 2  
 glucose blood VI test strips (BLOOD GLUCOSE TEST) strip Use one strip (freestyle lite) for each glucose check. Check glucose 2 times per day.  100 Strip 7  
 insulin aspart U-100 (NOVOLOG) 100 unit/mL inpn Take 8 units with each meal. 10 Pen 11  
  insulin degludec (TRESIBA FLEXTOUCH U-100) 100 unit/mL (3 mL) inpn 35 Units by SubCUTAneous route daily. 15 Pen 2  
 aspirin (ASPIRIN) 325 mg tablet Take 1 Tab by mouth daily. 90 Tab 3  
 nitrofurantoin, macrocrystal-monohydrate, (MACROBID) 100 mg capsule Take 1 Cap by mouth two (2) times a day. 14 Cap 0  
 acetaminophen (TYLENOL EXTRA STRENGTH) 500 mg tablet Take 2 Tabs by mouth every six (6) hours as needed for Pain. 40 Tab 0  
 Insulin Needles, Disposable, (DORA PEN NEEDLE) 32 gauge x 5/32\" ndle Use one needle to give insulin 4 times a day. 400 Pen Needle 15 Past History Past Medical History: 
Past Medical History:  
Diagnosis Date  Anxiety  CAD (coronary artery disease) S/P Coronary stents ( LAD and Lcx)  Depression  Diabetes ([facility] [address]  Hepatitis C   
 Hypercholesterolemia  Hypertension  Menopause  Stroke ([facility] [address] 2017 Past Surgical History: 
Past Surgical History:  
Procedure Laterality Date  HX BREAST BIOPSY Left   
 1971 left breast lump removed excisional per patient, cysts  HX  SECTION    
 pre-eclampsia  HX CHOLECYSTECTOMY    HX CORONARY STENT PLACEMENT  2013  
 4 stents  HX HEENT   thyroidectomy due to nodules.  HX HYSTERECTOMY  2005  
 heavy periods  HX OOPHORECTOMY  HX THYROIDECTOMY Family History: 
Family History Problem Relation Age of Onset  Diabetes Mother  Hypertension Mother  Cancer Mother  Heart Disease Mother  Heart Attack Mother  Diabetes Father  Hypertension Father  Cancer Father  Ovarian Cancer Paternal Grandmother Social History: 
Social History Tobacco Use  Smoking status: Never Smoker  Smokeless tobacco: Never Used Substance Use Topics  Alcohol use: No  
  Alcohol/week: 0.0 oz  Drug use: No  
 
 
Allergies: Allergies Allergen Reactions  Contrast Agent [Iodine] Rash and Sneezing Review of Systems Review of Systems Constitutional: Negative for activity change, diaphoresis and fever. HENT: Negative. Negative for congestion, rhinorrhea and sore throat. Eyes: Negative for pain, itching and visual disturbance. Respiratory: Positive for shortness of breath. Negative for cough. Cardiovascular: Negative for chest pain and palpitations. Gastrointestinal: Positive for nausea and vomiting. Negative for abdominal pain and diarrhea. Endocrine: Negative for polydipsia and polyuria. Genitourinary: Negative for dysuria and hematuria. Musculoskeletal: Negative for arthralgias, back pain and myalgias. (+) Neck edema Skin: Negative for rash and wound. Neurological: Positive for dizziness and weakness (generalized ). Negative for seizures, syncope and light-headedness. Hematological: Does not bruise/bleed easily. Psychiatric/Behavioral: Negative for agitation and hallucinations. Physical Exam  
 
Patient Vitals for the past 12 hrs: 
 Temp Pulse Resp BP SpO2  
01/24/19 1730    161/79 96 % 01/24/19 1700    147/72 97 % 01/24/19 1642 98.9 °F (37.2 °C) 84 16 (!) 176/93 100 % Physical Exam  
Constitutional: She appears well-developed and well-nourished. HENT:  
Head: Normocephalic and atraumatic. Eyes: Conjunctivae are normal. No scleral icterus. Neck: Normal range of motion. Neck supple. No JVD present. Soft fatty skin around the neck without masses or adenopathy Cardiovascular: Normal rate, regular rhythm and normal heart sounds. 4 intact extremity pulses Pulmonary/Chest: Effort normal and breath sounds normal.  
Abdominal: Soft. She exhibits no mass. There is no tenderness. Musculoskeletal: Normal range of motion. Lymphadenopathy:  
  She has no cervical adenopathy. Neurological: She is alert. Neurologically intact Skin: Skin is warm and dry. Nursing note and vitals reviewed. Diagnostic Study Results Labs - 
 Recent Results (from the past 12 hour(s)) CBC WITH AUTOMATED DIFF Collection Time: 01/24/19  5:00 PM  
Result Value Ref Range WBC 7.1 4.6 - 13.2 K/uL  
 RBC 3.96 (L) 4.20 - 5.30 M/uL  
 HGB 12.1 12.0 - 16.0 g/dL HCT 36.5 35.0 - 45.0 % MCV 92.2 74.0 - 97.0 FL  
 MCH 30.6 24.0 - 34.0 PG  
 MCHC 33.2 31.0 - 37.0 g/dL  
 RDW 11.7 11.6 - 14.5 % PLATELET 437 458 - 199 K/uL MPV 9.9 9.2 - 11.8 FL  
 NEUTROPHILS 54 40 - 73 % LYMPHOCYTES 37 21 - 52 % MONOCYTES 8 3 - 10 % EOSINOPHILS 1 0 - 5 % BASOPHILS 0 0 - 2 %  
 ABS. NEUTROPHILS 3.9 1.8 - 8.0 K/UL  
 ABS. LYMPHOCYTES 2.6 0.9 - 3.6 K/UL  
 ABS. MONOCYTES 0.5 0.05 - 1.2 K/UL  
 ABS. EOSINOPHILS 0.0 0.0 - 0.4 K/UL  
 ABS. BASOPHILS 0.0 0.0 - 0.1 K/UL  
 DF AUTOMATED METABOLIC PANEL, COMPREHENSIVE Collection Time: 01/24/19  5:00 PM  
Result Value Ref Range Sodium 143 136 - 145 mmol/L Potassium 3.8 3.5 - 5.5 mmol/L Chloride 105 100 - 108 mmol/L  
 CO2 29 21 - 32 mmol/L Anion gap 9 3.0 - 18 mmol/L Glucose 116 (H) 74 - 99 mg/dL BUN 30 (H) 7.0 - 18 MG/DL Creatinine 0.92 0.6 - 1.3 MG/DL  
 BUN/Creatinine ratio 33 (H) 12 - 20 GFR est AA >60 >60 ml/min/1.73m2 GFR est non-AA >60 >60 ml/min/1.73m2 Calcium 8.9 8.5 - 10.1 MG/DL Bilirubin, total 0.6 0.2 - 1.0 MG/DL  
 ALT (SGPT) 44 13 - 56 U/L  
 AST (SGOT) 36 15 - 37 U/L Alk. phosphatase 143 (H) 45 - 117 U/L Protein, total 7.9 6.4 - 8.2 g/dL Albumin 3.3 (L) 3.4 - 5.0 g/dL Globulin 4.6 (H) 2.0 - 4.0 g/dL A-G Ratio 0.7 (L) 0.8 - 1.7    
TROPONIN I Collection Time: 01/24/19  5:00 PM  
Result Value Ref Range Troponin-I, QT <0.02 0.0 - 0.045 NG/ML  
EKG, 12 LEAD, INITIAL Collection Time: 01/24/19  5:42 PM  
Result Value Ref Range Ventricular Rate 77 BPM  
 Atrial Rate 77 BPM  
 P-R Interval 154 ms QRS Duration 106 ms  
 Q-T Interval 412 ms QTC Calculation (Bezet) 466 ms Calculated P Axis 33 degrees Calculated R Axis -20 degrees Calculated T Axis 52 degrees Diagnosis Normal sinus rhythm Normal ECG When compared with ECG of 18-SEP-2018 19:35, No significant change was found Radiologic Studies -  
XR CHEST PORT    (Results Pending) No results found. Medications ordered:  
Medications  
ondansetron (ZOFRAN) injection 4 mg (4 mg IntraVENous Given 1/24/19 2987) Medical Decision Making Initial Medical Decision Making and DDx: 
Will evaluate for silent MI, Pneumonia, and CHF. Doubt Pulmonary Embolism, doubt abdominal pathology. ED Course: Progress Notes, Reevaluation, and Consults: 
  
6:02 PM 
Patient had no vomiting here. Patient appears stable and non-toxic. Will discharge with a prescription for Zofran. I am the first provider for this patient. I reviewed the vital signs, available nursing notes, past medical history, past surgical history, family history and social history. Vital Signs-Reviewed the patient's vital signs. Pulse Oximetry Analysis - 100% on Room air Cardiac Monitor: 
Rate/Rhythm:  84bpm/Normal Sinus Rhythm EKG: Interpreted by the EP. Time Interpreted: 5:42PM 
 Rate: 77bpm 
 Rhythm: Normal Sinus Rhythm Interpretation: No acute changes Comparison: When compared with EKG of 9/18/18, no significant change was found Records Reviewed: Nursing Notes and Triage notes  (Time of Review: 5:19 PM) 6:02 PM Pt reevaluated at this time. Discussed results and findings, as well as, diagnosis and plan for discharge. Follow up with doctors/services listed. Return to the emergency department for alarming symptoms. Pt verbalizes understanding and agreement with plan. All questions addressed. Diagnosis Clinical Impression: 1. Non-intractable vomiting with nausea, unspecified vomiting type Disposition: Discharged Follow-up Information Follow up With Specialties Details Why Contact Info Omid Garrison DO Family Practice, Family Practice In 2 days  [address] [id number] [place] [id number] [phone] Medication List  
  
START taking these medications   
ondansetron 4 mg disintegrating tablet Commonly known as:  ZOFRAN ODT Take 1-2 tablets every 6-8 hours as needed for nausea and vomiting. ASK your doctor about these medications   
acetaminophen 500 mg tablet Commonly known as:  [address] Take 2 Tabs by mouth every six (6) hours as needed for Pain. amLODIPine 10 mg tablet Commonly known as:  Job Dub Take 1 Tab by mouth daily. aspirin 325 mg tablet Commonly known as:  ASPIRIN Take 1 Tab by mouth daily. atorvastatin 80 mg tablet Commonly known as:  LIPITOR 
TAKE 1 TABLET BY MOUTH DAILY 
  
buPROPion 100 mg tablet Commonly known as:  [facility] Take 1 Tab by mouth daily. citalopram 40 mg tablet Commonly known as:  Cloyce Amber Take 1 Tab by mouth daily. gabapentin 300 mg capsule Commonly known as:  NEURONTIN Take 1 Cap by mouth three (3) times daily. glucose blood VI test strips strip Commonly known as:  blood glucose test 
Use one strip (freestyle lite) for each glucose check. Check glucose 2 times per day. hydrALAZINE 50 mg tablet Commonly known as:  APRESOLINE Take 0.5 Tabs by mouth four (4) times daily. hydroCHLOROthiazide 25 mg tablet Commonly known as:  HYDRODIURIL Take 1 Tab by mouth daily. ibuprofen 800 mg tablet Commonly known as:  MOTRIN Take 1 Tab by mouth every eight (8) hours as needed for Pain. insulin aspart U-100 100 unit/mL Inpn Commonly known as:  Marlene Silva Take 8 units with each meal. 
  
insulin degludec 100 unit/mL (3 mL) Inpn Commonly known as:  TRESIBA FLEXTOUCH U-100 
35 Units by SubCUTAneous route daily. Insulin Needles (Disposable) 32 gauge x 5/32\" Ndle Commonly known as:  Mona Pen Needle Use one needle to give insulin 4 times a day. levothyroxine 125 mcg tablet Commonly known as:  SYNTHROID Take 1 Tab by mouth Daily (before breakfast). losartan 100 mg tablet Commonly known as:  COZAAR Take 1 Tab by mouth daily. metFORMIN 850 mg tablet Commonly known as:  GLUCOPHAGE 
TAKE 1 TABLET BY MOUTH TWICE DAILY WITH MEALS 
  
metoprolol succinate 200 mg XL tablet Commonly known as:  TOPROL-XL 
TAKE 1 TABLET BY MOUTH DAILY 
  
nitrofurantoin (macrocrystal-monohydrate) 100 mg capsule Commonly known as:  MACROBID Take 1 Cap by mouth two (2) times a day. polyethylene glycol 17 gram packet Commonly known as:  Marlane Blinks Take 1 Packet by mouth two (2) times a day. prasugrel 10 mg tablet Commonly known as:  EFFIENT Take 1 Tab by mouth daily. Indications: Failed plavix. Recurrent CA. No P2Y12 response RABEprazole 20 mg tablet Commonly known as:  ACIPHEX 
TAKE 1 TABLET BY MOUTH ONCE DAILY 
  
zolpidem 5 mg tablet Commonly known as:  AMBIEN Take 1 Tab by mouth nightly as needed for Sleep. Max Daily Amount: 5 mg. Where to Get Your Medications Information about where to get these medications is not yet available Ask your nurse or doctor about these medications · ondansetron 4 mg disintegrating tablet 
  
 
_______________________________ Attestations: 
Scribe Attestation Smith Stark acting as a scribe for and in the presence of Jelly Prado MD     
January 24, 2019 at 5:19 PM 
    
Provider Attestation:     
I personally performed the services described in the documentation, reviewed the documentation, as recorded by the scribe in my presence, and it accurately and completely records my words and actions. January 24, 2019 at 5:19 PM - Jelly Prado MD   
_______________________________

## 2019-01-25 LAB
ATRIAL RATE: 77 BPM
CALCULATED P AXIS, ECG09: 33 DEGREES
CALCULATED R AXIS, ECG10: -20 DEGREES
CALCULATED T AXIS, ECG11: 52 DEGREES
DIAGNOSIS, 93000: NORMAL
P-R INTERVAL, ECG05: 154 MS
Q-T INTERVAL, ECG07: 412 MS
QRS DURATION, ECG06: 106 MS
QTC CALCULATION (BEZET), ECG08: 466 MS
VENTRICULAR RATE, ECG03: 77 BPM

## 2019-02-14 ENCOUNTER — OFFICE VISIT (OUTPATIENT)
Dept: FAMILY MEDICINE CLINIC | Age: 59
End: 2019-02-14

## 2019-02-14 ENCOUNTER — HOSPITAL ENCOUNTER (OUTPATIENT)
Dept: LAB | Age: 59
Discharge: HOME | End: 2019-02-14
Payer: OTHER GOVERNMENT

## 2019-02-14 VITALS
HEART RATE: 85 BPM | WEIGHT: 192 LBS | BODY MASS INDEX: 35.33 KG/M2 | HEIGHT: 62 IN | TEMPERATURE: 98.1 F | RESPIRATION RATE: 16 BRPM | SYSTOLIC BLOOD PRESSURE: 143 MMHG | OXYGEN SATURATION: 97 % | DIASTOLIC BLOOD PRESSURE: 77 MMHG

## 2019-02-14 DIAGNOSIS — I10 ESSENTIAL HYPERTENSION: ICD-10-CM

## 2019-02-14 DIAGNOSIS — E11.21 TYPE 2 DIABETES MELLITUS WITH NEPHROPATHY (HCC): ICD-10-CM

## 2019-02-14 DIAGNOSIS — R10.12 LEFT UPPER QUADRANT PAIN: ICD-10-CM

## 2019-02-14 DIAGNOSIS — N63.22 BREAST LUMP ON LEFT SIDE AT 11 O'CLOCK POSITION: Primary | ICD-10-CM

## 2019-02-14 LAB
ALBUMIN SERPL-MCNC: 3.2 G/DL (ref 3.4–5)
ALBUMIN/GLOB SERPL: 0.7 {RATIO} (ref 0.8–1.7)
ALP SERPL-CCNC: 200 U/L (ref 45–117)
ALT SERPL-CCNC: 52 U/L (ref 13–56)
ANION GAP SERPL CALC-SCNC: 7 MMOL/L (ref 3–18)
AST SERPL-CCNC: 50 U/L (ref 15–37)
BILIRUB SERPL-MCNC: 0.4 MG/DL (ref 0.2–1)
BUN SERPL-MCNC: 37 MG/DL (ref 7–18)
BUN/CREAT SERPL: 32 (ref 12–20)
CALCIUM SERPL-MCNC: 8.7 MG/DL (ref 8.5–10.1)
CHLORIDE SERPL-SCNC: 106 MMOL/L (ref 100–108)
CHOLEST SERPL-MCNC: 205 MG/DL
CO2 SERPL-SCNC: 28 MMOL/L (ref 21–32)
CREAT SERPL-MCNC: 1.16 MG/DL (ref 0.6–1.3)
GLOBULIN SER CALC-MCNC: 4.9 G/DL (ref 2–4)
GLUCOSE SERPL-MCNC: 165 MG/DL (ref 74–99)
HBA1C MFR BLD: 7.2 % (ref 4.2–5.6)
HDLC SERPL-MCNC: 61 MG/DL (ref 40–60)
HDLC SERPL: 3.4 {RATIO} (ref 0–5)
LDLC SERPL CALC-MCNC: 88.4 MG/DL (ref 0–100)
LIPID PROFILE,FLP: ABNORMAL
POTASSIUM SERPL-SCNC: 3.7 MMOL/L (ref 3.5–5.5)
PROT SERPL-MCNC: 8.1 G/DL (ref 6.4–8.2)
SODIUM SERPL-SCNC: 141 MMOL/L (ref 136–145)
TRIGL SERPL-MCNC: 278 MG/DL (ref ?–150)
VLDLC SERPL CALC-MCNC: 55.6 MG/DL

## 2019-02-14 PROCEDURE — 80053 COMPREHEN METABOLIC PANEL: CPT

## 2019-02-14 PROCEDURE — 36415 COLL VENOUS BLD VENIPUNCTURE: CPT

## 2019-02-14 PROCEDURE — 80061 LIPID PANEL: CPT

## 2019-02-14 PROCEDURE — 83036 HEMOGLOBIN GLYCOSYLATED A1C: CPT

## 2019-02-14 NOTE — PROGRESS NOTES
1. Have you been to the ER, urgent care clinic since your last visit? Hospitalized since your last visit? Yes, 1/24/19, anxiety attack, [facility]    2. Have you seen or consulted any other health care providers outside of the [address] [place] [address] since your last visit? Include any pap smears or colon screening. No     Patient presents in office today for routine care.   Patient concerns: left upper abd, lump in left breast.

## 2019-02-14 NOTE — PROGRESS NOTES
Subjective:     HPI:  Rob Suarez is a 62 y.o. female who presents to the office today for routine care. ER visit for anxiety attack: Pt presented to the ER on 1/24/19 for vomiting as a result of an anxiety attack. Pt states that the night before she went, a co-worker told her that she had something wrong with her because she could see pulsations in her neck. She states that this comment stressed her out and she was laying in bed getting anxious which made her nauseous. Afterwards, she went to the ER where they gave her some medication for the nausea and discharged her home. She states that by the time she got home, the symptoms of anxiety were alleviated. Abdominal pain: Pt complains of intermittent LUQ pain. She states that it is not present today. Mass on chest: Pt continues to  Be concerned about the mass on her left breast.    Current Outpatient Medications   Medication Sig Dispense Refill    ondansetron (ZOFRAN ODT) 4 mg disintegrating tablet Take 1-2 tablets every 6-8 hours as needed for nausea and vomiting. 10 Tab 0    zolpidem (AMBIEN) 5 mg tablet Take 1 Tab by mouth nightly as needed for Sleep. Max Daily Amount: 5 mg. 10 Tab 0    polyethylene glycol (MIRALAX) 17 gram packet Take 1 Packet by mouth two (2) times a day. 60 Packet 1    metoprolol succinate (TOPROL-XL) 200 mg XL tablet TAKE 1 TABLET BY MOUTH DAILY 90 Tab 1    prasugrel (EFFIENT) 10 mg tablet Take 1 Tab by mouth daily. Indications: Failed plavix. Recurrent CA. No P2Y12 response 90 Tab 3    RABEprazole (ACIPHEX) 20 mg tablet TAKE 1 TABLET BY MOUTH ONCE DAILY 30 Tab 5    atorvastatin (LIPITOR) 80 mg tablet TAKE 1 TABLET BY MOUTH DAILY 90 Tab 9    amLODIPine (NORVASC) 10 mg tablet Take 1 Tab by mouth daily. 90 Tab 3    losartan (COZAAR) 100 mg tablet Take 1 Tab by mouth daily. 90 Tab 3    hydrALAZINE (APRESOLINE) 50 mg tablet Take 0.5 Tabs by mouth four (4) times daily.  180 Tab 3    hydroCHLOROthiazide (HYDRODIURIL) 25 mg tablet Take 1 Tab by mouth daily. 90 Tab 3    levothyroxine (SYNTHROID) 125 mcg tablet Take 1 Tab by mouth Daily (before breakfast). 90 Tab 3    buPROPion (WELLBUTRIN) 100 mg tablet Take 1 Tab by mouth daily. 90 Tab 3    citalopram (CELEXA) 40 mg tablet Take 1 Tab by mouth daily. 90 Tab 3    ibuprofen (MOTRIN) 800 mg tablet Take 1 Tab by mouth every eight (8) hours as needed for Pain. 30 Tab 0    metFORMIN (GLUCOPHAGE) 850 mg tablet TAKE 1 TABLET BY MOUTH TWICE DAILY WITH MEALS 180 Tab 2    gabapentin (NEURONTIN) 300 mg capsule Take 1 Cap by mouth three (3) times daily. 90 Cap 2    glucose blood VI test strips (BLOOD GLUCOSE TEST) strip Use one strip (freestyle lite) for each glucose check. Check glucose 2 times per day. 100 Strip 7    insulin aspart U-100 (NOVOLOG) 100 unit/mL inpn Take 8 units with each meal. 10 Pen 11    insulin degludec (TRESIBA FLEXTOUCH U-100) 100 unit/mL (3 mL) inpn 35 Units by SubCUTAneous route daily. 15 Pen 2    aspirin (ASPIRIN) 325 mg tablet Take 1 Tab by mouth daily. 90 Tab 3    nitrofurantoin, macrocrystal-monohydrate, (MACROBID) 100 mg capsule Take 1 Cap by mouth two (2) times a day. 14 Cap 0    acetaminophen (TYLENOL EXTRA STRENGTH) 500 mg tablet Take 2 Tabs by mouth every six (6) hours as needed for Pain. 40 Tab 0    Insulin Needles, Disposable, (DORA PEN NEEDLE) 32 gauge x 5/32\" ndle Use one needle to give insulin 4 times a day.  400 Pen Needle 15        Allergies   Allergen Reactions    Contrast Agent [Iodine] Rash and Sneezing       Past Medical History:   Diagnosis Date    Anxiety     CAD (coronary artery disease)     S/P Coronary stents ( LAD and Lcx)    Depression     Diabetes ([address]     Hepatitis C     Hypercholesterolemia     Hypertension     Menopause     Stroke ([facility] [address] 2017        Past Surgical History:   Procedure Laterality Date    HX BREAST BIOPSY Left     1971 left breast lump removed excisional per patient, cysts    HX  SECTION  1982    pre-eclampsia    HX CHOLECYSTECTOMY  2004    HX CORONARY STENT PLACEMENT  Nov 2013    4 stents    HX HEENT  2009    thyroidectomy due to nodules.  HX HYSTERECTOMY  2005    heavy periods    HX OOPHORECTOMY      HX THYROIDECTOMY         Family History   Problem Relation Age of Onset    Diabetes Mother     Hypertension Mother     Cancer Mother     Heart Disease Mother     Heart Attack Mother     Diabetes Father     Hypertension Father     Cancer Father     Ovarian Cancer Paternal Grandmother        Social History     Socioeconomic History    Marital status:      Spouse name: Not on file    Number of children: Not on file    Years of education: Not on file    Highest education level: Not on file   Social Needs    Financial resource strain: Not on file    Food insecurity - worry: Not on file    Food insecurity - inability: Not on file   Recurious needs - medical: Not on file   Recurious needs - non-medical: Not on file   Occupational History    Not on file   Tobacco Use    Smoking status: Never Smoker    Smokeless tobacco: Never Used   Substance and Sexual Activity    Alcohol use: No     Alcohol/week: 0.0 oz    Drug use: No    Sexual activity: Not Currently   Other Topics Concern    Not on file   Social History Narrative    Not on file       REVIEW OF SYSTEM:  Review of Systems   Constitutional: Negative for chills and fever. Eyes: Negative for blurred vision. Respiratory: Negative for shortness of breath. Cardiovascular: Negative for chest pain, palpitations and leg swelling. Gastrointestinal: Positive for abdominal pain. Negative for constipation, diarrhea, nausea and vomiting. Musculoskeletal: Negative for joint pain. Neurological: Negative for headaches.        Objective:     Visit Vitals  /77 (BP 1 Location: Right arm, BP Patient Position: Sitting)   Pulse 85   Temp 98.1 °F (36.7 °C) (Oral)   Resp 16   Ht 5' 2\" (1.575 m)   Wt 192 lb (87.1 kg)   SpO2 97%   BMI 35.12 kg/m²       PHYSICAL EXAM:  Physical Exam   Constitutional: She is oriented to person, place, and time and well-developed, well-nourished, and in no distress. HENT:   Right Ear: Tympanic membrane, external ear and ear canal normal.   Left Ear: Tympanic membrane, external ear and ear canal normal.   Nose: Nose normal.   Mouth/Throat: Oropharynx is clear and moist.   Eyes: Pupils are equal, round, and reactive to light. Neck: Normal range of motion. Neck supple. No thyromegaly present. Cardiovascular: Normal rate, regular rhythm, normal heart sounds and intact distal pulses. No murmur heard. Pulmonary/Chest: Effort normal and breath sounds normal. She has no wheezes. Neurological: She is alert and oriented to person, place, and time. Skin: Skin is warm and dry. Vitals reviewed. Breasts: right breast normal without mass, skin or nipple changes or axillary nodes, left breast nodule at 11 o'clock in the areola     Assessment/Plan:       ICD-10-CM ICD-9-CM    1. Breast lump on left side at 11 o'clock position N63.22 611.72 US BREAST LT LIMITED=<3 QUAD   2. Left upper quadrant pain R10.12 789.02      Patient given opportunity to ask questions. Questions answered. Will order ultrasound, patient mammogram is less than 3year old. Patient has history of constipation. Encouraged patient to ensure regular bowel movements. Patient understands plan of care. Follow-up Disposition:  Return if symptoms worsen or fail to improve. Written by Juli Varghese, as dictated by Giovanny Lyons DO.    I, Dr. Giovanny Lyons, confirm that all documentation is accurate.

## 2019-02-15 ENCOUNTER — TELEPHONE (OUTPATIENT)
Dept: FAMILY MEDICINE CLINIC | Age: 59
End: 2019-02-15

## 2019-02-15 NOTE — TELEPHONE ENCOUNTER
Carito from scheduling need order diagnostic left breast mammogram. Pt. Has a lump on her breast. Please assist.

## 2019-03-06 ENCOUNTER — TELEPHONE (OUTPATIENT)
Dept: FAMILY MEDICINE CLINIC | Age: 59
End: 2019-03-06

## 2019-03-06 NOTE — TELEPHONE ENCOUNTER
Patient called stating she ran out of novolog, it is being sent through mail order but will not get here until a week. Patient is requesting one pen sent to [facility] [address] so she has enough to last her until the new prescription gets to her. Please advise, thank you.

## 2019-03-07 DIAGNOSIS — E11.9 TYPE 2 DIABETES MELLITUS WITHOUT COMPLICATION, WITHOUT LONG-TERM CURRENT USE OF INSULIN (HCC): ICD-10-CM

## 2019-03-07 RX ORDER — INSULIN ASPART 100 [IU]/ML
INJECTION, SOLUTION INTRAVENOUS; SUBCUTANEOUS
Qty: 10 PEN | Refills: 11 | Status: SHIPPED | OUTPATIENT
Start: 2019-03-07 | End: 2019-05-17 | Stop reason: SDUPTHER

## 2019-03-07 NOTE — TELEPHONE ENCOUNTER
Patient called she ran out of Brightblue. Mail order is late for delivery by a week. She needs one pen sent to her pharmacy [facility].

## 2019-03-20 ENCOUNTER — TELEPHONE (OUTPATIENT)
Dept: FAMILY MEDICINE CLINIC | Age: 59
End: 2019-03-20

## 2019-03-20 NOTE — TELEPHONE ENCOUNTER
Carito from scheduling is checking the status of order for diagnostic left breast mammogram. Pt. Has a lump on her breast. Please assist

## 2019-03-21 NOTE — TELEPHONE ENCOUNTER
Carito stated that the mammographor put the order in for the patient and now Dr. Cristhian Garay just need to sign off after review. She also stated that the patient is not answering her phone or returning her phone call for her to schedule an appointment for the mammogram. Will send the request to Dr. Cristhian Garay.

## 2019-03-25 DIAGNOSIS — I10 ESSENTIAL HYPERTENSION: ICD-10-CM

## 2019-03-25 DIAGNOSIS — I25.10 CORONARY ARTERY DISEASE INVOLVING NATIVE CORONARY ARTERY OF NATIVE HEART WITHOUT ANGINA PECTORIS: ICD-10-CM

## 2019-03-25 DIAGNOSIS — F32.89 OTHER DEPRESSION: ICD-10-CM

## 2019-03-25 DIAGNOSIS — E03.9 HYPOTHYROIDISM, ACQUIRED: ICD-10-CM

## 2019-03-31 RX ORDER — LEVOTHYROXINE SODIUM 125 UG/1
125 TABLET ORAL
Qty: 90 TAB | Refills: 1 | Status: SHIPPED | OUTPATIENT
Start: 2019-03-31 | End: 2020-04-07 | Stop reason: SDUPTHER

## 2019-03-31 RX ORDER — CITALOPRAM 40 MG/1
40 TABLET, FILM COATED ORAL DAILY
Qty: 90 TAB | Refills: 1 | Status: SHIPPED | OUTPATIENT
Start: 2019-03-31 | End: 2019-10-23 | Stop reason: SDUPTHER

## 2019-03-31 RX ORDER — HYDRALAZINE HYDROCHLORIDE 50 MG/1
25 TABLET, FILM COATED ORAL 4 TIMES DAILY
Qty: 180 TAB | Refills: 1 | Status: SHIPPED | OUTPATIENT
Start: 2019-03-31 | End: 2019-09-18 | Stop reason: SDUPTHER

## 2019-03-31 RX ORDER — LOSARTAN POTASSIUM 100 MG/1
100 TABLET ORAL DAILY
Qty: 90 TAB | Refills: 1 | Status: SHIPPED | OUTPATIENT
Start: 2019-03-31 | End: 2019-09-18

## 2019-03-31 RX ORDER — GABAPENTIN 300 MG/1
300 CAPSULE ORAL 3 TIMES DAILY
Qty: 90 CAP | Refills: 1 | Status: SHIPPED | OUTPATIENT
Start: 2019-03-31 | End: 2019-11-22

## 2019-03-31 RX ORDER — ATORVASTATIN CALCIUM 80 MG/1
TABLET, FILM COATED ORAL
Qty: 90 TAB | Refills: 1 | Status: SHIPPED | OUTPATIENT
Start: 2019-03-31 | End: 2019-10-23 | Stop reason: SDUPTHER

## 2019-03-31 RX ORDER — AMLODIPINE BESYLATE 10 MG/1
10 TABLET ORAL DAILY
Qty: 90 TAB | Refills: 1 | Status: SHIPPED | OUTPATIENT
Start: 2019-03-31 | End: 2019-10-23 | Stop reason: SDUPTHER

## 2019-03-31 RX ORDER — METOPROLOL SUCCINATE 200 MG/1
TABLET, EXTENDED RELEASE ORAL
Qty: 90 TAB | Refills: 1 | Status: SHIPPED | OUTPATIENT
Start: 2019-03-31 | End: 2019-10-23 | Stop reason: SDUPTHER

## 2019-03-31 RX ORDER — BUPROPION HYDROCHLORIDE 100 MG/1
100 TABLET ORAL DAILY
Qty: 90 TAB | Refills: 1 | Status: SHIPPED | OUTPATIENT
Start: 2019-03-31 | End: 2019-10-23

## 2019-03-31 RX ORDER — HYDROCHLOROTHIAZIDE 25 MG/1
25 TABLET ORAL DAILY
Qty: 90 TAB | Refills: 1 | Status: SHIPPED | OUTPATIENT
Start: 2019-03-31 | End: 2019-10-23 | Stop reason: SDUPTHER

## 2019-04-02 ENCOUNTER — HOSPITAL ENCOUNTER (OUTPATIENT)
Dept: ULTRASOUND IMAGING | Age: 59
Discharge: HOME | End: 2019-04-02
Attending: FAMILY MEDICINE
Payer: OTHER GOVERNMENT

## 2019-04-02 ENCOUNTER — HOSPITAL ENCOUNTER (OUTPATIENT)
Dept: MAMMOGRAPHY | Age: 59
Discharge: HOME | End: 2019-04-02
Attending: FAMILY MEDICINE
Payer: OTHER GOVERNMENT

## 2019-04-02 DIAGNOSIS — N63.22 BREAST LUMP ON LEFT SIDE AT 11 O'CLOCK POSITION: ICD-10-CM

## 2019-04-02 DIAGNOSIS — N63.0 BREAST LUMP: ICD-10-CM

## 2019-04-02 PROCEDURE — 76642 ULTRASOUND BREAST LIMITED: CPT

## 2019-04-02 PROCEDURE — 77061 BREAST TOMOSYNTHESIS UNI: CPT

## 2019-04-08 NOTE — TELEPHONE ENCOUNTER
glucose blood VI test strips (BLOOD GLUCOSE TEST) strip 100 Strip 7 3/30/2018     Sig: Use one strip (freestyle lite) for each glucose check.  Check glucose 2 times per day. Sent to pharmacy as: glucose blood VI test strips (BLOOD GLUCOSE TEST) strip    Notes to Pharmacy: Please dispense strips for one touch ultra. E-Prescribing Status: Receipt confirmed by pharmacy (3/30/2018  4:45 PM EDT)      Patient last seen on 02/14/19. No future appointment scheduled at this time. Please see and advise.

## 2019-05-06 ENCOUNTER — TELEPHONE (OUTPATIENT)
Dept: FAMILY MEDICINE CLINIC | Age: 59
End: 2019-05-06

## 2019-05-06 NOTE — TELEPHONE ENCOUNTER
Pt called in and was complaining of pain in her upper right thigh, like a sharp radiating pain and asked for a nurse to give her a call back today. Please advise.

## 2019-05-10 ENCOUNTER — OFFICE VISIT (OUTPATIENT)
Dept: FAMILY MEDICINE CLINIC | Age: 59
End: 2019-05-10

## 2019-05-10 VITALS
DIASTOLIC BLOOD PRESSURE: 70 MMHG | SYSTOLIC BLOOD PRESSURE: 128 MMHG | OXYGEN SATURATION: 96 % | HEIGHT: 62 IN | WEIGHT: 195 LBS | BODY MASS INDEX: 35.88 KG/M2 | RESPIRATION RATE: 16 BRPM | TEMPERATURE: 97.7 F | HEART RATE: 79 BPM

## 2019-05-10 DIAGNOSIS — E11.21 TYPE 2 DIABETES MELLITUS WITH NEPHROPATHY (HCC): Primary | ICD-10-CM

## 2019-05-10 DIAGNOSIS — R10.11 RIGHT UPPER QUADRANT PAIN: ICD-10-CM

## 2019-05-10 LAB
BILIRUB UR QL STRIP: NEGATIVE
GLUCOSE POC: 198 MG/DL
GLUCOSE UR-MCNC: NORMAL MG/DL
KETONES P FAST UR STRIP-MCNC: NEGATIVE MG/DL
PH UR STRIP: 5.5 [PH] (ref 4.6–8)
PROT UR QL STRIP: NORMAL
SP GR UR STRIP: 1.02 (ref 1–1.03)
UA UROBILINOGEN AMB POC: NORMAL (ref 0.2–1)
URINALYSIS CLARITY POC: CLEAR
URINALYSIS COLOR POC: YELLOW
URINE BLOOD POC: NEGATIVE
URINE LEUKOCYTES POC: NORMAL
URINE NITRITES POC: NEGATIVE

## 2019-05-10 NOTE — PATIENT INSTRUCTIONS
Start Miralax twice a day for the next week and ensure bowel movements. If pain not improved will consider imaging studies.

## 2019-05-10 NOTE — PROGRESS NOTES
1. Have you been to the ER, urgent care clinic since your last visit? Hospitalized since your last visit? No    2. Have you seen or consulted any other health care providers outside of the [address] [place] [address] since your last visit? Include any pap smears or colon screening. No     Patient presents in office today for routine care. Patient concerns: left and right side pain for 1 week.

## 2019-05-10 NOTE — PROGRESS NOTES
Subjective:     HPI:  Lazaro Solis is a 62 y.o. female who presents to the office today for routine care. Abdominal pain: Pt complains of pain on bilateral upper sides of her abdomen lasting 1 week. She states that the pain is more constant on her left side and comes and goes on her right. Pts gallbladder has been surgically removed. She has taken Tylenol without relief. Her last BM was yesterday. She has used Miralax before with good relief of constipation. Diabetes Mellitus:  female has diabetes mellitus, and  diabetes and hypertension. Pt's blood glucose is 198 in the office today. Diabetic ROS - medication compliance: compliant all of the time, home glucose monitoring: is performed regularly. She states that her BS this morning was 202, further diabetic ROS: no polyuria or polydipsia, no chest pain, dyspnea or TIA's, no numbness, tingling or pain in extremities. Lab review: labs are reviewed, up to date and normal.   Lab Results   Component Value Date/Time    Hemoglobin A1c 7.2 (H) 02/14/2019 03:15 PM    Hemoglobin A1c (POC) 10.8 05/19/2016 12:56 PM     Pt has been sad because Sunday is her first Mother's Day without her son. Current Outpatient Medications   Medication Sig Dispense Refill    glucose blood VI test strips (BLOOD GLUCOSE TEST) strip Use one strip (freestyle lite) for each glucose check. Check glucose 2 times per day. 100 Strip 7    hydroCHLOROthiazide (HYDRODIURIL) 25 mg tablet Take 1 Tab by mouth daily. 90 Tab 1    hydrALAZINE (APRESOLINE) 50 mg tablet Take 0.5 Tabs by mouth four (4) times daily. 180 Tab 1    levothyroxine (SYNTHROID) 125 mcg tablet Take 1 Tab by mouth Daily (before breakfast). 90 Tab 1    metoprolol succinate (TOPROL-XL) 200 mg XL tablet TAKE 1 TABLET BY MOUTH DAILY 90 Tab 1    losartan (COZAAR) 100 mg tablet Take 1 Tab by mouth daily. 90 Tab 1    amLODIPine (NORVASC) 10 mg tablet Take 1 Tab by mouth daily.  90 Tab 1    citalopram (CELEXA) 40 mg tablet Take 1 Tab by mouth daily. 90 Tab 1    buPROPion (WELLBUTRIN) 100 mg tablet Take 1 Tab by mouth daily. 90 Tab 1    gabapentin (NEURONTIN) 300 mg capsule Take 1 Cap by mouth three (3) times daily. 90 Cap 1    atorvastatin (LIPITOR) 80 mg tablet TAKE 1 TABLET BY MOUTH DAILY 90 Tab 1    insulin aspart U-100 (NOVOLOG) 100 unit/mL inpn Take 8 units with each meal. 10 Pen 11    ondansetron (ZOFRAN ODT) 4 mg disintegrating tablet Take 1-2 tablets every 6-8 hours as needed for nausea and vomiting. 10 Tab 0    zolpidem (AMBIEN) 5 mg tablet Take 1 Tab by mouth nightly as needed for Sleep. Max Daily Amount: 5 mg. 10 Tab 0    polyethylene glycol (MIRALAX) 17 gram packet Take 1 Packet by mouth two (2) times a day. 60 Packet 1    prasugrel (EFFIENT) 10 mg tablet Take 1 Tab by mouth daily. Indications: Failed plavix. Recurrent CA. No P2Y12 response 90 Tab 3    RABEprazole (ACIPHEX) 20 mg tablet TAKE 1 TABLET BY MOUTH ONCE DAILY 30 Tab 5    ibuprofen (MOTRIN) 800 mg tablet Take 1 Tab by mouth every eight (8) hours as needed for Pain. 30 Tab 0    metFORMIN (GLUCOPHAGE) 850 mg tablet TAKE 1 TABLET BY MOUTH TWICE DAILY WITH MEALS 180 Tab 2    insulin degludec (TRESIBA FLEXTOUCH U-100) 100 unit/mL (3 mL) inpn 35 Units by SubCUTAneous route daily. 15 Pen 2    aspirin (ASPIRIN) 325 mg tablet Take 1 Tab by mouth daily. 90 Tab 3    nitrofurantoin, macrocrystal-monohydrate, (MACROBID) 100 mg capsule Take 1 Cap by mouth two (2) times a day. 14 Cap 0    acetaminophen (TYLENOL EXTRA STRENGTH) 500 mg tablet Take 2 Tabs by mouth every six (6) hours as needed for Pain. 40 Tab 0    Insulin Needles, Disposable, (DORA PEN NEEDLE) 32 gauge x 5/32\" ndle Use one needle to give insulin 4 times a day.  400 Pen Needle 15        Allergies   Allergen Reactions    Contrast Agent [Iodine] Rash and Sneezing       Past Medical History:   Diagnosis Date    Anxiety     CAD (coronary artery disease)     S/P Coronary stents ( LAD and Lcx)  Depression     Diabetes ([facility]     Hepatitis C     Hypercholesterolemia     Hypertension     Menopause     [facility] 2017        Past Surgical History:   Procedure Laterality Date    HX BREAST BIOPSY Left     1971 2010 left breast lump removed excisional per patient, cysts    HX  SECTION  1982    pre-eclampsia    HX CHOLECYSTECTOMY  2004    HX CORONARY STENT PLACEMENT  2013    4 stents    HX HEENT      thyroidectomy due to nodules.      HX HYSTERECTOMY  2005    heavy periods    HX OOPHORECTOMY      HX THYROIDECTOMY         Family History   Problem Relation Age of Onset    Diabetes Mother     Hypertension Mother     Cancer Mother     Heart Disease Mother     Heart Attack Mother     Diabetes Father     Hypertension Father     Cancer Father     Ovarian Cancer Paternal Grandmother        Social History     Socioeconomic History    Marital status:      Spouse name: Not on file    Number of children: Not on file    Years of education: Not on file    Highest education level: Not on file   Occupational History    Not on file   Social Needs    Financial resource strain: Not on file    Food insecurity:     Worry: Not on file     Inability: Not on file    Transportation needs:     Medical: Not on file     Non-medical: Not on file   Tobacco Use    Smoking status: Never Smoker    Smokeless tobacco: Never Used   Substance and Sexual Activity    Alcohol use: No     Alcohol/week: 0.0 oz    Drug use: No    Sexual activity: Not Currently   Lifestyle    Physical activity:     Days per week: Not on file     Minutes per session: Not on file    Stress: Not on file   Relationships    Social connections:     Talks on phone: Not on file     Gets together: Not on file     Attends [employer] service: Not on file     Active member of club or organization: Not on file     Attends meetings of clubs or organizations: Not on file     Relationship status: Not on file   Rebeca Kern Intimate partner violence:     Fear of current or ex partner: Not on file     Emotionally abused: Not on file     Physically abused: Not on file     Forced sexual activity: Not on file   Other Topics Concern    Not on file   Social History Narrative    Not on file       REVIEW OF SYSTEM:  Review of Systems   Constitutional: Negative for chills and fever. Eyes: Negative for blurred vision. Respiratory: Negative for shortness of breath. Cardiovascular: Negative for chest pain, palpitations and leg swelling. Gastrointestinal: Positive for abdominal pain. Negative for constipation, diarrhea, nausea and vomiting. Musculoskeletal: Negative for joint pain. Neurological: Negative for headaches. Objective:     Visit Vitals  /70 (BP 1 Location: Right arm, BP Patient Position: Sitting)   Pulse 79   Temp 97.7 °F (36.5 °C) (Oral)   Resp 16   Ht 5' 2\" (1.575 m)   Wt 195 lb (88.5 kg)   SpO2 96%   BMI 35.67 kg/m²       PHYSICAL EXAM:  Physical Exam   Constitutional: She is oriented to person, place, and time and well-developed, well-nourished, and in no distress. HENT:   Right Ear: Tympanic membrane, external ear and ear canal normal.   Left Ear: Tympanic membrane, external ear and ear canal normal.   Nose: Nose normal.   Mouth/Throat: Oropharynx is clear and moist.   Eyes: Pupils are equal, round, and reactive to light. Neck: Normal range of motion. Neck supple. No thyromegaly present. Cardiovascular: Normal rate, regular rhythm, normal heart sounds and intact distal pulses. No murmur heard. Pulmonary/Chest: Effort normal and breath sounds normal. She has no wheezes. Abdominal: Soft. Bowel sounds are normal. There is tenderness in the right upper quadrant. Neurological: She is alert and oriented to person, place, and time. Skin: Skin is warm and dry. Vitals reviewed.     Lab Results   Component Value Date/Time    Sodium 141 02/14/2019 03:15 PM    Potassium 3.7 02/14/2019 03:15 PM Chloride 106 02/14/2019 03:15 PM    CO2 28 02/14/2019 03:15 PM    Anion gap 7 02/14/2019 03:15 PM    Glucose 165 (H) 02/14/2019 03:15 PM    BUN 37 (H) 02/14/2019 03:15 PM    Creatinine 1.16 02/14/2019 03:15 PM    BUN/Creatinine ratio 32 (H) 02/14/2019 03:15 PM    GFR est AA 58 (L) 02/14/2019 03:15 PM    GFR est non-AA 48 (L) 02/14/2019 03:15 PM    Calcium 8.7 02/14/2019 03:15 PM    Bilirubin, total 0.4 02/14/2019 03:15 PM    AST (SGOT) 50 (H) 02/14/2019 03:15 PM    Alk. phosphatase 200 (H) 02/14/2019 03:15 PM    Protein, total 8.1 02/14/2019 03:15 PM    Albumin 3.2 (L) 02/14/2019 03:15 PM    Globulin 4.9 (H) 02/14/2019 03:15 PM    A-G Ratio 0.7 (L) 02/14/2019 03:15 PM    ALT (SGPT) 52 02/14/2019 03:15 PM     Lab Results   Component Value Date/Time    Cholesterol, total 205 (H) 02/14/2019 03:15 PM    HDL Cholesterol 61 (H) 02/14/2019 03:15 PM    LDL, calculated 88.4 02/14/2019 03:15 PM    VLDL, calculated 55.6 02/14/2019 03:15 PM    Triglyceride 278 (H) 02/14/2019 03:15 PM    CHOL/HDL Ratio 3.4 02/14/2019 03:15 PM     Lab Results   Component Value Date/Time    Hemoglobin A1c 7.2 (H) 02/14/2019 03:15 PM    Hemoglobin A1c (POC) 10.8 05/19/2016 12:56 PM       Assessment/Plan:       ICD-10-CM ICD-9-CM    1. Type 2 diabetes mellitus with nephropathy (HCC) E11.21 250.40 AMB POC GLUCOSE BLOOD, BY GLUCOSE MONITORING DEVICE     583.81    2. Right upper quadrant pain R10.11 789.01 AMB POC URINALYSIS DIP STICK AUTO W/O MICRO     Patient given opportunity to ask questions. Questions answered. Patient understands plan of care. Follow-up and Dispositions    · Return if symptoms worsen or fail to improve. Written by Vitaly Bernardo, as dictated by Dao Fonseca DO.    I, Dr. Dao Fonseca, confirm that all documentation is accurate.

## 2019-05-17 DIAGNOSIS — E11.9 TYPE 2 DIABETES MELLITUS WITHOUT COMPLICATION, WITHOUT LONG-TERM CURRENT USE OF INSULIN (HCC): ICD-10-CM

## 2019-05-22 RX ORDER — INSULIN ASPART 100 [IU]/ML
INJECTION, SOLUTION INTRAVENOUS; SUBCUTANEOUS
Qty: 30 ML | Refills: 11 | Status: SHIPPED | OUTPATIENT
Start: 2019-05-22 | End: 2019-06-28 | Stop reason: SDUPTHER

## 2019-06-12 NOTE — TELEPHONE ENCOUNTER
ED Nurse Note:

Pt sleeping. No distress noted. Pt transferred to hospital bed for 
admission/surgery preparation for next shift.  Will continue to monitor. Returned patients call [facility] [phone].  To call me back

## 2019-06-28 ENCOUNTER — OFFICE VISIT (OUTPATIENT)
Dept: FAMILY MEDICINE CLINIC | Age: 59
End: 2019-06-28

## 2019-06-28 VITALS
HEART RATE: 85 BPM | WEIGHT: 194 LBS | HEIGHT: 62 IN | SYSTOLIC BLOOD PRESSURE: 142 MMHG | BODY MASS INDEX: 35.7 KG/M2 | DIASTOLIC BLOOD PRESSURE: 78 MMHG | OXYGEN SATURATION: 97 % | TEMPERATURE: 97.4 F | RESPIRATION RATE: 16 BRPM

## 2019-06-28 DIAGNOSIS — J01.40 ACUTE NON-RECURRENT PANSINUSITIS: Primary | ICD-10-CM

## 2019-06-28 DIAGNOSIS — E11.9 TYPE 2 DIABETES MELLITUS WITHOUT COMPLICATION, WITHOUT LONG-TERM CURRENT USE OF INSULIN (HCC): ICD-10-CM

## 2019-06-28 RX ORDER — INSULIN ASPART 100 [IU]/ML
10 INJECTION, SOLUTION INTRAVENOUS; SUBCUTANEOUS
Qty: 30 ML | Refills: 11 | Status: SHIPPED | OUTPATIENT
Start: 2019-06-28 | End: 2020-04-07

## 2019-06-28 RX ORDER — AZITHROMYCIN 250 MG/1
TABLET, FILM COATED ORAL
Qty: 6 TAB | Refills: 0 | Status: SHIPPED | OUTPATIENT
Start: 2019-06-28 | End: 2019-07-03

## 2019-06-28 NOTE — PROGRESS NOTES
Subjective:     HPI:  William Iraheta is a 62 y.o. female who presents to the office today for routine follow up. URI: Pt complains of URI symptoms lasting 1 week. She complains of head congestion, dry cough, and chills. Her symptoms are worse at night when she lies down. She took \"sinus medicine\" without relief. Diabetes Mellitus:  female has diabetes mellitus, and  hypertension. Diabetic ROS - medication compliance: compliant all of the time, diabetic diet compliance: compliant most of the time, home glucose monitoring: is performed regularly. Pt admits that she has some readings that are abnormally high. She noticed that higher BS is correlated with eating foods with high sugar content, further diabetic ROS: no polyuria or polydipsia, no chest pain, dyspnea or TIA's, no numbness, tingling or pain in extremities. Lab review: labs are reviewed, up to date and normal.   Lab Results   Component Value Date/Time    Hemoglobin A1c 7.2 (H) 02/14/2019 03:15 PM    Hemoglobin A1c (POC) 10.8 05/19/2016 12:56 PM     Hypertension  The patient presents for follow up of hypertension. Pt's BP is 142/78 in the office today. Cardiovascular ROS: taking medications as instructed, no medication side effects noted, no TIA's, no chest pain on exertion, no dyspnea on exertion, no swelling of ankles. Hand masses: Pt complains of masses in bilateral hands. She states that the masses are sometimes painful. Labs reviewed. Current Outpatient Medications   Medication Sig Dispense Refill    insulin aspart U-100 (NOVOLOG FLEXPEN U-100 INSULIN) 100 unit/mL (3 mL) inpn 10 Units by SubCUTAneous route Before breakfast, lunch, and dinner. 30 mL 11    azithromycin (ZITHROMAX) 250 mg tablet Take 2 tablets today, then take 1 tablet daily 6 Tab 0    glucose blood VI test strips (BLOOD GLUCOSE TEST) strip Use one strip (freestyle lite) for each glucose check. Check glucose 2 times per day.  100 Strip 7    hydroCHLOROthiazide (HYDRODIURIL) 25 mg tablet Take 1 Tab by mouth daily. 90 Tab 1    hydrALAZINE (APRESOLINE) 50 mg tablet Take 0.5 Tabs by mouth four (4) times daily. 180 Tab 1    levothyroxine (SYNTHROID) 125 mcg tablet Take 1 Tab by mouth Daily (before breakfast). 90 Tab 1    metoprolol succinate (TOPROL-XL) 200 mg XL tablet TAKE 1 TABLET BY MOUTH DAILY 90 Tab 1    losartan (COZAAR) 100 mg tablet Take 1 Tab by mouth daily. 90 Tab 1    amLODIPine (NORVASC) 10 mg tablet Take 1 Tab by mouth daily. 90 Tab 1    citalopram (CELEXA) 40 mg tablet Take 1 Tab by mouth daily. 90 Tab 1    buPROPion (WELLBUTRIN) 100 mg tablet Take 1 Tab by mouth daily. 90 Tab 1    gabapentin (NEURONTIN) 300 mg capsule Take 1 Cap by mouth three (3) times daily. 90 Cap 1    atorvastatin (LIPITOR) 80 mg tablet TAKE 1 TABLET BY MOUTH DAILY 90 Tab 1    ondansetron (ZOFRAN ODT) 4 mg disintegrating tablet Take 1-2 tablets every 6-8 hours as needed for nausea and vomiting. 10 Tab 0    zolpidem (AMBIEN) 5 mg tablet Take 1 Tab by mouth nightly as needed for Sleep. Max Daily Amount: 5 mg. 10 Tab 0    polyethylene glycol (MIRALAX) 17 gram packet Take 1 Packet by mouth two (2) times a day. 60 Packet 1    prasugrel (EFFIENT) 10 mg tablet Take 1 Tab by mouth daily. Indications: Failed plavix. Recurrent CA. No P2Y12 response 90 Tab 3    RABEprazole (ACIPHEX) 20 mg tablet TAKE 1 TABLET BY MOUTH ONCE DAILY 30 Tab 5    ibuprofen (MOTRIN) 800 mg tablet Take 1 Tab by mouth every eight (8) hours as needed for Pain. 30 Tab 0    metFORMIN (GLUCOPHAGE) 850 mg tablet TAKE 1 TABLET BY MOUTH TWICE DAILY WITH MEALS 180 Tab 2    insulin degludec (TRESIBA FLEXTOUCH U-100) 100 unit/mL (3 mL) inpn 35 Units by SubCUTAneous route daily. 15 Pen 2    aspirin (ASPIRIN) 325 mg tablet Take 1 Tab by mouth daily. 90 Tab 3    nitrofurantoin, macrocrystal-monohydrate, (MACROBID) 100 mg capsule Take 1 Cap by mouth two (2) times a day.  14 Cap 0    acetaminophen (TYLENOL EXTRA STRENGTH) 500 mg tablet Take 2 Tabs by mouth every six (6) hours as needed for Pain. 40 Tab 0    Insulin Needles, Disposable, (DORA PEN NEEDLE) 32 gauge x \" ndle Use one needle to give insulin 4 times a day. 400 Pen Needle 15        Allergies   Allergen Reactions    Contrast Agent [Iodine] Rash and Sneezing       Past Medical History:   Diagnosis Date    Anxiety     CAD (coronary artery disease)     S/P Coronary stents ( LAD and Lcx)    Depression     Diabetes ([facility]     Hepatitis C     Hypercholesterolemia     Hypertension     Menopause     Stroke ([facility] 2017        Past Surgical History:   Procedure Laterality Date    HX BREAST BIOPSY Left     1971 left breast lump removed excisional per patient, cysts    HX  SECTION      pre-eclampsia    HX CHOLECYSTECTOMY      HX CORONARY STENT PLACEMENT  2013    4 stents    HX HEENT      thyroidectomy due to nodules.      HX HYSTERECTOMY  2005    heavy periods    HX OOPHORECTOMY      HX THYROIDECTOMY         Family History   Problem Relation Age of Onset    Diabetes Mother     Hypertension Mother     Cancer Mother     Heart Disease Mother     Heart Attack Mother     Diabetes Father     Hypertension Father     Cancer Father     Ovarian Cancer Paternal Grandmother        Social History     Socioeconomic History    Marital status:      Spouse name: Not on file    Number of children: Not on file    Years of education: Not on file    Highest education level: Not on file   Occupational History    Not on file   Social Needs    Financial resource strain: Not on file    Food insecurity:     Worry: Not on file     Inability: Not on file    Transportation needs:     Medical: Not on file     Non-medical: Not on file   Tobacco Use    Smoking status: Never Smoker    Smokeless tobacco: Never Used   Substance and Sexual Activity    Alcohol use: No     Alcohol/week: 0.0 oz    Drug use: No    Sexual activity: Not Currently Lifestyle    Physical activity:     Days per week: Not on file     Minutes per session: Not on file    Stress: Not on file   Relationships    Social connections:     Talks on phone: Not on file     Gets together: Not on file     Attends [employer] service: Not on file     Active member of club or organization: Not on file     Attends meetings of clubs or organizations: Not on file     Relationship status: Not on file    Intimate partner violence:     Fear of current or ex partner: Not on file     Emotionally abused: Not on file     Physically abused: Not on file     Forced sexual activity: Not on file   Other Topics Concern    Not on file   Social History Narrative    Not on file       REVIEW OF SYSTEM:  Review of Systems   Constitutional: Positive for chills. Negative for fever. HENT: Positive for congestion. Eyes: Negative for blurred vision. Respiratory: Positive for cough. Negative for shortness of breath. Cardiovascular: Negative for chest pain, palpitations and leg swelling. Gastrointestinal: Negative for constipation, diarrhea, nausea and vomiting. Musculoskeletal: Negative for joint pain. Neurological: Negative for headaches. Objective:     Visit Vitals  /78 (BP 1 Location: Right arm, BP Patient Position: Sitting)   Pulse 85   Temp 97.4 °F (36.3 °C) (Oral)   Resp 16   Ht 5' 2\" (1.575 m)   Wt 194 lb (88 kg)   SpO2 97%   BMI 35.48 kg/m²       PHYSICAL EXAM:  Physical Exam   Constitutional: She is oriented to person, place, and time and well-developed, well-nourished, and in no distress. HENT:   Right Ear: Tympanic membrane, external ear and ear canal normal.   Left Ear: Tympanic membrane, external ear and ear canal normal.   Nose: Right sinus exhibits maxillary sinus tenderness and frontal sinus tenderness. Left sinus exhibits maxillary sinus tenderness and frontal sinus tenderness.    Mouth/Throat: Oropharynx is clear and moist.   Eyes: Pupils are equal, round, and reactive to light. Crusting to right eye   Neck: Normal range of motion. Neck supple. No thyromegaly present. Cardiovascular: Normal rate, regular rhythm, normal heart sounds and intact distal pulses. No murmur heard. Pulmonary/Chest: Effort normal and breath sounds normal. She has no wheezes. Neurological: She is alert and oriented to person, place, and time. Skin: Skin is warm and dry. Vitals reviewed. Assessment/Plan:       ICD-10-CM ICD-9-CM    1. Acute non-recurrent pansinusitis J01.40 461.8 azithromycin (ZITHROMAX) 250 mg tablet   2. Type 2 diabetes mellitus without complication, without long-term current use of insulin ([facility]) E11.9 250.00 insulin aspart U-100 (NOVOLOG FLEXPEN U-100 INSULIN) 100 unit/mL (3 mL) inpn   3. Uncontrolled type 2 diabetes mellitus with diabetic polyneuropathy, without long-term current use of insulin ([facility]) E11.42 250.62 insulin aspart U-100 (NOVOLOG FLEXPEN U-100 INSULIN) 100 unit/mL (3 mL) inpn    E11.65 357.2      Patient given opportunity to ask questions. Questions answered. Patient understands plan of care. Follow-up and Dispositions    · Return for Dr. Tonio Neri. .       Written by Yudith Parham, as dictated by Lala Weaver DO.    I, Dr. Lala Weaver, confirm that all documentation is accurate.

## 2019-06-28 NOTE — PROGRESS NOTES
1. Have you been to the ER, urgent care clinic since your last visit? Hospitalized since your last visit? No    2. Have you seen or consulted any other health care providers outside of the [address] [place] [address] since your last visit? Include any pap smears or colon screening. No     Patient presents in office today for routine care. Patient concerns: sinus congestion for a week.

## 2019-09-18 ENCOUNTER — OFFICE VISIT (OUTPATIENT)
Dept: FAMILY MEDICINE CLINIC | Facility: CLINIC | Age: 59
End: 2019-09-18

## 2019-09-18 VITALS
SYSTOLIC BLOOD PRESSURE: 150 MMHG | HEART RATE: 82 BPM | WEIGHT: 199 LBS | RESPIRATION RATE: 17 BRPM | DIASTOLIC BLOOD PRESSURE: 78 MMHG | OXYGEN SATURATION: 95 % | TEMPERATURE: 98.5 F | BODY MASS INDEX: 36.62 KG/M2 | HEIGHT: 62 IN

## 2019-09-18 DIAGNOSIS — F33.9 RECURRENT DEPRESSION (HCC): ICD-10-CM

## 2019-09-18 DIAGNOSIS — E11.21 TYPE 2 DIABETES WITH NEPHROPATHY (HCC): ICD-10-CM

## 2019-09-18 DIAGNOSIS — Z23 ENCOUNTER FOR IMMUNIZATION: ICD-10-CM

## 2019-09-18 DIAGNOSIS — M79.642 PAIN IN BOTH HANDS: ICD-10-CM

## 2019-09-18 DIAGNOSIS — M79.641 PAIN IN BOTH HANDS: ICD-10-CM

## 2019-09-18 DIAGNOSIS — E66.01 SEVERE OBESITY (HCC): ICD-10-CM

## 2019-09-18 DIAGNOSIS — Z00.00 ROUTINE GENERAL MEDICAL EXAMINATION AT A HEALTH CARE FACILITY: Primary | ICD-10-CM

## 2019-09-18 DIAGNOSIS — J20.9 ACUTE BRONCHITIS, UNSPECIFIED ORGANISM: ICD-10-CM

## 2019-09-18 DIAGNOSIS — I10 ESSENTIAL HYPERTENSION: ICD-10-CM

## 2019-09-18 PROBLEM — E87.6 HYPOKALEMIA: Status: RESOLVED | Noted: 2017-12-18 | Resolved: 2019-09-18

## 2019-09-18 PROBLEM — Z79.4 CONTROLLED TYPE 2 DIABETES MELLITUS WITH COMPLICATION, WITH LONG-TERM CURRENT USE OF INSULIN (HCC): Status: ACTIVE | Noted: 2017-12-18

## 2019-09-18 PROBLEM — E11.8 CONTROLLED TYPE 2 DIABETES MELLITUS WITH COMPLICATION, WITH LONG-TERM CURRENT USE OF INSULIN (HCC): Status: ACTIVE | Noted: 2017-12-18

## 2019-09-18 PROBLEM — R80.9 MICROALBUMINURIA: Status: ACTIVE | Noted: 2019-09-18

## 2019-09-18 PROBLEM — Z91.14 NONCOMPLIANCE WITH MEDICATION REGIMEN: Chronic | Status: RESOLVED | Noted: 2017-04-05 | Resolved: 2019-09-18

## 2019-09-18 LAB
GLUCOSE POC: 343 MG/DL
HBA1C MFR BLD HPLC: 6.6 %

## 2019-09-18 RX ORDER — HYDRALAZINE HYDROCHLORIDE 50 MG/1
50 TABLET, FILM COATED ORAL 2 TIMES DAILY
Qty: 180 TAB | Refills: 1 | Status: SHIPPED | OUTPATIENT
Start: 2019-09-18 | End: 2019-10-23 | Stop reason: SDUPTHER

## 2019-09-18 NOTE — PATIENT INSTRUCTIONS
Bronchitis: Care Instructions Your Care Instructions Bronchitis is inflammation of the bronchial tubes, which carry air to the lungs. The tubes swell and produce mucus, or phlegm. The mucus and inflamed bronchial tubes make you cough. You may have trouble breathing. Most cases of bronchitis are caused by viruses like those that cause colds. Antibiotics usually do not help and they may be harmful. Bronchitis usually develops rapidly and lasts about 2 to 3 weeks in otherwise healthy people. Follow-up care is a key part of your treatment and safety. Be sure to make and go to all appointments, and call your doctor if you are having problems. It's also a good idea to know your test results and keep a list of the medicines you take. How can you care for yourself at home? · Take all medicines exactly as prescribed. Call your doctor if you think you are having a problem with your medicine. · Get some extra rest. 
· Take an over-the-counter pain medicine, such as acetaminophen (Tylenol), ibuprofen (Advil, Motrin), or naproxen (Aleve) to reduce fever and relieve body aches. Read and follow all instructions on the label. · Do not take two or more pain medicines at the same time unless the doctor told you to. Many pain medicines have acetaminophen, which is Tylenol. Too much acetaminophen (Tylenol) can be harmful. · Take an over-the-counter cough medicine that contains dextromethorphan to help quiet a dry, hacking cough so that you can sleep. Avoid cough medicines that have more than one active ingredient. Read and follow all instructions on the label. · Breathe moist air from a humidifier, hot shower, or sink filled with hot water. The heat and moisture will thin mucus so you can cough it out. · Do not smoke. Smoking can make bronchitis worse. If you need help quitting, talk to your doctor about stop-smoking programs and medicines. These can increase your chances of quitting for good. When should you call for help? Call 911 anytime you think you may need emergency care. For example, call if: 
  · You have severe trouble breathing.  
 Call your doctor now or seek immediate medical care if: 
  · You have new or worse trouble breathing.  
  · You cough up dark brown or bloody mucus (sputum).  
  · You have a new or higher fever.  
  · You have a new rash.  
 Watch closely for changes in your health, and be sure to contact your doctor if: 
  · You cough more deeply or more often, especially if you notice more mucus or a change in the color of your mucus.  
  · You are not getting better as expected. Where can you learn more? Go to http://staci-zeinab.info/. Enter H333 in the search box to learn more about \"Bronchitis: Care Instructions. \" Current as of: September 5, 2018 Content Version: 12.1 © 7297-0254 [employer]. Care instructions adapted under license by [employer] (which disclaims liability or warranty for this information). If you have questions about a medical condition or this instruction, always ask your healthcare professional. [address] any warranty or liability for your use of this information. Bronchitis: Care Instructions Your Care Instructions Bronchitis is inflammation of the bronchial tubes, which carry air to the lungs. The tubes swell and produce mucus, or phlegm. The mucus and inflamed bronchial tubes make you cough. You may have trouble breathing. Most cases of bronchitis are caused by viruses like those that cause colds. Antibiotics usually do not help and they may be harmful. Bronchitis usually develops rapidly and lasts about 2 to 3 weeks in otherwise healthy people. Follow-up care is a key part of your treatment and safety. Be sure to make and go to all appointments, and call your doctor if you are having problems.  It's also a good idea to know your test results and keep a list of the medicines you take. How can you care for yourself at home? · Take all medicines exactly as prescribed. Call your doctor if you think you are having a problem with your medicine. · Get some extra rest. 
· Take an over-the-counter pain medicine, such as acetaminophen (Tylenol), ibuprofen (Advil, Motrin), or naproxen (Aleve) to reduce fever and relieve body aches. Read and follow all instructions on the label. · Do not take two or more pain medicines at the same time unless the doctor told you to. Many pain medicines have acetaminophen, which is Tylenol. Too much acetaminophen (Tylenol) can be harmful. · Take an over-the-counter cough medicine that contains dextromethorphan to help quiet a dry, hacking cough so that you can sleep. Avoid cough medicines that have more than one active ingredient. Read and follow all instructions on the label. · Breathe moist air from a humidifier, hot shower, or sink filled with hot water. The heat and moisture will thin mucus so you can cough it out. · Do not smoke. Smoking can make bronchitis worse. If you need help quitting, talk to your doctor about stop-smoking programs and medicines. These can increase your chances of quitting for good. When should you call for help? Call 911 anytime you think you may need emergency care. For example, call if: 
  · You have severe trouble breathing.  
 Call your doctor now or seek immediate medical care if: 
  · You have new or worse trouble breathing.  
  · You cough up dark brown or bloody mucus (sputum).  
  · You have a new or higher fever.  
  · You have a new rash.  
 Watch closely for changes in your health, and be sure to contact your doctor if: 
  · You cough more deeply or more often, especially if you notice more mucus or a change in the color of your mucus.  
  · You are not getting better as expected. Where can you learn more? Go to http://staci-zeinab.info/. Enter H333 in the search box to learn more about \"Bronchitis: Care Instructions. \" Current as of: September 5, 2018 Content Version: 12.1 © 2341-3513 [employer]. Care instructions adapted under license by [employer] (which disclaims liability or warranty for this information). If you have questions about a medical condition or this instruction, always ask your healthcare professional. [place] [address] any warranty or liability for your use of this information.

## 2019-09-18 NOTE — PROGRESS NOTES
Mike Diaz is a 61 y.o.  female presents today for office visit for follow up. Pt would also like to discuss cough. Pt is not fasting. Pt is in Room # 3      1. Have you been to the ER, urgent care clinic since your last visit? Hospitalized since your last visit? No    2. Have you seen or consulted any other health care providers outside of the [address] [place] [address] since your last visit? Include any pap smears or colon screening. No    Upcoming Appts  none    Health Maintenance reviewed      VORB: No orders of the defined types were placed in this encounter.   Domenica Bridges, MELVIN

## 2019-09-18 NOTE — PROGRESS NOTES
History and Physical    Today's Date:  2019   Patient's Name: Charlotta Cheadle   Patient's :  1960     History:     Chief Complaint   Patient presents with    Cough     STARTED 1mth ago     Hand Swelling     start couple of mths ago     Diabetes     BS:231    Hypertension   [facility] Annual Exam     Charlotta Cheadle is a 61 y.o. female presenting for initial visit to establish care. Will obtain records from previous provider to review. Care team updated on connect care. Hand pain  This is a chronic problem, new to me. This is not controlled. This is bilateral. +swelling. Cough  This is a chronic problem, new to me. This is not controlled. It is non-productive. No h/o smoking.     3 most recent PHQ Screens 2018   Little interest or pleasure in doing things Not at all   Feeling down, depressed, irritable, or hopeless Not at all   Total Score PHQ 2 0   Trouble falling or staying asleep, or sleeping too much -   Feeling tired or having little energy -   Poor appetite, weight loss, or overeating -   Feeling bad about yourself - or that you are a failure or have let yourself or your family down -   Trouble concentrating on things such as school, work, reading, or watching TV -   Moving or speaking so slowly that other people could have noticed; or the opposite being so fidgety that others notice -   Thoughts of being better off dead, or hurting yourself in some way -   PHQ 9 Score -      Past Medical History:   Diagnosis Date    Anxiety     CAD (coronary artery disease)     S/P Coronary stents ( LAD and Lcx)    Depression     Diabetes ([address]     Hepatitis C     Hypercholesterolemia     Hypertension     Menopause     Microalbuminuria 2019    St[facility] 2017     Past Surgical History:   Procedure Laterality Date    HX BREAST BIOPSY Left     1971  left breast lump removed excisional per patient, cysts    HX  SECTION      pre-eclampsia    HX CHOLECYSTECTOMY  2004    HX CORONARY STENT PLACEMENT  Nov 2013    4 stents    HX HEENT  2009    thyroidectomy due to nodules.  HX HYSTERECTOMY  2005    heavy periods    HX OOPHORECTOMY      HX THYROIDECTOMY        reports that she has never smoked. She has never used smokeless tobacco. She reports that she does not drink alcohol or use drugs. Family History   Problem Relation Age of Onset    Diabetes Mother     Hypertension Mother     Cancer Mother     Heart Disease Mother     Heart Attack Mother     Diabetes Father     Hypertension Father     Cancer Father     Ovarian Cancer Paternal Grandmother      Allergies   Allergen Reactions    Contrast Agent [Iodine] Rash and Sneezing     Problem List:      Patient Active Problem List   Diagnosis Code    CAD (coronary artery disease) I25.10    Hypothyroidism, acquired E03.9    Hepatitis C B19.20    Hyperlipidemia with target low density lipoprotein (LDL) cholesterol less than 100 mg/dL E78.5    Controlled type 2 diabetes mellitus with complication, with long-term current use of insulin (HCC) E11.8, Z79.4    CVA (cerebral vascular accident) ([facility] I63.9    Recurrent depression ([facility] F33.9    Microalbuminuria R80.9    Type 2 diabetes with nephropathy ([facility] E11.21    Severe obesity ([facility] E66.01     Medications:     Current Outpatient Medications   Medication Sig    diph,Pertuss,Acell,,Tet Vac-PF (ADACEL) 2 Lf-(2.5-5-3-5 mcg)-5Lf/0.5 mL susp 0.5 mL by IntraMUSCular route once for 1 dose.  hydrALAZINE (APRESOLINE) 50 mg tablet Take 1 Tab by mouth two (2) times a day.  insulin aspart U-100 (NOVOLOG FLEXPEN U-100 INSULIN) 100 unit/mL (3 mL) inpn 10 Units by SubCUTAneous route Before breakfast, lunch, and dinner.  glucose blood VI test strips (BLOOD GLUCOSE TEST) strip Use one strip (freestyle lite) for each glucose check. Check glucose 2 times per day.  hydroCHLOROthiazide (HYDRODIURIL) 25 mg tablet Take 1 Tab by mouth daily.     levothyroxine (SYNTHROID) 125 mcg tablet Take 1 Tab by mouth Daily (before breakfast).  metoprolol succinate (TOPROL-XL) 200 mg XL tablet TAKE 1 TABLET BY MOUTH DAILY    amLODIPine (NORVASC) 10 mg tablet Take 1 Tab by mouth daily.  citalopram (CELEXA) 40 mg tablet Take 1 Tab by mouth daily.  buPROPion (WELLBUTRIN) 100 mg tablet Take 1 Tab by mouth daily.  gabapentin (NEURONTIN) 300 mg capsule Take 1 Cap by mouth three (3) times daily.  atorvastatin (LIPITOR) 80 mg tablet TAKE 1 TABLET BY MOUTH DAILY    prasugrel (EFFIENT) 10 mg tablet Take 1 Tab by mouth daily. Indications: Failed plavix. Recurrent CA. No P2Y12 response    RABEprazole (ACIPHEX) 20 mg tablet TAKE 1 TABLET BY MOUTH ONCE DAILY    ibuprofen (MOTRIN) 800 mg tablet Take 1 Tab by mouth every eight (8) hours as needed for Pain.  metFORMIN (GLUCOPHAGE) 850 mg tablet TAKE 1 TABLET BY MOUTH TWICE DAILY WITH MEALS    insulin degludec (TRESIBA FLEXTOUCH U-100) 100 unit/mL (3 mL) inpn 35 Units by SubCUTAneous route daily.  aspirin (ASPIRIN) 325 mg tablet Take 1 Tab by mouth daily.  acetaminophen (TYLENOL EXTRA STRENGTH) 500 mg tablet Take 2 Tabs by mouth every six (6) hours as needed for Pain.  Insulin Needles, Disposable, (DORA PEN NEEDLE) 32 gauge x 5/32\" ndle Use one needle to give insulin 4 times a day. No current facility-administered medications for this visit.       Review of Systems:   General:   fevers, chills  Neurologic: dizziness, lightheadedness  Eyes:  vision changes, double vision, photophobia  Ears:  change in hearing, ear pain, ear discharge, ear ringing  Nose:  sneezing, runny nose  Mouth/Throat: sore throat, voice change  Neck:  pain, stiffness  Respiratory: dyspnea at rest, dyspnea on exertion, wheezing, +cough, sputum production  Cardiovascular:   chest pain, palpitations  Breasts: +lumps (present for over a year, spoke to previous doctor about this), discharge, pain, rash  Gastrointestinal:  nausea, vomiting  Urinary: dysuria, urinary frequency, nocturia, malodorous urine  Genital (F): vaginal discharge, ulcerations  Musculoskeletal:  +joint pain (hands), back pain  Psychiatric: +insomnia, +anxiety  Endocrine: cold intolerance, heat intolerance  Hematologic: easy bruising, easy bleeding  Dermatologic: +itching (all over, for 2 months), rash    Physical Assessment:     Visit Vitals  /78 (BP 1 Location: Left arm, BP Patient Position: Sitting)   Pulse 82   Temp 98.5 °F (36.9 °C) (Oral)   Resp 17   Ht 5' 2\" (1.575 m)   Wt 199 lb (90.3 kg)   SpO2 95%   BMI 36.40 kg/m²     General:   Well-groomed, well-nourished, in no distress, pleasant, appropriate and conversant. Eyes:    PERRL, conjunctiva clear  Ears:  TMs normal, no ear wax  Mouth:  oropharynx WNL without membranes, exudates, petechiae or ulcers  Cardiovascular:   RRR, no MRG. Pulmonary:   Lungs clear bilaterally. Normal respiratory effort. Abdomen:   Abdomen soft, NT, ND  Extremities:   No edema, LEs warm and well-perfused. Neuro:   Alert and oriented, no focal deficits. No facial asymmetry noted.   Skin:    No rash or jaundice  MSK:   Normal ROM, 5/5 muscle strength  Psych:  No pressured speech or abnormal thought content    Lab Results   Component Value Date/Time    GFR est non-AA 48 (L) 02/14/2019 03:15 PM    GFRNA, POC >60 05/31/2017 01:18 PM    GFR est AA 58 (L) 02/14/2019 03:15 PM    GFRAA, POC >60 05/31/2017 01:18 PM    Creatinine 1.16 02/14/2019 03:15 PM    Creatinine, POC 0.8 05/31/2017 01:18 PM    BUN 37 (H) 02/14/2019 03:15 PM    BUN, POC 20 (H) 04/04/2017 03:49 PM    Sodium 141 02/14/2019 03:15 PM    Sodium,  (L) 04/04/2017 03:49 PM    Potassium 3.7 02/14/2019 03:15 PM    Potassium, POC 3.5 04/04/2017 03:49 PM    Chloride 106 02/14/2019 03:15 PM    Chloride, POC 94 (L) 04/04/2017 03:49 PM    CO2 28 02/14/2019 03:15 PM    Magnesium 1.9 12/18/2017 11:55 PM    Phosphorus 3.6 12/18/2017 11:55 PM      Assessment/Plan & Orders: ICD-10-CM ICD-9-CM    1. Routine general medical examination at a health care facility Z00.00 V70.0    2. Pain in both hands M79.641 729.5 THEO COMPREHENSIVE PANEL    M79.642  RHEUMATOID FACTOR, IGM      SED RATE (ESR)      C REACTIVE PROTEIN, QT   3. Acute bronchitis, unspecified organism J20.9 466.0    4. Essential hypertension I10 401.9 hydrALAZINE (APRESOLINE) 50 mg tablet   5. Uncontrolled type 2 diabetes mellitus with diabetic polyneuropathy (HCC) E11.42 250.62 AMB POC HEMOGLOBIN A1C    E11.65 357.2    6. Recurrent depression (HCC) F33.9 296.30    7. Encounter for immunization Z23 V03.89 diph,Pertuss,Acell,,Tet Vac-PF (ADACEL) 2 Lf-(2.5-5-3-5 mcg)-5Lf/0.5 mL susp   8. Type 2 diabetes with nephropathy (HCC) E11.21 250.40      583.81    9. Severe obesity (HCC) E66.01 278.01      HM  Colon cancer: colonoscopy done 2-3 yrs ago, done at [address] [place] [address], due now  Influenza vaccine: due  Pneumococcal vaccine: due at age 72   Tdap: due  Herpes Zoster vaccine: done  Hep B vaccine: due  Weight:  Body mass index is 36.4 kg/m². Discussed the patient's BMI with her. The BMI follow up plan is as follows: diet and exercise  Cervical cancer:  pap smears no longer indicated  Breast Cancer: mammogram done  Osteoporosis: No indication for DEXA scan    Stop losartan  Increase hydralazine to 50mg po BID  Pt to check BP at home and bring BP log to the next visit    Follow-up and Dispositions    · Return in about 4 weeks (around 10/16/2019) for Hypertension, Diabetes mellitus, Go over lab/imaging results, Weight management, Pain. *Patient verbalized understanding and agreement with the plan. Patient was given an after-visit summary. Tarik Viveros.  [address], MD - Internal Medicine  9/18/2019, 1:51 PM  [facility]  [address] [place], [address]  Phone [phone]  Fax [phone]

## 2019-09-20 ENCOUNTER — HOSPITAL ENCOUNTER (OUTPATIENT)
Dept: LAB | Age: 59
Discharge: HOME | End: 2019-09-20
Payer: OTHER GOVERNMENT

## 2019-09-20 LAB
CRP SERPL-MCNC: <0.3 MG/DL (ref 0–0.3)
ERYTHROCYTE [SEDIMENTATION RATE] IN BLOOD: 73 MM/HR (ref 0–30)

## 2019-09-20 PROCEDURE — 85652 RBC SED RATE AUTOMATED: CPT

## 2019-09-20 PROCEDURE — 36415 COLL VENOUS BLD VENIPUNCTURE: CPT

## 2019-09-20 PROCEDURE — 86225 DNA ANTIBODY NATIVE: CPT

## 2019-09-20 PROCEDURE — 86431 RHEUMATOID FACTOR QUANT: CPT

## 2019-09-20 PROCEDURE — 86140 C-REACTIVE PROTEIN: CPT

## 2019-09-21 LAB
CENTROMERE B AB SER-ACNC: <0.2 AI (ref 0–0.9)
CHROMATIN AB SERPL-ACNC: <0.2 AI (ref 0–0.9)
DSDNA AB SER-ACNC: <1 IU/ML (ref 0–9)
ENA JO1 AB SER-ACNC: <0.2 AI (ref 0–0.9)
ENA RNP AB SER-ACNC: <0.2 AI (ref 0–0.9)
ENA SCL70 AB SER-ACNC: <0.2 AI (ref 0–0.9)
ENA SM AB SER-ACNC: <0.2 AI (ref 0–0.9)
ENA SS-A AB SER-ACNC: <0.2 AI (ref 0–0.9)
ENA SS-B AB SER-ACNC: <0.2 AI (ref 0–0.9)
SEE BELOW, 164869: NORMAL

## 2019-10-03 LAB — CANNABINOIDS BLD CFM-MCNC: >80 IU/ML (ref 0–15)

## 2019-10-23 ENCOUNTER — OFFICE VISIT (OUTPATIENT)
Dept: FAMILY MEDICINE CLINIC | Facility: CLINIC | Age: 59
End: 2019-10-23

## 2019-10-23 VITALS
RESPIRATION RATE: 16 BRPM | HEART RATE: 85 BPM | DIASTOLIC BLOOD PRESSURE: 80 MMHG | BODY MASS INDEX: 38.2 KG/M2 | HEIGHT: 62 IN | SYSTOLIC BLOOD PRESSURE: 140 MMHG | OXYGEN SATURATION: 93 % | WEIGHT: 207.6 LBS | TEMPERATURE: 98.3 F

## 2019-10-23 DIAGNOSIS — E03.9 ACQUIRED HYPOTHYROIDISM: ICD-10-CM

## 2019-10-23 DIAGNOSIS — F32.89 OTHER DEPRESSION: ICD-10-CM

## 2019-10-23 DIAGNOSIS — R76.8 RHEUMATOID FACTOR POSITIVE: ICD-10-CM

## 2019-10-23 DIAGNOSIS — I10 ESSENTIAL HYPERTENSION: Primary | ICD-10-CM

## 2019-10-23 RX ORDER — CITALOPRAM 40 MG/1
40 TABLET, FILM COATED ORAL DAILY
Qty: 90 TAB | Refills: 0 | Status: SHIPPED | OUTPATIENT
Start: 2019-10-23 | End: 2020-04-07

## 2019-10-23 RX ORDER — PRASUGREL 10 MG/1
10 TABLET, FILM COATED ORAL DAILY
Qty: 90 TAB | Refills: 3 | Status: CANCELLED | OUTPATIENT
Start: 2019-10-23

## 2019-10-23 RX ORDER — HYDROCHLOROTHIAZIDE 25 MG/1
25 TABLET ORAL DAILY
Qty: 90 TAB | Refills: 1 | Status: SHIPPED | OUTPATIENT
Start: 2019-10-23 | End: 2020-03-23

## 2019-10-23 RX ORDER — ATORVASTATIN CALCIUM 80 MG/1
TABLET, FILM COATED ORAL
Qty: 90 TAB | Refills: 0 | Status: SHIPPED | OUTPATIENT
Start: 2019-10-23 | End: 2021-01-13 | Stop reason: SDUPTHER

## 2019-10-23 RX ORDER — AMLODIPINE BESYLATE 10 MG/1
10 TABLET ORAL DAILY
Qty: 90 TAB | Refills: 0 | Status: SHIPPED | OUTPATIENT
Start: 2019-10-23 | End: 2020-03-23

## 2019-10-23 RX ORDER — IBUPROFEN 800 MG/1
800 TABLET ORAL
Qty: 30 TAB | Refills: 0 | Status: CANCELLED | OUTPATIENT
Start: 2019-10-23

## 2019-10-23 RX ORDER — LEVOTHYROXINE SODIUM 125 UG/1
125 TABLET ORAL
Qty: 90 TAB | Refills: 1 | Status: CANCELLED | OUTPATIENT
Start: 2019-10-23

## 2019-10-23 RX ORDER — METOPROLOL SUCCINATE 200 MG/1
TABLET, EXTENDED RELEASE ORAL
Qty: 90 TAB | Refills: 0 | Status: SHIPPED | OUTPATIENT
Start: 2019-10-23 | End: 2020-03-23

## 2019-10-23 RX ORDER — BUPROPION HYDROCHLORIDE 150 MG/1
150 TABLET ORAL
Qty: 90 TAB | Refills: 0 | Status: SHIPPED | OUTPATIENT
Start: 2019-10-23 | End: 2020-01-16

## 2019-10-23 RX ORDER — RABEPRAZOLE SODIUM 20 MG/1
TABLET, DELAYED RELEASE ORAL
Qty: 30 TAB | Refills: 5 | Status: CANCELLED | OUTPATIENT
Start: 2019-10-23

## 2019-10-23 RX ORDER — BUPROPION HYDROCHLORIDE 100 MG/1
100 TABLET ORAL DAILY
Qty: 90 TAB | Refills: 1 | Status: CANCELLED | OUTPATIENT
Start: 2019-10-23

## 2019-10-23 RX ORDER — HYDRALAZINE HYDROCHLORIDE 50 MG/1
50 TABLET, FILM COATED ORAL 2 TIMES DAILY
Qty: 180 TAB | Refills: 1 | Status: SHIPPED | OUTPATIENT
Start: 2019-10-23 | End: 2021-04-15 | Stop reason: DRUGHIGH

## 2019-10-23 RX ORDER — GABAPENTIN 300 MG/1
300 CAPSULE ORAL 3 TIMES DAILY
Qty: 90 CAP | Refills: 1 | Status: CANCELLED | OUTPATIENT
Start: 2019-10-23

## 2019-10-23 NOTE — PROGRESS NOTES
Peace Rivera is a 61 y.o.  female presents today for office visit for follow up. Pt would also like to discuss DM. Pt is not fasting. Pt is in Room # 2      1. Have you been to the ER, urgent care clinic since your last visit? Hospitalized since your last visit? No    2. Have you seen or consulted any other health care providers outside of the [address] [place] [address] since your last visit? Include any pap smears or colon screening. No    Upcoming Appts  none    Health Maintenance reviewed       VORB: No orders of the defined types were placed in this encounter.   Kaye Taylor, Georges Chao, RICHA

## 2019-10-28 ENCOUNTER — HOSPITAL ENCOUNTER (OUTPATIENT)
Dept: ULTRASOUND IMAGING | Age: 59
Discharge: HOME | End: 2019-10-28
Attending: INTERNAL MEDICINE
Payer: OTHER GOVERNMENT

## 2019-10-28 DIAGNOSIS — B18.2 CHRONIC HEPATITIS C (HCC): ICD-10-CM

## 2019-10-28 PROCEDURE — 76705 ECHO EXAM OF ABDOMEN: CPT

## 2019-10-28 NOTE — PROGRESS NOTES
Internal Medicine Progress Note    Today's Date:  10/28/2019   Patient:  Zainab Coleman  Patient :  1960    Subjective:     Chief Complaint   Patient presents with    Hypertension    Diabetes     BS:has not checked     Results    Weight Management      Hypertension   This is a chronic problem. BP is not at goal. Pt takes hydralazine, HCTZ and norvasc. Pt reports compliance with these medications. Diabetes mellitus  This is a chronic problem. BS is at goal. Pt is on novolog and tresiba. Pt is compliant with these medications. Pt is on aspirin and statin    Obesity Class II   This is a chronic problem. This is not at goal. Information given on the ketogenic/IF diet with exercise during a previous visit. Pt gained weight since the last visit. Hypothyroidism   This is a chronic problem. This is not at goal. Pt takes synthroid.     Lab Results   Component Value Date/Time    TSH 2.25 2017 11:55 PM     3 most recent PHQ Screens 2018   Little interest or pleasure in doing things Not at all   Feeling down, depressed, irritable, or hopeless Not at all   Total Score PHQ 2 0   Trouble falling or staying asleep, or sleeping too much -   Feeling tired or having little energy -   Poor appetite, weight loss, or overeating -   Feeling bad about yourself - or that you are a failure or have let yourself or your family down -   Trouble concentrating on things such as school, work, reading, or watching TV -   Moving or speaking so slowly that other people could have noticed; or the opposite being so fidgety that others notice -   Thoughts of being better off dead, or hurting yourself in some way -   PHQ 9 Score -     Past Medical History:   Diagnosis Date    Anxiety     CAD (coronary artery disease)     S/P Coronary stents ( LAD and Lcx)    Depression     Diabetes ([facility]     Hepatitis C     Hypercholesterolemia     Hypertension     Menopause     Microalbuminuria 2019    Stroke ([facility] 2017     Past Surgical History:   Procedure Laterality Date    HX BREAST BIOPSY Left     1971  left breast lump removed excisional per patient, cysts    HX  SECTION      pre-eclampsia    HX CHOLECYSTECTOMY      HX CORONARY STENT PLACEMENT  2013    4 stents    HX HEENT      thyroidectomy due to nodules.  HX HYSTERECTOMY  2005    heavy periods    HX OOPHORECTOMY      HX THYROIDECTOMY        reports that she has never smoked. She has never used smokeless tobacco. She reports that she does not drink alcohol or use drugs. Family History   Problem Relation Age of Onset    Diabetes Mother     Hypertension Mother     Cancer Mother     Heart Disease Mother     Heart Attack Mother     Diabetes Father     Hypertension Father     Cancer Father     Ovarian Cancer Paternal Grandmother      Allergies   Allergen Reactions    Contrast Agent [Iodine] Rash and Sneezing     Current Outpatient Meds     Current Outpatient Medications on File Prior to Visit   Medication Sig Dispense Refill    insulin aspart U-100 (NOVOLOG FLEXPEN U-100 INSULIN) 100 unit/mL (3 mL) inpn 10 Units by SubCUTAneous route Before breakfast, lunch, and dinner. 30 mL 11    levothyroxine (SYNTHROID) 125 mcg tablet Take 1 Tab by mouth Daily (before breakfast). 90 Tab 1    gabapentin (NEURONTIN) 300 mg capsule Take 1 Cap by mouth three (3) times daily. 90 Cap 1    prasugrel (EFFIENT) 10 mg tablet Take 1 Tab by mouth daily. Indications: Failed plavix. Recurrent CA. No P2Y12 response 90 Tab 3    RABEprazole (ACIPHEX) 20 mg tablet TAKE 1 TABLET BY MOUTH ONCE DAILY 30 Tab 5    ibuprofen (MOTRIN) 800 mg tablet Take 1 Tab by mouth every eight (8) hours as needed for Pain. 30 Tab 0    insulin degludec (TRESIBA FLEXTOUCH U-100) 100 unit/mL (3 mL) inpn 35 Units by SubCUTAneous route daily. 15 Pen 2    aspirin (ASPIRIN) 325 mg tablet Take 1 Tab by mouth daily.  90 Tab 3    acetaminophen (TYLENOL EXTRA STRENGTH) 500 mg tablet Take 2 Tabs by mouth every six (6) hours as needed for Pain. 40 Tab 0    Insulin Needles, Disposable, (DORA PEN NEEDLE) 32 gauge x 5/32\" ndle Use one needle to give insulin 4 times a day. 400 Pen Needle 15     No current facility-administered medications on file prior to visit. Review of Systems   CV:      chest pain, palpitations  PULM:  SOB, wheezing, cough, sputum production    Objective:       Visit Vitals  /80 (BP 1 Location: Left arm, BP Patient Position: Sitting)   Pulse 85   Temp 98.3 °F (36.8 °C) (Oral)   Resp 16   Ht 5' 2\" (1.575 m)   Wt 207 lb 9.6 oz (94.2 kg)   SpO2 93%   BMI 37.97 kg/m²     General:   Well-nourished, well-groomed, pleasant, alert, in no acute distress  Head:  Normocephalic, atraumatic  Ears:  External ears WNL  Nose:  External nares WNL  Psych:  No pressured speech, no abnormal thought content    Lab Results   Component Value Date/Time    GFR est non-AA 48 (L) 02/14/2019 03:15 PM    GFRNA, POC >60 05/31/2017 01:18 PM    GFR est AA 58 (L) 02/14/2019 03:15 PM    GFRAA, POC >60 05/31/2017 01:18 PM    Creatinine 1.16 02/14/2019 03:15 PM    Creatinine, POC 0.8 05/31/2017 01:18 PM    BUN 37 (H) 02/14/2019 03:15 PM    BUN, POC 20 (H) 04/04/2017 03:49 PM    Sodium 141 02/14/2019 03:15 PM    Sodium,  (L) 04/04/2017 03:49 PM    Potassium 3.7 02/14/2019 03:15 PM    Potassium, POC 3.5 04/04/2017 03:49 PM    Chloride 106 02/14/2019 03:15 PM    Chloride, POC 94 (L) 04/04/2017 03:49 PM    CO2 28 02/14/2019 03:15 PM    Magnesium 1.9 12/18/2017 11:55 PM    Phosphorus 3.6 12/18/2017 11:55 PM      Assessment/Plan & Orders:         ICD-10-CM ICD-9-CM    1. Essential hypertension I10 401.9 hydrALAZINE (APRESOLINE) 50 mg tablet      hydroCHLOROthiazide (HYDRODIURIL) 25 mg tablet      metoprolol succinate (TOPROL-XL) 200 mg XL tablet      amLODIPine (NORVASC) 10 mg tablet   2.  Uncontrolled type 2 diabetes mellitus with diabetic polyneuropathy, with long-term current use of insulin ([facility] E11.42 250.62 atorvastatin (LIPITOR) 80 mg tablet    Z79.4 357.2     E11.65 V58.67    3. Other depression F32.89 311 citalopram (CELEXA) 40 mg tablet   4. Acquired hypothyroidism E03.9 244.9 TSH AND FREE T4   5. Rheumatoid factor positive R76.8 795.79 REFERRAL TO RHEUMATOLOGY     Diet and exercise  Check BS BID  Bring BS log log to next visit  Pt to check BP at home and bring BP log to the next visit    Follow-up and Dispositions    · Return in about 4 weeks (around 11/20/2019) for Hypertension, Diabetes mellitus, Weight management, Thyroid problem, Go over lab/imaging results. *Patient verbalized understanding and agreement with the plan. Patient was given an after-visit summary. Charlie Nino.  Calista Gusman MD - Internal Medicine  10/28/2019, 3:03 PM  [facility]  [address] [address] [place], [address]  Phone [phone]  Fax [phone]

## 2019-11-01 ENCOUNTER — OFFICE VISIT (OUTPATIENT)
Dept: FAMILY MEDICINE CLINIC | Facility: CLINIC | Age: 59
End: 2019-11-01

## 2019-11-22 ENCOUNTER — OFFICE VISIT (OUTPATIENT)
Dept: FAMILY MEDICINE CLINIC | Facility: CLINIC | Age: 59
End: 2019-11-22

## 2019-11-22 VITALS
HEART RATE: 73 BPM | SYSTOLIC BLOOD PRESSURE: 140 MMHG | HEIGHT: 62 IN | RESPIRATION RATE: 16 BRPM | TEMPERATURE: 97.5 F | BODY MASS INDEX: 35.3 KG/M2 | OXYGEN SATURATION: 98 % | DIASTOLIC BLOOD PRESSURE: 70 MMHG | WEIGHT: 191.8 LBS

## 2019-11-22 DIAGNOSIS — I63.89 CEREBROVASCULAR ACCIDENT (CVA) DUE TO OTHER MECHANISM (HCC): ICD-10-CM

## 2019-11-22 DIAGNOSIS — M25.461 BILATERAL KNEE SWELLING: ICD-10-CM

## 2019-11-22 DIAGNOSIS — M25.462 BILATERAL KNEE SWELLING: ICD-10-CM

## 2019-11-22 DIAGNOSIS — E66.01 SEVERE OBESITY (HCC): ICD-10-CM

## 2019-11-22 DIAGNOSIS — J01.91 ACUTE RECURRENT SINUSITIS, UNSPECIFIED LOCATION: Primary | ICD-10-CM

## 2019-11-22 DIAGNOSIS — J01.91 ACUTE RECURRENT SINUSITIS, UNSPECIFIED LOCATION: ICD-10-CM

## 2019-11-22 RX ORDER — CYCLOBENZAPRINE HCL 10 MG
10 TABLET ORAL
Qty: 30 TAB | Refills: 0 | Status: SHIPPED | OUTPATIENT
Start: 2019-11-22 | End: 2020-04-07

## 2019-11-22 RX ORDER — FLUTICASONE PROPIONATE 50 MCG
2 SPRAY, SUSPENSION (ML) NASAL DAILY
Qty: 1 BOTTLE | Refills: 0 | Status: SHIPPED | OUTPATIENT
Start: 2019-11-22 | End: 2019-11-22 | Stop reason: SDUPTHER

## 2019-11-22 RX ORDER — FLUTICASONE PROPIONATE 50 MCG
SPRAY, SUSPENSION (ML) NASAL
Qty: 48 G | Refills: 0 | Status: SHIPPED | OUTPATIENT
Start: 2019-11-22 | End: 2020-04-07

## 2019-11-22 RX ORDER — METHYLPREDNISOLONE 4 MG/1
TABLET ORAL
Qty: 1 DOSE PACK | Refills: 0 | Status: SHIPPED | OUTPATIENT
Start: 2019-11-22 | End: 2019-12-20 | Stop reason: ALTCHOICE

## 2019-11-22 NOTE — PROGRESS NOTES
Ene Moura is a 61 y.o.  female presents today for office visit for follow up. Pt would also like to discuss htn. Pt is not fasting. Pt is in Room # 3      1. Have you been to the ER, urgent care clinic since your last visit? Hospitalized since your last visit? No    2. Have you seen or consulted any other health care providers outside of the [address] [place] [address] since your last visit? Include any pap smears or colon screening. No    Upcoming Appts  none    Health Maintenance reviewed     VORB: No orders of the defined types were placed in this encounter.   Eduardo Villa, MELVIN

## 2019-11-22 NOTE — PROGRESS NOTES
Internal Medicine Progress Note    Today's Date:  2019   Patient:  Candace nEg  Patient :  1960    Subjective:     Chief Complaint   Patient presents with    Diabetes    Hypertension    Weight Management    Thyroid Problem    Knee Swelling     both started 3wks ago     Neck Pain     left mass     Nasal Congestion     started 3wks       Hypertension   This is a chronic problem. BP is not at goal/borderline. Pt takes hydralazine, HCTZ and norvasc. Pt reports compliance with these medications. Diabetes mellitus  This is a chronic problem. BS is at goal. Pt is on aspart and tresiba. Pt is compliant with these medications. Pt is on aspirin and statin    Obesity Class II   This is a chronic problem. This is not at goal. Information given on the ketogenic/IF diet with exercise during a previous visit. Pt lost weight since the last visit. +lump on left side of the neck    Hypothyroidism   This is a chronic problem. Current status is unknown. Pt takes synthroid. TFTs were ordered but not done. Knee swelling  This is a new problem. This is not at goal. This has been present for three weeks. +rheumatoid factor    Sinus congestion  This is an acute problem. This is not at goal. Symptoms started three weeks ago. Pt had this before.     3 most recent PHQ Screens 2018   Little interest or pleasure in doing things Not at all   Feeling down, depressed, irritable, or hopeless Not at all   Total Score PHQ 2 0   Trouble falling or staying asleep, or sleeping too much -   Feeling tired or having little energy -   Poor appetite, weight loss, or overeating -   Feeling bad about yourself - or that you are a failure or have let yourself or your family down -   Trouble concentrating on things such as school, work, reading, or watching TV -   Moving or speaking so slowly that other people could have noticed; or the opposite being so fidgety that others notice -   Thoughts of being better off dead, or hurting yourself in some way -   PHQ 9 Score -     Past Medical History:   Diagnosis Date    Anxiety     CAD (coronary artery disease)     S/P Coronary stents ( LAD and Lcx)    Depression     Diabetes ([facility]     Hepatitis C     Hypercholesterolemia     Hypertension     Menopause     Microalbuminuria 2019    Stroke ([facility] 2017     Past Surgical History:   Procedure Laterality Date    HX BREAST BIOPSY Left     1971  left breast lump removed excisional per patient, cysts    HX  SECTION      pre-eclampsia    HX CHOLECYSTECTOMY      HX CORONARY STENT PLACEMENT  2013    4 stents    HX HEENT      thyroidectomy due to nodules.  HX HYSTERECTOMY      heavy periods    HX OOPHORECTOMY      HX THYROIDECTOMY        reports that she has never smoked. She has never used smokeless tobacco. She reports that she does not drink alcohol or use drugs. Family History   Problem Relation Age of Onset    Diabetes Mother     Hypertension Mother     Cancer Mother     Heart Disease Mother     Heart Attack Mother     Diabetes Father     Hypertension Father     Cancer Father     Ovarian Cancer Paternal Grandmother      Allergies   Allergen Reactions    Contrast Agent [Iodine] Rash and Sneezing     Current Outpatient Meds     Current Outpatient Medications on File Prior to Visit   Medication Sig Dispense Refill    hydrALAZINE (APRESOLINE) 50 mg tablet Take 1 Tab by mouth two (2) times a day. 180 Tab 1    hydroCHLOROthiazide (HYDRODIURIL) 25 mg tablet Take 1 Tab by mouth daily. 90 Tab 1    metoprolol succinate (TOPROL-XL) 200 mg XL tablet TAKE 1 TABLET BY MOUTH DAILY 90 Tab 0    amLODIPine (NORVASC) 10 mg tablet Take 1 Tab by mouth daily. 90 Tab 0    citalopram (CELEXA) 40 mg tablet Take 1 Tab by mouth daily.  90 Tab 0    atorvastatin (LIPITOR) 80 mg tablet TAKE 1 TABLET BY MOUTH DAILY 90 Tab 0    buPROPion XL (WELLBUTRIN XL) 150 mg tablet Take 1 Tab by mouth every morning. 90 Tab 0    insulin aspart U-100 (NOVOLOG FLEXPEN U-100 INSULIN) 100 unit/mL (3 mL) inpn 10 Units by SubCUTAneous route Before breakfast, lunch, and dinner. 30 mL 11    levothyroxine (SYNTHROID) 125 mcg tablet Take 1 Tab by mouth Daily (before breakfast). 90 Tab 1    RABEprazole (ACIPHEX) 20 mg tablet TAKE 1 TABLET BY MOUTH ONCE DAILY 30 Tab 5    ibuprofen (MOTRIN) 800 mg tablet Take 1 Tab by mouth every eight (8) hours as needed for Pain. 30 Tab 0    insulin degludec (TRESIBA FLEXTOUCH U-100) 100 unit/mL (3 mL) inpn 35 Units by SubCUTAneous route daily. 15 Pen 2    aspirin (ASPIRIN) 325 mg tablet Take 1 Tab by mouth daily. 90 Tab 3    acetaminophen (TYLENOL EXTRA STRENGTH) 500 mg tablet Take 2 Tabs by mouth every six (6) hours as needed for Pain. 40 Tab 0    Insulin Needles, Disposable, (DORA PEN NEEDLE) 32 gauge x 5/32\" ndle Use one needle to give insulin 4 times a day. 400 Pen Needle 15    [DISCONTINUED] gabapentin (NEURONTIN) 300 mg capsule Take 1 Cap by mouth three (3) times daily. 90 Cap 1    [DISCONTINUED] prasugrel (EFFIENT) 10 mg tablet Take 1 Tab by mouth daily. Indications: Failed plavix. Recurrent CA. No P2Y12 response 90 Tab 3     No current facility-administered medications on file prior to visit.       Review of Systems   CV:      chest pain, palpitations  PULM:  SOB, wheezing, cough, sputum production    Objective:       Visit Vitals  /70 (BP 1 Location: Left arm, BP Patient Position: Sitting)   Pulse 73   Temp 97.5 °F (36.4 °C) (Oral)   Resp 16   Ht 5' 2\" (1.575 m)   Wt 191 lb 12.8 oz (87 kg)   SpO2 98%   BMI 35.08 kg/m²     General:   Well-nourished, well-groomed, pleasant, alert, in no acute distress  Head:  Normocephalic, atraumatic, MMM, good dentition, oropharynx WNL without membranes, exudates, petechiae or ulcers  Ears:  External ears WNL, TMs WNL  Eyes:  Conjunctiva clear, PERRL  Nose:  External nares WNL  Neck:  Fat mass on both sides of neck but no discrete mass  Cardiovascular:   Regular rate and rhythm, no murmurs, no rubs, no gallops  Pulmonary:   Clear breath sounds bilaterally, good air movement, no wheezing, rales or rhonchi, normal respiratory effort   Psych:  No pressured speech, no abnormal thought content  Knees:  Normal exam    Lab Results   Component Value Date/Time    GFR est non-AA 48 (L) 02/14/2019 03:15 PM    GFRNA, POC >60 05/31/2017 01:18 PM    GFR est AA 58 (L) 02/14/2019 03:15 PM    GFRAA, POC >60 05/31/2017 01:18 PM    Creatinine 1.16 02/14/2019 03:15 PM    Creatinine, POC 0.8 05/31/2017 01:18 PM    BUN 37 (H) 02/14/2019 03:15 PM    BUN, POC 20 (H) 04/04/2017 03:49 PM    Sodium 141 02/14/2019 03:15 PM    Sodium,  (L) 04/04/2017 03:49 PM    Potassium 3.7 02/14/2019 03:15 PM    Potassium, POC 3.5 04/04/2017 03:49 PM    Chloride 106 02/14/2019 03:15 PM    Chloride, POC 94 (L) 04/04/2017 03:49 PM    CO2 28 02/14/2019 03:15 PM    Magnesium 1.9 12/18/2017 11:55 PM    Phosphorus 3.6 12/18/2017 11:55 PM      Assessment/Plan & Orders:         ICD-10-CM ICD-9-CM    1. Acute recurrent sinusitis, unspecified location J01.91 461.9 fluticasone propionate (FLONASE) 50 mcg/actuation nasal spray   2. Bilateral knee swelling M25.461 719.06 methylPREDNISolone (MEDROL DOSEPACK) 4 mg tablet    M25.462  cyclobenzaprine (FLEXERIL) 10 mg tablet   3. Cerebrovascular accident (CVA) due to other mechanism ([address] I63.89 434.91 REFERRAL TO NEUROLOGY   4. Severe obesity ([facility] E66.01 278.01       Went over ketogenic/IF diet with exercise again  Pt to check BP at home and bring BP log to the next visit  Pt would like to see neurology to get a refill of gabapentin and effient    Follow-up and Dispositions    · Return in about 4 weeks (around 12/20/2019) for Weight management, Diabetes mellitus, Pain. *Patient verbalized understanding and agreement with the plan. Patient was given an after-visit summary. Funmilayo Loyd.  [address], MD - Internal Medicine  11/22/2019, 3:03 [facility]  [address], [address]  Phone [phone]  Fax [phone]

## 2019-12-11 ENCOUNTER — TELEPHONE (OUTPATIENT)
Dept: FAMILY MEDICINE CLINIC | Facility: CLINIC | Age: 59
End: 2019-12-11

## 2019-12-11 NOTE — TELEPHONE ENCOUNTER
Patient called to check on the status of the referral to rheumatology.  I informed her that it has been sent to Tevin Laura DO.

## 2019-12-20 ENCOUNTER — OFFICE VISIT (OUTPATIENT)
Dept: FAMILY MEDICINE CLINIC | Facility: CLINIC | Age: 59
End: 2019-12-20

## 2019-12-20 VITALS
HEART RATE: 71 BPM | WEIGHT: 194.8 LBS | BODY MASS INDEX: 35.85 KG/M2 | SYSTOLIC BLOOD PRESSURE: 130 MMHG | OXYGEN SATURATION: 96 % | HEIGHT: 62 IN | TEMPERATURE: 98.1 F | DIASTOLIC BLOOD PRESSURE: 70 MMHG | RESPIRATION RATE: 16 BRPM

## 2019-12-20 DIAGNOSIS — Z79.4 CONTROLLED TYPE 2 DIABETES MELLITUS WITH OTHER SPECIFIED COMPLICATION, WITH LONG-TERM CURRENT USE OF INSULIN (HCC): Primary | ICD-10-CM

## 2019-12-20 DIAGNOSIS — I10 ESSENTIAL HYPERTENSION: ICD-10-CM

## 2019-12-20 DIAGNOSIS — E03.9 HYPOTHYROIDISM, ACQUIRED: ICD-10-CM

## 2019-12-20 DIAGNOSIS — E66.01 SEVERE OBESITY (HCC): ICD-10-CM

## 2019-12-20 DIAGNOSIS — E11.69 CONTROLLED TYPE 2 DIABETES MELLITUS WITH OTHER SPECIFIED COMPLICATION, WITH LONG-TERM CURRENT USE OF INSULIN (HCC): Primary | ICD-10-CM

## 2019-12-20 LAB — HBA1C MFR BLD HPLC: 6.8 %

## 2019-12-20 RX ORDER — PEN NEEDLE, DIABETIC 31 GX3/16"
NEEDLE, DISPOSABLE MISCELLANEOUS
Qty: 400 PEN NEEDLE | Refills: 3 | Status: SHIPPED | OUTPATIENT
Start: 2019-12-20 | End: 2020-10-09 | Stop reason: SDUPTHER

## 2019-12-20 RX ORDER — INSULIN DEGLUDEC 100 U/ML
35 INJECTION, SOLUTION SUBCUTANEOUS DAILY
Qty: 31.5 ML | Refills: 3 | Status: SHIPPED | OUTPATIENT
Start: 2019-12-20 | End: 2020-03-19

## 2019-12-20 RX ORDER — PEN NEEDLE, DIABETIC 31 GX3/16"
NEEDLE, DISPOSABLE MISCELLANEOUS
Qty: 400 PEN NEEDLE | Refills: 15 | Status: CANCELLED | OUTPATIENT
Start: 2019-12-20

## 2019-12-20 NOTE — PROGRESS NOTES
Chief Complaint   Patient presents with    Blood Pressure Check    Medication Refill     yoly Olson     1. Have you been to the ER, urgent care clinic since your last visit? Hospitalized since your last visit? No    2. Have you seen or consulted any other health care providers outside of the [address] [place] [address] since your last visit? Include any pap smears or colon screening.  No

## 2019-12-20 NOTE — PATIENT INSTRUCTIONS
Diabetes Blood Sugar Emergencies: Your Action Plan How can you prevent a blood sugar emergency? An important part of living with diabetes is keeping your blood sugar in your target range. You'll need to know what to do if it's too high or too low. Managing your blood sugar levels helps you avoid emergencies. This care sheet will teach you about the signs of high and low blood sugar. It will help you make an action plan with your doctor for when these signs occur. Low blood sugar is more likely to happen if you take certain medicines for diabetes. It can also happen if you skip a meal, drink alcohol, or exercise more than usual. 
You may get high blood sugar if you eat differently than you normally do. One example is eating more carbohydrate than usual. Having a cold, the flu, or other sudden illness can also cause high blood sugar levels. Levels can also rise if you miss a dose of medicine. Any change in how you take your medicine may affect your blood sugar level. So it's important to work with your doctor before you make any changes. Check your blood sugar Work with your doctor to fill in the blank spaces below that apply to you. Track your levels, know your target range, and write down ways you can get your blood sugar back in your target range. A log book can help you track your levels. Take the book to all of your medical appointments. · Check your blood sugar _____ times a day, at these times:________________________________________________. (For example: Before meals, at bedtime, before exercise, during exercise, other.) · Your blood sugar target range before a meal is ___________________. Your blood sugar target range after a meal is _______________________. · Do this___________________________________________________to get your blood sugar back within your safe range if your blood sugar results are _________________________________________. (For example: Less than 70 or above 250 mg/dL. ) Call your doctor when your blood sugar results are ___________________________________. (For example: Less than 70 or above 250 mg/dL.) What are the symptoms of low and high blood sugar? Common symptoms of low blood sugar are sweating and feeling shaky, weak, hungry, or confused. Symptoms can start quickly. Common symptoms of high blood sugar are feeling very thirsty or very hungry. You may also pass urine more often than usual. You may have blurry vision and may lose weight without trying. But some people may have high or low blood sugar without having any symptoms. That's a good reason to check your blood sugar on a regular schedule. What should you do if you have symptoms? Work with your doctor to fill in the blank spaces below that apply to you. Low blood sugar If you have symptoms of low blood sugar, check your blood sugar. If it's below _____ ( for example, below 70), eat or drink a quick-sugar food that has about 15 grams of carbohydrate. Your goal is to get your level back to your safe range. Check your blood sugar again 15 minutes later. If it's still not in your target range, take another 15 grams of carbohydrate and check your blood sugar again in 15 minutes. Repeat this until you reach your target. Then go back to your regular testing schedule. When you have low blood sugar, it's best to stop or reduce any physical activity until your blood sugar is back in your target range and is stable. If you must stay active, eat or drink 30 grams of carbohydrate. Then check your blood sugar again in 15 minutes. If it's not in your target range, take another 30 grams of carbohydrates. Check your blood sugar again in 15 minutes. Keep doing this until you reach your target. You can then go back to your regular testing schedule. If your symptoms or blood sugar levels are getting worse or have not improved after 15 minutes, seek medical care right away. Here are some examples of quick-sugar foods with 15 grams of carbohydrate: · 3 or 4 glucose tablets · 1 tube of glucose gel · Hard candy (such as 3 Jolly Ranchers or 5 to [employer]) · ½ cup to ¾ cup (4 to 6 ounces) of fruit juice or regular (not diet) soda High blood sugar If you have symptoms of high blood sugar, check your blood sugar. Your goal is to get your level back to your target range. If it's above ______ ( for example, above 250), follow these steps: · If you missed a dose of your diabetes medicine, take it now. Take only the amount of medicine that you have been prescribed. Do not take more or less medicine. · Give yourself insulin if your doctor has prescribed it for high blood sugar. · Test for ketones, if the doctor told you to do so. If the results of the ketone test show a moderate-to-large amount of ketones, call the doctor for advice. · Wait 30 minutes after you take the extra insulin or the missed medicine. Check your blood sugar again. If your symptoms or blood sugar levels are getting worse or have not improved after taking these steps, seek medical care right away. Follow-up care is a key part of your treatment and safety. Be sure to make and go to all appointments, and call your doctor if you are having problems. It's also a good idea to know your test results and keep a list of the medicines you take. Where can you learn more? Go to http://staci-zeinab.info/. Enter M436 in the search box to learn more about \"Diabetes Blood Sugar Emergencies: Your Action Plan. \" Current as of: April 16, 2019 Content Version: 12.2 © 4345-6304 [employer]. Care instructions adapted under license by [employer] (which disclaims liability or warranty for this information).  If you have questions about a medical condition or this instruction, always ask your healthcare professional. [employer] disclaims any warranty or liability for your use of this information.

## 2019-12-20 NOTE — PROGRESS NOTES
Internal Medicine Progress Note    Today's Date:  2019   Patient:  Marisol Mcneil  Patient :  1960    Subjective:     Chief Complaint   Patient presents with    Hypertension    Medication Refill     yoly Baker    Diabetes    Thyroid Problem    Weight Management      Hypertension   This is a chronic problem. BP is at goal. Pt takes toprol, hydralazine, HCTZ and norvasc. Pt reports compliance with these medications. Diabetes mellitus  This is a chronic problem. BS is at goal. Pt is on aspart and tresiba. Pt is compliant with these medications. Pt is on aspirin and statin    Obesity Class II   This is a chronic problem. This is not at goal. Information given on the ketogenic/IF diet with exercise during a previous visit. Pt gained weight since the last visit. Hypothyroidism   This is a chronic problem. This is at goal. Pt takes synthroid. Pt reports compliance with this medication.     3 most recent PHQ Screens 2018   Little interest or pleasure in doing things Not at all   Feeling down, depressed, irritable, or hopeless Not at all   Total Score PHQ 2 0   Trouble falling or staying asleep, or sleeping too much -   Feeling tired or having little energy -   Poor appetite, weight loss, or overeating -   Feeling bad about yourself - or that you are a failure or have let yourself or your family down -   Trouble concentrating on things such as school, work, reading, or watching TV -   Moving or speaking so slowly that other people could have noticed; or the opposite being so fidgety that others notice -   Thoughts of being better off dead, or hurting yourself in some way -   PHQ 9 Score -     Past Medical History:   Diagnosis Date    Anxiety     CAD (coronary artery disease)     S/P Coronary stents ( LAD and Lcx)    Depression     Diabetes ([facility]     Hepatitis C     Hypercholesterolemia     Hypertension     Menopause     Microalbuminuria 2019    [facility]     Past Surgical History:   Procedure Laterality Date    HX BREAST BIOPSY Left     1971  left breast lump removed excisional per patient, cysts    HX  SECTION      pre-eclampsia    HX CHOLECYSTECTOMY      HX CORONARY STENT PLACEMENT  2013    4 stents    HX HEENT      thyroidectomy due to nodules.  HX HYSTERECTOMY  2005    heavy periods    HX OOPHORECTOMY      HX THYROIDECTOMY        reports that she has never smoked. She has never used smokeless tobacco. She reports that she does not drink alcohol or use drugs. Family History   Problem Relation Age of Onset    Diabetes Mother     Hypertension Mother     Cancer Mother     Heart Disease Mother     Heart Attack Mother     Diabetes Father     Hypertension Father     Cancer Father     Ovarian Cancer Paternal Grandmother      Allergies   Allergen Reactions    Contrast Agent [Iodine] Rash and Sneezing     Current Outpatient Meds     Current Outpatient Medications on File Prior to Visit   Medication Sig Dispense Refill    cyclobenzaprine (FLEXERIL) 10 mg tablet Take 1 Tab by mouth three (3) times daily as needed for Muscle Spasm(s). 30 Tab 0    fluticasone propionate (FLONASE) 50 mcg/actuation nasal spray SHAKE LIQUID AND USE 2 SPRAYS IN EACH NOSTRIL DAILY 48 g 0    hydrALAZINE (APRESOLINE) 50 mg tablet Take 1 Tab by mouth two (2) times a day. 180 Tab 1    hydroCHLOROthiazide (HYDRODIURIL) 25 mg tablet Take 1 Tab by mouth daily. 90 Tab 1    metoprolol succinate (TOPROL-XL) 200 mg XL tablet TAKE 1 TABLET BY MOUTH DAILY 90 Tab 0    amLODIPine (NORVASC) 10 mg tablet Take 1 Tab by mouth daily. 90 Tab 0    citalopram (CELEXA) 40 mg tablet Take 1 Tab by mouth daily. 90 Tab 0    atorvastatin (LIPITOR) 80 mg tablet TAKE 1 TABLET BY MOUTH DAILY 90 Tab 0    buPROPion XL (WELLBUTRIN XL) 150 mg tablet Take 1 Tab by mouth every morning.  90 Tab 0    insulin aspart U-100 (NOVOLOG FLEXPEN U-100 INSULIN) 100 unit/mL (3 mL) inpn 10 Units by SubCUTAneous route Before breakfast, lunch, and dinner. 30 mL 11    levothyroxine (SYNTHROID) 125 mcg tablet Take 1 Tab by mouth Daily (before breakfast). 90 Tab 1    RABEprazole (ACIPHEX) 20 mg tablet TAKE 1 TABLET BY MOUTH ONCE DAILY 30 Tab 5    ibuprofen (MOTRIN) 800 mg tablet Take 1 Tab by mouth every eight (8) hours as needed for Pain. 30 Tab 0    aspirin (ASPIRIN) 325 mg tablet Take 1 Tab by mouth daily. 90 Tab 3    acetaminophen (TYLENOL EXTRA STRENGTH) 500 mg tablet Take 2 Tabs by mouth every six (6) hours as needed for Pain. 40 Tab 0     No current facility-administered medications on file prior to visit.       Review of Systems   CV:      chest pain, palpitations  PULM:  SOB, wheezing, cough, sputum production    Objective:       Visit Vitals  /70 (BP 1 Location: Left arm, BP Patient Position: Sitting) Comment: Manual; patient did not take BP medications   Pulse 71   Temp 98.1 °F (36.7 °C)   Resp 16   Ht 5' 2\" (1.575 m)   Wt 194 lb 12.8 oz (88.4 kg)   SpO2 96%   BMI 35.63 kg/m²     General:   Well-nourished, well-groomed, pleasant, alert, in no acute distress  Head:  Normocephalic, atraumatic  Ears:  External ears WNL  Nose:  External nares WNL  Psych:  No pressured speech, no abnormal thought content  Diabetic foot exam: monofilament exam normal, no open lesions or or erythema, thickened first digit toe nails and dry skin    Lab Results   Component Value Date/Time    GFR est non-AA 48 (L) 02/14/2019 03:15 PM    GFRNA, POC >60 05/31/2017 01:18 PM    GFR est AA 58 (L) 02/14/2019 03:15 PM    GFRAA, POC >60 05/31/2017 01:18 PM    Creatinine 1.16 02/14/2019 03:15 PM    Creatinine, POC 0.8 05/31/2017 01:18 PM    BUN 37 (H) 02/14/2019 03:15 PM    BUN, POC 20 (H) 04/04/2017 03:49 PM    Sodium 141 02/14/2019 03:15 PM    Sodium,  (L) 04/04/2017 03:49 PM    Potassium 3.7 02/14/2019 03:15 PM    Potassium, POC 3.5 04/04/2017 03:49 PM    Chloride 106 02/14/2019 03:15 PM Chloride, POC 94 (L) 04/04/2017 03:49 PM    CO2 28 02/14/2019 03:15 PM    Magnesium 1.9 12/18/2017 11:55 PM    Phosphorus 3.6 12/18/2017 11:55 PM      Assessment/Plan & Orders:         ICD-10-CM ICD-9-CM    1. Controlled type 2 diabetes mellitus with other specified complication, with long-term current use of insulin (HCC) E11.69 250.80 insulin degludec (TRESIBA FLEXTOUCH U-100) 100 unit/mL (3 mL) inpn    Z79.4 V58.67 AMB POC HEMOGLOBIN A1C      Insulin Needles, Disposable, (DORA PEN NEEDLE) 32 gauge x 5/32\" ndle      CBC WITH AUTOMATED DIFF      LIPID PANEL      METABOLIC PANEL, COMPREHENSIVE      HEMOGLOBIN A1C WITH EAG      TSH AND FREE T4      HM DIABETES FOOT EXAM      REFERRAL TO PODIATRY   2. Essential hypertension I10 401.9    3. Hypothyroidism, acquired E03.9 244.9    4. Severe obesity (HCC) E66.01 278.01       Information on ketogenic/IF diet with exercise given last time    Follow-up and Dispositions    · Return for Thyroid problem, Diabetes mellitus, Weight management, Go over labs/imaging, Hypertension. *Patient verbalized understanding and agreement with the plan. Patient was given an after-visit summary. Jill Meade.  [address], MD - Internal Medicine  12/27/2019, 3:03 PM  [facility]  [address], [address]  Phone [phone]  Fax [phone]

## 2020-01-09 ENCOUNTER — HOSPITAL ENCOUNTER (OUTPATIENT)
Dept: LAB | Age: 60
Discharge: HOME | End: 2020-01-09
Payer: OTHER GOVERNMENT

## 2020-01-09 DIAGNOSIS — Z79.4 CONTROLLED TYPE 2 DIABETES MELLITUS WITH OTHER SPECIFIED COMPLICATION, WITH LONG-TERM CURRENT USE OF INSULIN (HCC): ICD-10-CM

## 2020-01-09 DIAGNOSIS — E11.69 CONTROLLED TYPE 2 DIABETES MELLITUS WITH OTHER SPECIFIED COMPLICATION, WITH LONG-TERM CURRENT USE OF INSULIN (HCC): ICD-10-CM

## 2020-01-09 LAB
ALBUMIN SERPL-MCNC: 3 G/DL (ref 3.4–5)
ALBUMIN/GLOB SERPL: 0.6 {RATIO} (ref 0.8–1.7)
ALP SERPL-CCNC: 162 U/L (ref 45–117)
ALT SERPL-CCNC: 50 U/L (ref 13–56)
ANION GAP SERPL CALC-SCNC: 6 MMOL/L (ref 3–18)
AST SERPL-CCNC: 55 U/L (ref 10–38)
BASOPHILS # BLD: 0 K/UL (ref 0–0.1)
BASOPHILS NFR BLD: 0 % (ref 0–2)
BILIRUB SERPL-MCNC: 0.9 MG/DL (ref 0.2–1)
BUN SERPL-MCNC: 35 MG/DL (ref 7–18)
BUN/CREAT SERPL: 27 (ref 12–20)
CALCIUM SERPL-MCNC: 8.7 MG/DL (ref 8.5–10.1)
CHLORIDE SERPL-SCNC: 103 MMOL/L (ref 100–111)
CHOLEST SERPL-MCNC: 219 MG/DL
CO2 SERPL-SCNC: 30 MMOL/L (ref 21–32)
CREAT SERPL-MCNC: 1.31 MG/DL (ref 0.6–1.3)
DIFFERENTIAL METHOD BLD: ABNORMAL
EOSINOPHIL # BLD: 0.1 K/UL (ref 0–0.4)
EOSINOPHIL NFR BLD: 1 % (ref 0–5)
ERYTHROCYTE [DISTWIDTH] IN BLOOD BY AUTOMATED COUNT: 11.7 % (ref 11.6–14.5)
EST. AVERAGE GLUCOSE BLD GHB EST-MCNC: 148 MG/DL
GLOBULIN SER CALC-MCNC: 4.8 G/DL (ref 2–4)
GLUCOSE SERPL-MCNC: 85 MG/DL (ref 74–99)
HBA1C MFR BLD: 6.8 % (ref 4.2–5.6)
HCT VFR BLD AUTO: 38.7 % (ref 35–45)
HDLC SERPL-MCNC: 63 MG/DL (ref 40–60)
HDLC SERPL: 3.5 {RATIO} (ref 0–5)
HGB BLD-MCNC: 12.4 G/DL (ref 12–16)
LDLC SERPL CALC-MCNC: 117.8 MG/DL (ref 0–100)
LIPID PROFILE,FLP: ABNORMAL
LYMPHOCYTES # BLD: 2 K/UL (ref 0.9–3.6)
LYMPHOCYTES NFR BLD: 30 % (ref 21–52)
MCH RBC QN AUTO: 30.5 PG (ref 24–34)
MCHC RBC AUTO-ENTMCNC: 32 G/DL (ref 31–37)
MCV RBC AUTO: 95.3 FL (ref 74–97)
MONOCYTES # BLD: 0.8 K/UL (ref 0.05–1.2)
MONOCYTES NFR BLD: 12 % (ref 3–10)
NEUTS SEG # BLD: 3.8 K/UL (ref 1.8–8)
NEUTS SEG NFR BLD: 57 % (ref 40–73)
PLATELET # BLD AUTO: 246 K/UL (ref 135–420)
PMV BLD AUTO: 11 FL (ref 9.2–11.8)
POTASSIUM SERPL-SCNC: 3.9 MMOL/L (ref 3.5–5.5)
PROT SERPL-MCNC: 7.8 G/DL (ref 6.4–8.2)
RBC # BLD AUTO: 4.06 M/UL (ref 4.2–5.3)
SODIUM SERPL-SCNC: 139 MMOL/L (ref 136–145)
T4 FREE SERPL-MCNC: 1.2 NG/DL (ref 0.7–1.5)
TRIGL SERPL-MCNC: 191 MG/DL (ref ?–150)
TSH SERPL DL<=0.05 MIU/L-ACNC: 5.88 UIU/ML (ref 0.36–3.74)
VLDLC SERPL CALC-MCNC: 38.2 MG/DL
WBC # BLD AUTO: 6.7 K/UL (ref 4.6–13.2)

## 2020-01-09 PROCEDURE — 80053 COMPREHEN METABOLIC PANEL: CPT

## 2020-01-09 PROCEDURE — 83036 HEMOGLOBIN GLYCOSYLATED A1C: CPT

## 2020-01-09 PROCEDURE — 85025 COMPLETE CBC W/AUTO DIFF WBC: CPT

## 2020-01-09 PROCEDURE — 84439 ASSAY OF FREE THYROXINE: CPT

## 2020-01-09 PROCEDURE — 80061 LIPID PANEL: CPT

## 2020-01-09 PROCEDURE — 36415 COLL VENOUS BLD VENIPUNCTURE: CPT

## 2020-03-26 ENCOUNTER — PATIENT MESSAGE (OUTPATIENT)
Dept: FAMILY MEDICINE CLINIC | Facility: CLINIC | Age: 60
End: 2020-03-26

## 2020-03-26 DIAGNOSIS — E03.9 HYPOTHYROIDISM, ACQUIRED: ICD-10-CM

## 2020-03-29 RX ORDER — LEVOTHYROXINE SODIUM 125 UG/1
125 TABLET ORAL
Qty: 90 TAB | Refills: 1 | OUTPATIENT
Start: 2020-03-29

## 2020-03-29 NOTE — TELEPHONE ENCOUNTER
LPN to ask pt if she has been taking this medication regularly and correctly prior to refilling the medication

## 2020-04-01 ENCOUNTER — HOSPITAL ENCOUNTER (OUTPATIENT)
Dept: LAB | Age: 60
Discharge: HOME | End: 2020-04-01
Payer: OTHER GOVERNMENT

## 2020-04-01 PROCEDURE — 36415 COLL VENOUS BLD VENIPUNCTURE: CPT

## 2020-04-01 PROCEDURE — 87522 HEPATITIS C REVRS TRNSCRPJ: CPT

## 2020-04-07 ENCOUNTER — VIRTUAL VISIT (OUTPATIENT)
Dept: FAMILY MEDICINE CLINIC | Facility: CLINIC | Age: 60
End: 2020-04-07

## 2020-04-07 VITALS — HEIGHT: 62 IN | BODY MASS INDEX: 35.63 KG/M2

## 2020-04-07 DIAGNOSIS — I10 ESSENTIAL HYPERTENSION: ICD-10-CM

## 2020-04-07 DIAGNOSIS — E11.9 TYPE 2 DIABETES MELLITUS WITHOUT COMPLICATION, WITHOUT LONG-TERM CURRENT USE OF INSULIN (HCC): ICD-10-CM

## 2020-04-07 DIAGNOSIS — E03.9 HYPOTHYROIDISM, ACQUIRED: Primary | ICD-10-CM

## 2020-04-07 RX ORDER — HYDROXYCHLOROQUINE SULFATE 200 MG/1
200 TABLET, FILM COATED ORAL 2 TIMES DAILY
COMMUNITY
Start: 2020-02-19 | End: 2020-10-09

## 2020-04-07 RX ORDER — INSULIN DEGLUDEC 100 U/ML
35 INJECTION, SOLUTION SUBCUTANEOUS DAILY
COMMUNITY
Start: 2019-12-20 | End: 2020-08-25 | Stop reason: SDUPTHER

## 2020-04-07 RX ORDER — INSULIN ASPART 100 [IU]/ML
10 INJECTION, SOLUTION INTRAVENOUS; SUBCUTANEOUS
Qty: 27 ML | Refills: 3
Start: 2020-04-07 | End: 2020-07-06

## 2020-04-07 RX ORDER — ASPIRIN 325 MG
325 TABLET ORAL DAILY
Qty: 90 TAB | Refills: 2 | Status: SHIPPED | OUTPATIENT
Start: 2020-04-07

## 2020-04-07 RX ORDER — LEVOTHYROXINE SODIUM 125 UG/1
125 TABLET ORAL
Qty: 90 TAB | Refills: 2 | Status: SHIPPED | OUTPATIENT
Start: 2020-04-07 | End: 2020-12-21

## 2020-04-07 NOTE — PROGRESS NOTES
Luiz Toney is a 61 y.o. female who was seen by synchronous (real-time) audio-video technology on 4/7/2020. Consent:  She and/or her healthcare decision maker is aware that this patient-initiated Telehealth encounter is a billable service, with coverage as determined by her insurance carrier. She is aware that she may receive a bill and has provided verbal consent to proceed: Yes    I was at home while conducting this encounter. Assessment & Plan:   Diagnoses and all orders for this visit:    1. Hypothyroidism, acquired  -     levothyroxine (SYNTHROID) 125 mcg tablet; Take 1 Tab by mouth Daily (before breakfast). 2. Type 2 diabetes mellitus without complication, without long-term current use of insulin (HCC)  -     insulin aspart U-100 (NovoLOG Flexpen U-100 Insulin) 100 unit/mL (3 mL) inpn; 10 Units by SubCUTAneous route Before breakfast, lunch, and dinner for 90 days. 3. Essential hypertension    Other orders  -     aspirin (ASPIRIN) 325 mg tablet; Take 1 Tab by mouth daily. Take synthroid by itself on an empty stomach  Taper off celexa    Follow-up and Dispositions    · Return in about 3 months (around 7/7/2020) for Diabetes mellitus, Thyroid problem, Hypertension. 712  Subjective:   Luiz Toney was seen for Medication Refill; Thyroid Problem; Diabetes; and Hypertension    Hypertension   This is a chronic problem. Pt has not checked her BP today. Pt takes toprol, hydralazine, HCTZ and norvasc. Pt reports compliance with these medications.      Diabetes mellitus  This is a chronic problem. BS is at goal. Pt is on aspart and tresiba. Pt is compliant with these medications. Pt is on aspirin and statin     Hypothyroidism   This is a chronic problem. This is at goal. Pt takes synthroid. Pt does not take this medicine correctly. Prior to Admission medications    Medication Sig Start Date End Date Taking?  Authorizing Provider   levothyroxine (SYNTHROID) 125 mcg tablet Take 1 Tab by mouth Daily (before breakfast). 4/7/20  Yes Jami Duarte MD   hydroxychloroquine (PLAQUENIL) 200 mg tablet Take 200 mg by mouth two (2) times a day. 2/19/20  Yes Provider, Historical   insulin degludec 100 unit/mL (3 mL) inpn 35 Units by SubCUTAneous route daily. flexpen 12/20/19  Yes Provider, Historical   insulin aspart U-100 (NovoLOG Flexpen U-100 Insulin) 100 unit/mL (3 mL) inpn 10 Units by SubCUTAneous route Before breakfast, lunch, and dinner for 90 days. 4/7/20 7/6/20 Yes Jami Duarte MD   aspirin (ASPIRIN) 325 mg tablet Take 1 Tab by mouth daily. 4/7/20  Yes Jami Duarte MD   amLODIPine (NORVASC) 10 mg tablet TAKE 1 TABLET BY MOUTH DAILY 3/23/20  Yes Jami Duarte MD   hydroCHLOROthiazide (HYDRODIURIL) 25 mg tablet TAKE 1 TABLET BY MOUTH DAILY 3/23/20  Yes Jami Duarte MD   metoprolol succinate (TOPROL-XL) 200 mg XL tablet TAKE 1 TABLET BY MOUTH DAILY 3/23/20  Yes Jami Duarte MD   buPROPion XL (WELLBUTRIN XL) 150 mg tablet TAKE 1 TABLET BY MOUTH EVERY MORNING 1/16/20  Yes Jami Duarte MD   Insulin Needles, Disposable, (DORA PEN NEEDLE) 32 gauge x 5/32\" ndle Use one needle to give insulin 4 times a day. 12/20/19  Yes Jami Duarte MD   hydrALAZINE (APRESOLINE) 50 mg tablet Take 1 Tab by mouth two (2) times a day. 10/23/19  Yes Jami Duarte MD   atorvastatin (LIPITOR) 80 mg tablet TAKE 1 TABLET BY MOUTH DAILY 10/23/19  Yes Jami Duarte MD   RABEprazole (ACIPHEX) 20 mg tablet TAKE 1 TABLET BY MOUTH ONCE DAILY 7/31/18  Yes Zeyad Multani DO   cyclobenzaprine (FLEXERIL) 10 mg tablet Take 1 Tab by mouth three (3) times daily as needed for Muscle Spasm(s). 11/22/19 4/7/20  Jami Duarte MD   fluticasone propionate (FLONASE) 50 mcg/actuation nasal spray SHAKE LIQUID AND USE 2 SPRAYS IN [facility] NOSTRIL DAILY 11/22/19 4/7/20  Suly Rasmussen MD   citalopram (CELEXA) 40 mg tablet Take 1 Tab by mouth daily.  10/23/19 4/7/20  Jami Duarte MD   insulin aspart U-100 (Mary Alice Color[employer]og FLEXPEN U-100 INSULIN) 100 unit/mL (3 mL) inpn 10 Units by SubCUTAneous route Before breakfast, lunch, and dinner. 6/28/19 4/7/20  Zeyad Multani DO   levothyroxine (SYNTHROID) 125 mcg tablet Take 1 Tab by mouth Daily (before breakfast). 3/31/19 4/7/20  Zeyad Multani DO   ibuprofen (MOTRIN) 800 mg tablet Take 1 Tab by mouth every eight (8) hours as needed for Pain. 5/7/18 4/7/20  Zeyad Multani DO   aspirin (ASPIRIN) 325 mg tablet Take 1 Tab by mouth daily. 12/19/17 4/7/20  Rossana Saldaña DO   acetaminophen (TYLENOL EXTRA STRENGTH) 500 mg tablet Take 2 Tabs by mouth every six (6) hours as needed for Pain. 5/18/17 4/7/20  Bridget Bernabe MD     Allergies   Allergen Reactions    Contrast Agent [Iodine] Rash and Sneezing       ROS  Cardiac: chest pain, palpitations  Respiratory: shortness of breath, cough     PHYSICAL EXAMINATION:  [ INSTRUCTIONS:  \"[x]\" Indicates a positive item  \"[]\" Indicates a negative item  -- DELETE ALL ITEMS NOT EXAMINED]  Vital Signs: (As obtained by patient/caregiver at home)  Visit Vitals  Ht 5' 2\" (1.575 m)   BMI 35.63 kg/m²        Constitutional: [x] Appears well-developed and well-nourished [x] No apparent distress    Mental status: [x] Alert and awake  [x] Oriented to person/place/time [x] Able to follow commands   Eyes:   Sclera  [x]  Normal   Discharge [x]  None visible    HENT: [x] Normocephalic, atraumatic  Neurological:        [x] No Facial Asymmetry   [x] No gaze palsy  Psychiatric:       [x] Normal Affect []      [x] No Hallucinations     We discussed the expected course, resolution and complications of the diagnosis(es) in detail. Medication risks, benefits, costs, interactions, and alternatives were discussed as indicated. I advised her to contact the office if her condition worsens, changes or fails to improve as anticipated. She expressed understanding with the diagnosis(es) and plan.      Pursuant to the emergency declaration under the [employer] and the [employer] Act, [facility] waiver authority and the Coronavirus Preparedness and [employer] Act, this Virtual  Visit was conducted, with patient's consent, to reduce the patient's risk of exposure to COVID-19 and provide continuity of care for an established patient. Services were provided through a video synchronous discussion virtually to substitute for in-person clinic visit.     Windy Acosta MD  Internal Medicine  4/7/2020, 3:11 PM  [facility]  [address] [place] [address] [place], [address]  Phone [phone]  Fax [phone]

## 2020-04-07 NOTE — PATIENT INSTRUCTIONS
Take Synthroid on an empty stomach. It's best to take it 30 to 60 minutes before eating breakfast. Some people find it helpful to take Synthroid first thing in the morning. Take Synthroid alone. Do not take Synthroid within 4 hours of ingesting medications, supplements, or foods that may interact with Synthroid. Antacids, iron, and calcium supplements, dietary fiber, soy, and even walnuts can affect your body's absorption of Synthroid. Talk to your doctor to learn more.

## 2020-04-09 LAB
HCV GENOTYPE: NORMAL
HCV RNA SERPL NAA+PROBE-ACNC: 30 IU/ML
HCV RNA SERPL NAA+PROBE-LOG IU: 1.48 LOG10 IU/ML
TEST INFORMATION, 550045: NORMAL

## 2020-08-03 RX ORDER — BLOOD-GLUCOSE METER
KIT MISCELLANEOUS
Qty: 100 STRIP | Refills: 0 | Status: SHIPPED | OUTPATIENT
Start: 2020-08-03 | End: 2020-12-02 | Stop reason: SDUPTHER

## 2020-08-25 ENCOUNTER — VIRTUAL VISIT (OUTPATIENT)
Dept: INTERNAL MEDICINE CLINIC | Age: 60
End: 2020-08-25

## 2020-08-25 DIAGNOSIS — Z79.4 CONTROLLED TYPE 2 DIABETES MELLITUS WITH COMPLICATION, WITH LONG-TERM CURRENT USE OF INSULIN (HCC): Primary | ICD-10-CM

## 2020-08-25 DIAGNOSIS — F41.9 ANXIETY: ICD-10-CM

## 2020-08-25 DIAGNOSIS — E11.8 CONTROLLED TYPE 2 DIABETES MELLITUS WITH COMPLICATION, WITH LONG-TERM CURRENT USE OF INSULIN (HCC): Primary | ICD-10-CM

## 2020-08-25 RX ORDER — INSULIN DEGLUDEC 100 U/ML
35 INJECTION, SOLUTION SUBCUTANEOUS DAILY
Qty: 31.5 ML | Refills: 0 | Status: SHIPPED | OUTPATIENT
Start: 2020-08-25 | End: 2020-10-09

## 2020-08-25 RX ORDER — CITALOPRAM 40 MG/1
40 TABLET, FILM COATED ORAL DAILY
COMMUNITY
End: 2020-08-25 | Stop reason: DRUGHIGH

## 2020-08-25 RX ORDER — CITALOPRAM 20 MG/1
20 TABLET, FILM COATED ORAL DAILY
Qty: 30 TAB | Refills: 0 | Status: SHIPPED | OUTPATIENT
Start: 2020-08-25 | End: 2020-10-09 | Stop reason: DRUGHIGH

## 2020-08-25 NOTE — PROGRESS NOTES
Kelly Lynch is a 61 y.o. female who was seen by synchronous (real-time) audio-video technology on 8/25/2020 for Medication Refill (Novolog and Degludec insulins, Celexa)        Assessment & Plan:   Diagnoses and all orders for this visit:    1. Controlled type 2 diabetes mellitus with complication, with long-term current use of insulin (HCC)  -     insulin degludec 100 unit/mL (3 mL) inpn; 35 Units by SubCUTAneous route daily. flexpen  -     insulin NPH/insulin regular (NovoLIN 70/30 U-100 Insulin) 100 unit/mL (70-30) injection; 10 Units by SubCUTAneous route three (3) times daily.   - Med refills   2. Anxiety  -     citalopram (CELEXA) 20 mg tablet; Take 1 Tab by mouth daily.   - Patient is no longer on Plaquenil- advised her of the adverse reaction if she was to have this restarted she will not want to take celexa if she is represcribed  Plaquenil.    - Patient to follow up with her PCP for further recommendations or medication adjustments. Follow-up and Dispositions    · Return in about 1 month (around 9/25/2020) for With Dr Hanane Brantley . Subjective:   Patient's visit today is for medication refills she would like refills on Novolog, Degludec and celexa. Her PCP unavailable at this time. 1) Diabetes - this is a chronic problem- taking Novolog  70/30  10 units daily and Degludec 35 units daily. Her Blood sugars run from 120-158 2 hours after meals. Denies hypoglycemic episodes. 2) Anxiety - this is a chronic problem- was taking harvani but has since stopped this medication and completed treatment. Would like to restart celexa for her anxiety. Prior to Admission medications    Medication Sig Start Date End Date Taking? Authorizing Provider   citalopram (CELEXA) 20 mg tablet Take 1 Tab by mouth daily. 8/25/20  Yes Isidro Allen NP   insulin degludec 100 unit/mL (3 mL) inpn 35 Units by SubCUTAneous route daily.  flexpen 8/25/20  Yes Isidro Allen NP   insulin NPH/insulin regular (NovoLIN 70/30 U-100 Insulin) 100 unit/mL (70-30) injection 10 Units by SubCUTAneous route three (3) times daily. 8/25/20  Yes Suma Landry NP   glucose blood VI test strips (FreeStyle Lite Strips) strip USE TO CHECK GLUCOSE DAILY AS NEEDED 8/3/20  Yes Suma Landry NP   levothyroxine (SYNTHROID) 125 mcg tablet Take 1 Tab by mouth Daily (before breakfast). 4/7/20  Yes Vi Cooper MD   aspirin (ASPIRIN) 325 mg tablet Take 1 Tab by mouth daily. 4/7/20  Yes iV Cooper MD   amLODIPine (NORVASC) 10 mg tablet TAKE 1 TABLET BY MOUTH DAILY 3/23/20  Yes Vi Cooper MD   hydroCHLOROthiazide (HYDRODIURIL) 25 mg tablet TAKE 1 TABLET BY MOUTH DAILY 3/23/20  Yes Vi Cooper MD   metoprolol succinate (TOPROL-XL) 200 mg XL tablet TAKE 1 TABLET BY MOUTH DAILY 3/23/20  Yes Vi Cooper MD   buPROPion XL (WELLBUTRIN XL) 150 mg tablet TAKE 1 TABLET BY MOUTH EVERY MORNING 1/16/20  Yes Vi Cooper MD   Insulin Needles, Disposable, (DORA PEN NEEDLE) 32 gauge x 5/32\" ndle Use one needle to give insulin 4 times a day. 12/20/19  Yes Vi Cooper MD   hydrALAZINE (APRESOLINE) 50 mg tablet Take 1 Tab by mouth two (2) times a day. 10/23/19  Yes Vi Cooper MD   atorvastatin (LIPITOR) 80 mg tablet TAKE 1 TABLET BY MOUTH DAILY 10/23/19  Yes Vi Cooper MD   RABEprazole (ACIPHEX) 20 mg tablet TAKE 1 TABLET BY MOUTH ONCE DAILY 7/31/18  Yes Zeyad Multani,    insulin NPH/insulin regular (NovoLIN 70/30 U-100 Insulin) 100 unit/mL (70-30) injection by SubCUTAneous route. 8/25/20  Provider, Historical   citalopram (CeleXA) 40 mg tablet Take 40 mg by mouth daily. 8/25/20  Provider, Historical   hydroxychloroquine (PLAQUENIL) 200 mg tablet Take 200 mg by mouth two (2) times a day. 2/19/20   Provider, Historical   insulin degludec 100 unit/mL (3 mL) inpn 35 Units by SubCUTAneous route daily.  flexpen 12/20/19 8/25/20  Provider, Historical         Review of Systems   Constitutional: Negative for chills, fever and malaise/fatigue. Eyes: Negative for blurred vision and double vision. Respiratory: Negative for cough, shortness of breath and wheezing. Cardiovascular: Negative for chest pain and palpitations. Neurological: Negative for dizziness and headaches. Psychiatric/Behavioral: Negative for depression. The patient is nervous/anxious. Objective:   No flowsheet data found. [INSTRUCTIONS:  \"[x]\" Indicates a positive item  \"[]\" Indicates a negative item  -- DELETE ALL ITEMS NOT EXAMINED]    Constitutional: [x] Appears well-developed and well-nourished [x] No apparent distress      [] Abnormal -     Mental status: [x] Alert and awake  [x] Oriented to person/place/time [x] Able to follow commands    [] Abnormal -     Eyes:   EOM    [x]  Normal    [] Abnormal -   Sclera  [x]  Normal    [] Abnormal -          Discharge [x]  None visible   [] Abnormal -     HENT: [x] Normocephalic, atraumatic  [] Abnormal -   [x] Mouth/Throat: Mucous membranes are moist    External Ears [x] Normal  [] Abnormal -    Neck: [x] No visualized mass [] Abnormal -     Pulmonary/Chest: [x] Respiratory effort normal   [x] No visualized signs of difficulty breathing or respiratory distress        [] Abnormal -      Musculoskeletal:   [x] Normal gait with no signs of ataxia         [x] Normal range of motion of neck        [] Abnormal -     Neurological:        [x] No Facial Asymmetry (Cranial nerve 7 motor function) (limited exam due to video visit)          [x] No gaze palsy        [] Abnormal -          Skin:        [x] No significant exanthematous lesions or discoloration noted on facial skin         [] Abnormal -            Psychiatric:       [x] Normal Affect [] Abnormal -        [x] No Hallucinations    Other pertinent observable physical exam findings:-        We discussed the expected course, resolution and complications of the diagnosis(es) in detail.   Medication risks, benefits, costs, interactions, and alternatives were discussed as indicated. I advised her to contact the office if her condition worsens, changes or fails to improve as anticipated. She expressed understanding with the diagnosis(es) and plan. Sean Herrmann, who was evaluated through a patient-initiated, synchronous (real-time) audio-video encounter, and/or her healthcare decision maker, is aware that it is a billable service, with coverage as determined by her insurance carrier. She provided verbal consent to proceed: Yes, and patient identification was verified. It was conducted pursuant to the emergency declaration under the [facility], [address] [place] [address] authority and the [employer] and [employer] Act. A caregiver was present when appropriate. Ability to conduct physical exam was limited. I was in the office. The patient was at home.       Shikha Kohli NP

## 2020-08-25 NOTE — PROGRESS NOTES
Patricia Diaz presents today for   Chief Complaint   Patient presents with    Medication Refill     Novolog and Degludec insulins, Rubi Chase preferred language for health care discussion is English/other. Depression Screening:  3 most recent [facility] Screens 7/31/2018 12/19/2017 12/18/2017 9/14/2017 5/31/2017 4/6/2017 1/25/2017   Little interest or pleasure in doing things Not at all Not at all Not at all Not at all Not at all Not at all Not at all   Feeling down, depressed, irritable, or hopeless Not at all Not at all Not at all Not at all Not at all Not at all Not at all   Total Score PHQ 2 0 0 0 0 0 0 0   Trouble falling or staying asleep, or sleeping too much - - - - - Not at all -   Feeling tired or having little energy - - - - - Not at all -   Poor appetite, weight loss, or overeating - - - - - Not at all -   Feeling bad about yourself - or that you are a failure or have let yourself or your family down - - - - - Not at all -   Trouble concentrating on things such as school, work, reading, or watching TV - - - - - Not at all -   Moving or speaking so slowly that other people could have noticed; or the opposite being so fidgety that others notice - - - - - Not at all -   Thoughts of being better off dead, or hurting yourself in some way - - - - - Not at all -   PHQ 9 Score - - - - - 0 -       Learning Assessment:  Learning Assessment 3/17/2016 7/23/2015 6/24/2014   PRIMARY LEARNER Patient Patient Patient   PRIMARY LANGUAGE ENGLISH ENGLISH ENGLISH   LEARNER PREFERENCE PRIMARY DEMONSTRATION DEMONSTRATION DEMONSTRATION   ANSWERED BY patient patient patient   RELATIONSHIP SELF SELF SELF       Abuse Screening:  Abuse Screening Questionnaire 9/18/2019 7/31/2018 1/25/2017 3/17/2016 7/23/2015 6/24/2014   Do you ever feel afraid of your partner? N N N N N N   Are you in a relationship with someone who physically or mentally threatens you? N N N N N N   Is it safe for you to go home?  [facility] [place]       Fall Risk  No flowsheet data found. Health Maintenance reviewed and discussed per provider. Yes    Marisol Mcneil is due for   Health Maintenance Due   Topic Date Due    Colonoscopy  08/24/1978         Please order/place referral if appropriate. Advance Directive:  1. Do you have an advance directive in place? Patient Reply: NO    2. If not, would you like material regarding how to put one in place? Patient Reply: NO    Coordination of Care:  1. Have you been to the ER, urgent care clinic since your last visit? Hospitalized since your last visit? NO    2. Have you seen or consulted any other health care providers outside of the [address] [place] [address] since your last visit? Include any pap smears or colon screening.  NO

## 2020-10-09 ENCOUNTER — VIRTUAL VISIT (OUTPATIENT)
Dept: FAMILY MEDICINE CLINIC | Age: 60
End: 2020-10-09
Payer: OTHER GOVERNMENT

## 2020-10-09 DIAGNOSIS — Z79.4 CONTROLLED TYPE 2 DIABETES MELLITUS WITH OTHER NEUROLOGIC COMPLICATION, WITH LONG-TERM CURRENT USE OF INSULIN (HCC): Primary | ICD-10-CM

## 2020-10-09 DIAGNOSIS — E03.9 HYPOTHYROIDISM, ACQUIRED: ICD-10-CM

## 2020-10-09 DIAGNOSIS — E11.49 CONTROLLED TYPE 2 DIABETES MELLITUS WITH OTHER NEUROLOGIC COMPLICATION, WITH LONG-TERM CURRENT USE OF INSULIN (HCC): Primary | ICD-10-CM

## 2020-10-09 DIAGNOSIS — I10 ESSENTIAL HYPERTENSION: ICD-10-CM

## 2020-10-09 PROCEDURE — 99214 OFFICE O/P EST MOD 30 MIN: CPT | Performed by: INTERNAL MEDICINE

## 2020-10-09 RX ORDER — CITALOPRAM 40 MG/1
40 TABLET, FILM COATED ORAL DAILY
Qty: 90 TAB | Refills: 0 | Status: SHIPPED | OUTPATIENT
Start: 2020-10-09 | End: 2021-01-07

## 2020-10-09 RX ORDER — PEN NEEDLE, DIABETIC 31 GX3/16"
NEEDLE, DISPOSABLE MISCELLANEOUS
Qty: 200 PEN NEEDLE | Refills: 0 | Status: SHIPPED | OUTPATIENT
Start: 2020-10-09 | End: 2020-12-15 | Stop reason: SDUPTHER

## 2020-10-09 NOTE — PROGRESS NOTES
Leigh Villeda is a 61 y.o. female who was seen by synchronous (real-time) audio-video technology on 10/9/2020. Consent:  She and/or her healthcare decision maker is aware that this patient-initiated Telehealth encounter is a billable service, with coverage as determined by her insurance carrier. She is aware that she may receive a bill and has provided verbal consent to proceed: Yes    I was at home while conducting this encounter. Assessment & Plan:   Diagnoses and all orders for this visit:    1. Controlled type 2 diabetes mellitus with other neurologic complication, with long-term current use of insulin (HCC)  -     insulin nph-regular human rec (HUMULIN 70-30) 100 unit/mL (70-30) inpn; 15 Units by SubCUTAneous route two (2) times a day for 90 days. -     Insulin Needles, Disposable, (Mona Pen Needle) 32 gauge x 5/32\" ndle; Use one needle to give insulin 4 times a day. 2. Essential hypertension    3. Hypothyroidism, acquired    Other orders  -     citalopram (CELEXA) 40 mg tablet; Take 1 Tab by mouth daily. Follow-up and Dispositions    · Return in about 3 months (around 1/9/2021) for CPE, Go over labs/imaging, Diabetes mellitus, Hypertension, Thyroid problem. Subjective:   Leigh Villeda was seen for Diabetes; Thyroid Problem; and Hypertension    Hypertension   This is a chronic problem. Pt has not checked her BP today. Pt takes toprol, hydralazine, HCTZ and norvasc. Pt reports compliance with these medications.      Diabetes mellitus  This is a chronic problem.  BS is at goal. Pt is on humulin. Pt is compliant with this medication. Pt is on aspirin and statin     Hypothyroidism   This is a chronic problem. TSH is not at goal. Pt takes synthroid. Pt reports compliance with this medication.     3 most recent PHQ Screens 10/13/2020   Little interest or pleasure in doing things Not at all   Feeling down, depressed, irritable, or hopeless Not at all   Total Score PHQ 2 0   Trouble falling or staying asleep, or sleeping too much -   Feeling tired or having little energy -   Poor appetite, weight loss, or overeating -   Feeling bad about yourself - or that you are a failure or have let yourself or your family down -   Trouble concentrating on things such as school, work, reading, or watching TV -   Moving or speaking so slowly that other people could have noticed; or the opposite being so fidgety that others notice -   Thoughts of being better off dead, or hurting yourself in some way -   PHQ 9 Score -      Prior to Admission medications    Medication Sig Start Date End Date Taking? Authorizing Provider   citalopram (CELEXA) 40 mg tablet Take 1 Tab by mouth daily. 10/9/20  Yes Primitivo Cuba MD   insulin nph-regular human rec (HUMULIN 70-30) 100 unit/mL (70-30) inpn 15 Units by SubCUTAneous route two (2) times a day for 90 days. 10/9/20 1/7/21 Yes Primitivo Cuba MD   Insulin Needles, Disposable, (Mona Pen Needle) 32 gauge x 5/32\" ndle Use one needle to give insulin 4 times a day. 10/9/20  Yes Primitivo Cuba MD   glucose blood VI test strips (FreeStyle Lite Strips) strip USE TO CHECK GLUCOSE DAILY AS NEEDED 8/3/20   Yaneth Flores NP   levothyroxine (SYNTHROID) 125 mcg tablet Take 1 Tab by mouth Daily (before breakfast). 4/7/20   Primitivo Cuba MD   aspirin (ASPIRIN) 325 mg tablet Take 1 Tab by mouth daily. 4/7/20   Primitivo Cuba MD   amLODIPine (NORVASC) 10 mg tablet TAKE 1 TABLET BY MOUTH DAILY 3/23/20   Primitivo Cuba MD   hydroCHLOROthiazide (HYDRODIURIL) 25 mg tablet TAKE 1 TABLET BY MOUTH DAILY 3/23/20   Aayush NUNES MD   metoprolol succinate (TOPROL-XL) 200 mg XL tablet TAKE 1 TABLET BY MOUTH DAILY 3/23/20   Primitivo Cuba MD   buPROPion XL (WELLBUTRIN XL) 150 mg tablet TAKE 1 TABLET BY MOUTH EVERY MORNING 1/16/20   Primitivo Cuba MD   hydrALAZINE (APRESOLINE) 50 mg tablet Take 1 Tab by mouth two (2) times a day.  10/23/19   Primitivo Cuba MD   atorvastatin (LIPITOR) 80 mg tablet TAKE 1 TABLET BY MOUTH DAILY 10/23/19   Alma Rosa NUNES MD   RABEprazole (ACIPHEX) 20 mg tablet TAKE 1 TABLET BY MOUTH ONCE DAILY 7/31/18   Zeyad Multani DO     Allergies   Allergen Reactions    Contrast Agent [Iodine] Rash and Sneezing     ROS  Cardiac: chest pain, palpitations  Respiratory: shortness of breath, cough     PHYSICAL EXAMINATION:  [ INSTRUCTIONS:  \"[x]\" Indicates a positive item  \"[]\" Indicates a negative item  -- DELETE ALL ITEMS NOT EXAMINED]  Vital Signs: (As obtained by patient/caregiver at home)  There were no vitals taken for this visit. Constitutional: [x] Appears well-developed and well-nourished [x] No apparent distress    Mental status: [x] Alert and awake  [x] Oriented to person/place/time [x] Able to follow commands   Eyes:   Sclera  [x]  Normal   Discharge [x]  None visible    HENT: [x] Normocephalic, atraumatic  Neurological:        [x] No Facial Asymmetry   [x] No gaze palsy  Psychiatric:       [x] Normal Affect []      [x] No Hallucinations     We discussed the expected course, resolution and complications of the diagnosis(es) in detail. Medication risks, benefits, costs, interactions, and alternatives were discussed as indicated. I advised her to contact the office if her condition worsens, changes or fails to improve as anticipated. She expressed understanding with the diagnosis(es) and plan. Pursuant to the emergency declaration under the [facility], [facility] waiver authority and the [employer] Act, this Virtual  Visit was conducted, with patient's consent, to reduce the patient's risk of exposure to COVID-19 and provide continuity of care for an established patient. Services were provided through a video synchronous discussion virtually to substitute for in-person clinic visit.     Maria G Crowder MD  Internal Medicine  10/13/2020, 12:52 PM  [facility] [address] [facility], [address]  Phone [phone]  Fax [phone]

## 2020-10-13 PROBLEM — E11.49 CONTROLLED TYPE 2 DIABETES MELLITUS WITH NEUROLOGIC COMPLICATION, WITH LONG-TERM CURRENT USE OF INSULIN (HCC): Status: ACTIVE | Noted: 2017-12-18

## 2020-10-20 DIAGNOSIS — Z79.4 CONTROLLED TYPE 2 DIABETES MELLITUS WITH OTHER NEUROLOGIC COMPLICATION, WITH LONG-TERM CURRENT USE OF INSULIN (HCC): ICD-10-CM

## 2020-10-20 DIAGNOSIS — E11.49 CONTROLLED TYPE 2 DIABETES MELLITUS WITH OTHER NEUROLOGIC COMPLICATION, WITH LONG-TERM CURRENT USE OF INSULIN (HCC): ICD-10-CM

## 2020-10-20 NOTE — TELEPHONE ENCOUNTER
Requested Prescriptions     Pending Prescriptions Disp Refills    insulin nph-regular human rec (HUMULIN 70-30) 100 unit/mL (70-30) inpn 27 mL 0     Sig: 15 Units by SubCUTAneous route two (2) times a day for 90 days.      Future Appointments   Date Time Provider Elda Contreras   12/2/2020 11:00 AM Tre Nino MD [facility] BS AMB

## 2020-10-26 ENCOUNTER — VIRTUAL VISIT (OUTPATIENT)
Dept: FAMILY MEDICINE CLINIC | Age: 60
End: 2020-10-26
Payer: OTHER GOVERNMENT

## 2020-10-26 DIAGNOSIS — I10 ESSENTIAL HYPERTENSION: ICD-10-CM

## 2020-10-26 DIAGNOSIS — Z79.4 CONTROLLED TYPE 2 DIABETES MELLITUS WITH OTHER NEUROLOGIC COMPLICATION, WITH LONG-TERM CURRENT USE OF INSULIN (HCC): Primary | ICD-10-CM

## 2020-10-26 DIAGNOSIS — E03.9 HYPOTHYROIDISM, ACQUIRED: ICD-10-CM

## 2020-10-26 DIAGNOSIS — E11.49 CONTROLLED TYPE 2 DIABETES MELLITUS WITH OTHER NEUROLOGIC COMPLICATION, WITH LONG-TERM CURRENT USE OF INSULIN (HCC): Primary | ICD-10-CM

## 2020-10-26 PROCEDURE — 99214 OFFICE O/P EST MOD 30 MIN: CPT | Performed by: INTERNAL MEDICINE

## 2020-10-26 RX ORDER — INSULIN ASPART 100 [IU]/ML
10 INJECTION, SUSPENSION SUBCUTANEOUS 2 TIMES DAILY
Qty: 18 ML | Refills: 0 | Status: SHIPPED | OUTPATIENT
Start: 2020-10-26 | End: 2021-04-15 | Stop reason: SDUPTHER

## 2020-10-26 NOTE — PROGRESS NOTES
Sammy Dsouza is a 61 y.o. female who was seen by synchronous (real-time) audio-video technology on 10/26/2020. Consent:  She and/or her healthcare decision maker is aware that this patient-initiated Telehealth encounter is a billable service, with coverage as determined by her insurance carrier. She is aware that she may receive a bill and has provided verbal consent to proceed: Yes    I was at home while conducting this encounter. Assessment & Plan:   Diagnoses and all orders for this visit:    1. Controlled type 2 diabetes mellitus with other neurologic complication, with long-term current use of insulin (HCC)  -     insulin aspart protamine/insulin aspart (NovoLOG Mix 70-30FlexPen U-100) 100 unit/mL (70-30) inpn; 10 Units by SubCUTAneous route two (2) times a day for 90 days. 2. Essential hypertension    3. Hypothyroidism, acquired      Follow-up and Dispositions    · Return if symptoms worsen or fail to improve. Subjective:   Sammy Dsouza was seen for Diabetes (Patient states that she only needs insulin needles to draw up her medication); Hypertension; and Thyroid Problem    Hypertension   This is a chronic problem. Pt has not checked her BP today. Pt takes toprol, hydralazine, HCTZ and norvasc. Pt reports compliance with these medications.      Diabetes mellitus  This is a chronic problem.  BS is at goal. Pt is on insulin 70/30. Pt is compliant with this medication. Pt is on aspirin and statin.     Hypothyroidism   This is a chronic problem. TSH is not at goal. Pt takes synthroid. Pt reports compliance with this medication.     3 most recent PHQ Screens 7/31/2018   Little interest or pleasure in doing things Not at all   Feeling down, depressed, irritable, or hopeless Not at all   Total Score PHQ 2 0   Trouble falling or staying asleep, or sleeping too much -   Feeling tired or having little energy -   Poor appetite, weight loss, or overeating -   Feeling bad about yourself - or that you are a failure or have let yourself or your family down -   Trouble concentrating on things such as school, work, reading, or watching TV -   Moving or speaking so slowly that other people could have noticed; or the opposite being so fidgety that others notice -   Thoughts of being better off dead, or hurting yourself in some way -   PHQ 9 Score -      Prior to Admission medications    Medication Sig Start Date End Date Taking? Authorizing Provider   insulin aspart protamine/insulin aspart (NovoLOG Mix 70-30FlexPen U-100) 100 unit/mL (70-30) inpn 10 Units by SubCUTAneous route two (2) times a day for 90 days. 10/26/20 1/24/21 Yes Jamee Gunn MD   citalopram (CELEXA) 40 mg tablet Take 1 Tab by mouth daily. 10/9/20  Yes Jamee Gunn MD   Insulin Needles, Disposable, (Mona Pen Needle) 32 gauge x 5/32\" ndle Use one needle to give insulin 4 times a day. 10/9/20  Yes Jamee Gunn MD   glucose blood VI test strips (FreeStyle Lite Strips) strip USE TO CHECK GLUCOSE DAILY AS NEEDED 8/3/20  Yes Jose Reynolds NP   levothyroxine (SYNTHROID) 125 mcg tablet Take 1 Tab by mouth Daily (before breakfast). 4/7/20  Yes Jamee Gunn MD   aspirin (ASPIRIN) 325 mg tablet Take 1 Tab by mouth daily. 4/7/20  Yes Jamee Gunn MD   amLODIPine (NORVASC) 10 mg tablet TAKE 1 TABLET BY MOUTH DAILY 3/23/20  Yes Jamee Gunn MD   hydroCHLOROthiazide (HYDRODIURIL) 25 mg tablet TAKE 1 TABLET BY MOUTH DAILY 3/23/20  Yes Jamee Gunn MD   metoprolol succinate (TOPROL-XL) 200 mg XL tablet TAKE 1 TABLET BY MOUTH DAILY 3/23/20  Yes Jamee Gunn MD   buPROPion XL (WELLBUTRIN XL) 150 mg tablet TAKE 1 TABLET BY MOUTH EVERY MORNING 1/16/20  Yes Jamee Gunn MD   hydrALAZINE (APRESOLINE) 50 mg tablet Take 1 Tab by mouth two (2) times a day.  10/23/19  Yes Jamee Gunn MD   atorvastatin (LIPITOR) 80 mg tablet TAKE 1 TABLET BY MOUTH DAILY 10/23/19  Mateo Gunn MD   RABEprazole (ACIPHEX) 20 mg tablet TAKE 1 TABLET BY MOUTH ONCE DAILY 7/31/18  Yes Zeyad Multani DO   insulin nph-regular human rec (HUMULIN 70-30) 100 unit/mL (70-30) inpn 15 Units by SubCUTAneous route two (2) times a day for 90 days. 10/9/20 10/26/20  Khushi Miller MD     Allergies   Allergen Reactions    Contrast Agent [Iodine] Rash and Sneezing     ROS  Cardiac: no chest pain or palpitations  Respiratory: no shortness of breath or cough     PHYSICAL EXAMINATION:  [ INSTRUCTIONS:  \"[x]\" Indicates a positive item  \"[]\" Indicates a negative item  -- DELETE ALL ITEMS NOT EXAMINED]  Vital Signs: (As obtained by patient/caregiver at home)  There were no vitals taken for this visit. Constitutional: [x] Appears well-developed and well-nourished [x] No apparent distress    Mental status: [x] Alert and awake  [x] Oriented to person/place/time [x] Able to follow commands   Eyes:   Sclera  [x]  Normal   Discharge [x]  None visible    HENT: [x] Normocephalic, atraumatic  Neurological:        [x] No Facial Asymmetry   [x] No gaze palsy  Psychiatric:       [x] Normal Affect []      [x] No Hallucinations     We discussed the expected course, resolution and complications of the diagnosis(es) in detail. Medication risks, benefits, costs, interactions, and alternatives were discussed as indicated. I advised her to contact the office if her condition worsens, changes or fails to improve as anticipated. She expressed understanding with the diagnosis(es) and plan. Pursuant to the emergency declaration under the [facility], [facility] waiver authority and the [employer] Act, this Virtual  Visit was conducted, with patient's consent, to reduce the patient's risk of exposure to COVID-19 and provide continuity of care for an established patient. Services were provided through a video synchronous discussion virtually to substitute for in-person clinic visit.     Merary Silva [address], MD  Internal Medicine  10/26/2020, 12:52 PM  [facility]  [address] [place], [address]  Phone [phone]  Fax [phone]

## 2020-11-23 ENCOUNTER — HOSPITAL ENCOUNTER (OUTPATIENT)
Dept: LAB | Age: 60
Discharge: HOME | End: 2020-11-23
Payer: OTHER GOVERNMENT

## 2020-11-23 DIAGNOSIS — E11.8 CONTROLLED TYPE 2 DIABETES MELLITUS WITH COMPLICATION, WITH LONG-TERM CURRENT USE OF INSULIN (HCC): ICD-10-CM

## 2020-11-23 DIAGNOSIS — Z79.4 CONTROLLED TYPE 2 DIABETES MELLITUS WITH COMPLICATION, WITH LONG-TERM CURRENT USE OF INSULIN (HCC): ICD-10-CM

## 2020-11-23 LAB
ALBUMIN SERPL-MCNC: 3.1 G/DL (ref 3.4–5)
ALBUMIN/GLOB SERPL: 0.7 {RATIO} (ref 0.8–1.7)
ALP SERPL-CCNC: 126 U/L (ref 45–117)
ALT SERPL-CCNC: 23 U/L (ref 13–56)
ANION GAP SERPL CALC-SCNC: 5 MMOL/L (ref 3–18)
AST SERPL-CCNC: 15 U/L (ref 10–38)
BILIRUB SERPL-MCNC: 0.4 MG/DL (ref 0.2–1)
BUN SERPL-MCNC: 24 MG/DL (ref 7–18)
BUN/CREAT SERPL: 19 (ref 12–20)
CALCIUM SERPL-MCNC: 8.7 MG/DL (ref 8.5–10.1)
CHLORIDE SERPL-SCNC: 107 MMOL/L (ref 100–111)
CHOLEST SERPL-MCNC: 249 MG/DL
CO2 SERPL-SCNC: 31 MMOL/L (ref 21–32)
CREAT SERPL-MCNC: 1.26 MG/DL (ref 0.6–1.3)
EST. AVERAGE GLUCOSE BLD GHB EST-MCNC: 126 MG/DL
GLOBULIN SER CALC-MCNC: 4.3 G/DL (ref 2–4)
GLUCOSE SERPL-MCNC: 163 MG/DL (ref 74–99)
HBA1C MFR BLD: 6 % (ref 4.2–5.6)
HDLC SERPL-MCNC: 50 MG/DL (ref 40–60)
HDLC SERPL: 5 {RATIO} (ref 0–5)
LDLC SERPL CALC-MCNC: 173 MG/DL (ref 0–100)
LIPID PROFILE,FLP: ABNORMAL
POTASSIUM SERPL-SCNC: 3.9 MMOL/L (ref 3.5–5.5)
PROT SERPL-MCNC: 7.4 G/DL (ref 6.4–8.2)
SODIUM SERPL-SCNC: 143 MMOL/L (ref 136–145)
T4 FREE SERPL-MCNC: 0.9 NG/DL (ref 0.7–1.5)
TRIGL SERPL-MCNC: 130 MG/DL (ref ?–150)
TSH SERPL DL<=0.05 MIU/L-ACNC: 1.05 UIU/ML (ref 0.36–3.74)
VLDLC SERPL CALC-MCNC: 26 MG/DL

## 2020-11-23 PROCEDURE — 36415 COLL VENOUS BLD VENIPUNCTURE: CPT

## 2020-11-23 PROCEDURE — 84439 ASSAY OF FREE THYROXINE: CPT

## 2020-11-23 PROCEDURE — 80053 COMPREHEN METABOLIC PANEL: CPT

## 2020-11-23 PROCEDURE — 80061 LIPID PANEL: CPT

## 2020-11-23 PROCEDURE — 83036 HEMOGLOBIN GLYCOSYLATED A1C: CPT

## 2020-12-02 ENCOUNTER — OFFICE VISIT (OUTPATIENT)
Dept: FAMILY MEDICINE CLINIC | Age: 60
End: 2020-12-02
Payer: OTHER GOVERNMENT

## 2020-12-02 VITALS
HEART RATE: 77 BPM | OXYGEN SATURATION: 93 % | RESPIRATION RATE: 16 BRPM | BODY MASS INDEX: 37.47 KG/M2 | WEIGHT: 203.6 LBS | DIASTOLIC BLOOD PRESSURE: 78 MMHG | SYSTOLIC BLOOD PRESSURE: 146 MMHG | HEIGHT: 62 IN | TEMPERATURE: 97 F

## 2020-12-02 DIAGNOSIS — I10 ESSENTIAL HYPERTENSION: ICD-10-CM

## 2020-12-02 DIAGNOSIS — N18.31 STAGE 3A CHRONIC KIDNEY DISEASE (HCC): ICD-10-CM

## 2020-12-02 DIAGNOSIS — Z12.39 SCREENING BREAST EXAMINATION: ICD-10-CM

## 2020-12-02 DIAGNOSIS — E11.21 TYPE 2 DIABETES WITH NEPHROPATHY (HCC): ICD-10-CM

## 2020-12-02 DIAGNOSIS — Z00.00 ROUTINE GENERAL MEDICAL EXAMINATION AT HEALTH CARE FACILITY: Primary | ICD-10-CM

## 2020-12-02 PROCEDURE — 99396 PREV VISIT EST AGE 40-64: CPT | Performed by: INTERNAL MEDICINE

## 2020-12-02 RX ORDER — HYDROCHLOROTHIAZIDE 25 MG/1
TABLET ORAL
Qty: 90 TAB | Refills: 3 | Status: SHIPPED | OUTPATIENT
Start: 2020-12-02

## 2020-12-02 RX ORDER — BLOOD-GLUCOSE METER
KIT MISCELLANEOUS
Qty: 100 STRIP | Refills: 3 | Status: SHIPPED | OUTPATIENT
Start: 2020-12-02 | End: 2021-06-23 | Stop reason: SDUPTHER

## 2020-12-02 NOTE — PROGRESS NOTES
History and Physical    Today's Date:  2020   Patient's Name: Ashia Dumont   Patient's :  1960     History:     Chief Complaint   Patient presents with    Results    Annual Exam     Pt has no complaints    3 most recent PHQ Screens 2018   Little interest or pleasure in doing things Not at all   Feeling down, depressed, irritable, or hopeless Not at all   Total Score PHQ 2 0   Trouble falling or staying asleep, or sleeping too much -   Feeling tired or having little energy -   Poor appetite, weight loss, or overeating -   Feeling bad about yourself - or that you are a failure or have let yourself or your family down -   Trouble concentrating on things such as school, work, reading, or watching TV -   Moving or speaking so slowly that other people could have noticed; or the opposite being so fidgety that others notice -   Thoughts of being better off dead, or hurting yourself in some way -   PHQ 9 Score -      Past Medical History:   Diagnosis Date    Anxiety     CAD (coronary artery disease)     S/P Coronary stents ( LAD and Lcx)    Depression     Diabetes ([address]     Hepatitis C     Hypercholesterolemia     Hypertension     Menopause     Microalbuminuria 2019    Pure hypercholesterolemia 2014    St[facility] 2017     Past Surgical History:   Procedure Laterality Date    HX BREAST BIOPSY Left     1971  left breast lump removed excisional per patient, cysts    HX  SECTION  1982    pre-eclampsia    HX CHOLECYSTECTOMY      HX COLONOSCOPY  2020    small tubluar adenoma, small internal hemorrhoids repeat in      HX CORONARY STENT PLACEMENT  2013    4 stents    HX HEENT      thyroidectomy due to nodules.  HX HYSTERECTOMY      heavy periods    HX OOPHORECTOMY      HX THYROIDECTOMY        reports that she has never smoked. She has never used smokeless tobacco. She reports that she does not drink alcohol or use drugs.   Family History   Problem Relation Age of Onset    Diabetes Mother     Hypertension Mother     Cancer Mother     Heart Disease Mother     Heart Attack Mother     Diabetes Father     Hypertension Father     Cancer Father     Ovarian Cancer Paternal Grandmother      Allergies   Allergen Reactions    Contrast Agent [Iodine] Rash and Sneezing     Problem List:      Patient Active Problem List   Diagnosis Code    Essential hypertension I10    CAD (coronary artery disease) I25.10    Hypothyroidism, acquired E03.9    Hepatitis C B19.20    Pure hypercholesterolemia E78.00    Controlled type 2 diabetes mellitus with neurologic complication, with long-term current use of insulin ([address] E11.49, Z79.4    CVA (cerebral vascular accident) ([address] I63.9    Recurrent depression ([facility] F33.9    Microalbuminuria R80.9    Type 2 diabetes with nephropathy ([facility] [facility] E11.21    Severe obesity ([facility] E66.01    CKD (chronic kidney disease) stage 3, GFR 30-59 ml/min ([facility] N18.30     Medications:     Current Outpatient Medications   Medication Sig    glucose blood VI test strips (FreeStyle Lite Strips) strip USE TO CHECK GLUCOSE DAILY AS NEEDED    hydroCHLOROthiazide (HYDRODIURIL) 25 mg tablet TAKE 1 TABLET BY MOUTH DAILY    insulin aspart protamine/insulin aspart (NovoLOG Mix 70-30FlexPen U-100) 100 unit/mL (70-30) inpn 10 Units by SubCUTAneous route two (2) times a day for 90 days.  citalopram (CELEXA) 40 mg tablet Take 1 Tab by mouth daily.  Insulin Needles, Disposable, (Mona Pen Needle) 32 gauge x 5/32\" ndle Use one needle to give insulin 4 times a day.  levothyroxine (SYNTHROID) 125 mcg tablet Take 1 Tab by mouth Daily (before breakfast).  aspirin (ASPIRIN) 325 mg tablet Take 1 Tab by mouth daily.     amLODIPine (NORVASC) 10 mg tablet TAKE 1 TABLET BY MOUTH DAILY    metoprolol succinate (TOPROL-XL) 200 mg XL tablet TAKE 1 TABLET BY MOUTH DAILY    buPROPion XL (WELLBUTRIN XL) 150 mg tablet TAKE 1 TABLET BY MOUTH EVERY MORNING    hydrALAZINE (APRESOLINE) 50 mg tablet Take 1 Tab by mouth two (2) times a day.  atorvastatin (LIPITOR) 80 mg tablet TAKE 1 TABLET BY MOUTH DAILY    RABEprazole (ACIPHEX) 20 mg tablet TAKE 1 TABLET BY MOUTH ONCE DAILY    Insulin Syringes, Disposable, 1 mL syrg Give insulin shots BID     No current facility-administered medications for this visit. Review of Systems:   General:   fevers, chills  Neurologic: dizziness, lightheadedness  Eyes:  vision changes, double vision, photophobia  Ears:  change in hearing, ear pain, ear discharge, ear ringing, +pressure in right ear  Nose:  sneezing, runny nose  Mouth/Throat: sore throat, voice change  Neck:  pain, stiffness  Respiratory: dyspnea at rest, dyspnea on exertion, wheezing, +cough, sputum production  Cardiovascular:   chest pain, palpitations  Breasts: lumps, discharge, pain, rash  Gastrointestinal:  nausea, vomiting  Urinary: dysuria, urinary frequency, nocturia, malodorous urine  Genital (F): vaginal discharge, ulcerations  Musculoskeletal:  joint pain, back pain  Psychiatric: +insomnia, +anxiety  Endocrine: cold intolerance, heat intolerance  Hematologic: easy bruising, easy bleeding  Dermatologic: itching, rash    Physical Assessment:     Visit Vitals  BP (!) 146/78   Pulse 77   Temp 97 °F (36.1 °C) (Temporal)   Resp 16   Ht 5' 2\" (1.575 m)   Wt 203 lb 9.6 oz (92.4 kg)   SpO2 93%   BMI 37.24 kg/m²     General:   Well-groomed, well-nourished, in no distress, pleasant, appropriate and conversant. Eyes:    PERRL, conjunctiva clear  Ears:  TMs normal, no ear wax  Mouth:  oropharynx WNL without membranes, exudates, petechiae or ulcers  Cardiovascular:   RRR, no MRG. Pulmonary:   Lungs clear bilaterally. Normal respiratory effort. Abdomen:   Abdomen soft, NT, ND  Extremities:   No edema, LEs warm and well-perfused. Neuro:   Alert and oriented, no focal deficits. No facial asymmetry noted.   Skin:    No rash or jaundice  MSK:   Normal ROM, 5/5 muscle strength  Psych:  No pressured speech or abnormal thought content    Lab Results   Component Value Date/Time    GFR est non-AA 43 (L) 11/23/2020 02:08 PM    GFRNA, POC >60 05/31/2017 01:18 PM    GFR est AA 53 (L) 11/23/2020 02:08 PM    GFRAA, POC >60 05/31/2017 01:18 PM    Creatinine 1.26 11/23/2020 02:08 PM    Creatinine, POC 0.8 05/31/2017 01:18 PM    BUN 24 (H) 11/23/2020 02:08 PM    BUN, POC 20 (H) 04/04/2017 03:49 PM    Sodium 143 11/23/2020 02:08 PM    Sodium,  (L) 04/04/2017 03:49 PM    Potassium 3.9 11/23/2020 02:08 PM    Potassium, POC 3.5 04/04/2017 03:49 PM    Chloride 107 11/23/2020 02:08 PM    Chloride, POC 94 (L) 04/04/2017 03:49 PM    CO2 31 11/23/2020 02:08 PM    Magnesium 1.9 12/18/2017 11:55 PM    Phosphorus 3.6 12/18/2017 11:55 PM      Assessment/Plan & Orders:         ICD-10-CM ICD-9-CM    1. Routine general medical examination at health care facility  Z00.00 V70.0    2. Type 2 diabetes with nephropathy (HCC)  E11.21 250.40 glucose blood VI test strips (FreeStyle Lite Strips) strip     583.81 DISCONTINUED: Insulin Syringes, Disposable, 1 mL syrg   3. Essential hypertension  I10 401.9 hydroCHLOROthiazide (HYDRODIURIL) 25 mg tablet   4. Stage 3a chronic kidney disease  N18.31 585.3 REFERRAL TO NEPHROLOGY   5. Screening breast examination  Z12.39 V76.10 DAVID MAMMO BI SCREENING INCL CAD     HM  Colon cancer: colonoscopy done in March  Influenza vaccine: done  Pneumococcal vaccine: due at age 72   Tdap: done  Herpes Zoster vaccine: done  Hep B vaccine: unsure  Weight:  Body mass index is 37.24 kg/m². Discussed the patient's BMI with her.   The BMI follow up plan is as follows: diet and exercise  Cervical cancer:  pap smears no longer indicated  Breast Cancer: mammogram done  Osteoporosis: No indication for DEXA scan    Monitor home BP    Follow-up and Dispositions    · Return in about 4 weeks (around 12/30/2020) for Blood pressure check, virtual.       *Patient verbalized understanding and agreement with the plan. Patient was given an after-visit summary. Marie Guzman.  Thea Haynes MD - Internal Medicine  12/11/2020, 1:51 PM  [facility]  [address] [place] [address], [address]  Phone [phone]  Fax [phone]

## 2020-12-02 NOTE — PROGRESS NOTES
David Baum is a 61 y.o.  female presents today for office visit for follow up. Pt would also like to discuss DM. Pt is not fasting. Pt is in Room # 2      1. Have you been to the ER, urgent care clinic since your last visit? Hospitalized since your last visit? No    2. Have you seen or consulted any other health care providers outside of the [address] [place] [address] since your last visit? Include any pap smears or colon screening. No    Upcoming Appts  none    Health Maintenance reviewed     VORB: No orders of the defined types were placed in this encounter.   Abi Mcelroy, MELVIN

## 2020-12-07 DIAGNOSIS — E11.21 TYPE 2 DIABETES WITH NEPHROPATHY (HCC): ICD-10-CM

## 2020-12-08 ENCOUNTER — TELEPHONE (OUTPATIENT)
Dept: FAMILY MEDICINE CLINIC | Age: 60
End: 2020-12-08

## 2020-12-08 DIAGNOSIS — E11.49 CONTROLLED TYPE 2 DIABETES MELLITUS WITH OTHER NEUROLOGIC COMPLICATION, WITH LONG-TERM CURRENT USE OF INSULIN (HCC): ICD-10-CM

## 2020-12-08 DIAGNOSIS — Z79.4 CONTROLLED TYPE 2 DIABETES MELLITUS WITH OTHER NEUROLOGIC COMPLICATION, WITH LONG-TERM CURRENT USE OF INSULIN (HCC): ICD-10-CM

## 2020-12-08 RX ORDER — PEN NEEDLE, DIABETIC 31 GX3/16"
NEEDLE, DISPOSABLE MISCELLANEOUS
Qty: 200 PEN NEEDLE | Refills: 0 | Status: CANCELLED | OUTPATIENT
Start: 2020-12-08

## 2020-12-08 NOTE — TELEPHONE ENCOUNTER
Pharmacy is calling to clarify prescription. What size needles for the syringes?   She has never had them filled there before

## 2020-12-11 PROBLEM — N18.30 CKD (CHRONIC KIDNEY DISEASE) STAGE 3, GFR 30-59 ML/MIN (HCC): Status: ACTIVE | Noted: 2020-12-11

## 2020-12-11 NOTE — TELEPHONE ENCOUNTER
Patient is calling the office to find out what the hold up is on her syringes. She only knows that they are 1 ml. Her previous pin needles were 32g. Shouldn't this still be the same for the syringes?

## 2020-12-15 DIAGNOSIS — E11.49 CONTROLLED TYPE 2 DIABETES MELLITUS WITH OTHER NEUROLOGIC COMPLICATION, WITH LONG-TERM CURRENT USE OF INSULIN (HCC): ICD-10-CM

## 2020-12-15 DIAGNOSIS — Z79.4 CONTROLLED TYPE 2 DIABETES MELLITUS WITH OTHER NEUROLOGIC COMPLICATION, WITH LONG-TERM CURRENT USE OF INSULIN (HCC): ICD-10-CM

## 2020-12-15 DIAGNOSIS — E11.21 TYPE 2 DIABETES WITH NEPHROPATHY (HCC): ICD-10-CM

## 2020-12-15 RX ORDER — PEN NEEDLE, DIABETIC 31 GX3/16"
NEEDLE, DISPOSABLE MISCELLANEOUS
Qty: 200 PEN NEEDLE | Refills: 3 | Status: SHIPPED | OUTPATIENT
Start: 2020-12-15

## 2020-12-15 NOTE — TELEPHONE ENCOUNTER
Patient is calling again to inquire about her syringes. She has been without insulin for 5 days now.

## 2020-12-19 DIAGNOSIS — E03.9 HYPOTHYROIDISM, ACQUIRED: ICD-10-CM

## 2020-12-19 DIAGNOSIS — I10 ESSENTIAL HYPERTENSION: ICD-10-CM

## 2020-12-21 RX ORDER — AMLODIPINE BESYLATE 10 MG/1
TABLET ORAL
Qty: 90 TAB | Refills: 2 | Status: SHIPPED | OUTPATIENT
Start: 2020-12-21 | End: 2021-04-15 | Stop reason: ALTCHOICE

## 2020-12-21 RX ORDER — METOPROLOL SUCCINATE 200 MG/1
TABLET, EXTENDED RELEASE ORAL
Qty: 90 TAB | Refills: 2 | Status: SHIPPED | OUTPATIENT
Start: 2020-12-21 | End: 2021-05-28 | Stop reason: ALTCHOICE

## 2020-12-21 RX ORDER — LEVOTHYROXINE SODIUM 125 UG/1
TABLET ORAL
Qty: 90 TAB | Refills: 2 | Status: SHIPPED | OUTPATIENT
Start: 2020-12-21 | End: 2021-06-23 | Stop reason: SDUPTHER

## 2021-01-11 ENCOUNTER — HOSPITAL ENCOUNTER (OUTPATIENT)
Dept: MAMMOGRAPHY | Age: 61
Discharge: HOME | End: 2021-01-11
Attending: INTERNAL MEDICINE
Payer: OTHER GOVERNMENT

## 2021-01-11 DIAGNOSIS — Z12.31 VISIT FOR SCREENING MAMMOGRAM: ICD-10-CM

## 2021-01-11 DIAGNOSIS — Z12.39 SCREENING BREAST EXAMINATION: ICD-10-CM

## 2021-01-11 PROCEDURE — 77063 BREAST TOMOSYNTHESIS BI: CPT

## 2021-01-13 RX ORDER — ATORVASTATIN CALCIUM 80 MG/1
TABLET, FILM COATED ORAL
Qty: 90 TAB | Refills: 3 | Status: SHIPPED | OUTPATIENT
Start: 2021-01-13

## 2021-04-15 ENCOUNTER — TELEPHONE (OUTPATIENT)
Dept: FAMILY MEDICINE CLINIC | Age: 61
End: 2021-04-15

## 2021-04-15 ENCOUNTER — VIRTUAL VISIT (OUTPATIENT)
Dept: FAMILY MEDICINE CLINIC | Age: 61
End: 2021-04-15
Payer: OTHER GOVERNMENT

## 2021-04-15 DIAGNOSIS — Z13.31 SCREENING FOR DEPRESSION: ICD-10-CM

## 2021-04-15 DIAGNOSIS — E03.9 HYPOTHYROIDISM, ACQUIRED: ICD-10-CM

## 2021-04-15 DIAGNOSIS — I10 ESSENTIAL HYPERTENSION: ICD-10-CM

## 2021-04-15 DIAGNOSIS — E11.49 CONTROLLED TYPE 2 DIABETES MELLITUS WITH OTHER NEUROLOGIC COMPLICATION, WITH LONG-TERM CURRENT USE OF INSULIN (HCC): Primary | ICD-10-CM

## 2021-04-15 DIAGNOSIS — N18.30 STAGE 3 CHRONIC KIDNEY DISEASE, UNSPECIFIED WHETHER STAGE 3A OR 3B CKD (HCC): ICD-10-CM

## 2021-04-15 DIAGNOSIS — Z79.4 CONTROLLED TYPE 2 DIABETES MELLITUS WITH OTHER NEUROLOGIC COMPLICATION, WITH LONG-TERM CURRENT USE OF INSULIN (HCC): Primary | ICD-10-CM

## 2021-04-15 PROCEDURE — 99214 OFFICE O/P EST MOD 30 MIN: CPT | Performed by: INTERNAL MEDICINE

## 2021-04-15 RX ORDER — BUMETANIDE 1 MG/1
1 TABLET ORAL DAILY
COMMUNITY
Start: 2021-02-12

## 2021-04-15 RX ORDER — HYDRALAZINE HYDROCHLORIDE 25 MG/1
25 TABLET, FILM COATED ORAL 3 TIMES DAILY
Qty: 180 TAB | Refills: 1 | Status: SHIPPED | OUTPATIENT
Start: 2021-04-15 | End: 2021-04-15 | Stop reason: SDUPTHER

## 2021-04-15 RX ORDER — INSULIN ASPART 100 [IU]/ML
10 INJECTION, SUSPENSION SUBCUTANEOUS 2 TIMES DAILY
Qty: 18 ML | Refills: 1 | Status: SHIPPED | OUTPATIENT
Start: 2021-04-15 | End: 2021-06-22 | Stop reason: ALTCHOICE

## 2021-04-15 RX ORDER — INFLUENZA A VIRUS A/GUANGDONG-MAONAN/SWL1536/2019 CNIC-1909 (H1N1) ANTIGEN (FORMALDEHYDE INACTIVATED), INFLUENZA A VIRUS A/HONG KONG/2671/2019 (H3N2) ANTIGEN (FORMALDEHYDE INACTIVATED), INFLUENZA B VIRUS B/PHUKET/3073/2013 ANTIGEN (FORMALDEHYDE INACTIVATED), AND INFLUENZA B VIRUS B/WASHINGTON/02/2019 ANTIGEN (FORMALDEHYDE INACTIVATED) 15; 15; 15; 15 UG/.5ML; UG/.5ML; UG/.5ML; UG/.5ML
INJECTION, SUSPENSION INTRAMUSCULAR
COMMUNITY
Start: 2020-10-10 | End: 2021-05-28 | Stop reason: ALTCHOICE

## 2021-04-15 RX ORDER — HYDRALAZINE HYDROCHLORIDE 25 MG/1
25 TABLET, FILM COATED ORAL 2 TIMES DAILY
Qty: 180 TAB | Refills: 1 | Status: SHIPPED | OUTPATIENT
Start: 2021-04-15

## 2021-04-15 NOTE — PROGRESS NOTES
Doug Fernandez is a 61 y.o. female who was seen by synchronous (real-time) audio-video technology on 4/15/2021. Consent:  She and/or her healthcare decision maker is aware that this patient-initiated Telehealth encounter is a billable service, with coverage as determined by her insurance carrier. She is aware that she may receive a bill and has provided verbal consent to proceed: Yes    I was at home while conducting this encounter. Assessment & Plan:   Diagnoses and all orders for this visit:    1. Controlled type 2 diabetes mellitus with other neurologic complication, with long-term current use of insulin (HCC)  -     insulin aspart protamine/insulin aspart (NovoLOG Mix 70-30FlexPen U-100) 100 unit/mL (70-30) inpn; 10 Units by SubCUTAneous route two (2) times a day for 90 days. -     CBC WITH AUTOMATED DIFF; Future  -     LIPID PANEL; Future  -     METABOLIC PANEL, COMPREHENSIVE; Future  -     HEMOGLOBIN A1C WITH EAG; Future    2. Essential hypertension  -     BSHSI MYCHART BP FLOWSHEET  -     hydrALAZINE (APRESOLINE) 25 mg tablet; Take 1 Tab by mouth two (2) times a day. 3. Hypothyroidism, acquired  -     TSH AND FREE T4; Future    4. Screening for depression  -     CT DEPRESSION SCREEN ANNUAL    5. Stage 3 chronic kidney disease, unspecified whether stage 3a or 3b CKD (HCC)    Stop amlodipine due to leg swelling  Pt to call eye doctor today to make an appointment for eye symptoms    Follow-up and Dispositions    · Return in about 1 week (around 4/22/2021) for Diabetes mellitus, Hypertension, Virtual.   Follow-up and Disposition History        Subjective:   Doug Fernandez was seen for Hypertension, Diabetes, Eye Problem, and Thyroid Problem    Hypertension   This is a chronic problem. BP is not at goal. Pt takes toprol, bumex and norvasc. Pt reports compliance with these medications.      Diabetes mellitus  This is a chronic problem.  A1c is at goal. Pt is on insulin 70/30.  Pt is compliant with this medication. Pt is on aspirin and statin.     Hypothyroidism   This is a chronic problem. TSH is at goal. Pt takes synthroid. Pt reports compliance with this medication. 3 most recent PHQ Screens 7/31/2018   Little interest or pleasure in doing things Not at all   Feeling down, depressed, irritable, or hopeless Not at all   Total Score PHQ 2 0   Trouble falling or staying asleep, or sleeping too much -   Feeling tired or having little energy -   Poor appetite, weight loss, or overeating -   Feeling bad about yourself - or that you are a failure or have let yourself or your family down -   Trouble concentrating on things such as school, work, reading, or watching TV -   Moving or speaking so slowly that other people could have noticed; or the opposite being so fidgety that others notice -   Thoughts of being better off dead, or hurting yourself in some way -   PHQ 9 Score -      Prior to Admission medications    Medication Sig Start Date End Date Taking? Authorizing Provider   insulin aspart protamine/insulin aspart (NovoLOG Mix 70-30FlexPen U-100) 100 unit/mL (70-30) inpn 10 Units by SubCUTAneous route two (2) times a day for 90 days. 4/15/21 7/14/21 Yes Agapito Marcum MD   bumetanide (BUMEX) 1 mg tablet 1 mg daily. 2/12/21  Yes Provider, Historical   influenza vaccine 2020-21, 6 mos+,,PF, (Fluzone Quad [id number], PF,) syrg injection  10/10/20  Yes Provider, Historical   hydrALAZINE (APRESOLINE) 25 mg tablet Take 1 Tab by mouth two (2) times a day.  4/15/21  Yes Agapito Marcum MD   atorvastatin (LIPITOR) 80 mg tablet TAKE 1 TABLET BY MOUTH DAILY 1/13/21  Yes Agapito Marcum MD   citalopram (CELEXA) 40 mg tablet TAKE 1 TABLET BY MOUTH DAILY 1/7/21  Yes Agapito Marcum MD   levothyroxine (SYNTHROID) 125 mcg tablet TAKE 1 TABLET BY MOUTH DAILY BEFORE BREAKFAST 12/21/20  Yes Agapito Marcum MD   metoprolol succinate (TOPROL-XL) 200 mg XL tablet TAKE 1 TABLET BY MOUTH DAILY 12/21/20  Yes Agapito Marcum MD Insulin Needles, Disposable, (Mona Pen Needle) 32 gauge x 5/32\" ndle Use one needle to give insulin 4 times a day. 12/15/20  Yes Whit Chung MD   Insulin Syringes, Disposable, 1 mL syrg Give insulin shots BID 12/15/20  Yes Whit Chung MD   glucose blood VI test strips (FreeStyle Lite Strips) strip USE TO CHECK GLUCOSE DAILY AS NEEDED 12/2/20  Yes Whit Chung MD   hydroCHLOROthiazide (HYDRODIURIL) 25 mg tablet TAKE 1 TABLET BY MOUTH DAILY 12/2/20  Yes Whit Chung MD   aspirin (ASPIRIN) 325 mg tablet Take 1 Tab by mouth daily. 4/7/20  Yes Whit Chung MD   buPROPion XL (WELLBUTRIN XL) 150 mg tablet TAKE 1 TABLET BY MOUTH EVERY MORNING 1/16/20  Yes Whit Chung MD   hydrALAZINE (APRESOLINE) 25 mg tablet Take 1 Tab by mouth three (3) times daily. 4/15/21 4/15/21  Whit Chung MD   amLODIPine (NORVASC) 10 mg tablet TAKE 1 TABLET BY MOUTH DAILY 12/21/20 4/15/21  Whit Chung MD   insulin aspart protamine/insulin aspart (NovoLOG Mix 70-30FlexPen U-100) 100 unit/mL (70-30) inpn 10 Units by SubCUTAneous route two (2) times a day for 90 days. 10/26/20 4/15/21  Whit Chung MD   hydrALAZINE (APRESOLINE) 50 mg tablet Take 1 Tab by mouth two (2) times a day. 10/23/19 4/15/21  Whit Chung MD   RABEprazole (ACIPHEX) 20 mg tablet TAKE 1 TABLET BY MOUTH ONCE DAILY 7/31/18 4/15/21  Zeyad Multani, DO     Allergies   Allergen Reactions    Contrast Agent [Iodine] Rash and Sneezing     We discussed the expected course, resolution and complications of the diagnosis(es) in detail. Medication risks, benefits, costs, interactions, and alternatives were discussed as indicated. I advised her to contact the office if her condition worsens, changes or fails to improve as anticipated. She expressed understanding with the diagnosis(es) and plan.      Pursuant to the emergency declaration under the [facility], 1135 waiver authority and the Coronavirus Preparedness and Response Supplemental Appropriations Act, this Virtual  Visit was conducted, with patient's consent, to reduce the patient's risk of exposure to COVID-19 and provide continuity of care for an established patient. Services were provided through a video synchronous discussion virtually to substitute for in-person clinic visit.     Imogene Leyden, MD  Internal Medicine  4/15/2021, 12:52 PM  [facility]  [address] [place] [address], [address]  Phone [phone]  Fax [phone]

## 2021-04-15 NOTE — TELEPHONE ENCOUNTER
Pharmacy is calling to gain clarity on Dr. Randolph [place] instructions on the patient's hydralazine. Please call Tre Rodrigues the pharmacist [phone] to follow up.

## 2021-04-15 NOTE — PATIENT INSTRUCTIONS
Counting Carbohydrates: Care Instructions Your Care Instructions You don't have to eat special foods when you have diabetes. You just have to be careful to eat healthy foods. Carbohydrates (carbs) raise blood sugar higher and quicker than any other nutrient. Carbs are found in desserts, breads and cereals, and fruit. They're also in starchy vegetables. These include potatoes, corn, and grains such as rice and pasta. Carbs are also in milk and yogurt. The more carbs you eat at one time, the higher your blood sugar will rise. Spreading carbs all through the day helps keep your blood sugar levels within your target range. Counting carbs is one of the best ways to keep your blood sugar under control. If you use insulin, counting carbs helps you match the right amount of insulin to the number of grams of carbs in a meal. Then you can change your diet and insulin dose as needed. Testing your blood sugar several times a day can help you learn how carbs affect your blood sugar. A registered dietitian or certified diabetes educator can help you plan meals and snacks. Follow-up care is a key part of your treatment and safety. Be sure to make and go to all appointments, and call your doctor if you are having problems. It's also a good idea to know your test results and keep a list of the medicines you take. How can you care for yourself at home? Know your daily amount of carbohydrates Your daily amount depends on several things, such as your weight, how active you are, which diabetes medicines you take, and what your goals are for your blood sugar levels. A registered dietitian or certified diabetes educator can help you plan how many carbs to include in each meal and snack. For most adults, a guideline for the daily amount of carbs is: · 45 to 60 grams at each meal. That's about the same as 3 to 4 carbohydrate servings. · 15 to 20 grams at each snack. That's about the same as 1 carbohydrate serving. Count carbs Counting carbs lets you know how much rapid-acting insulin to take before you eat. If you use an insulin pump, you get a constant rate of insulin during the day. So the pump must be programmed at meals. This gives you extra insulin to cover the rise in blood sugar after meals. If you take insulin: 
· Learn your own insulin-to-carb ratio. You and your diabetes health professional will figure out the ratio. You can do this by testing your blood sugar after meals. For example, you may need a certain amount of insulin for every 15 grams of carbs. · Add up the carb grams in a meal. Then you can figure out how many units of insulin to take based on your insulin-to-carb ratio. · Exercise lowers blood sugar. You can use less insulin than you would if you were not doing exercise. Keep in mind that timing matters. If you exercise within 1 hour after a meal, your body may need less insulin for that meal than it would if you exercised 3 hours after the meal. Test your blood sugar to find out how exercise affects your need for insulin. If you do or don't take insulin: 
· Look at labels on packaged foods. This can tell you how many carbs are in a serving. You can also use guides from the American Diabetes Association. · Be aware of portions, or serving sizes. If a package has two servings and you eat the whole package, you need to double the number of grams of carbohydrate listed for one serving. · Protein, fat, and fiber do not raise blood sugar as much as carbs do. If you eat a lot of these nutrients in a meal, your blood sugar will rise more slowly than it would otherwise. Eat from all food groups · Eat at least three meals a day. · Plan meals to include food from all the food groups. The food groups include grains, fruits, dairy, proteins, and vegetables. · Talk to your dietitian or diabetes educator about ways to add limited amounts of sweets into your meal plan.  
· If you drink alcohol, talk to your doctor. It may not be recommended when you are taking certain diabetes medicines. Where can you learn more? Go to http://www.gray.com/ Enter U248 in the search box to learn more about \"Counting Carbohydrates: Care Instructions. \" Current as of: August 31, 2020               Content Version: 12.8 © 2411-9801 [employer]back. Care instructions adapted under license by [employer] (which disclaims liability or warranty for this information). If you have questions about a medical condition or this instruction, always ask your healthcare professional. [address] any warranty or liability for your use of this information.

## 2021-04-15 NOTE — TELEPHONE ENCOUNTER
Dede called wanting to know if it should be 270 pills for 3 times a day for hydralazine? If so please reorder and send.

## 2021-04-15 NOTE — PROGRESS NOTES
Elena Cantu is a 61 y.o.  female presents today for office visit for follow up. Pt would also like to discuss DM. 1. Have you been to the ER, urgent care clinic since your last visit? Hospitalized since your last visit? No    2. Have you seen or consulted any other health care providers outside of the [address] [place] [address] since your last visit? Include any pap smears or colon screening. No    Upcoming Appts  none    Health Maintenance reviewed     VORB: No orders of the defined types were placed in this encounter.   Mann Reed LPN

## 2021-05-26 ENCOUNTER — PATIENT MESSAGE (OUTPATIENT)
Dept: FAMILY MEDICINE CLINIC | Age: 61
End: 2021-05-26

## 2021-05-28 ENCOUNTER — VIRTUAL VISIT (OUTPATIENT)
Dept: FAMILY MEDICINE CLINIC | Age: 61
End: 2021-05-28
Payer: OTHER GOVERNMENT

## 2021-05-28 DIAGNOSIS — I10 ESSENTIAL HYPERTENSION: ICD-10-CM

## 2021-05-28 DIAGNOSIS — E11.49 CONTROLLED TYPE 2 DIABETES MELLITUS WITH OTHER NEUROLOGIC COMPLICATION, WITH LONG-TERM CURRENT USE OF INSULIN (HCC): Primary | ICD-10-CM

## 2021-05-28 DIAGNOSIS — Z79.4 CONTROLLED TYPE 2 DIABETES MELLITUS WITH OTHER NEUROLOGIC COMPLICATION, WITH LONG-TERM CURRENT USE OF INSULIN (HCC): Primary | ICD-10-CM

## 2021-05-28 DIAGNOSIS — N18.32 STAGE 3B CHRONIC KIDNEY DISEASE (HCC): ICD-10-CM

## 2021-05-28 PROCEDURE — 99214 OFFICE O/P EST MOD 30 MIN: CPT | Performed by: INTERNAL MEDICINE

## 2021-05-28 RX ORDER — CARVEDILOL 25 MG/1
TABLET ORAL
COMMUNITY
Start: 2021-05-11

## 2021-05-28 RX ORDER — INSULIN ASPART 100 [IU]/ML
10 INJECTION, SUSPENSION SUBCUTANEOUS 2 TIMES DAILY
Qty: 18 ML | Refills: 1 | Status: CANCELLED | OUTPATIENT
Start: 2021-05-28 | End: 2021-08-26

## 2021-05-28 RX ORDER — INSULIN DEGLUDEC INJECTION 100 U/ML
INJECTION, SOLUTION SUBCUTANEOUS
COMMUNITY
Start: 2020-08-25 | End: 2021-06-18 | Stop reason: SDUPTHER

## 2021-05-28 NOTE — PROGRESS NOTES
Loni Trevino is a 61 y.o.  female presents today for office visit for follow up. Pt would also like to discuss DM. 1. Have you been to the ER, urgent care clinic since your last visit? Hospitalized since your last visit? No    2. Have you seen or consulted any other health care providers outside of the [address] [place] [address] since your last visit? Include any pap smears or colon screening.  No    Upcoming Appts  none    Health Maintenance reviewed    VORB:   Orders Placed This Encounter    carvediloL (COREG) 25 mg tablet    insulin degludec Jasmine Roman FlexTouch U-100) 100 unit/mL (3 mL) inJacquelyn Arthur LPN

## 2021-05-28 NOTE — PROGRESS NOTES
Heather Swanson is a 61 y.o. female who was seen by synchronous (real-time) audio-video technology on 5/28/2021. Consent:  She and/or her healthcare decision maker is aware that this patient-initiated Telehealth encounter is a billable service, with coverage as determined by her insurance carrier. She is aware that she may receive a bill and has provided verbal consent to proceed: Yes    I was at home while conducting this encounter. Assessment & Plan:   Diagnoses and all orders for this visit:    1. Controlled type 2 diabetes mellitus with other neurologic complication, with long-term current use of insulin ([facility] [address]  -      DIABETES FOOT EXAM  -     [facility]    2. Essential hypertension  -     [facility]    3. Stage 3b chronic kidney disease (HCC)    Stop novolog    Follow-up and Dispositions    · Return in about 1 week (around 6/4/2021) for Diabetes mellitus, Hypertension, Go over labs/imaging. Subjective:   Heather Swanson was seen for Diabetes, Hypertension, Chronic Kidney Disease, and Thyroid Problem    Hypertension   This is a chronic problem. BP is not at goal. Pt takes carvedilol, bumex and hydralazine. Pt reports compliance with these medications.      Diabetes mellitus  This is a chronic problem.  A1c is at goal. Pt is on insulin 70/30 and tresiba. Pt is compliant with this medication. Pt is on aspirin and statin. Pt has a podiatrist.     Hypothyroidism   This is a chronic problem. TSH is at goal. Pt takes synthroid. Pt reports compliance with this medication. CKD Stage 3  This is a chronic problem. This is stable.  Pt has a nephrologist.    Lab Results   Component Value Date/Time    Creatinine, POC 0.8 05/31/2017 01:18 PM    Creatinine 2.1 (H) 04/01/2021 10:21 AM     3 most recent PHQ Screens 7/31/2018   Little interest or pleasure in doing things Not at all   Feeling down, depressed, irritable, or hopeless Not at all   Total Score PHQ 2 0   Trouble falling or staying asleep, or sleeping too much -   Feeling tired or having little energy -   Poor appetite, weight loss, or overeating -   Feeling bad about yourself - or that you are a failure or have let yourself or your family down -   Trouble concentrating on things such as school, work, reading, or watching TV -   Moving or speaking so slowly that other people could have noticed; or the opposite being so fidgety that others notice -   Thoughts of being better off dead, or hurting yourself in some way -   PHQ 9 Score -      Prior to Admission medications    Medication Sig Start Date End Date Taking? Authorizing Provider   carvediloL (COREG) 25 mg tablet TAKE 1 TABLET BY MOUTH TWICE DAILY 5/11/21  Yes Provider, Historical   insulin degludec Jerelyn Bend FlexTouch U-100) 100 unit/mL (3 mL) inpn 35 units once a day 8/25/20  Yes Provider, Historical   insulin aspart protamine/insulin aspart (NovoLOG Mix 70-30FlexPen U-100) 100 unit/mL (70-30) inpn 10 Units by SubCUTAneous route two (2) times a day for 90 days. 4/15/21 7/14/21 Yes Shamir Lin MD   bumetanide (BUMEX) 1 mg tablet 1 mg daily. 2/12/21  Yes Provider, Historical   hydrALAZINE (APRESOLINE) 25 mg tablet Take 1 Tab by mouth two (2) times a day. 4/15/21  Yes Shamir Lin MD   atorvastatin (LIPITOR) 80 mg tablet TAKE 1 TABLET BY MOUTH DAILY 1/13/21  Yes Shamir Lin MD   citalopram (CELEXA) 40 mg tablet TAKE 1 TABLET BY MOUTH DAILY 1/7/21  Yes Shamir Lin MD   levothyroxine (SYNTHROID) 125 mcg tablet TAKE 1 TABLET BY MOUTH DAILY BEFORE BREAKFAST 12/21/20  Yes Shamir Lin MD   Insulin Needles, Disposable, (Mona Pen Needle) 32 gauge x 5/32\" ndle Use one needle to give insulin 4 times a day.  12/15/20  Yes Shamir Lin MD   Insulin Syringes, Disposable, 1 mL syrg Give insulin shots BID 12/15/20  Yes Shamir Lin MD   glucose blood VI test strips (FreeStyle Lite Strips) strip USE TO CHECK GLUCOSE DAILY AS NEEDED 12/2/20  Yes Anisa Champion MD MANJU   hydroCHLOROthiazide (HYDRODIURIL) 25 mg tablet TAKE 1 TABLET BY MOUTH DAILY 12/2/20  Yes Phillip Gould MD   aspirin (ASPIRIN) 325 mg tablet Take 1 Tab by mouth daily. 4/7/20  Yes Phillip Gould MD   buPROPion XL (WELLBUTRIN XL) 150 mg tablet TAKE 1 TABLET BY MOUTH EVERY MORNING 1/16/20  Yes Phillip Gould MD   influenza vaccine 2020-21, 6 mos+,,PF, (Fluzone Quad [id number], PF,) syrg injection  10/10/20 5/28/21  Provider, Historical   metoprolol succinate (TOPROL-XL) 200 mg XL tablet TAKE 1 TABLET BY MOUTH DAILY  Patient not taking: Reported on 5/28/2021 12/21/20 5/28/21  Phillip Gould MD     Allergies   Allergen Reactions    Contrast Agent [Iodine] Rash and Sneezing     We discussed the expected course, resolution and complications of the diagnosis(es) in detail. Medication risks, benefits, costs, interactions, and alternatives were discussed as indicated. I advised her to contact the office if her condition worsens, changes or fails to improve as anticipated. She expressed understanding with the diagnosis(es) and plan. Pursuant to the emergency declaration under the [facility], 1135 waiver authority and the [employer] Act, this Virtual  Visit was conducted, with patient's consent, to reduce the patient's risk of exposure to COVID-19 and provide continuity of care for an established patient. Services were provided through a video synchronous discussion virtually to substitute for in-person clinic visit.     La Ulrich MD  Internal Medicine  5/28/2021, 12:52 PM  [facility]  [address] [place] [address] [place], [address]  Phone [phone]  Fax [phone]

## 2021-05-28 NOTE — PATIENT INSTRUCTIONS
Learning About High Blood Pressure What is high blood pressure? Blood pressure is a measure of how hard the blood pushes against the walls of your arteries. It's normal for blood pressure to go up and down throughout the day. But if it stays up, you have high blood pressure. Another name for high blood pressure is hypertension. Two numbers tell you your blood pressure. The first number is the systolic pressure (top number). It shows how hard the blood pushes when your heart is pumping. The second number is the diastolic pressure (bottom number). It shows how hard the blood pushes between heartbeats, when your heart is relaxed and filling with blood. Your doctor will give you a goal for your blood pressure based on your health and your age. High blood pressure (hypertension) means that the top number stays high, or the bottom number stays high, or both. High blood pressure increases the risk of stroke, heart attack, and other problems. What happens when you have high blood pressure? · Blood flows through your arteries with too much force. Over time, this can damage the heart and the walls of your arteries. But you can't feel it. High blood pressure usually doesn't cause symptoms. · High blood pressure makes your heart work harder. And that can lead to heart failure, which means your heart doesn't pump as much blood as your body needs. · Fat and calcium start to build up in your arteries. This buildup is called hardening of the arteries. It can cause many problems including a heart attack and stroke. · Arteries also carry blood and oxygen to organs like your eyes, kidneys, and brain. If high blood pressure damages those arteries, it can lead to vision loss, kidney disease, stroke, and a higher risk of dementia. How can you prevent high blood pressure? · Stay at a healthy weight. · Try to limit how much sodium you eat to less than 2,300 milligrams (mg) a day.  If you limit your sodium to 1,500 mg a day, you can lower your blood pressure even more. ? Buy foods that are labeled \"unsalted,\" \"sodium-free,\" or \"low-sodium. \" Foods labeled \"reduced-sodium\" and \"light sodium\" may still have too much sodium. ? Flavor your food with garlic, lemon juice, onion, vinegar, herbs, and spices instead of salt. Do not use soy sauce, steak sauce, onion salt, garlic salt, mustard, or ketchup on your food. ? Use less salt (or none) when recipes call for it. You can often use half the salt a recipe calls for without losing flavor. · Be physically active. Get at least 30 minutes of exercise on most days of the week. Walking is a good choice. You also may want to do other activities, such as running, swimming, cycling, or playing tennis or team sports. · Limit alcohol to 2 drinks a day for men and 1 drink a day for women. · Eat plenty of fruits, vegetables, and low-fat dairy products. Eat less saturated and total fats. How is high blood pressure treated? · Your doctor will suggest making lifestyle changes to help your heart. For example, your doctor may ask you to eat healthy foods, quit smoking, lose extra weight, and be more active. · If lifestyle changes don't help enough, your doctor may recommend that you take medicine. · When blood pressure is very high, medicines are needed to lower it. Follow-up care is a key part of your treatment and safety. Be sure to make and go to all appointments, and call your doctor if you are having problems. It's also a good idea to know your test results and keep a list of the medicines you take. Where can you learn more? Go to http://www.[employer]imate [employer]pper.com/ Enter P501 in the search box to learn more about \"Learning About High Blood Pressure. \" Current as of: August 31, 2020               Content Version: 12.8 © 3210-7279 [employer].   
Care instructions adapted under license by [employer] (which disclaims liability or warranty for this information). If you have questions about a medical condition or this instruction, always ask your healthcare professional. [place] [address] any warranty or liability for your use of this information.

## 2023-03-28 NOTE — ED NOTES
Purposeful rounding completed:    Side rails up x 2:  YES  Bed in low position and wheels locked: YES  Call bell within reach: YES  Comfort addressed: YES    Toileting needs addressed: YES  Plan of care reviewed/updated with patient and or family members: YES  IV site assessed: YES  Pain assessed and addressed: YES, 2  Pt states her daughter is going to pick her up. Bactrim Counseling:  I discussed with the patient the risks of sulfa antibiotics including but not limited to GI upset, allergic reaction, drug rash, diarrhea, dizziness, photosensitivity, and yeast infections.  Rarely, more serious reactions can occur including but not limited to aplastic anemia, agranulocytosis, methemoglobinemia, blood dyscrasias, liver or kidney failure, lung infiltrates or desquamative/blistering drug rashes.

## 2023-11-13 NOTE — DISCHARGE SUMMARY
2 [facility]  Hospitalist Division    Discharge Summary      Patient: Ramon Delong MRN: [id number]  CSN: [id number]    YOB: 1960  Age: 62 y.o. Sex: female    DOA: 12/18/2017 LOS:  LOS: 1 day   Discharge Date: 12/19/17     PCP:  Leonor Fritz DO    Chief Complaint:    Chief Complaint   Patient presents with    Shortness of Breath    Headache     CVA (cerebral vascular accident) [facility])    Admission Diagnosis:   Hospital Problems as of 12/19/2017  Date Reviewed: 9/25/2017          Codes Class Noted - Resolved POA    Type 2 diabetes mellitus with nephropathy ([facility] [address] ICD-10-CM: E11.21  ICD-9-CM: 250.40, 583.81  12/18/2017 - Present Yes        * (Principal)CVA (cerebral vascular accident) ([facility] [address] ICD-10-CM: I63.9  ICD-9-CM: 434.91  12/18/2017 - Present Yes        Hypokalemia ICD-10-CM: E87.6  ICD-9-CM: 276.8  12/18/2017 - Present Yes        Hypertension ICD-10-CM: I10  ICD-9-CM: 401.9  6/24/2014 - Present Yes        CAD (coronary artery disease) ICD-10-CM: I25.10  ICD-9-CM: 414.00  6/24/2014 - Present Yes        Hypothyroidism, acquired ICD-10-CM: E03.9  ICD-9-CM: 244.9  6/24/2014 - Present Yes    Overview Signed 6/24/2014  2:44 PM by Leonor Fritz DO     Thyroid removed due to nodules             Hepatitis C ICD-10-CM: B19.20  ICD-9-CM: 070.70  6/24/2014 - Present Yes              Discharge Diagnoses:    CVA -Subacute. MRA BRAIN and Neck, TTE pending. Permissible hypertension overnight ok for home meds as this is subacute. Continue DAPT and Lipitor, unclear why patient would have a CVA on DAPT. ASA and plavix tests - ARU >550 suggesting [id number] is non-theraputic. [id number]    Neuro and vital sign check per protocol. PT/OT/Speech evaluation  Neuro consult      Hypertension -restart hctz, hydralazine, losartan, toprol, norvasc. Labetalol as needed if sbp /dbp > 220/120  DM-SSI+A1c   CAD s/p PCI on plavix-reports compliance.    HLP-Lipitor 80  Hep C - recommend out patient follow up with ID  Hypokalemia - replace   SEVERO - CPAP  GERD-PPI  Hypothyroid-synthroid  Anxiety/depression - celexa    Hospital Course:   62 y.o. female with DM,CAD,HTN,HLP,HCV,Hypokalemia,SEVERO,GERD,Hypothyroidism,and prior CVA's presents with headache and sob. She saw her PCP today for routine visit and was told her BP was high sbp 170's. Later around 10 am she developed blurry vision and headache. She decided to come to the ED for further evaluation. In the ED she had left side numbness involving her face and left arm. She was seen by tele neurology. CT head indicates a lacunar infarct with age indeterminance. She had MRI brain which was positive for a subacute CVA. Patient was given Aspirin 162 mg. By the following day her blurry vision and left sided numbness had resolved. She was recently admitted 4/2017 for TIA vs seizure however she was continued on DAPT with [id number] and Plavix. She had aspirin and P2Y12 testing that suggested each respective drug is likely not achieving its theraputic value. She was thus changed to [id number] and plavix was changed to Effient. I recommended close blood pressure monitoring and further discussion of anti-platelet therapy with her neurologist.  The stroke team was unable to see her during her admission. Significant Diagnostic Studies:  CT head  MRI head-12/18  1. New small focal signal abnormality left posterior frontal white matter,  centrum semiovale, suggestive of new late subacute or chronic small vessel  infarct.     2. No other evidence of acute infarct or other new acute intracranial finding.     3.  Chronic infarcts involving anterior superior right basal ganglia and superior  right occipital cortex.     4. Mostly stable mild bilateral deep white matter signal changes, nonspecific  but findings likely related to chronic microvascular ischemic disease.     5. Slight increased T2 hyperintense foci basal ganglia suggestive of  perivascular spaces, chronic ischemic changes, or chronic lacunar infarcts. MRA head/neck  TTE    Consults:  Treatment Team: Attending Provider: Allison Bain DO; Consulting Provider: Sharon Arroyo MD; Consulting Provider: Delio Williamson MD; Charge Nurse: Sanjay Figueredo, RN; Charge Nurse: Ty Barajas, MIA; Physical Therapist: Shannon Frazier PT; Occupational Therapist: Claude Miranda OT; Speech Language Pathologist: Irving Augustin    Operative Procedures:  N/A    Disposition:  Home    Diet:  Cardiac    Discharge Condition:   Good    Discharge Medications:    Current Discharge Medication List      START taking these medications    Details   prasugrel (EFFIENT) 10 mg tablet Take 1 Tab by mouth daily. Indications: Failed plavix. Recurrent CA. No P2Y12 response  Qty: 90 Tab, Refills: 3      aspirin (ASPIRIN) 325 mg tablet Take 1 Tab by mouth daily. Qty: 90 Tab, Refills: 3         CONTINUE these medications which have NOT CHANGED    Details   gabapentin (NEURONTIN) 300 mg capsule Take 1 Cap by mouth three (3) times daily. Qty: 90 Cap, Refills: 2    Associated Diagnoses: Uncontrolled type 2 diabetes mellitus with diabetic polyneuropathy, with long-term current use of insulin ([facility])      metFORMIN (GLUCOPHAGE) 850 mg tablet Take 1 Tab by mouth two (2) times daily (with meals). Qty: 60 Tab, Refills: 2    Associated Diagnoses: Uncontrolled type 2 diabetes mellitus with diabetic polyneuropathy, without long-term current use of insulin ([facility])      glucose blood VI test strips (BLOOD GLUCOSE TEST) strip Use one strip for each glucose check. Check glucose 2 times per day. Qty: 100 Strip, Refills: 7    Comments: Please dispense strips for one touch ultra.   Associated Diagnoses: Uncontrolled type 2 diabetes mellitus with diabetic polyneuropathy, without long-term current use of insulin ([facility])      insulin aspart (NOVOLOG) 100 unit/mL inpn Take 8 units with each meal.  Qty: 10 Pen, Refills: 11    Associated Diagnoses: Type 2 diabetes mellitus without complication, without long-term current use of insulin ([address] Uncontrolled type 2 diabetes mellitus with diabetic polyneuropathy, without long-term current use of insulin ([facility])      insulin degludec (TRESIBA FLEXTOUCH U-100) 100 unit/mL (3 mL) inpn 35 Units by SubCUTAneous route daily. Qty: 15 Pen, Refills: 2    Associated Diagnoses: Uncontrolled type 2 diabetes mellitus with diabetic polyneuropathy, without long-term current use of insulin ([address] Type 2 diabetes mellitus without complication, without long-term current use of insulin ([facility])      atorvastatin (LIPITOR) 80 mg tablet TAKE 1 TABLET BY MOUTH DAILY  Qty: 90 Tab, Refills: 9    Comments: **Patient requests 90 days supply**  Associated Diagnoses: Malignant hypertension; Coronary artery disease involving native coronary artery of native heart without angina pectoris      acetaminophen (TYLENOL EXTRA STRENGTH) 500 mg tablet Take 2 Tabs by mouth every six (6) hours as needed for Pain. Qty: 40 Tab, Refills: 0      amLODIPine (NORVASC) 10 mg tablet Take 1 Tab by mouth daily. Qty: 90 Tab, Refills: 3    Associated Diagnoses: Malignant hypertension; Essential hypertension      metoprolol succinate (TOPROL-XL) 200 mg XL tablet Take 1 Tab by mouth daily. Qty: 90 Tab, Refills: 3    Associated Diagnoses: Malignant hypertension; Essential hypertension      losartan (COZAAR) 100 mg tablet Take 1 Tab by mouth daily. Qty: 90 Tab, Refills: 3    Associated Diagnoses: Malignant hypertension; Essential hypertension      hydrALAZINE (APRESOLINE) 50 mg tablet Take 0.5 Tabs by mouth four (4) times daily. Qty: 180 Tab, Refills: 3    Associated Diagnoses: Malignant hypertension; Essential hypertension      hydroCHLOROthiazide (HYDRODIURIL) 25 mg tablet Take 1 Tab by mouth daily.   Qty: 90 Tab, Refills: 3    Associated Diagnoses: Malignant hypertension; Essential hypertension      levothyroxine (SYNTHROID) 125 mcg tablet Take 1 Tab by mouth Daily (before breakfast). Qty: 90 Tab, Refills: 3    Associated Diagnoses: Hypothyroidism, acquired      buPROPion (WELLBUTRIN) 100 mg tablet Take 1 Tab by mouth daily. Qty: 90 Tab, Refills: 3    Associated Diagnoses: Other depression      citalopram (CELEXA) 40 mg tablet Take 1 Tab by mouth daily. Qty: 90 Tab, Refills: 3    Associated Diagnoses: Other depression      Insulin Needles, Disposable, (DORA PEN NEEDLE) 32 gauge x 5/32\" ndle Use one needle to give insulin 4 times a day. Qty: 400 Pen Needle, Refills: 15    Associated Diagnoses: Controlled type 2 diabetes mellitus with other specified complication, with long-term current use of insulin ([facility])      RABEprazole (ACIPHEX) 20 mg tablet TAKE 1 TABLET BY MOUTH ONCE DAILY  Qty: 30 Tab, Refills: 5    Associated Diagnoses: Gastric reflux      polyethylene glycol (MIRALAX) 17 gram packet Take 1 Packet by mouth two (2) times a day. Qty: 60 Packet, Refills: 1      ondansetron hcl (ZOFRAN, AS HYDROCHLORIDE,) 4 mg tablet Take 2 Tabs by mouth every eight (8) hours as needed for Nausea. Qty: 12 Tab, Refills: 0      HYDROmorphone (DILAUDID) 2 mg tablet Take 1 Tab by mouth every four (4) hours as needed for Pain. Max Daily Amount: 12 mg.  Qty: 60 Tab, Refills: 0    Associated Diagnoses: Right lower quadrant abdominal pain      nitrofurantoin, macrocrystal-monohydrate, (MACROBID) 100 mg capsule Take 1 Cap by mouth two (2) times a day. Qty: 14 Cap, Refills: 0    Associated Diagnoses: Cystitis      sucralfate (CARAFATE) 100 mg/mL suspension Take 5 mL by mouth four (4) times daily. Qty: 414 mL, Refills: 0    Associated Diagnoses: Epigastric abdominal pain      valACYclovir (VALTREX) 1 gram tablet Take 1 Tab by mouth three (3) times daily. Qty: 21 Tab, Refills: 0    Associated Diagnoses: Herpes zoster with complication      promethazine (PHENERGAN) 25 mg tablet Take 1 Tab by mouth every six (6) hours as needed.   Qty: 12 Tab, Refills: 0      ondansetron (ZOFRAN ODT) 4 mg disintegrating tablet Take 1 Tab by mouth every eight (8) hours as needed for Nausea. Qty: 10 Tab, Refills: 0         STOP taking these medications       clopidogrel (PLAVIX) 75 mg tab Comments:   Reason for Stopping:         aspirin delayed-release 81 mg tablet Comments:   Reason for Stopping:               Recent Results (from the past 24 hour(s))   URINALYSIS W/ RFLX MICROSCOPIC    Collection Time: 12/18/17  4:55 PM   Result Value Ref Range    Color YELLOW      Appearance CLEAR      Specific gravity 1.011 1.005 - 1.030      pH (UA) 6.5 5.0 - 8.0      Protein 300 (A) NEG mg/dL    Glucose NEGATIVE  NEG mg/dL    Ketone NEGATIVE  NEG mg/dL    Bilirubin NEGATIVE  NEG      Blood NEGATIVE  NEG      Urobilinogen 1.0 0.2 - 1.0 EU/dL    Nitrites NEGATIVE  NEG      Leukocyte Esterase TRACE (A) NEG     DRUG SCREEN, URINE    Collection Time: 12/18/17  4:55 PM   Result Value Ref Range    BENZODIAZEPINES NEGATIVE  NEG      BARBITURATES NEGATIVE  NEG      THC (TH-CANNABINOL) NEGATIVE  NEG      OPIATES NEGATIVE  NEG      PCP(PHENCYCLIDINE) NEGATIVE  NEG      COCAINE NEGATIVE  NEG      AMPHETAMINES NEGATIVE  NEG      METHADONE NEGATIVE       HDSCOM (NOTE)    URINE MICROSCOPIC ONLY    Collection Time: 12/18/17  4:55 PM   Result Value Ref Range    WBC 1 to 3 0 - 4 /hpf    RBC 0 0 - 5 /hpf    Epithelial cells 1+ 0 - 5 /lpf    Bacteria NEGATIVE  NEG /hpf   CBC W/O DIFF    Collection Time: 12/18/17 11:55 PM   Result Value Ref Range    WBC 7.1 4.6 - 13.2 K/uL    RBC 4.29 4. 20 - 5.30 M/uL    HGB 13.4 12.0 - 16.0 g/dL    HCT 38.4 35.0 - 45.0 %    MCV 89.5 74.0 - 97.0 FL    MCH 31.2 24.0 - 34.0 PG    MCHC 34.9 31.0 - 37.0 g/dL    RDW 11.6 11.6 - 14.5 %    PLATELET 672 197 - 202 K/uL    MPV 10.4 9.2 - 11.8 FL   PROTHROMBIN TIME + INR    Collection Time: 12/18/17 11:55 PM   Result Value Ref Range    Prothrombin time 12.1 11.5 - 15.2 sec    INR 0.9 0.8 - 1.2     PTT    Collection Time: 12/18/17 11:55 PM   Result Value Ref Range    aPTT 29.3 23.0 - 36.4 SEC METABOLIC PANEL, BASIC    Collection Time: 12/18/17 11:55 PM   Result Value Ref Range    Sodium 139 136 - 145 mmol/L    Potassium 3.1 (L) 3.5 - 5.5 mmol/L    Chloride 101 100 - 108 mmol/L    CO2 28 21 - 32 mmol/L    Anion gap 10 3.0 - 18 mmol/L    Glucose 103 (H) 74 - 99 mg/dL    BUN 19 (H) 7.0 - 18 MG/DL    Creatinine 0.79 0.6 - 1.3 MG/DL    BUN/Creatinine ratio 24 (H) 12 - 20      GFR est AA >60 >60 ml/min/1.73m2    GFR est non-AA >60 >60 ml/min/1.73m2    Calcium 8.6 8.5 - 10.1 MG/DL   MAGNESIUM    Collection Time: 12/18/17 11:55 PM   Result Value Ref Range    Magnesium 1.9 1.6 - 2.6 mg/dL   PHOSPHORUS    Collection Time: 12/18/17 11:55 PM   Result Value Ref Range    Phosphorus 3.6 2.5 - 4.9 MG/DL   HEPATIC FUNCTION PANEL    Collection Time: 12/18/17 11:55 PM   Result Value Ref Range    Protein, total 7.6 6.4 - 8.2 g/dL    Albumin 2.7 (L) 3.4 - 5.0 g/dL    Globulin 4.9 (H) 2.0 - 4.0 g/dL    A-G Ratio 0.6 (L) 0.8 - 1.7      Bilirubin, total 1.1 (H) 0.2 - 1.0 MG/DL    Bilirubin, direct 0.4 (H) 0.0 - 0.2 MG/DL    Alk.  phosphatase 171 (H) 45 - 117 U/L    AST (SGOT) 44 (H) 15 - 37 U/L    ALT (SGPT) 48 13 - 56 U/L   LIPASE    Collection Time: 12/18/17 11:55 PM   Result Value Ref Range    Lipase 96 73 - 393 U/L   CARDIAC PANEL,(CK, CKMB & TROPONIN)    Collection Time: 12/18/17 11:55 PM   Result Value Ref Range    CK 77 26 - 192 U/L    CK - MB <1.0 <3.6 ng/ml    CK-MB Index  0.0 - 4.0 %     CALCULATION NOT PERFORMED WHEN RESULT IS BELOW LINEAR LIMIT    Troponin-I, Qt. <0.02 0.0 - 0.045 NG/ML   SED RATE (ESR)    Collection Time: 12/18/17 11:55 PM   Result Value Ref Range    Sed rate, automated 72 (H) 0 - 30 mm/hr   LIPID PANEL    Collection Time: 12/18/17 11:55 PM   Result Value Ref Range    LIPID PROFILE          Cholesterol, total 257 (H) <200 MG/DL    Triglyceride 137 <150 MG/DL    HDL Cholesterol 93 (H) 40 - 60 MG/DL    LDL, calculated 136.6 (H) 0 - 100 MG/DL    VLDL, calculated 27.4 MG/DL    CHOL/HDL Ratio 2.8 0 - 5. 0     CORTISOL    Collection Time: 12/18/17 11:55 PM   Result Value Ref Range    Cortisol, random 9.7 3.09 - 22.40 ug/dL   TSH 3RD GENERATION    Collection Time: 12/18/17 11:55 PM   Result Value Ref Range    TSH 2.25 0.36 - 3.74 uIU/mL   T3, FREE    Collection Time: 12/18/17 11:55 PM   Result Value Ref Range    Triiodothyronine (T3), free 2.4 2.3 - 4.2 PG/ML   T4, FREE    Collection Time: 12/18/17 11:55 PM   Result Value Ref Range    T4, Free 1.3 0.7 - 1.5 NG/DL   GLUCOSE, POC    Collection Time: 12/18/17 11:59 PM   Result Value Ref Range    Glucose (POC) 104 70 - 110 mg/dL   EKG, 12 LEAD, SUBSEQUENT    Collection Time: 12/19/17  1:23 AM   Result Value Ref Range    Ventricular Rate 85 BPM    Atrial Rate 85 BPM    P-R Interval 164 ms    QRS Duration 96 ms    Q-T Interval 400 ms    QTC Calculation (Bezet) 476 ms    Calculated P Axis 40 degrees    Calculated R Axis -38 degrees    Calculated T Axis 68 degrees    Diagnosis       Normal sinus rhythm  Left axis deviation  Nonspecific ST and T wave abnormality  Prolonged QT  Abnormal ECG  When compared with ECG of 18-DEC-2017 11:58,  No significant change was found  Confirmed by Matias Solorio ([id number]) on 12/19/2017 9:26:39 AM     CALCIUM, IONIZED    Collection Time: 12/19/17  5:55 AM   Result Value Ref Range    Ionized Calcium 1.16 1.12 - 1.32 MMOL/L   AMMONIA    Collection Time: 12/19/17  5:55 AM   Result Value Ref Range    Ammonia 48 (H) 11 - 32 UMOL/L   GLUCOSE, POC    Collection Time: 12/19/17  8:08 AM   Result Value Ref Range    Glucose (POC) 182 (H) 70 - 110 mg/dL   GLUCOSE, POC    Collection Time: 12/19/17 11:30 AM   Result Value Ref Range    Glucose (POC) 212 (H) 70 - 110 mg/dL   PLATELET FUNCTION, VERIFY NOW P2Y12    Collection Time: 12/19/17 12:08 PM   Result Value Ref Range    P2Y12 Plt response [id number] 194 - 418 PRU       Follow-Up And Discharge Instructions:    Follow-up Information     Follow up With Details Comments Contact Info    Zeyad BOLAÑOS [address] [place] [address], DO   [facility]  [address] [facility] Anselmo Singh MD Schedule an appointment as soon as possible for a visit follow-up recurrent CVAs [address] Feroz Navarro MD Call follow-up with your cardiologist or Dr Aldo Kaur for systolic cardiomyopathy [address] [facility] [address]  [address]  [facility]  [facility]  [phone]              Wound Care/Supplies:   N/A      Dr Andres Bazzi        Time Spent:  35min    Cc: Rizwan Holley DO High Dose Vitamin A Counseling: Side effects reviewed, pt to contact office should one occur.